# Patient Record
Sex: FEMALE | Race: WHITE | NOT HISPANIC OR LATINO | Employment: OTHER | URBAN - METROPOLITAN AREA
[De-identification: names, ages, dates, MRNs, and addresses within clinical notes are randomized per-mention and may not be internally consistent; named-entity substitution may affect disease eponyms.]

---

## 2017-01-10 ENCOUNTER — ALLSCRIPTS OFFICE VISIT (OUTPATIENT)
Dept: OTHER | Facility: OTHER | Age: 67
End: 2017-01-10

## 2017-01-10 DIAGNOSIS — Z20.5 CONTACT WITH AND (SUSPECTED) EXPOSURE TO VIRAL HEPATITIS: ICD-10-CM

## 2017-01-10 DIAGNOSIS — Z12.11 ENCOUNTER FOR SCREENING FOR MALIGNANT NEOPLASM OF COLON: ICD-10-CM

## 2017-01-10 DIAGNOSIS — I10 ESSENTIAL (PRIMARY) HYPERTENSION: ICD-10-CM

## 2017-01-10 DIAGNOSIS — M79.605 PAIN OF LEFT LEG: ICD-10-CM

## 2017-01-10 DIAGNOSIS — M79.604 PAIN OF RIGHT LEG: ICD-10-CM

## 2017-01-17 ENCOUNTER — ALLSCRIPTS OFFICE VISIT (OUTPATIENT)
Dept: OTHER | Facility: OTHER | Age: 67
End: 2017-01-17

## 2017-02-09 ENCOUNTER — ALLSCRIPTS OFFICE VISIT (OUTPATIENT)
Dept: OTHER | Facility: OTHER | Age: 67
End: 2017-02-09

## 2017-02-21 ENCOUNTER — APPOINTMENT (OUTPATIENT)
Dept: LAB | Facility: CLINIC | Age: 67
End: 2017-02-21
Payer: MEDICARE

## 2017-02-21 ENCOUNTER — TRANSCRIBE ORDERS (OUTPATIENT)
Dept: LAB | Facility: CLINIC | Age: 67
End: 2017-02-21

## 2017-02-21 DIAGNOSIS — M81.0 OSTEOPOROSIS, UNSPECIFIED: Primary | ICD-10-CM

## 2017-02-21 DIAGNOSIS — E55.9 UNSPECIFIED VITAMIN D DEFICIENCY: ICD-10-CM

## 2017-02-21 LAB
25(OH)D3 SERPL-MCNC: 30.1 NG/ML (ref 30–100)
ALBUMIN SERPL BCP-MCNC: 3.9 G/DL (ref 3.5–5)
ANION GAP SERPL CALCULATED.3IONS-SCNC: 5 MMOL/L (ref 4–13)
BUN SERPL-MCNC: 14 MG/DL (ref 5–25)
CALCIUM ALBUM COR SERPL-MCNC: 10.3 MG/DL (ref 8.3–10.1)
CALCIUM SERPL-MCNC: 10.2 MD/DL (ref 8.3–10.1)
CALCIUM SERPL-MCNC: 10.2 MG/DL (ref 8.3–10.1)
CHLORIDE SERPL-SCNC: 107 MMOL/L (ref 100–108)
CO2 SERPL-SCNC: 30 MMOL/L (ref 21–32)
CREAT SERPL-MCNC: 0.95 MG/DL (ref 0.6–1.3)
GFR SERPL CREATININE-BSD FRML MDRD: 58.7 ML/MIN/1.73SQ M
GLUCOSE SERPL-MCNC: 118 MG/DL (ref 65–140)
POTASSIUM SERPL-SCNC: 4.7 MMOL/L (ref 3.5–5.3)
SODIUM SERPL-SCNC: 142 MMOL/L (ref 136–145)

## 2017-02-21 PROCEDURE — 82040 ASSAY OF SERUM ALBUMIN: CPT

## 2017-02-21 PROCEDURE — 80048 BASIC METABOLIC PNL TOTAL CA: CPT

## 2017-02-21 PROCEDURE — 36415 COLL VENOUS BLD VENIPUNCTURE: CPT

## 2017-02-21 PROCEDURE — 82306 VITAMIN D 25 HYDROXY: CPT

## 2017-02-21 PROCEDURE — 82310 ASSAY OF CALCIUM: CPT

## 2017-02-24 ENCOUNTER — GENERIC CONVERSION - ENCOUNTER (OUTPATIENT)
Dept: OTHER | Facility: OTHER | Age: 67
End: 2017-02-24

## 2017-03-21 ENCOUNTER — TRANSCRIBE ORDERS (OUTPATIENT)
Dept: LAB | Facility: CLINIC | Age: 67
End: 2017-03-21

## 2017-03-21 ENCOUNTER — APPOINTMENT (OUTPATIENT)
Dept: LAB | Facility: CLINIC | Age: 67
End: 2017-03-21
Payer: MEDICARE

## 2017-03-21 DIAGNOSIS — E53.8 OTHER B-COMPLEX DEFICIENCIES: ICD-10-CM

## 2017-03-21 DIAGNOSIS — M62.81 MUSCLE WEAKNESS (GENERALIZED): ICD-10-CM

## 2017-03-21 DIAGNOSIS — G60.0 PERONEAL MUSCULAR ATROPHY: Primary | ICD-10-CM

## 2017-03-21 DIAGNOSIS — R20.2 PARESTHESIA: ICD-10-CM

## 2017-03-21 LAB — VIT B12 SERPL-MCNC: 438 PG/ML (ref 100–900)

## 2017-03-21 PROCEDURE — 82607 VITAMIN B-12: CPT

## 2017-03-21 PROCEDURE — 36415 COLL VENOUS BLD VENIPUNCTURE: CPT

## 2017-03-21 PROCEDURE — 86618 LYME DISEASE ANTIBODY: CPT

## 2017-03-21 PROCEDURE — 83918 ORGANIC ACIDS TOTAL QUANT: CPT

## 2017-03-21 PROCEDURE — 84165 PROTEIN E-PHORESIS SERUM: CPT

## 2017-03-21 PROCEDURE — 86038 ANTINUCLEAR ANTIBODIES: CPT

## 2017-03-21 PROCEDURE — 86235 NUCLEAR ANTIGEN ANTIBODY: CPT

## 2017-03-22 LAB
B BURGDOR IGG SER IA-ACNC: 0.43
B BURGDOR IGM SER IA-ACNC: 0.15
ENA SS-A AB SER-ACNC: <0.2 AI (ref 0–0.9)
ENA SS-B AB SER-ACNC: <0.2 AI (ref 0–0.9)

## 2017-03-23 LAB
ALBUMIN SERPL ELPH-MCNC: 4 G/DL (ref 3.5–5)
ALBUMIN SERPL ELPH-MCNC: 62.5 % (ref 52–65)
ALPHA1 GLOB SERPL ELPH-MCNC: 0.26 G/DL (ref 0.1–0.4)
ALPHA1 GLOB SERPL ELPH-MCNC: 4.1 % (ref 2.5–5)
ALPHA2 GLOB SERPL ELPH-MCNC: 0.72 G/DL (ref 0.4–1.2)
ALPHA2 GLOB SERPL ELPH-MCNC: 11.3 % (ref 7–13)
BETA GLOB ABNORMAL SERPL ELPH-MCNC: 0.52 G/DL (ref 0.4–0.8)
BETA1 GLOB SERPL ELPH-MCNC: 8.1 % (ref 5–13)
BETA2 GLOB SERPL ELPH-MCNC: 4.8 % (ref 2–8)
BETA2+GAMMA GLOB SERPL ELPH-MCNC: 0.31 G/DL (ref 0.2–0.5)
GAMMA GLOB ABNORMAL SERPL ELPH-MCNC: 0.59 G/DL (ref 0.5–1.6)
GAMMA GLOB SERPL ELPH-MCNC: 9.2 % (ref 12–22)
IGG/ALB SER: 1.67 {RATIO} (ref 1.1–1.8)
PROT PATTERN SERPL ELPH-IMP: ABNORMAL
PROT SERPL-MCNC: 6.4 G/DL (ref 6.4–8.2)
RYE IGE QN: NEGATIVE

## 2017-03-24 LAB — METHYLMALONATE SERPL-SCNC: 269 NMOL/L (ref 0–378)

## 2017-06-28 ENCOUNTER — ALLSCRIPTS OFFICE VISIT (OUTPATIENT)
Dept: OTHER | Facility: OTHER | Age: 67
End: 2017-06-28

## 2017-07-11 ENCOUNTER — OFFICE VISIT (OUTPATIENT)
Dept: URGENT CARE | Facility: CLINIC | Age: 67
End: 2017-07-11
Payer: MEDICARE

## 2017-07-11 ENCOUNTER — APPOINTMENT (OUTPATIENT)
Dept: RADIOLOGY | Facility: CLINIC | Age: 67
End: 2017-07-11
Payer: MEDICARE

## 2017-07-11 DIAGNOSIS — R05.9 COUGH: ICD-10-CM

## 2017-07-11 PROCEDURE — 71020 HB CHEST X-RAY 2VW FRONTAL&LATL: CPT

## 2017-07-11 PROCEDURE — 99214 OFFICE O/P EST MOD 30 MIN: CPT

## 2017-07-11 PROCEDURE — G0463 HOSPITAL OUTPT CLINIC VISIT: HCPCS

## 2017-07-17 ENCOUNTER — ALLSCRIPTS OFFICE VISIT (OUTPATIENT)
Dept: OTHER | Facility: OTHER | Age: 67
End: 2017-07-17

## 2017-08-23 ENCOUNTER — ALLSCRIPTS OFFICE VISIT (OUTPATIENT)
Dept: OTHER | Facility: OTHER | Age: 67
End: 2017-08-23

## 2017-08-28 ENCOUNTER — APPOINTMENT (OUTPATIENT)
Dept: LAB | Facility: CLINIC | Age: 67
End: 2017-08-28
Payer: MEDICARE

## 2017-08-28 ENCOUNTER — TRANSCRIBE ORDERS (OUTPATIENT)
Dept: LAB | Facility: CLINIC | Age: 67
End: 2017-08-28

## 2017-08-28 DIAGNOSIS — M81.0 SENILE OSTEOPOROSIS: Primary | ICD-10-CM

## 2017-08-28 DIAGNOSIS — E55.9 UNSPECIFIED VITAMIN D DEFICIENCY: ICD-10-CM

## 2017-08-28 LAB
25(OH)D3 SERPL-MCNC: 30.2 NG/ML (ref 30–100)
ALBUMIN SERPL BCP-MCNC: 3.7 G/DL (ref 3.5–5)
ALP SERPL-CCNC: 62 U/L (ref 46–116)
ALT SERPL W P-5'-P-CCNC: 25 U/L (ref 12–78)
ANION GAP SERPL CALCULATED.3IONS-SCNC: 7 MMOL/L (ref 4–13)
AST SERPL W P-5'-P-CCNC: 17 U/L (ref 5–45)
BILIRUB SERPL-MCNC: 0.51 MG/DL (ref 0.2–1)
BUN SERPL-MCNC: 23 MG/DL (ref 5–25)
CALCIUM SERPL-MCNC: 9.6 MG/DL (ref 8.3–10.1)
CHLORIDE SERPL-SCNC: 101 MMOL/L (ref 100–108)
CO2 SERPL-SCNC: 29 MMOL/L (ref 21–32)
CREAT SERPL-MCNC: 0.82 MG/DL (ref 0.6–1.3)
GFR SERPL CREATININE-BSD FRML MDRD: 74 ML/MIN/1.73SQ M
GLUCOSE SERPL-MCNC: 176 MG/DL (ref 65–140)
POTASSIUM SERPL-SCNC: 5.3 MMOL/L (ref 3.5–5.3)
PROT SERPL-MCNC: 7.1 G/DL (ref 6.4–8.2)
SODIUM SERPL-SCNC: 137 MMOL/L (ref 136–145)

## 2017-08-28 PROCEDURE — 82306 VITAMIN D 25 HYDROXY: CPT

## 2017-08-28 PROCEDURE — 36415 COLL VENOUS BLD VENIPUNCTURE: CPT

## 2017-08-28 PROCEDURE — 80053 COMPREHEN METABOLIC PANEL: CPT

## 2017-09-07 ENCOUNTER — GENERIC CONVERSION - ENCOUNTER (OUTPATIENT)
Dept: OTHER | Facility: OTHER | Age: 67
End: 2017-09-07

## 2017-09-11 ENCOUNTER — GENERIC CONVERSION - ENCOUNTER (OUTPATIENT)
Dept: OTHER | Facility: OTHER | Age: 67
End: 2017-09-11

## 2017-09-13 ENCOUNTER — GENERIC CONVERSION - ENCOUNTER (OUTPATIENT)
Dept: OTHER | Facility: OTHER | Age: 67
End: 2017-09-13

## 2017-10-04 ENCOUNTER — APPOINTMENT (OUTPATIENT)
Dept: RADIOLOGY | Facility: CLINIC | Age: 67
End: 2017-10-04
Payer: MEDICARE

## 2017-10-04 ENCOUNTER — OFFICE VISIT (OUTPATIENT)
Dept: URGENT CARE | Facility: CLINIC | Age: 67
End: 2017-10-04
Payer: MEDICARE

## 2017-10-04 DIAGNOSIS — R52 PAIN: ICD-10-CM

## 2017-10-04 DIAGNOSIS — S69.92XA UNSPECIFIED INJURY OF LEFT WRIST, HAND AND FINGER(S), INITIAL ENCOUNTER: ICD-10-CM

## 2017-10-04 PROCEDURE — 73130 X-RAY EXAM OF HAND: CPT

## 2017-10-04 PROCEDURE — 90715 TDAP VACCINE 7 YRS/> IM: CPT

## 2017-10-05 NOTE — PROGRESS NOTES
Assessment  1  Injury of left hand, initial encounter (959 4) (C51 28OD)   2  Left thumb sprain (842 10) (C45 672A)   3  Abrasion of knee (916 0) (S80 219A)    Plan  Injury of left hand, initial encounter    · * XR HAND 3+ VIEW LEFT; Status:Active; Requested for:04Oct2017;     Discussion/Summary  Discussion Summary:   Xray appears negative for fracture  Will follow up with radiologist report  Patient wearing stabilization brace  Ice, elevate  Can take ibuprofen for pain  If not improving over the next week to follow up with orthopedics  Medication Side Effects Reviewed: Possible side effects of new medications were reviewed with the patient/guardian today  Understands and agrees with treatment plan: The treatment plan was reviewed with the patient/guardian  The patient/guardian understands and agrees with the treatment plan   Follow Up Instructions: Follow Up with your Primary Care Provider in 7 days  If your symptoms worsen, go to the nearest [place] [address] Emergency Department  Chief Complaint  1  Wrist Pain  Chief Complaint Free Text Note Form: S/p fall on 10/01/17 , c/o pain , swelling and discoloration of left wrist extending to thumb; also with abrasions of both knees; denies loc      History of Present Illness  HPI: 80-year-old female here after falling 3 days ago  She has persistent pain and swelling and bruising of her left thenar area that extends up little bit into her wrist  Has decreased range of motion of her thumb  Also has abrasions on both of her knees  2 bilateral outstretched hands  Right hand and wrist with full range of motion but has some bruising along the lateral aspect of hand  Hospital Based Practices Required Assessment:   Pain Assessment   the patient states they have pain  The pain is located in the wrist    Dior-Coreas FACES Pain Rating Scale Children >3 Score: 8  Wrist Pain: BERTA MIGUEL presents with complaints of wrist pain     Associated symptoms include swelling,-redness,-wrist bruising,-decreased range of motion-and-pain in the hand, but-no warmth,-no crepitation,-no stiffness,-no numbness in the hand,-no weakness in the hand,-no fever,-no chills,-no generalized rash,-no localized rash-and-no pain in other joints  Review of Systems  Focused-Female:   Constitutional: No fever, no chills, feels well, no tiredness, no recent weight gain or loss  Musculoskeletal: as noted in HPI  Integumentary: as noted in HPI  Neurological: no complaints of headache, no confusion, no numbness or tingling, no dizziness or fainting  ROS Reviewed:   ROS reviewed  Active Problems  1  Acute bronchitis (466 0) (J20 9)   2  Anxiety (300 00) (F41 9)   3  Atopic dermatitis (691 8) (L20 9)   4  Chronic lower back pain (724 2,338 29) (M54 5,G89 29)   5  Chronic pain of both lower extremities (729 5,338 29) (M79 604,M79 605,G89 29)   6  Clinical depression (311) (F32 9)   7  Colon cancer screening (V76 51) (Z12 11)   8  Cough (786 2) (R05)   9  Degeneration of intervertebral disc (722 6)   10  Dermatitis (692 9) (L30 9)   11  Disc herniation (722 2)   12  Edema, peripheral (782 3) (R60 9)   13  Encounter for screening colonoscopy (V76 51) (Z12 11)   14  Episode of recurrent major depressive disorder, unspecified depression episode    severity (296 30) (F33 9)   15  Exposure to hepatitis C (V01 79) (Z20 5)   16  Flu vaccine need (V04 81) (Z23)   17  HTN (hypertension) (401 9) (I10)   18  Hypercholesteremia (272 0) (E78 00)   19  Hypotension (458 9) (I95 9)   20  Impetigo (684) (L01 00)   21  Left hip pain (719 45) (M25 552)   22  Lumbar radiculopathy (724 4) (M54 16)   23  Major depression (296 20) (F32 9)   24  Osteoporosis (733 00) (M81 0)   25  Restless legs syndrome (RLS) (333 94) (G25 81)   26  S/P lumbar fusion (V45 4) (Z98 1)   27  Screening for genitourinary condition (V81 6) (Z13 89)   28  Screening for neurological condition (V80 09) (Z13 89)   29   Sleep disturbances (780 50) (G47 9)   30  SOB (shortness of breath) on exertion (786 05) (R06 02)   31  Type 2 diabetes mellitus (250 00) (E11 9)   32  Ulnar neuropathy (354 2) (G56 20)   33  Uncontrolled diabetes mellitus type 2 without complications (229 64) (D49 95)   34  Visit for screening mammogram (V76 12) (Z12 31)   35  Vitamin D deficiency (268 9) (E55 9)    Past Medical History  1  History of Cellulitis (682 9) (L03 90)   2  History of Dry skin (701 1) (L85 3)   3  History of Dysuria (788 1) (R30 0)   4  History of Herniated disc (722 2)   5  History of esophageal reflux (V12 79) (Z87 19)   6  History of fatigue (V13 89) (Z87 898)   7  History of pneumonia (V12 61) (Z87 01)   8  History of urinary tract infection (V13 02) (Z87 440)   9  History of Left elbow pain (719 42) (M25 522)   10  History of Neck pain (723 1) (M54 2)   11  History of Pain, foot (729 5) (M79 673)   12  History of URTI (acute upper respiratory infection) (465 9) (J06 9)  Active Problems And Past Medical History Reviewed: The active problems and past medical history were reviewed and updated today  Family History  Mother    1  Family history of cerebrovascular accident (CVA) (V17 1) (Z82 3)   2  Family history of hypertension (V17 49) (Z82 49)   3  Family history of mental disorder (V17 0) (Z81 8)  Father    4  Family history of cardiac disorder (V17 49) (Z82 49)   5  Family history of malignant neoplasm (V16 9) (Z80 9)   6  Family history of type 2 diabetes mellitus (V18 0) (Z83 3)  Sibling    7  Family history of Calcium kidney stones   8  Family history of herpes zoster infection (V18 8) (Z83 1)   9  Family history of hypertension (V17 49) (Z82 49)   10  Family history of mental disorder (V17 0) (Z81 8)   11  Family history of migraine headaches (V17 2) (Z82 0)   12  Family history of type 2 diabetes mellitus (V18 0) (Z83 3)  Grandparent    13  Family history of Calcium kidney stones   14   Family history of chronic obstructive pulmonary disease (V17 6) (Z82 5)   15  Family history of tuberculosis (V18 8) (Z83 1)  Family History Reviewed: The family history was reviewed and updated today  Social History   · Alcohol use (V49 89) (Z78 9)   · Always uses seat belt   · Current every day smoker (305 1) (F17 200)   · Dental care, regularly   · English   · Good dental hygiene   · Occasional caffeine consumption   · Primary spoken language [place]  Social History Reviewed: The social history was reviewed and updated today  The social history was reviewed and is unchanged  Surgical History  1  History of Back Surgery   2  History of Cholecystectomy   3  History of Foot Surgery   4  History of Hip Surgery   5  History of Hysterectomy   6  History of Laparoscopic Sling Operation For Stress Incontinence   7  History of Ovarian Cystectomy   8  History of Shoulder Surgery  Surgical History Reviewed: The surgical history was reviewed and updated today  Current Meds   1  Albuterol Sulfate (2 5 MG/3ML) 0 083% Inhalation Nebulization Solution; USE 1 UNIT   DOSE IN NEBULIZER EVERY 4 HOURS AS NEEDED; Therapy: 93Uxl1271 to (Last Rx:45Eit4587)  Requested for: 92Snw3393 Ordered   2  Atorvastatin Calcium 20 MG Oral Tablet; take 1 tablet by mouth once daily; Therapy: 76DPD8949 to (Evaluate:08Rxs2878)  Requested for: 45TXI2168; Last   Rx:25Fqa2748 Ordered   3  Betamethasone Dipropionate 0 05 % External Cream; APPLY SPARINGLY TO AFFECTED   AREA(S) TWICE DAILY; Therapy: 09Vwq3422 to (Last Rx:41Wbo8840)  Requested for: 72Noc7087 Ordered   4  Calcium 500 MG TABS; TAKE 1 TABLET DAILY; Therapy: 77GYE8862 to Recorded   5  ClonazePAM 0 125 MG Oral Tablet Disintegrating; one at bedtime as needed; Therapy: 77Zwu3025 to Recorded   6  Elavil 25 MG Oral Tablet; TAKE 1 TABLET 3 TIMES DAILY AS NEEDED; Therapy: 96Fto4567 to Recorded   7  Escitalopram Oxalate 10 MG Oral Tablet; TAKE 1 TABLET DAILY;    Therapy: 51LWW7857 to (Evaluate:54Kdu8580)  Requested for: 72ONV9886; Last   EO:68ARW5430 Ordered   8  HydroCHLOROthiazide 50 MG Oral Tablet; 1 tablet daily prn for ankle swelling; Therapy: 95ERK6572 to (Last Jose Prima)  Requested for: 11LMG6667 Ordered   9  Lisinopril 2 5 MG Oral Tablet; TAKE ONE TABLET BY MOUTH ONCE DAILY AS   DIRECTED; Therapy: 40ISR7310 to (Evaluate:13Jan2018)  Requested for: 32OPC2412; Last   Rx:51Qif5304 Ordered   10  MetFORMIN HCl - 500 MG Oral Tablet; TAKE 1 TABLET TWICE DAILY WITH FOOD; Therapy: 66KCY4315 to (RFAPQPNV:09DMV1257)  Requested for: 57FYA1529; Last    Rx:29Jzo1404 Ordered   11  Mirapex ER 1 5 MG Oral Tablet Extended Release 24 Hour; TAKE 1 TABLET EVERY    MORNING; Therapy: 31DHU6122 to Recorded   12  Omeprazole 20 MG Oral Capsule Delayed Release; TAKE ONE CAPSULE BY MOUTH    ONCE DAILY; Therapy: 24Qzz4379 to (Evaluate:97Kol7467)  Requested for: 64BIT9327; Last    Rx:59Txb7348 Ordered   13  OneTouch Ultra Blue In Vitro Strip; TEST 3 TIMES DAILY; Therapy: 00MLM6878 to (Evaluate:42Thq9920) Recorded   14  ProAir  (90 Base) MCG/ACT Inhalation Aerosol Solution; INHALE 1 TO 2 PUFFS    EVERY 4 TO 6 HOURS AS NEEDED; Therapy: 01Apr2016 to (Last Rx:01Apr2016)  Requested for: 01Apr2016 Ordered   15  Prolia 60 MG/ML Subcutaneous Solution; INJECT SUBCUTANEOUSLY  60 MG / 1 ML    EVERY 6 MONTHS; Therapy: 62Uxt2755 to Recorded   16  Vitamin B Complex Oral Tablet; TAKE 1 TABLET DAILY; Therapy: 88ZJN5051 to Recorded   17  Vitamin D 1000 UNIT Oral Tablet; TAKE 1 TABLET DAILY; Therapy: 92SZR4254 to (LJTERRNR:58QSD0829) Recorded  Medication List Reviewed: The medication list was reviewed and updated today  Allergies  1  Versed   2  Gabapentin CAPS   3  Lyrica CAPS   4  Penicillin V Potassium TABS   5  propofol   6  Sulfa Drugs   7   Vicodin TABS    Vitals  Signs   Recorded: 69JET0422 01:12PM   Temperature: 97 1 F  Heart Rate: 88  Respiration: 18  Systolic: 712  Diastolic: 76  O2 Saturation: 95  Pain Scale: 8    Physical Exam    Constitutional   General appearance: No acute distress, well appearing and well nourished  Pulmonary   Respiratory effort: No increased work of breathing or signs of respiratory distress  Auscultation of lungs: Clear to auscultation  Cardiovascular   Auscultation of heart: Normal rate and rhythm, normal S1 and S2, without murmurs  Musculoskeletal   Inspection/palpation of joints, bones, and muscles: Abnormal  -Left thenar area with swelling and ecchymosis  Tenderness over MCP joint  Has full range of motion of wrist  Unable to fully oppose thumb and right fourth and fifth fingers  Unable to make a fist  Right wrist and hand with full range of motion  Mild bruising along lateral aspect of hand  Skin   Skin and subcutaneous tissue: Abnormal  -Healing abrasions on bilateral knees with no signs of infection        Future Appointments    Date/Time Provider Specialty Site   11/03/2017 11:15 AM Otto Mejía, Aleksandr Zarate [address] INT MED     Signatures   Electronically signed by : Aleksandr Neville; Oct  4 2017  1:44PM EST                       (Author)    Electronically signed by : MARY BETH Kothari ; Oct  4 2017  7:22PM EST                       (Co-author)

## 2017-10-11 ENCOUNTER — GENERIC CONVERSION - ENCOUNTER (OUTPATIENT)
Dept: OTHER | Facility: OTHER | Age: 67
End: 2017-10-11

## 2017-11-06 ENCOUNTER — ALLSCRIPTS OFFICE VISIT (OUTPATIENT)
Dept: OTHER | Facility: OTHER | Age: 67
End: 2017-11-06

## 2017-11-06 ENCOUNTER — TRANSCRIBE ORDERS (OUTPATIENT)
Dept: LAB | Facility: CLINIC | Age: 67
End: 2017-11-06

## 2017-11-06 ENCOUNTER — APPOINTMENT (OUTPATIENT)
Dept: LAB | Facility: CLINIC | Age: 67
End: 2017-11-06
Payer: MEDICARE

## 2017-11-06 DIAGNOSIS — I10 ESSENTIAL HYPERTENSION, MALIGNANT: ICD-10-CM

## 2017-11-06 DIAGNOSIS — E78.00 PURE HYPERCHOLESTEROLEMIA: ICD-10-CM

## 2017-11-06 DIAGNOSIS — E13.8 DIABETES MELLITUS OF OTHER TYPE WITH COMPLICATION, UNSPECIFIED LONG TERM INSULIN USE STATUS: Primary | ICD-10-CM

## 2017-11-06 DIAGNOSIS — E55.9 AVITAMINOSIS D: ICD-10-CM

## 2017-11-06 LAB
25(OH)D3 SERPL-MCNC: 37.6 NG/ML (ref 30–100)
BASOPHILS # BLD AUTO: 0.1 THOUSANDS/ΜL (ref 0–0.1)
BASOPHILS NFR BLD AUTO: 1 % (ref 0–1)
EOSINOPHIL # BLD AUTO: 0.24 THOUSAND/ΜL (ref 0–0.61)
EOSINOPHIL NFR BLD AUTO: 3 % (ref 0–6)
ERYTHROCYTE [DISTWIDTH] IN BLOOD BY AUTOMATED COUNT: 14.9 % (ref 11.6–15.1)
HCT VFR BLD AUTO: 39.1 % (ref 34.8–46.1)
HGB BLD-MCNC: 12.5 G/DL (ref 11.5–15.4)
LYMPHOCYTES # BLD AUTO: 2.69 THOUSANDS/ΜL (ref 0.6–4.47)
LYMPHOCYTES NFR BLD AUTO: 36 % (ref 14–44)
MCH RBC QN AUTO: 28 PG (ref 26.8–34.3)
MCHC RBC AUTO-ENTMCNC: 32 G/DL (ref 31.4–37.4)
MCV RBC AUTO: 88 FL (ref 82–98)
MONOCYTES # BLD AUTO: 0.6 THOUSAND/ΜL (ref 0.17–1.22)
MONOCYTES NFR BLD AUTO: 8 % (ref 4–12)
NEUTROPHILS # BLD AUTO: 3.79 THOUSANDS/ΜL (ref 1.85–7.62)
NEUTS SEG NFR BLD AUTO: 52 % (ref 43–75)
NRBC BLD AUTO-RTO: 0 /100 WBCS
PLATELET # BLD AUTO: 258 THOUSANDS/UL (ref 149–390)
PMV BLD AUTO: 10.2 FL (ref 8.9–12.7)
RBC # BLD AUTO: 4.46 MILLION/UL (ref 3.81–5.12)
WBC # BLD AUTO: 7.43 THOUSAND/UL (ref 4.31–10.16)

## 2017-11-06 PROCEDURE — 82306 VITAMIN D 25 HYDROXY: CPT

## 2017-11-06 PROCEDURE — 82043 UR ALBUMIN QUANTITATIVE: CPT

## 2017-11-06 PROCEDURE — 84443 ASSAY THYROID STIM HORMONE: CPT

## 2017-11-06 PROCEDURE — 83036 HEMOGLOBIN GLYCOSYLATED A1C: CPT

## 2017-11-06 PROCEDURE — 80053 COMPREHEN METABOLIC PANEL: CPT

## 2017-11-06 PROCEDURE — 85025 COMPLETE CBC W/AUTO DIFF WBC: CPT

## 2017-11-06 PROCEDURE — 80061 LIPID PANEL: CPT

## 2017-11-06 PROCEDURE — 82570 ASSAY OF URINE CREATININE: CPT

## 2017-11-06 PROCEDURE — 36415 COLL VENOUS BLD VENIPUNCTURE: CPT

## 2017-11-07 ENCOUNTER — GENERIC CONVERSION - ENCOUNTER (OUTPATIENT)
Dept: OTHER | Facility: OTHER | Age: 67
End: 2017-11-07

## 2017-11-07 LAB
ALBUMIN SERPL BCP-MCNC: 3.6 G/DL (ref 3.5–5)
ALP SERPL-CCNC: 54 U/L (ref 46–116)
ALT SERPL W P-5'-P-CCNC: 24 U/L (ref 12–78)
ANION GAP SERPL CALCULATED.3IONS-SCNC: 7 MMOL/L (ref 4–13)
AST SERPL W P-5'-P-CCNC: 17 U/L (ref 5–45)
BILIRUB SERPL-MCNC: 0.51 MG/DL (ref 0.2–1)
BUN SERPL-MCNC: 22 MG/DL (ref 5–25)
CALCIUM SERPL-MCNC: 9.1 MG/DL (ref 8.3–10.1)
CHLORIDE SERPL-SCNC: 106 MMOL/L (ref 100–108)
CHOLEST SERPL-MCNC: 115 MG/DL (ref 50–200)
CO2 SERPL-SCNC: 26 MMOL/L (ref 21–32)
CREAT SERPL-MCNC: 0.84 MG/DL (ref 0.6–1.3)
CREAT UR-MCNC: 74.8 MG/DL
EST. AVERAGE GLUCOSE BLD GHB EST-MCNC: 148 MG/DL
GFR SERPL CREATININE-BSD FRML MDRD: 72 ML/MIN/1.73SQ M
GLUCOSE P FAST SERPL-MCNC: 84 MG/DL (ref 65–99)
HBA1C MFR BLD: 6.8 % (ref 4.2–6.3)
HDLC SERPL-MCNC: 43 MG/DL (ref 40–60)
LDLC SERPL CALC-MCNC: 47 MG/DL (ref 0–100)
MICROALBUMIN UR-MCNC: 7.4 MG/L (ref 0–20)
MICROALBUMIN/CREAT 24H UR: 10 MG/G CREATININE (ref 0–30)
POTASSIUM SERPL-SCNC: 4.3 MMOL/L (ref 3.5–5.3)
PROT SERPL-MCNC: 7.1 G/DL (ref 6.4–8.2)
SODIUM SERPL-SCNC: 139 MMOL/L (ref 136–145)
TRIGL SERPL-MCNC: 125 MG/DL
TSH SERPL DL<=0.05 MIU/L-ACNC: 3.09 UIU/ML (ref 0.36–3.74)

## 2017-11-10 ENCOUNTER — ALLSCRIPTS OFFICE VISIT (OUTPATIENT)
Dept: OTHER | Facility: OTHER | Age: 67
End: 2017-11-10

## 2017-11-29 ENCOUNTER — GENERIC CONVERSION - ENCOUNTER (OUTPATIENT)
Dept: OTHER | Facility: OTHER | Age: 67
End: 2017-11-29

## 2018-01-03 ENCOUNTER — ALLSCRIPTS OFFICE VISIT (OUTPATIENT)
Dept: OTHER | Facility: OTHER | Age: 68
End: 2018-01-03

## 2018-01-03 DIAGNOSIS — Z91.81 HISTORY OF FALLING: ICD-10-CM

## 2018-01-09 NOTE — RESULT NOTES
Verified Results  * DXA BONE DENSITY SPINE HIP AND PELVIS 66AHW9689 12:50PM Isiah De Santiago Order Number: [id number]     Test Name Result Flag Reference   DXA BONE DENSITY SPINE HIP AND PELVIS (Report)     CENTRAL DXA SCAN     CLINICAL HISTORY:  77year old post-menopausal  female risk factors include postmenopausal, premature menopause, estrogen deficiency  TECHNIQUE: Bone densitometry was performed using a [employer] [place] C bone densitometer  Regions of interest appear properly placed  There are no obvious fractures or other confounding variables which could limit the study  None     COMPARISON: Baseline     RESULTS:    LUMBAR SPINE: L1-L4:   BMD 0 995 gm/cm2   T-score normal, -0 5   Z-score +1 4     LEFT TOTAL HIP:   BMD 0 771 gm/cm2   T-score below normal, -1 4   Z-score -0 1     LEFT FEMORAL NECK:   BMD 0 631 gm/cm2   T-score below normal, -2 0   Z-score -0 4     The Frax score in this patient identifies the risk of a major osteoporotic fracture in the next 10 years at 19% and a hip fracture risk of 1 9%  ASSESSMENT:   1  Based on the WHO classification, this study identifies moderate femoral neck and total hip osteopenia and the patient is considered at risk for fracture  2  A daily intake of calcium of at least 1200 mg and vitamin D, 800-1000 IU, as well as weight bearing and muscle strengthening exercise, fall prevention and avoidance of tobacco and excessive alcohol intake as basic preventive measures are recommended  3  Repeat DXA scan in 18-24 months as clinically indicated  WHO CLASSIFICATION:   Normal (a T-score of -1 0 or higher)   Low bone mineral density (a T-score of less than -1 0 but higher than -2 5)   Osteoporosis (a T-score of -2 5 or less)   Severe osteoporosis (a T-score of -2 5 or less with a fragility fracture)      Thank you for allowing us the opportunity to participate in your patient care       The expanded DEXA report will no longer be arriving in your mail   If you desire to view the full report please contact [address] [place] [address] or access the PACS system       Workstation performed: [id number]

## 2018-01-11 NOTE — RESULT NOTES
Verified Results  MAMMO SCREENING BILATERAL W 3D & CAD 12LJN8445 01:03PM Leann Mejía     Test Name Result Flag Reference   MAMMO SCREENING BILATERAL W 3D & CAD (Report)     Patient History:   Patient is postmenopausal    Family history of unknown cancer in paternal aunt, unknown cancer   in paternal cousin, and colorectal cancer in father at age 58  Took estrogen for 1 year  Patient is a former smoker  Patient's BMI is 29 5  Reason for exam: screening (asymptomatic)  Mammo Screening Bilateral W DBT and CAD: May 25, 2016 - Check In    #: [de-identified]   2D/3D Procedure   3D views: Bilateral MLO view(s) were taken  2D views: Bilateral CC view(s) were taken  Technologist: SIMI Eason (SIMI)(M)   Prior study comparison: November 6, 2014, mammo screening    bilateral, performed at [facility], [place]  November 4, 2013, mammo screening bilateral W CAD, performed at    [facility], [place]  November 17, 2010, mammo    screening bilateral, performed at [facility], [place]  November 5, 2009, mammo screening bilateral,    performed at [facility], [place]  March 31, 2009, mammo screening bilateral, performed at [place], [place]  There are scattered fibroglandular densities  A combination of    mediolateral oblique 3-D tomographic slices as well as standard    two-dimensional orthogonal images were obtained  No dominant soft tissue mass, architectural distortion or    suspicious calcifications are noted  The skin and nipple    contours are within normal limits  No evidence of malignancy  No significant changes   when compared with prior studies  ASSESSMENT: BiRad:1 - Negative     Recommendation:   Routine screening mammogram of both breasts in 1 year  A    reminder letter will be sent  8-10% of cancers will be missed on mammography   Management of a    palpable abnormality must be based on clinical grounds  Patients    will be notified of their results via letter from our facility  Accredited by [employer] of Radiology and FDA  Transcription Location: SIMI Kennedy 98: [id number]     Risk Value(s):   Tyrer-Cuzick 10 Year: 2 671%, Tyrer-Cuzick Lifetime: 5 366%,    Myriad Table: 1 5%, DO 5 Year: 1 9%, NCI Lifetime: 6 7%   Signed by:    Kimberley Child MD   5/26/16

## 2018-01-12 VITALS
WEIGHT: 164.5 LBS | RESPIRATION RATE: 18 BRPM | HEIGHT: 60 IN | BODY MASS INDEX: 32.3 KG/M2 | DIASTOLIC BLOOD PRESSURE: 70 MMHG | HEART RATE: 96 BPM | TEMPERATURE: 96.3 F | SYSTOLIC BLOOD PRESSURE: 144 MMHG

## 2018-01-12 NOTE — RESULT NOTES
Verified Results  (1) CBC/PLT/DIFF 82ILQ7656 10:30AM Leann Mejía     Test Name Result Flag Reference   WBC COUNT 7 43 Thousand/uL  4 31-10 16   RBC COUNT 4 46 Million/uL  3 81-5 12   HEMOGLOBIN 12 5 g/dL  11 5-15 4   HEMATOCRIT 39 1 %  34 8-46  1   MCV 88 fL  82-98   MCH 28 0 pg  26 8-34 3   MCHC 32 0 g/dL  31 4-37 4   RDW 14 9 %  11 6-15 1   MPV 10 2 fL  8 9-12 7   PLATELET COUNT 244 Thousands/uL  149-390   nRBC AUTOMATED 0 /100 WBCs     NEUTROPHILS RELATIVE PERCENT 52 %  43-75   LYMPHOCYTES RELATIVE PERCENT 36 %  14-44   MONOCYTES RELATIVE PERCENT 8 %  4-12   EOSINOPHILS RELATIVE PERCENT 3 %  0-6   BASOPHILS RELATIVE PERCENT 1 %  0-1   NEUTROPHILS ABSOLUTE COUNT 3 79 Thousands/? ??L  1 85-7 62   LYMPHOCYTES ABSOLUTE COUNT 2 69 Thousands/? ??L  0 60-4 47   MONOCYTES ABSOLUTE COUNT 0 60 Thousand/? ??L  0 17-1 22   EOSINOPHILS ABSOLUTE COUNT 0 24 Thousand/? ??L  0 00-0 61   BASOPHILS ABSOLUTE COUNT 0 10 Thousands/? ??L  0 00-0 10   This is a patient instruction: This test is non-fasting  Please drink two glasses of water morning of bloodwork  (1) VITAMIN D 25-HYDROXY 70GZR0089 10:30AM Leann Mejía     Test Name Result Flag Reference   VIT D 25-HYDROX 37 6 ng/mL  30 0-100 0   This assay is a certified procedure of the CDC Vitamin D Standardization Certification Program (VDSCP)     Deficiency <20ng/ml   Insufficiency 20-30ng/ml   Sufficient  ng/ml     *Patients undergoing fluorescein dye angiography may retain small amounts of fluorescein in the body for 48-72 hours post procedure  Samples containing fluorescein can produce falsely elevated Vitamin D values  If the patient had this procedure, a specimen should be resubmitted post fluorescein clearance       (1) MICROALBUMIN CREATININE RATIO, RANDOM URINE 40VPN1124 10:30AM Leann Mejía     Test Name Result Flag Reference   MICROALBUMIN/ CREAT R 10 mg/g creatinine  0-30   MICROALBUMIN,URINE 7 4 mg/L  0 0-20 0   CREATININE URINE 74 8 mg/dL       (1) TSH [id number] 10:30AM Leann Mejía     Test Name Result Flag Reference   TSH 3 090 uIU/mL  0 358-3 740   This is a patient instruction: This test is non-fasting  Please drink two glasses of water morning of bloodwork  Patients undergoing fluorescein dye angiography may retain small amounts of fluorescein in the body for 48-72 hours post procedure  Samples containing fluorescein can produce falsely depressed TSH values  If the patient had this procedure,a specimen should be resubmitted post fluorescein clearance  The recommended reference ranges for TSH during pregnancy are as follows:  First trimester 0 1 to 2 5 uIU/mL  Second trimester  0 2 to 3 0 uIU/mL  Third trimester 0 3 to 3 0 uIU/m     (1) COMPREHENSIVE METABOLIC PANEL 98YIA3136 44:80SE Leann Mejía     Test Name Result Flag Reference   SODIUM 139 mmol/L  136-145   POTASSIUM 4 3 mmol/L  3 5-5 3   CHLORIDE 106 mmol/L  100-108   CARBON DIOXIDE 26 mmol/L  21-32   ANION GAP (CALC) 7 mmol/L  4-13   BLOOD UREA NITROGEN 22 mg/dL  5-25   CREATININE 0 84 mg/dL  0 60-1 30   Standardized to IDMS reference method   CALCIUM 9 1 mg/dL  8 3-10 1   BILI, TOTAL 0 51 mg/dL  0 20-1 00   ALK PHOSPHATAS 54 U/L     ALT (SGPT) 24 U/L  12-78   Specimen collection should occur prior to Sulfasalazine and/or Sulfapyridine administration due to the potential for falsely depressed results  AST(SGOT) 17 U/L  5-45   Specimen collection should occur prior to Sulfasalazine administration due to the potential for falsely depressed results  ALBUMIN 3 6 g/dL  3 5-5 0   TOTAL PROTEIN 7 1 g/dL  6 4-8 2   eGFR 72 ml/min/1 73sq m     [facility] Disease Education Program recommendations are as follows:  GFR calculation is accurate only with a steady state creatinine  Chronic Kidney disease less than 60 ml/min/1 73 sq  meters  Kidney failure less than 15 ml/min/1 73 sq  meters     GLUCOSE FASTING 84 mg/dL  65-99   Specimen collection should occur prior to Sulfasalazine administration due to the potential for falsely depressed results  Specimen collection should occur prior to Sulfapyridine administration due to the potential for falsely elevated results  (1) LIPID PANEL FASTING W DIRECT LDL REFLEX [id number] 10:30AM Leann Mejía     Test Name Result Flag Reference   CHOLESTEROL 115 mg/dL     LDL CHOLESTEROL CALCULATED 47 mg/dL  0-100   This is a patient instruction: This is a fasting test  Water, black tea or black coffee only after 9:00pm the night before the test  Drink 2 glasses of water the morning of the test         Triglyceride:        Normal <150 mg/dl   Borderline High 150-199 mg/dl   High 200-499 mg/dl   Very High >499 mg/dl      Cholesterol:       Desirable <200 mg/dl    Borderline High 200-239 mg/dl    High >239 mg/dl      HDL Cholesterol:       High>59 mg/dL    Low <41 mg/dL      HDL Cholesterol:       High>59 mg/dL    Low <41 mg/dL      This screening LDL is a calculated result  It does not have the accuracy of the Direct Measured LDL in the monitoring of patients with hyperlipidemia and/or statin therapy  Direct Measure LDL (XRH626) must be ordered separately in these patients  TRIGLYCERIDES 125 mg/dL  <=150   Specimen collection should occur prior to N-Acetylcysteine or Metamizole administration due to the potential for falsely depressed results  HDL,DIRECT 43 mg/dL  40-60   Specimen collection should occur prior to Metamizole administration due to the potential for falsley depressed results  (1) HEMOGLOBIN A1C 03DMH2673 10:30AM Leann Mejía     Test Name Result Flag Reference   HEMOGLOBIN A1C 6 8 % H 4 2-6 3   EST  AVG   GLUCOSE 148 mg/dl

## 2018-01-13 VITALS
HEIGHT: 60 IN | RESPIRATION RATE: 20 BRPM | DIASTOLIC BLOOD PRESSURE: 70 MMHG | HEART RATE: 80 BPM | BODY MASS INDEX: 36 KG/M2 | TEMPERATURE: 99.1 F | SYSTOLIC BLOOD PRESSURE: 102 MMHG | WEIGHT: 183.38 LBS

## 2018-01-14 VITALS
RESPIRATION RATE: 20 BRPM | DIASTOLIC BLOOD PRESSURE: 72 MMHG | HEART RATE: 88 BPM | WEIGHT: 180.38 LBS | TEMPERATURE: 97.7 F | SYSTOLIC BLOOD PRESSURE: 130 MMHG | BODY MASS INDEX: 35.41 KG/M2 | HEIGHT: 60 IN

## 2018-01-14 VITALS
HEIGHT: 60 IN | BODY MASS INDEX: 32.62 KG/M2 | HEART RATE: 88 BPM | WEIGHT: 166.13 LBS | DIASTOLIC BLOOD PRESSURE: 70 MMHG | RESPIRATION RATE: 18 BRPM | SYSTOLIC BLOOD PRESSURE: 150 MMHG | TEMPERATURE: 96.5 F

## 2018-01-14 VITALS
WEIGHT: 169.13 LBS | DIASTOLIC BLOOD PRESSURE: 72 MMHG | RESPIRATION RATE: 18 BRPM | HEIGHT: 60 IN | HEART RATE: 90 BPM | TEMPERATURE: 97.5 F | SYSTOLIC BLOOD PRESSURE: 138 MMHG | OXYGEN SATURATION: 94 % | BODY MASS INDEX: 33.21 KG/M2

## 2018-01-14 VITALS
HEIGHT: 60 IN | TEMPERATURE: 98.3 F | WEIGHT: 176.38 LBS | DIASTOLIC BLOOD PRESSURE: 70 MMHG | RESPIRATION RATE: 18 BRPM | SYSTOLIC BLOOD PRESSURE: 122 MMHG | BODY MASS INDEX: 34.63 KG/M2

## 2018-01-15 VITALS
SYSTOLIC BLOOD PRESSURE: 98 MMHG | WEIGHT: 187 LBS | HEART RATE: 72 BPM | BODY MASS INDEX: 36.71 KG/M2 | DIASTOLIC BLOOD PRESSURE: 60 MMHG | HEIGHT: 60 IN | TEMPERATURE: 98.2 F | RESPIRATION RATE: 16 BRPM

## 2018-01-15 NOTE — RESULT NOTES
Verified Results  * XR CHEST PA & LATERAL [id number] 08:34AM [facility] Order Number: [id number]     Test Name Result Flag Reference   XR CHEST PA & LATERAL (Report)     CHEST      INDICATION: Cough     COMPARISON: None     VIEWS: Frontal and lateral projections; 2 images     FINDINGS:        Heart shadow appears unremarkable  Atherosclerotic vascular calcifications are noted  Left basilar subsegmental atelectasis present  No focal consolidations, pleural effusions, or pneumothorax  Right shoulder arthroplasty partially visualized  IMPRESSION:     Mild left basilar subsegmental atelectasis         Workstation performed: [id number]     Signed by:   Kathryn Cruz MD   4/11/16

## 2018-01-15 NOTE — PROGRESS NOTES
Assessment    1  Left elbow pain (719 42) (M25 522)   2  Neck pain (723 1) (M54 2)   3  Restless legs syndrome (RLS) (333 94) (G25 81)    Plan  Left elbow pain    · Cyclobenzaprine HCl - 5 MG Oral Tablet  Left elbow pain, Neck pain, Restless legs syndrome (RLS)    · Methocarbamol 750 MG Oral Tablet; take 1 tablet by mouth three times a day  Neck pain    · TraMADol HCl - 50 MG Oral Tablet; TAKE 1 TABLET 3 TIMES DAILY AS NEEDED  Type 2 diabetes mellitus    · *VB-Foot Exam; Status:Complete;   Done: [id number] 01:00PM    Discussion/Summary  Discussion Summary:   Will start pt with flexeril and robaxin for neck pain  Start tramadol for pain  Continue all other medicines as prescribed  Counseling Documentation With Imm: The patient was counseled regarding instructions for management, risk factor reductions, patient and family education, risks and benefits of treatment options, importance of compliance with treatment  Medication SE Review and Pt Understands Tx: Possible side effects of new medications were reviewed with the patient/guardian today  The treatment plan was reviewed with the patient/guardian  The patient/guardian understands and agrees with the treatment plan      Chief Complaint  Chief Complaint Free Text Note Form: Pt is here to f/u form blood work  History of Present Illness  HPI: Pt presents for follow up  Labs reviewed with pt  She is slightly vitamin d deficient and was informed to take OTC supplement  Pt complains of increased neck pain that is radiating into her left arm  She has been seeing Dr Susan Maloney regarding her elbow pain on that side  She desires an anti-inflammatory and muscle relaxer  She has not been sleeping secondary to the pain and spasm in her neck  She stopped all of her RLS medicines due to lack of benefit  Review of Systems  Complete-Female:   Constitutional: as noted in HPI     Eyes: No complaints of eye pain, no red eyes, no eyesight problems, no discharge, no dry eyes, no itching of eyes  ENT: no complaints of earache, no loss of hearing, no nose bleeds, no nasal discharge, no sore throat, no hoarseness  Cardiovascular: No complaints of slow heart rate, no fast heart rate, no chest pain, no palpitations, no leg claudication, no lower extremity edema  Respiratory: No complaints of shortness of breath, no wheezing, no cough, no SOB on exertion, no orthopnea, no PND  Gastrointestinal: No complaints of abdominal pain, no constipation, no nausea or vomiting, no diarrhea, no bloody stools  Genitourinary: No complaints of dysuria, no incontinence, no pelvic pain, no dysmenorrhea, no vaginal discharge or bleeding  Musculoskeletal: No complaints of arthralgias, no myalgias, no joint swelling or stiffness, no limb pain or swelling  Integumentary: No complaints of skin rash or lesions, no itching, no skin wounds, no breast pain or lump  Neurological: No complaints of headache, no confusion, no convulsions, no numbness, no dizziness or fainting, no tingling, no limb weakness, no difficulty walking  Psychiatric: as noted in HPI  Endocrine: No complaints of proptosis, no hot flashes, no muscle weakness, no deepening of the voice, no feelings of weakness  Hematologic/Lymphatic: No complaints of swollen glands, no swollen glands in the neck, does not bleed easily, does not bruise easily  ROS Reviewed:   ROS reviewed  Active Problems    1  Fatigue (780 79) (R53 83)   2  HTN (hypertension) (401 9) (I10)   3  Hypercholesteremia (272 0) (E78 0)   4  Left elbow pain (719 42) (M25 522)   5  Pain, foot (729 5) (M79 673)   6  Restless legs syndrome (RLS) (333 94) (G25 81)   7  Type 2 diabetes mellitus (250 00) (E11 9)   8  Ulnar neuropathy (354 2) (G56 20)    Past Medical History    1  History of Asymptomatic menopausal state (V49 81) (Z78 0)   2  History of Dry skin (701 1) (L85 3)   3  History of Herniated disc (722 2)   4  History of depression (V11 8) (Z86 59)   5  History of esophageal reflux (V12 79) (Z87 19)   6  History of URTI (acute upper respiratory infection) (465 9) (J06 9)  Active Problems And Past Medical History Reviewed: The active problems and past medical history were reviewed and updated today  Surgical History    1  History of Cholecystectomy   2  History of Foot Surgery   3  History of Hip Surgery   4  History of Hysterectomy   5  History of Laparoscopic Sling Operation For Stress Incontinence   6  History of Ovarian Cystectomy   7  History of Shoulder Surgery  Surgical History Reviewed: The surgical history was reviewed and updated today  Family History    1  Family history of cerebrovascular accident (CVA) (V17 1) (Z82 3)   2  Family history of hypertension (V17 49) (Z82 49)   3  Family history of mental disorder (V17 0) (Z81 8)    4  Family history of cardiac disorder (V17 49) (Z82 49)   5  Family history of malignant neoplasm (V16 9) (Z80 9)   6  Family history of type 2 diabetes mellitus (V18 0) (Z83 3)    7  Family history of Calcium kidney stones   8  Family history of herpes zoster infection (V18 8) (Z83 1)   9  Family history of hypertension (V17 49) (Z82 49)   10  Family history of mental disorder (V17 0) (Z81 8)   11  Family history of migraine headaches (V17 2) (Z82 0)   12  Family history of type 2 diabetes mellitus (V18 0) (Z83 3)    13  Family history of Calcium kidney stones   14  Family history of chronic obstructive pulmonary disease (V17 6) (Z82 5)   15  Family history of tuberculosis (V18 8) (Z83 1)  Family History Reviewed: The family history was reviewed and updated today  Social History    · Alcohol use (V49 89) (F10 99)   · Current every day smoker (305 1) (F17 200)   · English   · Primary spoken language English  Social History Reviewed: The social history was reviewed and updated today  The social history was reviewed and is unchanged  Current Meds   1   Calcium 500 MG Oral Tablet; TAKE 1 TABLET DAILY; Therapy: 77WDH2952 to Recorded   2  Cyclobenzaprine HCl - 5 MG Oral Tablet; TAKE 1 TABLET 3 TIMES DAILY AS NEEDED; Therapy: 09XLG0032 to (Evaluate:04Jan2016)  Requested for: 44Biv4235; Last Rx:65Gwu2334   Ordered   3  Invokamet 150-1000 MG Oral Tablet; Take one by mouth twice a day; Therapy: 93EOZ0380 to (Hossein Vu)  Requested for: 78WIJ3784; Last OQ:46QIS9979   Ordered   4  Lipitor 20 MG Oral Tablet; take 1 tablet by mouth once daily; Therapy: 50SJW4687 to Recorded   5  Lisinopril 2 5 MG Oral Tablet; TAKE 1 TABLET DAILY AS DIRECTED; Therapy: 89MLZ0881 to (Evaluate:13Jan2016)  Requested for: 58VVS2294; Last Rx:64Ctn4573   Ordered   6  Nystatin-Triamcinolone 626175-9 1 UNIT/GM-% External Ointment; APPLY SPARINGLY TO AFFECTED   AREA(S) 3 TIMES DAILY AS NEEDED; Therapy: 04ITK7809 to Recorded   7  OneTouch Ultra Blue In Vitro Strip; TEST 3 TIMES DAILY; Therapy: 74ZGR1303 to (Evaluate:96Tnv7148) Recorded  Medication List Reviewed: The medication list was reviewed and updated today  Allergies    1  Penicillin V Potassium TABS   2  propofol   3  Sulfa Drugs   4  Vicodin TABS    Vitals  Vital Signs [Data Includes: Current Encounter]    Recorded: 80DBO3855 12:58PM   Temperature 97 6 F   Heart Rate 110   Respiration 18   Systolic 050   Diastolic 74   Height 5 ft    Weight 168 lb 8 oz   BMI Calculated 32 91   BSA Calculated 1 74     Physical Exam  Right Foot Findings: dryness, but no swelling, no erythema, no tenderness, no warmth and no maceration  The toes were normal  Left Foot Findings: dryness, but no swelling, no erythema, no tenderness, no warmth and no maceration   The toes were normal    Monofilament Testing: Tactile sensation in the right foot (see image for location): number 1 was normal, number 4 was normal, number 5 was normal, number 6 was normal, number 2 was normal, number 3 was normal, number 7 was normal, number 8 was normal, number 9 was normal and number 10 was normal  Tactile sensation in the left foot (see image for location): number 1 was diminished, number 6 was diminished, number 2 was diminished, number 3 was diminished, number 9 was diminished and number 10 was diminished, but number 4 was normal, number 5 was normal, number 7 was normal and number 8 was normal    Vascular: Capillary refills findings on the right were normal in the toes  Capillary refills findings on the left were normal in the toes  Assign Risk Category: 1: No loss of protective sensation, deformity present  Impending risk  Constitutional   General appearance: No acute distress, well appearing and well nourished  Eyes   Conjunctiva and lids: No swelling, erythema or discharge  Pupils and irises: Equal, round and reactive to light  Ears, Nose, Mouth, and Throat   External inspection of ears and nose: Normal     Otoscopic examination: Tympanic membranes translucent with normal light reflex  Canals patent without erythema  Nasal mucosa, septum, and turbinates: Normal without edema or erythema  Oropharynx: Normal with no erythema, edema, exudate or lesions  Pulmonary   Respiratory effort: No increased work of breathing or signs of respiratory distress  Auscultation of lungs: Clear to auscultation  Cardiovascular   Auscultation of heart: Normal rate and rhythm, normal S1 and S2, without murmurs  Examination of extremities for edema and/or varicosities: Normal     Carotid pulses: Normal     Abdomen   Abdomen: Non-tender, no masses  Musculoskeletal   Gait and station: Normal     Digits and nails: Normal without clubbing or cyanosis  Inspection/palpation of joints, bones, and muscles: Abnormal   Palpation - left shoulder, C6 and C7 tenderness     Psychiatric   Orientation to person, place, and time: Normal     Mood and affect: Normal          Signatures   Electronically signed by : SANTIAGO Enamorado; Jan 18 2016  8:44AM EST                       (Author)    Electronically signed by EDILBERTO Cortez ; Jan 18 2016  8:44AM EST                       (Author)

## 2018-01-16 ENCOUNTER — TRANSCRIBE ORDERS (OUTPATIENT)
Dept: LAB | Facility: CLINIC | Age: 68
End: 2018-01-16

## 2018-01-16 NOTE — RESULT NOTES
Verified Results  (1) HEMOGLOBIN A1C 08Apr2016 09:58AM Claudene Grippe Order Number: [id number]      5 7-6 4% impaired fasting glucose  >=6 5% diagnosis of diabetes    Falsely low levels are seen in conditions linked to short RBC life span-  hemolytic anemia, and splenomegaly  Falsely elevated levels are seen in situations where there is an increased production of RBC- receipt of erythropoietin or blood transfusions  Adopted from ADA-Clinical Practice Recommendations     Test Name Result Flag Reference   HEMOGLOBIN A1C 6 3 % H 4 0-5 6   EST  AVG   GLUCOSE 134 mg/dl

## 2018-01-22 VITALS
TEMPERATURE: 99.6 F | HEART RATE: 80 BPM | HEIGHT: 60 IN | SYSTOLIC BLOOD PRESSURE: 100 MMHG | BODY MASS INDEX: 36.98 KG/M2 | WEIGHT: 188.38 LBS | RESPIRATION RATE: 20 BRPM | DIASTOLIC BLOOD PRESSURE: 62 MMHG

## 2018-01-22 VITALS
WEIGHT: 183 LBS | HEIGHT: 60 IN | BODY MASS INDEX: 35.93 KG/M2 | RESPIRATION RATE: 16 BRPM | HEART RATE: 84 BPM | SYSTOLIC BLOOD PRESSURE: 110 MMHG | DIASTOLIC BLOOD PRESSURE: 60 MMHG | TEMPERATURE: 98.6 F

## 2018-03-09 DIAGNOSIS — E11.8 TYPE 2 DIABETES MELLITUS WITH COMPLICATION, UNSPECIFIED LONG TERM INSULIN USE STATUS: Primary | ICD-10-CM

## 2018-03-09 RX ORDER — CLONAZEPAM 0.12 MG/1
TABLET, ORALLY DISINTEGRATING ORAL
COMMUNITY
Start: 2017-09-13 | End: 2018-04-06 | Stop reason: ALTCHOICE

## 2018-03-09 RX ORDER — BETAMETHASONE DIPROPIONATE 0.5 MG/G
CREAM TOPICAL 2 TIMES DAILY
COMMUNITY
Start: 2017-08-23 | End: 2018-10-03 | Stop reason: ALTCHOICE

## 2018-03-09 RX ORDER — AMITRIPTYLINE HYDROCHLORIDE 25 MG/1
1 TABLET, FILM COATED ORAL
COMMUNITY
Start: 2017-09-13 | End: 2018-11-26 | Stop reason: SDUPTHER

## 2018-03-09 RX ORDER — OXYCODONE AND ACETAMINOPHEN 10; 325 MG/1; MG/1
1 TABLET ORAL 4 TIMES DAILY PRN
COMMUNITY
Start: 2016-06-03 | End: 2018-05-07 | Stop reason: ALTCHOICE

## 2018-03-09 RX ORDER — ALBUTEROL SULFATE 2.5 MG/3ML
1 SOLUTION RESPIRATORY (INHALATION) EVERY 4 HOURS PRN
COMMUNITY
Start: 2016-07-21 | End: 2018-07-02 | Stop reason: SDUPTHER

## 2018-03-09 RX ORDER — MULTIVIT WITH MINERALS/LUTEIN
TABLET ORAL
Status: ON HOLD | COMMUNITY
Start: 2017-11-10 | End: 2019-01-28 | Stop reason: ALTCHOICE

## 2018-03-09 RX ORDER — ATORVASTATIN CALCIUM 20 MG/1
1 TABLET, FILM COATED ORAL DAILY
COMMUNITY
Start: 2015-12-14 | End: 2018-04-16 | Stop reason: SDUPTHER

## 2018-03-09 RX ORDER — ALBUTEROL SULFATE 90 UG/1
2 AEROSOL, METERED RESPIRATORY (INHALATION)
COMMUNITY
Start: 2016-04-01 | End: 2021-05-10 | Stop reason: SDUPTHER

## 2018-03-09 RX ORDER — TRIAMCINOLONE ACETONIDE 1 MG/G
CREAM TOPICAL 2 TIMES DAILY
Status: ON HOLD | COMMUNITY
Start: 2017-11-10 | End: 2019-01-28

## 2018-03-09 RX ORDER — CALCIUM CARBONATE 500(1250)
2 TABLET ORAL EVERY OTHER DAY
COMMUNITY
Start: 2015-12-14

## 2018-03-09 RX ORDER — LISINOPRIL 2.5 MG/1
1 TABLET ORAL DAILY
COMMUNITY
Start: 2015-12-14 | End: 2019-02-01 | Stop reason: SDUPTHER

## 2018-03-09 RX ORDER — OMEPRAZOLE 20 MG/1
1 CAPSULE, DELAYED RELEASE ORAL DAILY
COMMUNITY
Start: 2016-07-21 | End: 2018-07-02 | Stop reason: SDUPTHER

## 2018-03-09 RX ORDER — ESCITALOPRAM OXALATE 10 MG/1
1 TABLET ORAL DAILY
COMMUNITY
Start: 2017-06-29 | End: 2018-07-02 | Stop reason: SDUPTHER

## 2018-03-09 RX ORDER — PRAMIPEXOLE 1.5 MG/1
1 TABLET, EXTENDED RELEASE ORAL
COMMUNITY
Start: 2017-06-28 | End: 2018-04-06 | Stop reason: SDUPTHER

## 2018-03-09 RX ORDER — HYDROCHLOROTHIAZIDE 50 MG/1
TABLET ORAL
COMMUNITY
Start: 2017-06-28 | End: 2018-04-02 | Stop reason: SDUPTHER

## 2018-03-27 ENCOUNTER — APPOINTMENT (OUTPATIENT)
Dept: LAB | Facility: CLINIC | Age: 68
End: 2018-03-27
Payer: MEDICARE

## 2018-03-27 ENCOUNTER — TRANSCRIBE ORDERS (OUTPATIENT)
Dept: LAB | Facility: CLINIC | Age: 68
End: 2018-03-27

## 2018-03-27 DIAGNOSIS — E55.9 AVITAMINOSIS D: ICD-10-CM

## 2018-03-27 DIAGNOSIS — M81.0 SENILE OSTEOPOROSIS: Primary | ICD-10-CM

## 2018-03-27 DIAGNOSIS — M81.0 SENILE OSTEOPOROSIS: ICD-10-CM

## 2018-03-27 LAB
25(OH)D3 SERPL-MCNC: 33 NG/ML (ref 30–100)
ANION GAP SERPL CALCULATED.3IONS-SCNC: 7 MMOL/L (ref 4–13)
BUN SERPL-MCNC: 22 MG/DL (ref 5–25)
CALCIUM SERPL-MCNC: 9.5 MG/DL (ref 8.3–10.1)
CHLORIDE SERPL-SCNC: 101 MMOL/L (ref 100–108)
CO2 SERPL-SCNC: 31 MMOL/L (ref 21–32)
CREAT SERPL-MCNC: 0.95 MG/DL (ref 0.6–1.3)
GFR SERPL CREATININE-BSD FRML MDRD: 62 ML/MIN/1.73SQ M
GLUCOSE P FAST SERPL-MCNC: 96 MG/DL (ref 65–99)
POTASSIUM SERPL-SCNC: 4.3 MMOL/L (ref 3.5–5.3)
SODIUM SERPL-SCNC: 139 MMOL/L (ref 136–145)

## 2018-03-27 PROCEDURE — 82306 VITAMIN D 25 HYDROXY: CPT

## 2018-03-27 PROCEDURE — 36415 COLL VENOUS BLD VENIPUNCTURE: CPT

## 2018-03-27 PROCEDURE — 80048 BASIC METABOLIC PNL TOTAL CA: CPT

## 2018-04-02 DIAGNOSIS — R60.1 GENERALIZED EDEMA: Primary | ICD-10-CM

## 2018-04-03 RX ORDER — HYDROCHLOROTHIAZIDE 50 MG/1
TABLET ORAL
Qty: 30 TABLET | Refills: 3 | Status: SHIPPED | OUTPATIENT
Start: 2018-04-03 | End: 2018-04-18 | Stop reason: SDUPTHER

## 2018-04-06 ENCOUNTER — OFFICE VISIT (OUTPATIENT)
Dept: FAMILY MEDICINE CLINIC | Facility: CLINIC | Age: 68
End: 2018-04-06
Payer: MEDICARE

## 2018-04-06 VITALS
BODY MASS INDEX: 29.36 KG/M2 | HEART RATE: 123 BPM | DIASTOLIC BLOOD PRESSURE: 64 MMHG | TEMPERATURE: 97.6 F | SYSTOLIC BLOOD PRESSURE: 102 MMHG | WEIGHT: 176.2 LBS | RESPIRATION RATE: 20 BRPM | OXYGEN SATURATION: 90 % | HEIGHT: 65 IN

## 2018-04-06 DIAGNOSIS — J18.9 PNEUMONIA DUE TO INFECTIOUS ORGANISM, UNSPECIFIED LATERALITY, UNSPECIFIED PART OF LUNG: Primary | ICD-10-CM

## 2018-04-06 PROBLEM — E11.9 DIABETES MELLITUS (HCC): Status: ACTIVE | Noted: 2018-04-06

## 2018-04-06 PROCEDURE — 99214 OFFICE O/P EST MOD 30 MIN: CPT | Performed by: PHYSICIAN ASSISTANT

## 2018-04-06 RX ORDER — VALACYCLOVIR HYDROCHLORIDE 1 G/1
TABLET, FILM COATED ORAL
COMMUNITY
Start: 2018-02-27 | End: 2018-04-06 | Stop reason: ALTCHOICE

## 2018-04-06 RX ORDER — CLONAZEPAM 2 MG/1
2 TABLET ORAL
COMMUNITY
Start: 2018-03-06 | End: 2018-09-07 | Stop reason: SDUPTHER

## 2018-04-06 RX ORDER — PRAMIPEXOLE DIHYDROCHLORIDE 1.5 MG/1
1.5 TABLET ORAL 2 TIMES DAILY
Refills: 1 | COMMUNITY
Start: 2018-03-17 | End: 2018-09-15 | Stop reason: SDUPTHER

## 2018-04-06 RX ORDER — PROMETHAZINE HYDROCHLORIDE AND CODEINE PHOSPHATE 6.25; 1 MG/5ML; MG/5ML
5 SYRUP ORAL EVERY 4 HOURS PRN
Qty: 120 ML | Refills: 0 | Status: SHIPPED | OUTPATIENT
Start: 2018-04-06 | End: 2018-04-12 | Stop reason: SDUPTHER

## 2018-04-06 RX ORDER — LEVOFLOXACIN 500 MG/1
500 TABLET, FILM COATED ORAL EVERY 24 HOURS
Qty: 10 TABLET | Refills: 0 | Status: SHIPPED | OUTPATIENT
Start: 2018-04-06 | End: 2018-04-16

## 2018-04-06 NOTE — PROGRESS NOTES
Assessment/Plan:    Pneumonia due to infectious organism  Pt with sxs very suspicious for pneumonia  HR and Temp increased and O2 sat decreased  No fever at present but pt took tylenol  Will treat with levaquin and cough suppressant with codeine  RTO for routine when feeling better  Diagnoses and all orders for this visit:    Pneumonia due to infectious organism, unspecified laterality, unspecified part of lung  -     levofloxacin (LEVAQUIN) 500 mg tablet; Take 1 tablet (500 mg total) by mouth every 24 hours for 10 days  -     promethazine-codeine (PHENERGAN WITH CODEINE) 6 25-10 mg/5 mL syrup; Take 5 mL by mouth every 4 (four) hours as needed for cough    Other orders  -     carbidopa-levodopa (SINEMET)  mg per tablet; Take 1 tablet by mouth as needed    -     clonazePAM (KlonoPIN) 2 mg tablet; Take 2 mg by mouth daily at bedtime    -     pramipexole (MIRAPEX) 1 5 MG tablet; Take 1 5 mg by mouth 2 (two) times a day  -     Discontinue: valACYclovir (VALTREX) 1,000 mg tablet;           Subjective:      Patient ID: Orin Enamorado is a 76 y o  female  Cough   This is a new problem  The current episode started in the past 7 days  The problem has been unchanged  The problem occurs every few minutes  The cough is productive of purulent sputum  Associated symptoms include chills, a fever, nasal congestion, rhinorrhea and wheezing  Pertinent negatives include no chest pain, ear pain, headaches, myalgias, postnasal drip, rash, sore throat or shortness of breath  She has tried OTC cough suppressant for the symptoms  The treatment provided mild relief  The following portions of the patient's history were reviewed and updated as appropriate:   She  has a past medical history of Anxiety; Cellulitis; Depression; Diabetes mellitus ([address] ); Dysuria; Esophageal reflux; Fatigue; Hyperlipidemia; Hypertension; Pneumonia; RLS (restless legs syndrome); and Vitamin D deficiency    She   Patient Active Problem List Diagnosis Date Noted    Diabetes mellitus ([facility] [address] ) 04/06/2018    Pneumonia due to infectious organism 04/06/2018    Chronic lower back pain 09/29/2016    Anxiety 05/21/2016    Vitamin D deficiency 03/09/2016    HTN (hypertension) 12/14/2015    Hypercholesteremia 12/14/2015    Restless legs syndrome (RLS) 12/14/2015     She  has a past surgical history that includes Back surgery; Cholecystectomy (1975); Foot surgery (Right, 2014); Hip surgery (2013); Hysterectomy; Ovarian cyst surgery (1971); Shoulder surgery (Right, 12/14/2015); and Other surgical history (2011)  Her family history includes Calcium disorder in her other and other; Cancer in her father; Depression in her family; Diabetes type II in her father and other; Heart disease in her father; Hypertension in her mother and other; Mental illness in her mother and other; Migraines in her other; Other in her other; Pulmonary embolism in her other; Stroke in her mother; Substance Abuse in her brother and family; Tuberculosis in her other  She  reports that she quit smoking about 5 weeks ago  Her smoking use included Cigarettes  She has never used smokeless tobacco  She reports that she drinks alcohol  She reports that she does not use drugs    Current Outpatient Prescriptions   Medication Sig Dispense Refill    albuterol (2 5 mg/3 mL) 0 083 % nebulizer solution Inhale 1 each every 4 (four) hours as needed      albuterol (PROAIR HFA) 90 mcg/act inhaler Inhale 2 puffs      amitriptyline (ELAVIL) 25 mg tablet Take 1 tablet by mouth daily at bedtime as needed        Ascorbic Acid (VITAMIN C) 1000 MG tablet Take by mouth      atorvastatin (LIPITOR) 20 mg tablet Take 1 tablet by mouth daily      betamethasone dipropionate (DIPROSONE) 0 05 % cream Apply topically 2 (two) times a day      Calcium 500 MG tablet Take 1 tablet by mouth every other day        carbidopa-levodopa (SINEMET)  mg per tablet Take 1 tablet by mouth as needed    1    cholecalciferol (VITAMIN D3) 1,000 units tablet Take 3 tablets by mouth every other day        clonazePAM (KlonoPIN) 2 mg tablet Take 2 mg by mouth daily at bedtime        denosumab (PROLIA) 60 mg/mL Inject under the skin every 6 (six) months      escitalopram (LEXAPRO) 10 mg tablet Take 1 tablet by mouth daily      Glucose Blood (ONETOUCH ULTRA BLUE VI) by In Vitro route 3 (three) times a day      hydrochlorothiazide (HYDRODIURIL) 50 mg tablet TAKE ONE TABLET BY MOUTH ONCE DAILY AS NEEDED FOR  ANKLE  SWELLING 30 tablet 3    lisinopril (ZESTRIL) 2 5 mg tablet Take 1 tablet by mouth daily      metFORMIN (GLUCOPHAGE) 1000 MG tablet Take 1 tablet (1,000 mg total) by mouth 2 (two) times a day 180 tablet 1    omeprazole (PriLOSEC) 20 mg delayed release capsule Take 1 capsule by mouth daily      pramipexole (MIRAPEX) 1 5 MG tablet Take 1 5 mg by mouth 2 (two) times a day  1    triamcinolone (KENALOG) 0 1 % cream Apply topically 2 (two) times a day      VITAMIN B COMPLEX-C PO Take 1 tablet by mouth daily      levofloxacin (LEVAQUIN) 500 mg tablet Take 1 tablet (500 mg total) by mouth every 24 hours for 10 days 10 tablet 0    oxyCODONE-acetaminophen (PERCOCET)  mg per tablet Take 1 tablet by mouth 4 (four) times a day as needed      promethazine-codeine (PHENERGAN WITH CODEINE) 6 25-10 mg/5 mL syrup Take 5 mL by mouth every 4 (four) hours as needed for cough 120 mL 0     No current facility-administered medications for this visit        Current Outpatient Prescriptions on File Prior to Visit   Medication Sig    albuterol (2 5 mg/3 mL) 0 083 % nebulizer solution Inhale 1 each every 4 (four) hours as needed    albuterol (PROAIR HFA) 90 mcg/act inhaler Inhale 2 puffs    amitriptyline (ELAVIL) 25 mg tablet Take 1 tablet by mouth daily at bedtime as needed      Ascorbic Acid (VITAMIN C) 1000 MG tablet Take by mouth    atorvastatin (LIPITOR) 20 mg tablet Take 1 tablet by mouth daily    betamethasone dipropionate (DIPROSONE) 0 05 % cream Apply topically 2 (two) times a day    Calcium 500 MG tablet Take 1 tablet by mouth every other day      cholecalciferol (VITAMIN D3) 1,000 units tablet Take 3 tablets by mouth every other day      denosumab (PROLIA) 60 mg/mL Inject under the skin every 6 (six) months    escitalopram (LEXAPRO) 10 mg tablet Take 1 tablet by mouth daily    Glucose Blood (ONETOUCH ULTRA BLUE VI) by In Vitro route 3 (three) times a day    hydrochlorothiazide (HYDRODIURIL) 50 mg tablet TAKE ONE TABLET BY MOUTH ONCE DAILY AS NEEDED FOR  ANKLE  SWELLING    lisinopril (ZESTRIL) 2 5 mg tablet Take 1 tablet by mouth daily    metFORMIN (GLUCOPHAGE) 1000 MG tablet Take 1 tablet (1,000 mg total) by mouth 2 (two) times a day    omeprazole (PriLOSEC) 20 mg delayed release capsule Take 1 capsule by mouth daily    triamcinolone (KENALOG) 0 1 % cream Apply topically 2 (two) times a day    VITAMIN B COMPLEX-C PO Take 1 tablet by mouth daily    oxyCODONE-acetaminophen (PERCOCET)  mg per tablet Take 1 tablet by mouth 4 (four) times a day as needed    [DISCONTINUED] clonazePAM (KlonoPIN) 0 125 mg disintegrating tablet Take by mouth    [DISCONTINUED] Pramipexole Dihydrochloride ER (MIRAPEX ER) 1 5 MG TB24 Take 1 tablet by mouth     No current facility-administered medications on file prior to visit  She is allergic to gabapentin; hydrocodone-acetaminophen; midazolam; penicillin v; pregabalin; propofol; and sulfa antibiotics       Review of Systems   Constitutional: Positive for chills and fever  HENT: Positive for rhinorrhea  Negative for congestion, ear pain, hearing loss, postnasal drip, sinus pain, sinus pressure, sore throat and trouble swallowing  Eyes: Negative for pain and visual disturbance  Respiratory: Positive for cough and wheezing  Negative for chest tightness and shortness of breath  Cardiovascular: Negative  Negative for chest pain, palpitations and leg swelling  Gastrointestinal: Negative for abdominal pain, blood in stool, constipation, diarrhea, nausea and vomiting  Endocrine: Negative for cold intolerance, heat intolerance, polydipsia, polyphagia and polyuria  Genitourinary: Negative for difficulty urinating, dysuria, flank pain and urgency  Musculoskeletal: Negative for arthralgias, back pain, gait problem and myalgias  Skin: Negative for rash  Allergic/Immunologic: Negative  Neurological: Negative for dizziness, weakness, light-headedness and headaches  Hematological: Negative  Psychiatric/Behavioral: Negative for behavioral problems, dysphoric mood and sleep disturbance  The patient is not nervous/anxious  Objective:      /64 (BP Location: Left arm, Patient Position: Sitting, Cuff Size: Large)   Pulse (!) 123   Temp 97 6 °F (36 4 °C) (Tympanic)   Resp 20   Ht 5' 5" (1 651 m)   Wt 79 9 kg (176 lb 3 2 oz)   SpO2 90%   BMI 29 32 kg/m²          Physical Exam   Constitutional: She is oriented to person, place, and time  She appears well-developed and well-nourished  No distress  HENT:   Head: Normocephalic and atraumatic  Right Ear: External ear normal    Left Ear: External ear normal    Nose: Nose normal    Mouth/Throat: Oropharynx is clear and moist  No oropharyngeal exudate  Eyes: Conjunctivae and EOM are normal  Pupils are equal, round, and reactive to light  Right eye exhibits no discharge  Left eye exhibits no discharge  No scleral icterus  Neck: Normal range of motion  Neck supple  No thyromegaly present  Cardiovascular: Normal rate, regular rhythm and normal heart sounds  Exam reveals no gallop and no friction rub  No murmur heard  Pulmonary/Chest: Effort normal  No respiratory distress  She has no wheezes  She has rhonchi in the right lower field and the left lower field  She has no rales  Abdominal: Soft  Bowel sounds are normal  She exhibits no distension  There is no tenderness     Musculoskeletal: Normal range of motion  She exhibits no edema, tenderness or deformity  Neurological: She is alert and oriented to person, place, and time  No cranial nerve deficit  Skin: Skin is warm and dry  She is not diaphoretic  Psychiatric: She has a normal mood and affect   Her behavior is normal  Judgment and thought content normal

## 2018-04-06 NOTE — ASSESSMENT & PLAN NOTE
Pt with sxs very suspicious for pneumonia  HR and Temp increased and O2 sat decreased  No fever at present but pt took tylenol  Will treat with levaquin and cough suppressant with codeine  RTO for routine when feeling better

## 2018-04-12 DIAGNOSIS — J18.9 PNEUMONIA DUE TO INFECTIOUS ORGANISM, UNSPECIFIED LATERALITY, UNSPECIFIED PART OF LUNG: ICD-10-CM

## 2018-04-12 RX ORDER — PROMETHAZINE HYDROCHLORIDE AND CODEINE PHOSPHATE 6.25; 1 MG/5ML; MG/5ML
5 SYRUP ORAL EVERY 4 HOURS PRN
Qty: 120 ML | Refills: 0 | Status: SHIPPED | OUTPATIENT
Start: 2018-04-12 | End: 2018-05-07 | Stop reason: ALTCHOICE

## 2018-04-12 NOTE — TELEPHONE ENCOUNTER
Pt called and stated that she still has coughing spasms lasting up to 2 hrs  and is almost finished with her cough syrup    She would like if she can get a refill    Pharmacy [facility]    Please advise  [phone]

## 2018-04-16 DIAGNOSIS — E78.5 HYPERLIPIDEMIA, UNSPECIFIED HYPERLIPIDEMIA TYPE: Primary | ICD-10-CM

## 2018-04-16 RX ORDER — ATORVASTATIN CALCIUM 20 MG/1
20 TABLET, FILM COATED ORAL DAILY
Qty: 90 TABLET | Refills: 3 | Status: SHIPPED | OUTPATIENT
Start: 2018-04-16 | End: 2018-07-02 | Stop reason: SDUPTHER

## 2018-04-18 DIAGNOSIS — R60.1 GENERALIZED EDEMA: ICD-10-CM

## 2018-04-18 RX ORDER — HYDROCHLOROTHIAZIDE 50 MG/1
50 TABLET ORAL DAILY
Qty: 90 TABLET | Refills: 3 | Status: SHIPPED | OUTPATIENT
Start: 2018-04-18 | End: 2020-04-24 | Stop reason: SDUPTHER

## 2018-05-07 ENCOUNTER — OFFICE VISIT (OUTPATIENT)
Dept: URGENT CARE | Facility: CLINIC | Age: 68
End: 2018-05-07
Payer: MEDICARE

## 2018-05-07 ENCOUNTER — APPOINTMENT (OUTPATIENT)
Dept: RADIOLOGY | Facility: CLINIC | Age: 68
End: 2018-05-07
Payer: MEDICARE

## 2018-05-07 VITALS
RESPIRATION RATE: 20 BRPM | SYSTOLIC BLOOD PRESSURE: 120 MMHG | DIASTOLIC BLOOD PRESSURE: 68 MMHG | HEART RATE: 84 BPM | HEIGHT: 65 IN | WEIGHT: 176 LBS | OXYGEN SATURATION: 96 % | TEMPERATURE: 97 F | BODY MASS INDEX: 29.32 KG/M2

## 2018-05-07 DIAGNOSIS — M79.671 RIGHT FOOT PAIN: ICD-10-CM

## 2018-05-07 DIAGNOSIS — S99.191A OTHER PHYSEAL FRACTURE OF RIGHT METATARSAL, INITIAL ENCOUNTER FOR CLOSED FRACTURE: Primary | ICD-10-CM

## 2018-05-07 PROCEDURE — G0463 HOSPITAL OUTPT CLINIC VISIT: HCPCS | Performed by: PHYSICIAN ASSISTANT

## 2018-05-07 PROCEDURE — 73630 X-RAY EXAM OF FOOT: CPT

## 2018-05-07 PROCEDURE — 99213 OFFICE O/P EST LOW 20 MIN: CPT | Performed by: PHYSICIAN ASSISTANT

## 2018-05-07 RX ORDER — CLINDAMYCIN PHOSPHATE 11.9 MG/ML
SOLUTION TOPICAL
COMMUNITY
Start: 2018-04-17 | End: 2018-10-03 | Stop reason: ALTCHOICE

## 2018-05-07 NOTE — PATIENT INSTRUCTIONS
Foot Fracture in Adults   WHAT YOU NEED TO KNOW:   A foot fracture is a break in one or more of the bones in your foot  Foot fractures are commonly caused by trauma, falls, or repeated stress injuries  DISCHARGE INSTRUCTIONS:   Medicines:   · Antibiotics: This medicine is given to help treat or prevent an infection caused by bacteria  · NSAIDs:  These medicines decrease swelling and pain  NSAIDs are available without a doctor's order  Ask which medicine is right for you  Ask how much to take and when to take it  Take as directed  NSAIDs can cause stomach bleeding and kidney problems if not taken correctly  · Pain medicine: You may be given a prescription medicine to decrease pain  Do not wait until the pain is severe before you take this medicine  · Take your medicine as directed  Contact your healthcare provider if you think your medicine is not helping or if you have side effects  Tell him or her if you are allergic to any medicine  Keep a list of the medicines, vitamins, and herbs you take  Include the amounts, and when and why you take them  Bring the list or the pill bottles to follow-up visits  Carry your medicine list with you in case of an emergency  Follow up with your healthcare provider or bone specialist as directed: You may need to return to have your cast, splint, external fixation devices, or stitches removed  You may also need to return for tests to make sure your foot is healing  Write down your questions so you remember to ask them during your visits  Pin care: If you have pins in your foot, you will need to clean them daily  Cleaning the pins can help prevent an infection  Ask for more information about pin care  Wound care:  Carefully wash the wound with soap and water  Dry the area and put on new, clean bandages as directed  Change your bandages when they get wet or dirty  Self-care:   · Rest:  You may need to rest your foot and avoid activities that cause pain   For stress fractures, you will need to avoid the activity that caused the fracture until it heals  Ask when you can return to your normal activities such as work and sports  · Ice:  Ice helps decrease swelling and pain  Ice may also help prevent tissue damage  Use an ice pack or put crushed ice in a plastic bag  Cover it with a towel, and place it on your foot for 15 to 20 minutes every hour as directed  · Elevate your foot:  Raise your foot at or above the level of your heart as often as you can  This will help decrease swelling and pain  Prop your foot on pillows or blankets to keep it elevated comfortably  · Physical therapy: Once your foot has healed, a physical therapist can teach you exercises to help improve movement and strength, and to decrease pain  Cast or splint care:   · Check the skin around your cast and splint daily for any redness or open areas  · Do not use a sharp or pointed object to scratch your skin under the cast or splint  · Do not remove your splint unless your healthcare provider or orthopedic surgeon says it is okay  Bathing with a cast or splint:  Do not let your cast or splint get wet  Before bathing, cover the cast or splint with a plastic bag  Tape the bag to your skin above the cast or splint to seal out the water  Keep your foot out of the water in case the bag leaks  Ask when it is okay to take a bath or shower  Assistive devices: You may be given a hard-soled shoe to wear while your foot is healing  You also may need to use crutches to help you walk while your foot heals  It is important to use your crutches correctly  Ask for more information about how to use crutches  Contact your healthcare provider or bone specialist if:   · You have a fever  · You have new sores around your boot, cast, or splint  · You have new or worsening trouble moving your foot      · You notice a foul smell coming from under your cast     · Your boot, cast, or splint gets damaged  · You have questions or concerns about your condition or care  Seek care immediately or call 911 if:   · The pain in your injured foot gets worse even after you rest and take pain medicine  · The skin or toes of your foot become numb, swollen, cold, white, or blue  · You have more pain or swelling than you did before the cast was put on  · Your wound is draining fluid or pus  · Blood soaks through your bandage  · Your leg feels warm, tender, and painful  It may look swollen and red  · You suddenly feel lightheaded and short of breath  · You have chest pain when you take a deep breath or cough  You may cough up blood  © 2017 [employer] Information is for End User's use only and may not be sold, redistributed or otherwise used for commercial purposes  All illustrations and images included in CareNotes® are the copyrighted property of [employer] , [employer]  or Jean Kahn  The above information is an  only  It is not intended as medical advice for individual conditions or treatments  Talk to your doctor, nurse or pharmacist before following any medical regimen to see if it is safe and effective for you

## 2018-05-07 NOTE — PROGRESS NOTES
[facility]        NAME: Mary Quick is a 76 y o  female  : 1950    MRN: [id number]  DATE: May 7, 2018  TIME: 9:02 AM    Assessment and Plan   Other physeal fracture of right metatarsal, initial encounter for closed fracture [S99 191A]  1  Other physeal fracture of right metatarsal, initial encounter for closed fracture     2  Right foot pain  XR foot 3+ vw right         Patient Instructions       Follow up with your podiatrist  Call today to obtain an appt    Elevate foot, may continue to apply ice for swelling  Proceed to  ER if symptoms worsen  Chief Complaint     Chief Complaint   Patient presents with    Foot Pain     C/O pain in lateral aspect of  right foot while walking and feeling a crack x 2 days ago  Pt denies any injury but has past h/o stress fractures  Ecchymosis is noted at base of toes  History of Present Illness       Patient presents with right foot pain which started over this past weekend  She was at her grandson regulation unable base when she heard a crack in her right foot  There was no injury  She states that she has a history of stress fractures in her left foot and feels the same thing has happened with right foot  The foot is now of swollen had ecchymosis and very difficult to ambulate  Review of Systems   Review of Systems   Constitutional: Negative for chills and fever  HENT: Negative for rhinorrhea and sore throat  Eyes: Negative for redness  Respiratory: Negative for cough  Cardiovascular: Negative for chest pain  Gastrointestinal: Negative for abdominal pain  Skin: Negative for rash  Neurological: Negative for dizziness and headaches  Hematological: Negative for adenopathy           Current Medications       Current Outpatient Prescriptions:     albuterol (2 5 mg/3 mL) 0 083 % nebulizer solution, Inhale 1 each every 4 (four) hours as needed, Disp: , Rfl:     albuterol (PROAIR HFA) 90 mcg/act inhaler, Inhale 2 puffs, Disp: , Rfl:     amitriptyline (ELAVIL) 25 mg tablet, Take 1 tablet by mouth daily at bedtime as needed  , Disp: , Rfl:     Ascorbic Acid (VITAMIN C) 1000 MG tablet, Take by mouth, Disp: , Rfl:     atorvastatin (LIPITOR) 20 mg tablet, Take 1 tablet (20 mg total) by mouth daily, Disp: 90 tablet, Rfl: 3    betamethasone dipropionate (DIPROSONE) 0 05 % cream, Apply topically 2 (two) times a day, Disp: , Rfl:     Calcium 500 MG tablet, Take 1 tablet by mouth every other day  , Disp: , Rfl:     carbidopa-levodopa (SINEMET)  mg per tablet, Take 1 tablet by mouth as needed  , Disp: , Rfl: 1    cholecalciferol (VITAMIN D3) 1,000 units tablet, Take 3 tablets by mouth every other day  , Disp: , Rfl:     clindamycin (CLEOCIN T) 1 % external solution, , Disp: , Rfl:     clonazePAM (KlonoPIN) 2 mg tablet, Take 2 mg by mouth daily at bedtime  , Disp: , Rfl:     denosumab (PROLIA) 60 mg/mL, Inject under the skin every 6 (six) months, Disp: , Rfl:     escitalopram (LEXAPRO) 10 mg tablet, Take 1 tablet by mouth daily, Disp: , Rfl:     Glucose Blood (ONETOUCH ULTRA BLUE VI), by In Vitro route 3 (three) times a day, Disp: , Rfl:     hydrochlorothiazide (HYDRODIURIL) 50 mg tablet, Take 1 tablet (50 mg total) by mouth daily, Disp: 90 tablet, Rfl: 3    lisinopril (ZESTRIL) 2 5 mg tablet, Take 1 tablet by mouth daily, Disp: , Rfl:     metFORMIN (GLUCOPHAGE) 1000 MG tablet, Take 1 tablet (1,000 mg total) by mouth 2 (two) times a day, Disp: 180 tablet, Rfl: 1    omeprazole (PriLOSEC) 20 mg delayed release capsule, Take 1 capsule by mouth daily, Disp: , Rfl:     pramipexole (MIRAPEX) 1 5 MG tablet, Take 1 5 mg by mouth 2 (two) times a day, Disp: , Rfl: 1    triamcinolone (KENALOG) 0 1 % cream, Apply topically 2 (two) times a day, Disp: , Rfl:     VITAMIN B COMPLEX-C PO, Take 1 tablet by mouth daily, Disp: , Rfl:     Current Allergies     Allergies as of 05/07/2018 - Reviewed 05/07/2018   Allergen Reaction Noted    Gabapentin  11/04/2016    Hydrocodone-acetaminophen  12/14/2015    Midazolam Nausea Only and Vomiting 07/17/2017    Penicillin v  12/21/2015    Pregabalin  11/04/2016    Propofol  12/14/2015    Sulfa antibiotics  12/14/2015            The following portions of the patient's history were reviewed and updated as appropriate: allergies, current medications, past family history, past medical history, past social history, past surgical history and problem list      Past Medical History:   Diagnosis Date    Anxiety     Cellulitis     LAST ASSESED 2/12/16; RESOLVED 3/9/16    Depression     Diabetes mellitus ([facility] [address] )     Dysuria     LAST ASSESSED 3/7/16; RESOLVED 3/9/16    Esophageal reflux     RESOLVED 1/15/16    Fatigue     RESOLVED 3/9/16    Hyperlipidemia     Hypertension     Pneumonia     LAST ASSESSED 4/8/16; RESOLVED 5/10/16    RLS (restless legs syndrome)     Vitamin D deficiency        Past Surgical History:   Procedure Laterality Date    BACK SURGERY      CHOLECYSTECTOMY  1975    FOOT SURGERY Right 2014    HIP SURGERY  2013    HYSTERECTOMY      OTHER SURGICAL HISTORY  2011    LAPAROSCOPIC SLING OPERATION FOR STRESS INCONTINENCE     OVARIAN CYST SURGERY  1971    CYSTECTOMY    SHOULDER SURGERY Right 12/14/2015    REPAIR OF TORN MUSCLES       Family History   Problem Relation Age of Onset    Hypertension Mother     Mental illness Mother     Stroke Mother      CVA    Cancer Father     Diabetes type II Father     Heart disease Father      CARDIAC DISORDER    Hypertension Other     Mental illness Other     Migraines Other     Diabetes type II Other     Calcium disorder Other      CALCIUM KIDNEY STONES    Other Other      HERPES ZOSTER INFECTION    Tuberculosis Other     Pulmonary embolism Other      CHRONIC OBSTRUCTIVE PULMONARY DISEASE    Calcium disorder Other      CALISUM KIDNEY STONES    Depression Family     Substance Abuse Family     Substance Abuse Brother Medications have been verified  Objective   /68   Pulse 84   Temp (!) 97 °F (36 1 °C) (Tympanic)   Resp 20   Ht 5' 5" (1 651 m)   Wt 79 8 kg (176 lb)   SpO2 96%   BMI 29 29 kg/m²        Physical Exam     Physical Exam   Constitutional: She is oriented to person, place, and time  She appears well-developed and well-nourished  HENT:   Head: Normocephalic and atraumatic  Eyes: Conjunctivae are normal    Neck: No tracheal deviation present  Cardiovascular: Normal rate and regular rhythm  Pulmonary/Chest: Effort normal    Musculoskeletal:   Right foot with swelling and ecchymosis  Ecchymosis at the base of the 3rd to 5th toes  There is exquisite tenderness over the 3rd 4th and 5th metatarsals  Neurovascular status is intact  There is a healing blister on her 2nd toe which is being followed by her podiatrist    Neurological: She is alert and oriented to person, place, and time  Skin: Skin is warm and dry  Psychiatric: She has a normal mood and affect  Her behavior is normal  Thought content normal      Right foot x-ray viewed by me and independently reviewed by me contemporaneously as distal 4th metatarsal fracture      Patient placed in a post op shoe, has a walker at home and will follow up with her podiatrist

## 2018-06-05 DIAGNOSIS — Z12.11 SCREENING FOR COLON CANCER: Primary | ICD-10-CM

## 2018-06-06 ENCOUNTER — TRANSCRIBE ORDERS (OUTPATIENT)
Dept: LAB | Facility: CLINIC | Age: 68
End: 2018-06-06

## 2018-06-06 ENCOUNTER — APPOINTMENT (OUTPATIENT)
Dept: LAB | Facility: CLINIC | Age: 68
End: 2018-06-06
Payer: MEDICARE

## 2018-06-06 DIAGNOSIS — Z12.11 SCREENING FOR COLON CANCER: ICD-10-CM

## 2018-06-06 LAB — HEMOCCULT STL QL IA: POSITIVE

## 2018-06-06 PROCEDURE — G0328 FECAL BLOOD SCRN IMMUNOASSAY: HCPCS

## 2018-06-11 DIAGNOSIS — R19.5 POSITIVE FIT (FECAL IMMUNOCHEMICAL TEST): ICD-10-CM

## 2018-06-11 DIAGNOSIS — Z12.11 SCREENING FOR COLON CANCER: Primary | ICD-10-CM

## 2018-06-21 ENCOUNTER — TELEPHONE (OUTPATIENT)
Dept: FAMILY MEDICINE CLINIC | Facility: CLINIC | Age: 68
End: 2018-06-21

## 2018-06-22 ENCOUNTER — OFFICE VISIT (OUTPATIENT)
Dept: GASTROENTEROLOGY | Facility: CLINIC | Age: 68
End: 2018-06-22
Payer: MEDICARE

## 2018-06-22 VITALS
DIASTOLIC BLOOD PRESSURE: 68 MMHG | HEART RATE: 72 BPM | WEIGHT: 181 LBS | BODY MASS INDEX: 30.16 KG/M2 | SYSTOLIC BLOOD PRESSURE: 115 MMHG | HEIGHT: 65 IN

## 2018-06-22 DIAGNOSIS — Z12.11 SCREENING FOR COLON CANCER: ICD-10-CM

## 2018-06-22 DIAGNOSIS — R19.5 STOOL CONTENTS FINDING, ABNORMAL: ICD-10-CM

## 2018-06-22 DIAGNOSIS — R19.5 POSITIVE FIT (FECAL IMMUNOCHEMICAL TEST): Primary | ICD-10-CM

## 2018-06-22 DIAGNOSIS — R19.7 DIARRHEA, UNSPECIFIED TYPE: ICD-10-CM

## 2018-06-22 PROCEDURE — 99203 OFFICE O/P NEW LOW 30 MIN: CPT | Performed by: INTERNAL MEDICINE

## 2018-06-22 NOTE — PROGRESS NOTES
[facility]    Dear Carmela Garcia,    I had the pleasure of seeing your patient Senait Fried in the office today and I thank you for this kind referral        Chief Complaint: Diarrhea      HPI:  Senait Fried is a 76 y o  female who presents with a history of onset of diarrhea following institution of antibiotics  According to the patient she developed a cellulitis in her right foot  She was placed on Keflex  She developed significant diarrhea  She was told to stop the Keflex after some weeks and since then the diarrhea has gotten better  However stool testing showed a positive fecal immunochemical test   The patient has never had a colonoscopy  She denies any abdominal pain, change in stool caliber, weight loss, rectal pain, tenesmus, or any other associated symptomatology  No family history of colon cancer  Review of Systems:   Constitutional: No fever or chills, feels well, no tiredness, no recent weight gain or weight loss  HENT: No complaints of earache, no hearing loss, no nosebleeds, no nasal discharge, no sore throat, no hoarseness  Eyes: No complaints of eye pain, no red eyes, no discharge from eyes, no itchy eyes  Cardiovascular: No complaints of slow heart rate, no fast heart rate, no chest pain, no palpitations, no leg claudication, no lower extremity edema  Respiratory: No complaints of shortness of breath, no wheezing, no cough, no SOB on exertion, no orthopnea  Gastrointestinal: As noted in HPI  Genitourinary: No complaints of dysuria, no incontinence, no hesitancy, no nocturia  Musculoskeletal:  Per HPI Positive pain in the right foot as  Neurological: No complaints of headache, no confusion, no convulsions, no numbness or tingling, no dizziness or fainting, no limb weakness, no difficulty walking  Skin: No complaints of skin rash or skin lesions, no itching, no skin wound, no dry skin      Hematological/Lymphatic: No complaints of swollen glands, does not bleed easy  Allergic/Immunologic: No immunocompromised state  Endocrine:  No complaints of polyuria, no polydipsia  Psychiatric/Behavioral: is not suicidal, no sleep disturbances, no anxiety or depression, no change in personality, no emotional problems         Historical Information   Past Medical History:   Diagnosis Date    Anxiety     Cellulitis     LAST ASSESED 2/12/16; RESOLVED 3/9/16    Depression     Diabetes mellitus ([address] )     Dysuria     LAST ASSESSED 3/7/16; RESOLVED 3/9/16    Esophageal reflux     RESOLVED 1/15/16    Fatigue     RESOLVED 3/9/16    Hyperlipidemia     Hypertension     Pneumonia     LAST ASSESSED 4/8/16; RESOLVED 5/10/16    RLS (restless legs syndrome)     Vitamin D deficiency      Past Surgical History:   Procedure Laterality Date    BACK SURGERY      CHOLECYSTECTOMY  1975    FOOT SURGERY Right 2014    HIP SURGERY  2013    HYSTERECTOMY      OTHER SURGICAL HISTORY  2011    LAPAROSCOPIC SLING OPERATION FOR STRESS INCONTINENCE     OVARIAN CYST SURGERY  1971    CYSTECTOMY    SHOULDER SURGERY Right 12/14/2015    REPAIR OF TORN MUSCLES     Social History   History   Alcohol Use    Yes     Comment: rarely     History   Drug Use No     History   Smoking Status    Former Smoker    Types: Cigarettes    Quit date: 3/1/2018   Smokeless Tobacco    Never Used     Family History   Problem Relation Age of Onset    Hypertension Mother     Mental illness Mother     Stroke Mother         CVA    Cancer Father     Diabetes type II Father     Heart disease Father         CARDIAC DISORDER    Hypertension Other     Mental illness Other     Migraines Other     Diabetes type II Other     Calcium disorder Other         CALCIUM KIDNEY STONES    Other Other         HERPES ZOSTER INFECTION    Tuberculosis Other     Pulmonary embolism Other         CHRONIC OBSTRUCTIVE PULMONARY DISEASE    Calcium disorder Other         CALISUM KIDNEY STONES    Depression Family     Substance Abuse Family     Substance Abuse Brother          Current Medications: has a current medication list which includes the following prescription(s): albuterol, albuterol, amitriptyline, vitamin c, atorvastatin, betamethasone dipropionate, calcium, carbidopa-levodopa, cholecalciferol, clindamycin, clonazepam, denosumab, escitalopram, glucose blood, hydrochlorothiazide, lisinopril, metformin, omeprazole, pramipexole, triamcinolone, and b complex-c        Vital Signs: /68   Pulse 72   Ht 5' 5" (1 651 m)   Wt 82 1 kg (181 lb)   BMI 30 12 kg/m²     Physical Exam:   Constitutional  General Appearance: No acute distress, well appearing and well nourished  Head  Normocephalic  Eyes  Conjunctivae and lids: No swelling, erythema, or discharge  Pupils and irises: Equal, round and reactive to light  Ears, Nose, Mouth, and Throat  External inspection of ears and nose: Normal  Nasal mucosa, septum and turbinates: Normal without edema or erythema/   Oropharynx: Normal with no erythema, edema, exudate or lesions  Neck  Normal range of motion  Neck supple  Cardiovascular  Auscultation of the heart: Normal rate and rhythm, normal S1 and S2 without murmurs  Examination of the extremities for edema and/or varicosities: Normal  Pulmonary/Chest  Respiratory effort: No increased work of breathing or signs of respiratory distress  Auscultation of lungs: Clear to auscultation, equal breath sounds bilaterally, no wheezes, rales, no rhonchi  Abdomen  Abdomen: Non-tender, no masses  Liver and spleen: No hepatomegaly or splenomegaly  Musculoskeletal  Gait and station: normal  Right foot boot  Digits and Nails: normal without clubbing or cyanosis  Inspection/palpation of joints, bones, and muscles: Normal  Neurological  No nystagmus or asterixis  Skin  Skin and subcutaneous tissue: Normal without rashes or lesions  Lymphatic  Palpation of the lymph nodes in neck: No lymphadenopathy  Psychiatric  Orientation to person, place and time: Normal   Mood and affect: Normal          Labs:   Lab Results   Component Value Date    ALT 24 11/06/2017    AST 17 11/06/2017    BUN 22 03/27/2018    CALCIUM 9 5 03/27/2018     03/27/2018    CHOL 115 11/06/2017    CO2 31 03/27/2018    CREATININE 0 95 03/27/2018    HDL 43 11/06/2017    HCT 39 1 11/06/2017    HGB 12 5 11/06/2017    HGBA1C 6 8 (H) 11/06/2017    PHOS 3 8 07/08/2016     11/06/2017    K 4 3 03/27/2018     03/27/2018    TRIG 125 11/06/2017    WBC 7 43 11/06/2017         X-Rays & Procedures:   No orders to display         ______________________________________________________________________      Assessment & Plan:      Diagnoses and all orders for this visit:    Positive FIT (fecal immunochemical test)  -         Stool contents finding, abnormal    Diarrhea, unspecified type    Other orders  -     Diet NPO; Sips with meds; Standing  -     Void on call to OR; Standing  -     Insert peripheral IV; Standing      I have taken liberty of scheduling the patient for colonoscopy  I will be happy to inform you of her results and any further recommendations  I would like to thank you for allowing me to participate in her care                With warmest regards,    Yosvany Esparza MD, [facility]

## 2018-06-22 NOTE — LETTER
June 22, 2018     Lisa Samaniego PA-C  [address] [place] [address]    Patient: Trevor Ballesteros   YOB: 1950   Date of Visit: 6/22/2018       Dear Dr Conrad Gan: Thank you for referring Ivan Owens to me for evaluation  Below are my notes for this consultation  If you have questions, please do not hesitate to call me  I look forward to following your patient along with you  Sincerely,        Deven Lopez MD        CC: No Recipients  Deven Lopez MD  6/22/2018 10:21 AM  Sign at close encounter  [place] [id number] [facility]    Dear Joseph Nielsen,    I had the pleasure of seeing your patient Trevor Ballesteros in the office today and I thank you for this kind referral        Chief Complaint: Diarrhea      HPI:  Trevor Ballesteros is a 76 y o  female who presents with a history of onset of diarrhea following institution of antibiotics  According to the patient she developed a cellulitis in her right foot  She was placed on Keflex  She developed significant diarrhea  She was told to stop the Keflex after some weeks and since then the diarrhea has gotten better  However stool testing showed a positive fecal immunochemical test   The patient has never had a colonoscopy  She denies any abdominal pain, change in stool caliber, weight loss, rectal pain, tenesmus, or any other associated symptomatology  No family history of colon cancer  Review of Systems:   Constitutional: No fever or chills, feels well, no tiredness, no recent weight gain or weight loss  HENT: No complaints of earache, no hearing loss, no nosebleeds, no nasal discharge, no sore throat, no hoarseness  Eyes: No complaints of eye pain, no red eyes, no discharge from eyes, no itchy eyes  Cardiovascular: No complaints of slow heart rate, no fast heart rate, no chest pain, no palpitations, no leg claudication, no lower extremity edema     Respiratory: No complaints of shortness of breath, no wheezing, no cough, no SOB on exertion, no orthopnea  Gastrointestinal: As noted in HPI  Genitourinary: No complaints of dysuria, no incontinence, no hesitancy, no nocturia  Musculoskeletal:  Per HPI Positive pain in the right foot as  Neurological: No complaints of headache, no confusion, no convulsions, no numbness or tingling, no dizziness or fainting, no limb weakness, no difficulty walking  Skin: No complaints of skin rash or skin lesions, no itching, no skin wound, no dry skin  Hematological/Lymphatic: No complaints of swollen glands, does not bleed easy  Allergic/Immunologic: No immunocompromised state  Endocrine:  No complaints of polyuria, no polydipsia  Psychiatric/Behavioral: is not suicidal, no sleep disturbances, no anxiety or depression, no change in personality, no emotional problems         Historical Information   Past Medical History:   Diagnosis Date    Anxiety     Cellulitis     LAST ASSESED 2/12/16; RESOLVED 3/9/16    Depression     Diabetes mellitus ([facility] [address] )     Dysuria     LAST ASSESSED 3/7/16; RESOLVED 3/9/16    Esophageal reflux     RESOLVED 1/15/16    Fatigue     RESOLVED 3/9/16    Hyperlipidemia     Hypertension     Pneumonia     LAST ASSESSED 4/8/16; RESOLVED 5/10/16    RLS (restless legs syndrome)     Vitamin D deficiency      Past Surgical History:   Procedure Laterality Date    BACK SURGERY      CHOLECYSTECTOMY  1975    FOOT SURGERY Right 2014    HIP SURGERY  2013    HYSTERECTOMY      OTHER SURGICAL HISTORY  2011    LAPAROSCOPIC SLING OPERATION FOR STRESS INCONTINENCE     OVARIAN CYST SURGERY  1971    CYSTECTOMY    SHOULDER SURGERY Right 12/14/2015    REPAIR OF TORN MUSCLES     Social History   History   Alcohol Use    Yes     Comment: rarely     History   Drug Use No     History   Smoking Status    Former Smoker    Types: Cigarettes    Quit date: 3/1/2018   Smokeless Tobacco    Never Used     Family History   Problem Relation Age of Onset    Hypertension Mother     Mental illness Mother     Stroke Mother         CVA    Cancer Father     Diabetes type II Father     Heart disease Father         CARDIAC DISORDER    Hypertension Other     Mental illness Other     Migraines Other     Diabetes type II Other     Calcium disorder Other         CALCIUM KIDNEY STONES    Other Other         HERPES ZOSTER INFECTION    Tuberculosis Other     Pulmonary embolism Other         CHRONIC OBSTRUCTIVE PULMONARY DISEASE    Calcium disorder Other         CALISUM KIDNEY STONES    Depression Family     Substance Abuse Family     Substance Abuse Brother          Current Medications: has a current medication list which includes the following prescription(s): albuterol, albuterol, amitriptyline, vitamin c, atorvastatin, betamethasone dipropionate, calcium, carbidopa-levodopa, cholecalciferol, clindamycin, clonazepam, denosumab, escitalopram, glucose blood, hydrochlorothiazide, lisinopril, metformin, omeprazole, pramipexole, triamcinolone, and b complex-c        Vital Signs: /68   Pulse 72   Ht 5' 5" (1 651 m)   Wt 82 1 kg (181 lb)   BMI 30 12 kg/m²      Physical Exam:   Constitutional  General Appearance: No acute distress, well appearing and well nourished  Head  Normocephalic  Eyes  Conjunctivae and lids: No swelling, erythema, or discharge  Pupils and irises: Equal, round and reactive to light  Ears, Nose, Mouth, and Throat  External inspection of ears and nose: Normal  Nasal mucosa, septum and turbinates: Normal without edema or erythema/   Oropharynx: Normal with no erythema, edema, exudate or lesions  Neck  Normal range of motion  Neck supple  Cardiovascular  Auscultation of the heart: Normal rate and rhythm, normal S1 and S2 without murmurs  Examination of the extremities for edema and/or varicosities: Normal  Pulmonary/Chest  Respiratory effort: No increased work of breathing or signs of respiratory distress     Auscultation of lungs: Clear to auscultation, equal breath sounds bilaterally, no wheezes, rales, no rhonchi  Abdomen  Abdomen: Non-tender, no masses  Liver and spleen: No hepatomegaly or splenomegaly  Musculoskeletal  Gait and station: normal  Right foot boot  Digits and Nails: normal without clubbing or cyanosis  Inspection/palpation of joints, bones, and muscles: Normal  Neurological  No nystagmus or asterixis  Skin  Skin and subcutaneous tissue: Normal without rashes or lesions  Lymphatic  Palpation of the lymph nodes in neck: No lymphadenopathy  Psychiatric  Orientation to person, place and time: Normal   Mood and affect: Normal          Labs:   Lab Results   Component Value Date    ALT 24 11/06/2017    AST 17 11/06/2017    BUN 22 03/27/2018    CALCIUM 9 5 03/27/2018     03/27/2018    CHOL 115 11/06/2017    CO2 31 03/27/2018    CREATININE 0 95 03/27/2018    HDL 43 11/06/2017    HCT 39 1 11/06/2017    HGB 12 5 11/06/2017    HGBA1C 6 8 (H) 11/06/2017    PHOS 3 8 07/08/2016     11/06/2017    K 4 3 03/27/2018     03/27/2018    TRIG 125 11/06/2017    WBC 7 43 11/06/2017         X-Rays & Procedures:   No orders to display         ______________________________________________________________________      Assessment & Plan:      Diagnoses and all orders for this visit:    Positive FIT (fecal immunochemical test)  -         Stool contents finding, abnormal    Diarrhea, unspecified type    Other orders  -     Diet NPO; Sips with meds; Standing  -     Void on call to OR; Standing  -     Insert peripheral IV; Standing      I have taken liberty of scheduling the patient for colonoscopy  I will be happy to inform you of her results and any further recommendations  I would like to thank you for allowing me to participate in her care                With warmest regards,    Joseph Peterson MD, [facility]

## 2018-06-25 PROBLEM — R19.7 DIARRHEA: Status: ACTIVE | Noted: 2018-06-25

## 2018-06-25 PROBLEM — R19.5 POSITIVE FIT (FECAL IMMUNOCHEMICAL TEST): Status: ACTIVE | Noted: 2018-06-25

## 2018-07-02 ENCOUNTER — OFFICE VISIT (OUTPATIENT)
Dept: FAMILY MEDICINE CLINIC | Facility: CLINIC | Age: 68
End: 2018-07-02
Payer: MEDICARE

## 2018-07-02 VITALS
DIASTOLIC BLOOD PRESSURE: 74 MMHG | RESPIRATION RATE: 16 BRPM | HEIGHT: 65 IN | HEART RATE: 84 BPM | SYSTOLIC BLOOD PRESSURE: 118 MMHG | WEIGHT: 180 LBS | BODY MASS INDEX: 29.99 KG/M2 | TEMPERATURE: 96.7 F

## 2018-07-02 DIAGNOSIS — R06.02 SHORTNESS OF BREATH: ICD-10-CM

## 2018-07-02 DIAGNOSIS — G25.81 RESTLESS LEGS SYNDROME (RLS): ICD-10-CM

## 2018-07-02 DIAGNOSIS — E11.9 TYPE 2 DIABETES MELLITUS WITHOUT COMPLICATION, WITHOUT LONG-TERM CURRENT USE OF INSULIN (HCC): Primary | ICD-10-CM

## 2018-07-02 DIAGNOSIS — K21.9 GASTROESOPHAGEAL REFLUX DISEASE WITHOUT ESOPHAGITIS: ICD-10-CM

## 2018-07-02 DIAGNOSIS — E78.5 HYPERLIPIDEMIA, UNSPECIFIED HYPERLIPIDEMIA TYPE: ICD-10-CM

## 2018-07-02 DIAGNOSIS — F41.9 ANXIETY: ICD-10-CM

## 2018-07-02 PROBLEM — J18.9 PNEUMONIA DUE TO INFECTIOUS ORGANISM: Status: RESOLVED | Noted: 2018-04-06 | Resolved: 2018-07-02

## 2018-07-02 PROCEDURE — 99214 OFFICE O/P EST MOD 30 MIN: CPT | Performed by: PHYSICIAN ASSISTANT

## 2018-07-02 PROCEDURE — 83036 HEMOGLOBIN GLYCOSYLATED A1C: CPT | Performed by: PHYSICIAN ASSISTANT

## 2018-07-02 PROCEDURE — 36416 COLLJ CAPILLARY BLOOD SPEC: CPT | Performed by: PHYSICIAN ASSISTANT

## 2018-07-02 RX ORDER — ALBUTEROL SULFATE 2.5 MG/3ML
2.5 SOLUTION RESPIRATORY (INHALATION) EVERY 4 HOURS PRN
Qty: 3 VIAL | Refills: 0 | Status: SHIPPED | OUTPATIENT
Start: 2018-07-02 | End: 2018-07-09 | Stop reason: SDUPTHER

## 2018-07-02 RX ORDER — OMEPRAZOLE 20 MG/1
20 CAPSULE, DELAYED RELEASE ORAL DAILY
Qty: 90 CAPSULE | Refills: 1 | Status: SHIPPED | OUTPATIENT
Start: 2018-07-02 | End: 2018-10-01 | Stop reason: SDUPTHER

## 2018-07-02 RX ORDER — ATORVASTATIN CALCIUM 20 MG/1
20 TABLET, FILM COATED ORAL DAILY
Qty: 90 TABLET | Refills: 0 | Status: SHIPPED | OUTPATIENT
Start: 2018-07-02 | End: 2019-03-15 | Stop reason: SDUPTHER

## 2018-07-02 RX ORDER — ESCITALOPRAM OXALATE 10 MG/1
15 TABLET ORAL DAILY
Qty: 45 TABLET | Refills: 3 | Status: SHIPPED | OUTPATIENT
Start: 2018-07-02 | End: 2018-11-06 | Stop reason: SDUPTHER

## 2018-07-02 RX ORDER — ALBUTEROL SULFATE 2.5 MG/3ML
2.5 SOLUTION RESPIRATORY (INHALATION) EVERY 4 HOURS PRN
Qty: 3 VIAL | Refills: 5 | Status: SHIPPED | OUTPATIENT
Start: 2018-07-02 | End: 2018-07-02 | Stop reason: SDUPTHER

## 2018-07-02 NOTE — ASSESSMENT & PLAN NOTE
[facility] [address]    [address] [address] [place] [id number]    Phone:  [phone]    Fax:  [phone]       Thank You for choosing us for your health care visit. We are glad to serve you and happy to provide you with this summary of your visit. Please help us to ensure we have accurate records. If you find anything that needs to be changed, please let our staff know as soon as possible.          Your Demographic Information     Patient Name Sex Kana Mcdonough Male 2009       Ethnic Group Patient Race    Not of  or  Origin White      Your Visit Details     Date & Time Provider Department    3/22/2017 7:15 AM Daniela Flowers,  [facility] [address]      Your To Do List     Follow-Up    Return in about 1 year (around 3/22/2018).      Your Vitals Were     BP Pulse Temp Resp    104/70 (62 %/ 80 %)* (BP Location: [facility] [place], Patient Position: Sitting, Cuff Size: Pediatric) 88 98.9 °F (37.2 °C) (Temporal Artery) 20    Height Weight BMI Smoking Status    4' 4\" (1.321 m) (75 %, Z= 0.68)† 63 lb (28.6 kg) (73 %, Z= 0.63)† 16.38 kg/m2 (63 %, Z= 0.34)† Never Smoker    *BP percentiles are based on NHBPEP's 4th Report    †Growth percentiles are based on CDC 2-20 Years data.      Medications Prescribed or Re-Ordered Today     None      Allergies     No Known Allergies      Immunizations History as of 3/22/2017     Name Date    DTaP 2011, 3/12/2010, 2009, 2009    DTaP/IPV 2014  2:10 PM    HEPATITIS A CHILD 2012, 2011    HIB 3/12/2010, 2009, 2009    Hepatitis B Child 2009, 2009, 2009    Influenza 2011    Influenza A novel H1N1 2009    Influenza Quadrivalent Live 2013    MMR 2011    MMRV 2014  2:08 PM    Pneumococcal Conjugate 7 Valent 3/12/2010, 2009, 2009    Polio, INACTIVATED 3/12/2010, 2009, 2009    Rotavirus - Pentavalent 2009, 2009    Varicella 2011      Problem List  Lab Results   Component Value Date    HGBA1C 6 8 (H) 11/06/2017       No results for input(s): POCGLU in the last 72 hours  Blood Sugar Average: Last 72 hrs: Will try invokamet for pts elevated glucose   Recheck 3 months as of 3/22/2017     Simple tics    No active medical problems              Patient Instructions    In the next few weeks you may receive a Press Ganey survey regarding your most recent clinic visit with us.  Please take a few moments out of your day to accurately evaluate your visit.  We strive to provide you with the best medical care.  Again, thank you for your time and we look forward to your next visit.      Your Child's Health  8-9 Year-Old Visit      Kana Lopez  March 22, 2017    Visit Vitals   • /70 (BP Location: [facility] [place], Patient Position: Sitting, Cuff Size: Pediatric)   • Pulse 88   • Temp 98.9 °F (37.2 °C) (Temporal Artery)   • Resp 20   • Ht 4' 4\" (1.321 m)   • Wt 28.6 kg   • BMI 16.38 kg/m2     Weight: 63 lbs      DEVELOPMENT:  At this age a typical child:  1.  Can read for enjoyment.  2.  Can tell a joke coherently.  3.  Is very concerned about rules and fairness.  4.  Is outspoken when he or she perceives unfairness in parents.  5.  Is able to take care of his or her room and assume responsibility with fewer reminders than at six years of age.  6.  Is learning to tell time (this sometimes takes until age ten).    SAFETY/ACCIDENT PREVENTION:  Accidents are the most common causes of death in  this age range.  Automobile fatalities are the most numerous, followed by deaths due to fire, drowning, falls, and gun accidents.  Continued reminders about seat belts, swimming rules, playing with fire, playing with guns, and other safety measures are necessary.  The seat belt should be across the hips and not across the stomach.  Kana should ride in the back seat.  The center seat is the safest if it has a shoulder harness.      INDIVIDUAL DIFFERENCES:  In this age range, differing abilities in physical coordination, memory, reading, and personality become apparent.  It is particularly important for parents to learn the individual strengths of each child and to praise and encourage them.  Encouragement can take  the form of praise, gifts related to the child's interests, trips, memory games at the dinner table, and individual attention from you (yes, they still crave it at this age).    Academic achievement is easily recognized by both parent and school.  Be sure to support and praise the development of non-academic strengths such as artistic, mechanical, social, athletic, or verbal skills.  \"Ruba is my finder; she always remembers where things are,\" is more likely to have a good effect than, \"She doesn't get good grades like her brother, but we like her anyway.\" Your hardest job may be telling the difference between low effort and genuine difficulty when Kana encounters poor grades.    SUN EXPOSURE:  There is now evidence that sun exposure, especially sunburn before the age of ten, increases the risk of skin cancer.  Fair-skinned people always have a higher risk.  Kana should avoid the midday sun, use sun block, and wear protective clothing and sunglasses.  Begin to educate him about tanning booths.  There is no such thing as \"safe\" tanning rays.  Set a good example; avoid burning and crash-tanning.  Follow the guidelines in our Sun Exposure handout.    DIET AND EXERCISE:  Some children may need breakfast to function well at school.  However, you should keep in mind that much of the research about the benefits of having breakfast was done by a breakfast cereal company.  In any case, the meal does not need to be big.    A multivitamin with 600 International Units vitamin D should be given to all children who drink less than 32 oz of milk per day.    Continue reminders about regular tooth brushing.    Television ads and convenience foods encourage a diet high in salt, fat, and calories as well as low in fiber.  Having a variety of alternative snack foods can promote better nutrition.  Pre-cut vegetables, dried fruits, fresh fruits, cheese, and unsalted nuts are examples of good snack foods.  Having these available will not  be effective, however, if Kana knows that the cupboards are full of chips and cupcakes.    TELEVISION/VIDEO:  1.  Limit viewing to a maximum of two hours per day.    2.  Excessive TV is associated with obesity, aggressive behavior, and negative moods.  3.  Do not allow TV shows or videos that promote bad attitudes.  Many videos consistently portray women or minorities as victims.   4.  Teach Kana not to eat while watching TV by not doing it yourself.  5.  Encourage other forms of learning and entertainment, such as books, story-telling, and trips to museums, farms, zoos, etc.    CHORES/ALLOWANCES:  Children at this age will handle chores with encouragement, but they can be very sensitive to fairness.  Chores should be evenly divided among siblings or rotated.  Excusing a child because of special activities, even if they are highly valued by the family (for example, music lessons in a musical family), will breed resentment in the siblings who end up picking up the chores.    An allowance can provide a useful tool for learning about Kana's values and personality as well as providing a lever for discipline.  Make certain that the amount is not so large that Kana does not have to make choices about what to spend it on.    MEDICATION FOR FEVER OR PAIN:   Acetaminophen liquid (e.g., Tylenol or Tempra) may be given every four hours as needed for pain or fever.  Acetaminophen liquid is less concentrated than the infant dropper bottle type. Be sure to check which product CONCENTRATION you are using.      CHILDREN’S Tylenol/Acetaminophen  (160 MG/5 mL)    Child’s Weight:  Dose:  36 - 47 pounds:    240 mg (7.5 mL (1 1/2 Teaspoons))  48 - 59 pounds:    320 mg (10.0 mL (2 Teaspoons))  60 - 71 pounds:    400 mg (12.5 mL (2 1/2 Teaspoons))  72 - 95 pounds:    480 mg (15.0 mL (3 Teaspoons))    CHILDREN’S Tylenol/Acetaminophen MELTAWAYS ( 80 MG tablets)    Child’s Weight:  Dose:  36 - 47 pounds:    240 mg (3 meltaway tablets)  48 -  59 pounds:    320 mg (4 meltaway tablets)  60 - 71 pounds:    400 mg (5 meltaway tablets)  72 - 95 pounds:    480 mg (6 meltaway tablets)     () Tylenol/Acetaminophen MELTAWAYS (160 MG tablets)    Child’s Weight:  Dose:  36 - 47 pounds:    240 mg (1 1/2 meltaway tablets)  48 - 59 pounds:    320 mg (2 meltaway tablets)  60 - 71 pounds:    400 mg (2 1/2 meltaway tablets)  72 - 95 pounds:    480 mg (3 meltaway tablets)    CHILDREN'S Ibuprofen (e.g., Advil or Motrin) may be given every six hours as needed for pain or fever.    Child’s Weight:  Dose:  48 - 59 pounds:    200 mg (2 Teaspoons of liquid)  60 - 71 pounds:    250 mg (2 1/2Teaspoons of liquid)  72 - 95 pounds:    300 mg (3 Teaspoons of liquid)    NEXT VISIT:  IN 1 - 2 YEARS       Thank you for entrusting your care to [facility].

## 2018-07-02 NOTE — PROGRESS NOTES
Assessment/Plan:    No problem-specific Assessment & Plan notes found for this encounter  Diagnoses and all orders for this visit:    Gastroesophageal reflux disease without esophagitis  -     omeprazole (PriLOSEC) 20 mg delayed release capsule; Take 1 capsule (20 mg total) by mouth daily    Hyperlipidemia, unspecified hyperlipidemia type  -     atorvastatin (LIPITOR) 20 mg tablet; Take 1 tablet (20 mg total) by mouth daily    Shortness of breath  -     Discontinue: albuterol (2 5 mg/3 mL) 0 083 % nebulizer solution; Take 1 vial (2 5 mg total) by nebulization every 4 (four) hours as needed for shortness of breath  -     albuterol (2 5 mg/3 mL) 0 083 % nebulizer solution; Take 1 vial (2 5 mg total) by nebulization every 4 (four) hours as needed for shortness of breath    Type 2 diabetes mellitus without complication, without long-term current use of insulin (HCC)  -     Canagliflozin-Metformin HCl (INVOKAMET) 150-1000 MG TABS; Take 1 tablet by mouth 2 (two) times a day    Restless legs syndrome (RLS)    Anxiety  -     escitalopram (LEXAPRO) 10 mg tablet; Take 1 5 tablets (15 mg total) by mouth daily          Subjective:      Patient ID: Chery Costello is a 76 y o  female  Pt presents for routine visit  Since her last visit she notes that she fell and broke her right foot  She is in a cam walker boot for another 2 weeks and has been following with ortho regarding this  She states her sugars have been elevated and she desires an option to help with this  She is currently on metformin 1000mg bid  She also notes ongoing RLS  She is currently on elavil, klonopin, mirapex and sinemet  She only takes the sinemet for long car rides  This works well and she tolerates it  She failed gabapentin, lyrica, requip  The following portions of the patient's history were reviewed and updated as appropriate:   She  has a past medical history of Anxiety; Cellulitis; Depression; Diabetes mellitus ([address] );  Dysuria; Esophageal reflux; Fatigue; Fracture; Hyperlipidemia; Hypertension; Pneumonia; RLS (restless legs syndrome); and Vitamin D deficiency  She   Patient Active Problem List    Diagnosis Date Noted    Positive FIT (fecal immunochemical test) 06/25/2018    Diarrhea 06/25/2018    Diabetes mellitus ([facility] [address] ) 04/06/2018    Pneumonia due to infectious organism 04/06/2018    Chronic lower back pain 09/29/2016    Anxiety 05/21/2016    Vitamin D deficiency 03/09/2016    HTN (hypertension) 12/14/2015    Hypercholesteremia 12/14/2015    Restless legs syndrome (RLS) 12/14/2015     She  has a past surgical history that includes Back surgery; Cholecystectomy (1975); Foot surgery (Right, 2014); Hip surgery (2013); Hysterectomy; Ovarian cyst surgery (1971); Shoulder surgery (Right, 12/14/2015); and Other surgical history (2011)  Her family history includes Calcium disorder in her other and other; Cancer in her father; Depression in her family; Diabetes type II in her father and other; Heart disease in her father; Hypertension in her mother and other; Mental illness in her mother and other; Migraines in her other; Other in her other; Pulmonary embolism in her other; Stroke in her mother; Substance Abuse in her brother and family; Tuberculosis in her other  She  reports that she quit smoking about 4 months ago  Her smoking use included Cigarettes  She has never used smokeless tobacco  She reports that she drinks alcohol  She reports that she does not use drugs    Current Outpatient Prescriptions   Medication Sig Dispense Refill    albuterol (2 5 mg/3 mL) 0 083 % nebulizer solution Take 1 vial (2 5 mg total) by nebulization every 4 (four) hours as needed for shortness of breath 3 vial 0    albuterol (PROAIR HFA) 90 mcg/act inhaler Inhale 2 puffs      amitriptyline (ELAVIL) 25 mg tablet Take 1 tablet by mouth daily at bedtime as needed        Ascorbic Acid (VITAMIN C) 1000 MG tablet Take by mouth      atorvastatin (LIPITOR) 20 mg tablet Take 1 tablet (20 mg total) by mouth daily 90 tablet 0    betamethasone dipropionate (DIPROSONE) 0 05 % cream Apply topically 2 (two) times a day      Calcium 500 MG tablet Take 2 tablets by mouth every other day        carbidopa-levodopa (SINEMET)  mg per tablet Take 1 tablet by mouth as needed    1    cholecalciferol (VITAMIN D3) 1,000 units tablet Take 3 tablets by mouth every other day        clindamycin (CLEOCIN T) 1 % external solution       clonazePAM (KlonoPIN) 2 mg tablet Take 2 mg by mouth daily at bedtime        denosumab (PROLIA) 60 mg/mL Inject under the skin every 6 (six) months      escitalopram (LEXAPRO) 10 mg tablet Take 1 5 tablets (15 mg total) by mouth daily 45 tablet 3    Glucose Blood (ONETOUCH ULTRA BLUE VI) by In Vitro route 3 (three) times a day      hydrochlorothiazide (HYDRODIURIL) 50 mg tablet Take 1 tablet (50 mg total) by mouth daily 90 tablet 3    lisinopril (ZESTRIL) 2 5 mg tablet Take 1 tablet by mouth daily      omeprazole (PriLOSEC) 20 mg delayed release capsule Take 1 capsule (20 mg total) by mouth daily 90 capsule 1    pramipexole (MIRAPEX) 1 5 MG tablet Take 1 5 mg by mouth 2 (two) times a day  1    triamcinolone (KENALOG) 0 1 % cream Apply topically 2 (two) times a day      VITAMIN B COMPLEX-C PO Take 1 tablet by mouth daily      Canagliflozin-Metformin HCl (INVOKAMET) 150-1000 MG TABS Take 1 tablet by mouth 2 (two) times a day 60 tablet 3     No current facility-administered medications for this visit        Current Outpatient Prescriptions on File Prior to Visit   Medication Sig    albuterol (PROAIR HFA) 90 mcg/act inhaler Inhale 2 puffs    amitriptyline (ELAVIL) 25 mg tablet Take 1 tablet by mouth daily at bedtime as needed      Ascorbic Acid (VITAMIN C) 1000 MG tablet Take by mouth    betamethasone dipropionate (DIPROSONE) 0 05 % cream Apply topically 2 (two) times a day    Calcium 500 MG tablet Take 2 tablets by mouth every other day  carbidopa-levodopa (SINEMET)  mg per tablet Take 1 tablet by mouth as needed      cholecalciferol (VITAMIN D3) 1,000 units tablet Take 3 tablets by mouth every other day      clindamycin (CLEOCIN T) 1 % external solution     clonazePAM (KlonoPIN) 2 mg tablet Take 2 mg by mouth daily at bedtime      denosumab (PROLIA) 60 mg/mL Inject under the skin every 6 (six) months    Glucose Blood (ONETOUCH ULTRA BLUE VI) by In Vitro route 3 (three) times a day    hydrochlorothiazide (HYDRODIURIL) 50 mg tablet Take 1 tablet (50 mg total) by mouth daily    lisinopril (ZESTRIL) 2 5 mg tablet Take 1 tablet by mouth daily    pramipexole (MIRAPEX) 1 5 MG tablet Take 1 5 mg by mouth 2 (two) times a day    triamcinolone (KENALOG) 0 1 % cream Apply topically 2 (two) times a day    VITAMIN B COMPLEX-C PO Take 1 tablet by mouth daily    [DISCONTINUED] albuterol (2 5 mg/3 mL) 0 083 % nebulizer solution Inhale 1 each every 4 (four) hours as needed    [DISCONTINUED] atorvastatin (LIPITOR) 20 mg tablet Take 1 tablet (20 mg total) by mouth daily    [DISCONTINUED] escitalopram (LEXAPRO) 10 mg tablet Take 1 tablet by mouth daily    [DISCONTINUED] metFORMIN (GLUCOPHAGE) 1000 MG tablet Take 1 tablet (1,000 mg total) by mouth 2 (two) times a day    [DISCONTINUED] omeprazole (PriLOSEC) 20 mg delayed release capsule Take 1 capsule by mouth daily     No current facility-administered medications on file prior to visit  She is allergic to gabapentin; hydrocodone-acetaminophen; midazolam; penicillin v; pregabalin; propofol; and sulfa antibiotics       Review of Systems   Constitutional: Negative for chills and fever  HENT: Negative for congestion, ear pain, hearing loss, postnasal drip, rhinorrhea, sinus pain, sinus pressure, sore throat and trouble swallowing  Eyes: Negative for pain and visual disturbance  Respiratory: Negative for cough, chest tightness, shortness of breath and wheezing  Cardiovascular: Negative  Negative for chest pain, palpitations and leg swelling  Gastrointestinal: Negative for abdominal pain, blood in stool, constipation, diarrhea, nausea and vomiting  Endocrine: Negative for cold intolerance, heat intolerance, polydipsia, polyphagia and polyuria  Genitourinary: Negative for difficulty urinating, dysuria, flank pain and urgency  Musculoskeletal: Positive for arthralgias  Negative for back pain, gait problem and myalgias  Skin: Negative for rash  Allergic/Immunologic: Negative  Neurological: Positive for tremors  Negative for dizziness, weakness, light-headedness and headaches  Hematological: Negative  Psychiatric/Behavioral: Negative for behavioral problems, dysphoric mood and sleep disturbance  The patient is not nervous/anxious  Objective:      /74 (BP Location: Left arm, Patient Position: Sitting, Cuff Size: Large)   Pulse 84   Temp (!) 96 7 °F (35 9 °C) (Tympanic)   Resp 16   Ht 5' 5" (1 651 m)   Wt 81 6 kg (180 lb)   BMI 29 95 kg/m²          Physical Exam   Constitutional: She is oriented to person, place, and time  She appears well-developed and well-nourished  No distress  HENT:   Head: Normocephalic and atraumatic  Right Ear: External ear normal    Left Ear: External ear normal    Nose: Nose normal    Mouth/Throat: Oropharynx is clear and moist  No oropharyngeal exudate  Eyes: Conjunctivae and EOM are normal  Pupils are equal, round, and reactive to light  Right eye exhibits no discharge  Left eye exhibits no discharge  No scleral icterus  Neck: Normal range of motion  Neck supple  No thyromegaly present  Cardiovascular: Normal rate, regular rhythm and normal heart sounds  Exam reveals no gallop and no friction rub  No murmur heard  Pulmonary/Chest: Effort normal and breath sounds normal  No respiratory distress  She has no wheezes  She has no rales  Abdominal: Soft  Bowel sounds are normal  She exhibits no distension   There is no tenderness  Musculoskeletal: Normal range of motion  She exhibits no edema, tenderness or deformity  Neurological: She is alert and oriented to person, place, and time  No cranial nerve deficit  Skin: Skin is warm and dry  She is not diaphoretic  Psychiatric: She has a normal mood and affect   Her behavior is normal  Judgment and thought content normal

## 2018-07-09 ENCOUNTER — ANESTHESIA EVENT (OUTPATIENT)
Dept: PERIOP | Facility: HOSPITAL | Age: 68
End: 2018-07-09
Payer: MEDICARE

## 2018-07-09 DIAGNOSIS — E11.8 TYPE 2 DIABETES MELLITUS WITH COMPLICATION, WITHOUT LONG-TERM CURRENT USE OF INSULIN (HCC): Primary | ICD-10-CM

## 2018-07-09 DIAGNOSIS — R06.02 SHORTNESS OF BREATH: ICD-10-CM

## 2018-07-09 RX ORDER — ALBUTEROL SULFATE 2.5 MG/3ML
2.5 SOLUTION RESPIRATORY (INHALATION) EVERY 4 HOURS PRN
Qty: 3 ML | Refills: 5 | Status: SHIPPED | OUTPATIENT
Start: 2018-07-09 | End: 2018-07-13 | Stop reason: SDUPTHER

## 2018-07-10 ENCOUNTER — HOSPITAL ENCOUNTER (OUTPATIENT)
Facility: HOSPITAL | Age: 68
Setting detail: OUTPATIENT SURGERY
Discharge: HOME | End: 2018-07-10
Attending: INTERNAL MEDICINE | Admitting: INTERNAL MEDICINE
Payer: MEDICARE

## 2018-07-10 ENCOUNTER — ANESTHESIA (OUTPATIENT)
Dept: PERIOP | Facility: HOSPITAL | Age: 68
End: 2018-07-10
Payer: MEDICARE

## 2018-07-10 VITALS
RESPIRATION RATE: 20 BRPM | OXYGEN SATURATION: 94 % | BODY MASS INDEX: 29.02 KG/M2 | SYSTOLIC BLOOD PRESSURE: 99 MMHG | HEART RATE: 70 BPM | WEIGHT: 174.16 LBS | TEMPERATURE: 97.8 F | DIASTOLIC BLOOD PRESSURE: 51 MMHG | HEIGHT: 65 IN

## 2018-07-10 DIAGNOSIS — R19.7 DIARRHEA, UNSPECIFIED TYPE: ICD-10-CM

## 2018-07-10 DIAGNOSIS — R19.5 POSITIVE FIT (FECAL IMMUNOCHEMICAL TEST): ICD-10-CM

## 2018-07-10 LAB — GLUCOSE SERPL-MCNC: 118 MG/DL (ref 65–140)

## 2018-07-10 PROCEDURE — 88305 TISSUE EXAM BY PATHOLOGIST: CPT | Performed by: PATHOLOGY

## 2018-07-10 PROCEDURE — 82948 REAGENT STRIP/BLOOD GLUCOSE: CPT

## 2018-07-10 PROCEDURE — 45380 COLONOSCOPY AND BIOPSY: CPT | Performed by: INTERNAL MEDICINE

## 2018-07-10 RX ORDER — LIDOCAINE HYDROCHLORIDE 10 MG/ML
INJECTION, SOLUTION INFILTRATION; PERINEURAL AS NEEDED
Status: DISCONTINUED | OUTPATIENT
Start: 2018-07-10 | End: 2018-07-10 | Stop reason: SURG

## 2018-07-10 RX ORDER — ASPIRIN 81 MG/1
81 TABLET ORAL DAILY
COMMUNITY

## 2018-07-10 RX ORDER — POTASSIUM CHLORIDE 750 MG/1
10 CAPSULE, EXTENDED RELEASE ORAL 2 TIMES DAILY
COMMUNITY

## 2018-07-10 RX ORDER — OXYCODONE HYDROCHLORIDE AND ACETAMINOPHEN 5; 325 MG/1; MG/1
1 TABLET ORAL ONCE
Status: COMPLETED | OUTPATIENT
Start: 2018-07-10 | End: 2018-07-10

## 2018-07-10 RX ORDER — SODIUM CHLORIDE, SODIUM LACTATE, POTASSIUM CHLORIDE, CALCIUM CHLORIDE 600; 310; 30; 20 MG/100ML; MG/100ML; MG/100ML; MG/100ML
125 INJECTION, SOLUTION INTRAVENOUS CONTINUOUS
Status: DISCONTINUED | OUTPATIENT
Start: 2018-07-10 | End: 2018-07-10 | Stop reason: HOSPADM

## 2018-07-10 RX ORDER — PROPOFOL 10 MG/ML
INJECTION, EMULSION INTRAVENOUS AS NEEDED
Status: DISCONTINUED | OUTPATIENT
Start: 2018-07-10 | End: 2018-07-10 | Stop reason: SURG

## 2018-07-10 RX ADMIN — OXYCODONE HYDROCHLORIDE AND ACETAMINOPHEN 1 TABLET: 5; 325 TABLET ORAL at 11:49

## 2018-07-10 RX ADMIN — PROPOFOL 50 MG: 10 INJECTION, EMULSION INTRAVENOUS at 11:12

## 2018-07-10 RX ADMIN — PROPOFOL 50 MG: 10 INJECTION, EMULSION INTRAVENOUS at 11:23

## 2018-07-10 RX ADMIN — PROPOFOL 50 MG: 10 INJECTION, EMULSION INTRAVENOUS at 11:15

## 2018-07-10 RX ADMIN — SODIUM CHLORIDE, SODIUM LACTATE, POTASSIUM CHLORIDE, AND CALCIUM CHLORIDE: .6; .31; .03; .02 INJECTION, SOLUTION INTRAVENOUS at 10:57

## 2018-07-10 RX ADMIN — PROPOFOL 50 MG: 10 INJECTION, EMULSION INTRAVENOUS at 11:19

## 2018-07-10 RX ADMIN — LIDOCAINE HYDROCHLORIDE 50 MG: 10 INJECTION, SOLUTION INFILTRATION; PERINEURAL at 11:10

## 2018-07-10 RX ADMIN — PROPOFOL 100 MG: 10 INJECTION, EMULSION INTRAVENOUS at 11:10

## 2018-07-10 NOTE — OP NOTE
**** GI/ENDOSCOPY REPORT ****     PATIENT NAME: Kit Bales ------ VISIT ID:  Patient ID:   [id number] YOB: 1950     INTRODUCTION: Colonoscopy - A 76 female patient presents for an outpatient   Colonoscopy at [address] [place] [address]  PREVIOUS COLONOSCOPY: This colonoscopy is being performed for diagnostic   indication     INDICATIONS: Change in bowel habits  Diarrhea  CONSENT:  The benefits, risks, and alternatives to the procedure were   discussed and informed consent was obtained from the patient  PREPARATION: EKG, pulse, pulse oximetry and blood pressure were monitored   throughout the procedure  The patient was identified by myself both   verbally and by visual inspection of ID band  Airway Assessment   Classification: Airway class 2 - Visualization of the soft palate, fauces   and uvula  ASA Classification: Class 2 - Patient has mild to moderate   systemic disturbance that may or may not be related to the disorder   requiring surgery  MEDICATIONS: Anesthesia-check records     PROCEDURE:  The endoscope was passed without difficulty through the anus   under direct visualization and advanced to the cecum, confirmed by   appendiceal orifice and ileocecal valve  The scope was withdrawn and the   mucosa was carefully examined  The quality of the preparation was adequate   prep  Cecal Intubation Time: 5 minutes(s) Scope Withdrawal Time: 10   minutes(s)     RECTAL EXAM: Normal rectal exam      FINDINGS:  The colonoscopy examination was completely normal  A biopsy was   taken from the ascending colon and descending colon  Normal retroversion     COMPLICATIONS: There were no complications  IMPRESSIONS: Normal colonoscopy  RECOMMENDATIONS: Follow-up on the results of the biopsy specimens  Colonoscopy recommended in 10 years Yearly FIT test with PCP Every 3 year   Cologuard test with PCP     ESTIMATED BLOOD LOSS: Insignificant       PATHOLOGY SPECIMENS: Random biopsy taken from the ascending colon and   descending colon  PROCEDURE CODES: Colonoscopy with biopsy     ICD-9 Codes: 787 99 Other symptoms involving digestive system 787 91   Diarrhea     ICD-10 Codes: R19 4 Change in bowel habit R19 7 Diarrhea, unspecified     PERFORMED BY: EDILBERTO Barcenas [facility]  on 07/10/2018  Version 1, electronically signed by EDILBERTO Rodriguez , D O  on   07/10/2018 at 11:29

## 2018-07-10 NOTE — ANESTHESIA PREPROCEDURE EVALUATION
Review of Systems/Medical History  Patient summary reviewed        Cardiovascular  Negative cardio ROS Hyperlipidemia, Hypertension ,    Pulmonary  Pneumonia,        GI/Hepatic    GERD ,        Negative  ROS        Endo/Other  Diabetes ,      GYN  Negative gynecology ROS          Hematology  Negative hematology ROS      Musculoskeletal  Negative musculoskeletal ROS        Neurology      Comment: RLS Psychology   Anxiety, Depression ,              Physical Exam    Airway    Mallampati score: II  TM Distance: >3 FB  Neck ROM: full     Dental       Cardiovascular  Comment: Negative ROS, Cardiovascular exam normal    Pulmonary  Pulmonary exam normal     Other Findings        Anesthesia Plan  ASA Score- 2     Anesthesia Type- IV sedation with anesthesia with ASA Monitors  Additional Monitors:   Airway Plan:         Plan Factors-    Induction- intravenous  Postoperative Plan-     Informed Consent- Anesthetic plan and risks discussed with patient  I personally reviewed this patient with the CRNA  Discussed and agreed on the Anesthesia Plan with the CRNA  Natan Hoang

## 2018-07-12 ENCOUNTER — TELEPHONE (OUTPATIENT)
Dept: FAMILY MEDICINE CLINIC | Facility: CLINIC | Age: 68
End: 2018-07-12

## 2018-07-12 NOTE — TELEPHONE ENCOUNTER
Pharmacy filled with 3 cc per vile and gave her only 25 viles of her Albuteraol  The script was written for a  Quantity 75 viles    When  She called they said the 3cc equaled 75 viles  This is not correct  Can you look at this        This would mean she would have to pick prescription every 5 to 6 days    Pharmacy [facility] in [facility] [address]    Please advise    [phone]

## 2018-07-13 DIAGNOSIS — R06.02 SHORTNESS OF BREATH: ICD-10-CM

## 2018-07-13 DIAGNOSIS — E11.8 TYPE 2 DIABETES MELLITUS WITH COMPLICATION, WITHOUT LONG-TERM CURRENT USE OF INSULIN (HCC): Primary | ICD-10-CM

## 2018-07-13 RX ORDER — ALBUTEROL SULFATE 2.5 MG/3ML
2.5 SOLUTION RESPIRATORY (INHALATION) EVERY 4 HOURS PRN
Qty: 25 VIAL | Refills: 5 | Status: SHIPPED | OUTPATIENT
Start: 2018-07-13 | End: 2020-04-08 | Stop reason: SDUPTHER

## 2018-07-16 ENCOUNTER — TELEPHONE (OUTPATIENT)
Dept: FAMILY MEDICINE CLINIC | Facility: CLINIC | Age: 68
End: 2018-07-16

## 2018-07-17 ENCOUNTER — TELEPHONE (OUTPATIENT)
Dept: GASTROENTEROLOGY | Facility: CLINIC | Age: 68
End: 2018-07-17

## 2018-08-03 ENCOUNTER — TELEPHONE (OUTPATIENT)
Dept: FAMILY MEDICINE CLINIC | Facility: CLINIC | Age: 68
End: 2018-08-03

## 2018-08-03 NOTE — TELEPHONE ENCOUNTER
Electronic Ting Dumont was denied- will do paper auth Pts invokamet not covered and very expensive Submitted auth via cover my meds Submitted auth via cover my meds

## 2018-08-03 NOTE — TELEPHONE ENCOUNTER
Mckayla Salcido stated that her 2nd insurance will cover the invocana but it will cost er $200 00 a month      She will drop off her formulary from her insurance on Monday to see what ins will cover and will be cheaper/    FYI

## 2018-08-16 ENCOUNTER — TELEPHONE (OUTPATIENT)
Dept: FAMILY MEDICINE CLINIC | Facility: CLINIC | Age: 68
End: 2018-08-16

## 2018-08-16 DIAGNOSIS — Z12.39 SCREENING FOR BREAST CANCER: Primary | ICD-10-CM

## 2018-08-16 NOTE — TELEPHONE ENCOUNTER
Tita sent her a letter that next month she should get her Mammo done  Can you please put in script for her just a screening

## 2018-09-07 DIAGNOSIS — G25.81 RLS (RESTLESS LEGS SYNDROME): Primary | ICD-10-CM

## 2018-09-07 DIAGNOSIS — F41.9 ANXIETY: ICD-10-CM

## 2018-09-07 RX ORDER — CLONAZEPAM 2 MG/1
2 TABLET ORAL
Qty: 30 TABLET | Refills: 0 | Status: SHIPPED | OUTPATIENT
Start: 2018-09-07 | End: 2018-10-08 | Stop reason: SDUPTHER

## 2018-09-12 ENCOUNTER — TRANSCRIBE ORDERS (OUTPATIENT)
Dept: LAB | Facility: CLINIC | Age: 68
End: 2018-09-12

## 2018-09-12 ENCOUNTER — APPOINTMENT (OUTPATIENT)
Dept: RADIOLOGY | Facility: CLINIC | Age: 68
End: 2018-09-12
Payer: MEDICARE

## 2018-09-12 ENCOUNTER — OFFICE VISIT (OUTPATIENT)
Dept: FAMILY MEDICINE CLINIC | Facility: CLINIC | Age: 68
End: 2018-09-12
Payer: MEDICARE

## 2018-09-12 ENCOUNTER — APPOINTMENT (OUTPATIENT)
Dept: LAB | Facility: CLINIC | Age: 68
End: 2018-09-12
Payer: MEDICARE

## 2018-09-12 VITALS
HEIGHT: 65 IN | BODY MASS INDEX: 30.16 KG/M2 | SYSTOLIC BLOOD PRESSURE: 140 MMHG | TEMPERATURE: 98.3 F | HEART RATE: 106 BPM | RESPIRATION RATE: 24 BRPM | OXYGEN SATURATION: 86 % | WEIGHT: 181 LBS | DIASTOLIC BLOOD PRESSURE: 76 MMHG

## 2018-09-12 DIAGNOSIS — J44.9 CHRONIC OBSTRUCTIVE PULMONARY DISEASE, UNSPECIFIED COPD TYPE (HCC): ICD-10-CM

## 2018-09-12 DIAGNOSIS — R05.9 COUGH: ICD-10-CM

## 2018-09-12 DIAGNOSIS — J18.9 PNEUMONIA DUE TO INFECTIOUS ORGANISM, UNSPECIFIED LATERALITY, UNSPECIFIED PART OF LUNG: ICD-10-CM

## 2018-09-12 DIAGNOSIS — R06.02 SHORTNESS OF BREATH: ICD-10-CM

## 2018-09-12 DIAGNOSIS — J18.9 PNEUMONIA DUE TO INFECTIOUS ORGANISM, UNSPECIFIED LATERALITY, UNSPECIFIED PART OF LUNG: Primary | ICD-10-CM

## 2018-09-12 PROBLEM — R01.1 HEART MURMUR: Status: ACTIVE | Noted: 2018-09-12

## 2018-09-12 LAB
BASOPHILS # BLD AUTO: 0.09 THOUSANDS/ΜL (ref 0–0.1)
BASOPHILS NFR BLD AUTO: 1 % (ref 0–1)
EOSINOPHIL # BLD AUTO: 0.25 THOUSAND/ΜL (ref 0–0.61)
EOSINOPHIL NFR BLD AUTO: 3 % (ref 0–6)
ERYTHROCYTE [DISTWIDTH] IN BLOOD BY AUTOMATED COUNT: 16 % (ref 11.6–15.1)
HCT VFR BLD AUTO: 39.2 % (ref 34.8–46.1)
HGB BLD-MCNC: 12.2 G/DL (ref 11.5–15.4)
IMM GRANULOCYTES # BLD AUTO: 0.03 THOUSAND/UL (ref 0–0.2)
IMM GRANULOCYTES NFR BLD AUTO: 0 % (ref 0–2)
LYMPHOCYTES # BLD AUTO: 1.94 THOUSANDS/ΜL (ref 0.6–4.47)
LYMPHOCYTES NFR BLD AUTO: 24 % (ref 14–44)
MCH RBC QN AUTO: 28.2 PG (ref 26.8–34.3)
MCHC RBC AUTO-ENTMCNC: 31.1 G/DL (ref 31.4–37.4)
MCV RBC AUTO: 91 FL (ref 82–98)
MONOCYTES # BLD AUTO: 0.73 THOUSAND/ΜL (ref 0.17–1.22)
MONOCYTES NFR BLD AUTO: 9 % (ref 4–12)
NEUTROPHILS # BLD AUTO: 5.17 THOUSANDS/ΜL (ref 1.85–7.62)
NEUTS SEG NFR BLD AUTO: 63 % (ref 43–75)
NRBC BLD AUTO-RTO: 0 /100 WBCS
PLATELET # BLD AUTO: 255 THOUSANDS/UL (ref 149–390)
PMV BLD AUTO: 10 FL (ref 8.9–12.7)
RBC # BLD AUTO: 4.33 MILLION/UL (ref 3.81–5.12)
WBC # BLD AUTO: 8.21 THOUSAND/UL (ref 4.31–10.16)

## 2018-09-12 PROCEDURE — 99214 OFFICE O/P EST MOD 30 MIN: CPT | Performed by: NURSE PRACTITIONER

## 2018-09-12 PROCEDURE — 36415 COLL VENOUS BLD VENIPUNCTURE: CPT

## 2018-09-12 PROCEDURE — 96372 THER/PROPH/DIAG INJ SC/IM: CPT | Performed by: NURSE PRACTITIONER

## 2018-09-12 PROCEDURE — 85025 COMPLETE CBC W/AUTO DIFF WBC: CPT

## 2018-09-12 PROCEDURE — 71046 X-RAY EXAM CHEST 2 VIEWS: CPT

## 2018-09-12 RX ORDER — METHYLPREDNISOLONE ACETATE 40 MG/ML
40 INJECTION, SUSPENSION INTRA-ARTICULAR; INTRALESIONAL; INTRAMUSCULAR; SOFT TISSUE ONCE
Status: COMPLETED | OUTPATIENT
Start: 2018-09-12 | End: 2018-09-12

## 2018-09-12 RX ORDER — PREDNISONE 20 MG/1
TABLET ORAL
Qty: 16 TABLET | Refills: 0 | Status: SHIPPED | OUTPATIENT
Start: 2018-09-12 | End: 2018-09-26 | Stop reason: ALTCHOICE

## 2018-09-12 RX ORDER — AZITHROMYCIN 250 MG/1
250 TABLET, FILM COATED ORAL EVERY 24 HOURS
Qty: 6 TABLET | Refills: 0 | Status: SHIPPED | OUTPATIENT
Start: 2018-09-12 | End: 2018-09-18

## 2018-09-12 RX ADMIN — METHYLPREDNISOLONE ACETATE 40 MG: 40 INJECTION, SUSPENSION INTRA-ARTICULAR; INTRALESIONAL; INTRAMUSCULAR; SOFT TISSUE at 09:22

## 2018-09-12 NOTE — PROGRESS NOTES
Assessment/Plan:    No problem-specific Assessment & Plan notes found for this encounter  Diagnoses and all orders for this visit:    Pneumonia due to infectious organism, unspecified laterality, unspecified part of lung  Comments:  Chest xray ordered, Depomedrol 40mg POCT , Prednisone taper Rx , Trelegy inhaler ordered and Z pack ordered  Orders:  -     Cancel: XR chest pa & lateral; Future  -     CBC and differential; Future  -     methylPREDNISolone acetate (DEPO-MEDROL) injection 40 mg; Inject 1 mL (40 mg total) into a muscle once   -     predniSONE 20 mg tablet; Please take 3 -20 mg tabs X3 days , Please take 2 - 20mg tabs X 3 days, Please take 1-20 mg tab X1 days  -     azithromycin (ZITHROMAX Z-NICOLÁS) 250 mg tablet; Take 1 tablet (250 mg total) by mouth every 24 hours for 6 days  -     fluticasone-umeclidinium-vilanterol (TRELEGY ELLIPTA) 100-62 5-25 MCG/INH inhaler; Inhale 1 puff daily Rinse mouth after use  -     XR chest pa & lateral; Future    Cough  Comments:  Chest xray ordered, Depomedrol 40mg POCT , Prednisone taper Rx , Trelegy inhaler ordered and Z pack ordered  Orders:  -     Cancel: XR chest pa & lateral; Future  -     CBC and differential; Future  -     methylPREDNISolone acetate (DEPO-MEDROL) injection 40 mg; Inject 1 mL (40 mg total) into a muscle once   -     predniSONE 20 mg tablet; Please take 3 -20 mg tabs X3 days , Please take 2 - 20mg tabs X 3 days, Please take 1-20 mg tab X1 days  -     azithromycin (ZITHROMAX Z-NICOLÁS) 250 mg tablet; Take 1 tablet (250 mg total) by mouth every 24 hours for 6 days  -     fluticasone-umeclidinium-vilanterol (TRELEGY ELLIPTA) 100-62 5-25 MCG/INH inhaler; Inhale 1 puff daily Rinse mouth after use    -     XR chest pa & lateral; Future    Shortness of breath  Comments:  Chest xray ordered, Depomedrol 40mg POCT , Prednisone taper Rx , Trelegy inhaler ordered and Z pack ordered  Orders:  -     methylPREDNISolone acetate (DEPO-MEDROL) injection 40 mg; Inject 1 mL (40 mg total) into a muscle once   -     predniSONE 20 mg tablet; Please take 3 -20 mg tabs X3 days , Please take 2 - 20mg tabs X 3 days, Please take 1-20 mg tab X1 days  -     azithromycin (ZITHROMAX Z-NICOLÁS) 250 mg tablet; Take 1 tablet (250 mg total) by mouth every 24 hours for 6 days  -     fluticasone-umeclidinium-vilanterol (TRELEGY ELLIPTA) 100-62 5-25 MCG/INH inhaler; Inhale 1 puff daily Rinse mouth after use  -     XR chest pa & lateral; Future    Chronic obstructive pulmonary disease, unspecified COPD type ([facility] )  Comments:  Pt quit smoking 6 mos ago, Chest xray ordered, Depomedrol 40mg POCT , Prednisone taper Rx , Trelegy inhaler ordered and Z pack ordered  Orders:  -     methylPREDNISolone acetate (DEPO-MEDROL) injection 40 mg; Inject 1 mL (40 mg total) into a muscle once   -     predniSONE 20 mg tablet; Please take 3 -20 mg tabs X3 days , Please take 2 - 20mg tabs X 3 days, Please take 1-20 mg tab X1 days  -     azithromycin (ZITHROMAX Z-NICOLÁS) 250 mg tablet; Take 1 tablet (250 mg total) by mouth every 24 hours for 6 days  -     fluticasone-umeclidinium-vilanterol (TRELEGY ELLIPTA) 100-62 5-25 MCG/INH inhaler; Inhale 1 puff daily Rinse mouth after use  -     XR chest pa & lateral; Future          Subjective:      Patient ID: Chery Costello is a 76 y o  female here for ongoing symptoms of cough, shortness of breath X3days reports no fever but reports feeling really cold  Pt is using her   Albuterol inhaler and her nebulizer daily and throughout the day  Pneumonia   She complains of cough, shortness of breath and wheezing  This is a recurrent problem  The current episode started in the past 7 days  The problem occurs daily  The problem has been gradually worsening  The cough is productive of purulent sputum, productive, hoarse and harsh  Associated symptoms include orthopnea and a sore throat  Pertinent negatives include no postnasal drip   Her symptoms are aggravated by change in weather, climbing stairs, any activity, lying down, minimal activity, strenuous activity and URI  Her symptoms are alleviated by nothing  She reports minimal improvement on treatment  Her symptoms are not alleviated by beta-agonist, change in position, OTC cough suppressant and rest  Risk factors for lung disease include smoking/tobacco exposure  Her past medical history is significant for COPD and pneumonia  The following portions of the patient's history were reviewed and updated as appropriate:   She  has a past medical history of Anxiety; Cellulitis; Depression; Diabetes mellitus ([facility] ); Dysuria; Esophageal reflux; Fatigue; Fracture; Hyperlipidemia; Hypertension; Pneumonia; RLS (restless legs syndrome); and Vitamin D deficiency  She   Patient Active Problem List    Diagnosis Date Noted    Cough 09/12/2018    Shortness of breath 09/12/2018    Heart murmur 09/12/2018    Positive FIT (fecal immunochemical test) 06/25/2018    Diarrhea 06/25/2018    Diabetes mellitus ([facility] ) 04/06/2018    Pneumonia due to infectious organism 04/06/2018    Chronic lower back pain 09/29/2016    Anxiety 05/21/2016    Vitamin D deficiency 03/09/2016    HTN (hypertension) 12/14/2015    Hypercholesteremia 12/14/2015    Restless legs syndrome (RLS) 12/14/2015     She  has a past surgical history that includes Back surgery; Cholecystectomy (1975); Foot surgery (Right, 2014); Hip surgery (2013); Hysterectomy; Ovarian cyst surgery (1971); Shoulder surgery (Right, 12/14/2015); Other surgical history (2011); Tonsillectomy; and pr colonoscopy flx dx w/collj spec when pfrmd (N/A, 7/10/2018)  Her family history includes Calcium disorder in her other and other; Cancer in her father; Depression in her family; Diabetes type II in her father and other; Heart disease in her father; Hypertension in her mother and other; Mental illness in her mother and other; Migraines in her other;  Other in her other; Pulmonary embolism in her other; Stroke in her mother; Substance Abuse in her brother and family; Tuberculosis in her other  She  reports that she quit smoking about 6 months ago  Her smoking use included Cigarettes  She has never used smokeless tobacco  She reports that she drinks alcohol  She reports that she does not use drugs    Current Outpatient Prescriptions   Medication Sig Dispense Refill    albuterol (2 5 mg/3 mL) 0 083 % nebulizer solution Take 1 vial (2 5 mg total) by nebulization every 4 (four) hours as needed for shortness of breath Disp 1 box 25 vial 5    albuterol (PROAIR HFA) 90 mcg/act inhaler Inhale 2 puffs      amitriptyline (ELAVIL) 25 mg tablet Take 1 tablet by mouth daily at bedtime as needed        Ascorbic Acid (VITAMIN C) 1000 MG tablet Take by mouth      aspirin (ECOTRIN LOW STRENGTH) 81 mg EC tablet Take 81 mg by mouth daily      atorvastatin (LIPITOR) 20 mg tablet Take 1 tablet (20 mg total) by mouth daily 90 tablet 0    JOELLEN MICROLET LANCETS lancets Use as instructed BID E11 65 100 each 5    betamethasone dipropionate (DIPROSONE) 0 05 % cream Apply topically 2 (two) times a day      Calcium 500 MG tablet Take 2 tablets by mouth every other day        Canagliflozin-Metformin HCl (INVOKAMET) 150-1000 MG TABS Take 1 tablet by mouth 2 (two) times a day 60 tablet 3    carbidopa-levodopa (SINEMET)  mg per tablet Take 1 tablet by mouth as needed    1    cholecalciferol (VITAMIN D3) 1,000 units tablet Take 3 tablets by mouth every other day        clindamycin (CLEOCIN T) 1 % external solution       clonazePAM (KlonoPIN) 2 mg tablet Take 1 tablet (2 mg total) by mouth daily at bedtime 30 tablet 0    denosumab (PROLIA) 60 mg/mL Inject under the skin every 6 (six) months      escitalopram (LEXAPRO) 10 mg tablet Take 1 5 tablets (15 mg total) by mouth daily 45 tablet 3    glucose blood (JOELLEN CONTOUR TEST) test strip Use as instructed BID  E11 65 100 each 5    Glucose Blood (ONETOUCH ULTRA BLUE VI) by In Vitro route 3 (three) times a day      hydrochlorothiazide (HYDRODIURIL) 50 mg tablet Take 1 tablet (50 mg total) by mouth daily 90 tablet 3    lisinopril (ZESTRIL) 2 5 mg tablet Take 1 tablet by mouth daily      metFORMIN (GLUCOPHAGE) 1000 MG tablet Take 1,000 mg by mouth 2 (two) times a day with meals      omeprazole (PriLOSEC) 20 mg delayed release capsule Take 1 capsule (20 mg total) by mouth daily 90 capsule 1    potassium chloride (MICRO-K) 10 MEQ CR capsule Take 10 mEq by mouth 2 (two) times a day      pramipexole (MIRAPEX) 1 5 MG tablet Take 1 5 mg by mouth 2 (two) times a day  1    triamcinolone (KENALOG) 0 1 % cream Apply topically 2 (two) times a day      VITAMIN B COMPLEX-C PO Take 1 tablet by mouth daily      azithromycin (ZITHROMAX Z-NICOLÁS) 250 mg tablet Take 1 tablet (250 mg total) by mouth every 24 hours for 6 days 6 tablet 0    fluticasone-umeclidinium-vilanterol (TRELEGY ELLIPTA) 100-62 5-25 MCG/INH inhaler Inhale 1 puff daily Rinse mouth after use  1 Inhaler 5    predniSONE 20 mg tablet Please take 3 -20 mg tabs X3 days , Please take 2 - 20mg tabs X 3 days, Please take 1-20 mg tab X1 days 16 tablet 0     No current facility-administered medications for this visit        Current Outpatient Prescriptions on File Prior to Visit   Medication Sig    albuterol (2 5 mg/3 mL) 0 083 % nebulizer solution Take 1 vial (2 5 mg total) by nebulization every 4 (four) hours as needed for shortness of breath Disp 1 box    albuterol (PROAIR HFA) 90 mcg/act inhaler Inhale 2 puffs    amitriptyline (ELAVIL) 25 mg tablet Take 1 tablet by mouth daily at bedtime as needed      Ascorbic Acid (VITAMIN C) 1000 MG tablet Take by mouth    aspirin (ECOTRIN LOW STRENGTH) 81 mg EC tablet Take 81 mg by mouth daily    atorvastatin (LIPITOR) 20 mg tablet Take 1 tablet (20 mg total) by mouth daily    JOELLEN MICROLET LANCETS lancets Use as instructed BID E11 65    betamethasone dipropionate (DIPROSONE) 0 05 % cream Apply topically 2 (two) times a day    Calcium 500 MG tablet Take 2 tablets by mouth every other day      Canagliflozin-Metformin HCl (INVOKAMET) 150-1000 MG TABS Take 1 tablet by mouth 2 (two) times a day    carbidopa-levodopa (SINEMET)  mg per tablet Take 1 tablet by mouth as needed      cholecalciferol (VITAMIN D3) 1,000 units tablet Take 3 tablets by mouth every other day      clindamycin (CLEOCIN T) 1 % external solution     clonazePAM (KlonoPIN) 2 mg tablet Take 1 tablet (2 mg total) by mouth daily at bedtime    denosumab (PROLIA) 60 mg/mL Inject under the skin every 6 (six) months    escitalopram (LEXAPRO) 10 mg tablet Take 1 5 tablets (15 mg total) by mouth daily    glucose blood (JOELLEN CONTOUR TEST) test strip Use as instructed BID  E11 65    Glucose Blood (ONETOUCH ULTRA BLUE VI) by In Vitro route 3 (three) times a day    hydrochlorothiazide (HYDRODIURIL) 50 mg tablet Take 1 tablet (50 mg total) by mouth daily    lisinopril (ZESTRIL) 2 5 mg tablet Take 1 tablet by mouth daily    metFORMIN (GLUCOPHAGE) 1000 MG tablet Take 1,000 mg by mouth 2 (two) times a day with meals    omeprazole (PriLOSEC) 20 mg delayed release capsule Take 1 capsule (20 mg total) by mouth daily    potassium chloride (MICRO-K) 10 MEQ CR capsule Take 10 mEq by mouth 2 (two) times a day    pramipexole (MIRAPEX) 1 5 MG tablet Take 1 5 mg by mouth 2 (two) times a day    triamcinolone (KENALOG) 0 1 % cream Apply topically 2 (two) times a day    VITAMIN B COMPLEX-C PO Take 1 tablet by mouth daily     No current facility-administered medications on file prior to visit  She is allergic to gabapentin; hydrocodone-acetaminophen; midazolam; penicillin v; pregabalin; propofol; and sulfa antibiotics       Review of Systems   Constitutional: Negative  HENT: Positive for sore throat  Negative for congestion, postnasal drip, sinus pain and sinus pressure  Eyes: Negative      Respiratory: Positive for cough, shortness of breath and wheezing  Cardiovascular: Negative  Heart murmur   Gastrointestinal: Negative  Endocrine: Negative  Genitourinary: Negative  Musculoskeletal: Negative  Skin: Negative  Allergic/Immunologic: Negative  Neurological: Negative  Hematological: Negative  Psychiatric/Behavioral: Negative  Objective:      /76 (BP Location: Left arm, Patient Position: Sitting, Cuff Size: Large)   Pulse (!) 106   Temp 98 3 °F (36 8 °C) (Tympanic)   Resp (!) 24   Ht 5' 5" (1 651 m)   Wt 82 1 kg (181 lb)   SpO2 (!) 86%   BMI 30 12 kg/m²          Physical Exam   Constitutional: She is oriented to person, place, and time  She appears well-developed and well-nourished  HENT:   Head: Normocephalic  Eyes: Pupils are equal, round, and reactive to light  Neck: Normal range of motion  Cardiovascular: Normal rate and regular rhythm  Pulmonary/Chest: Effort normal  She has decreased breath sounds in the right upper field, the right middle field, the right lower field, the left upper field, the left middle field and the left lower field  She has wheezes in the right upper field, the right middle field, the right lower field, the left upper field, the left middle field and the left lower field  She has rhonchi in the right upper field, the right middle field, the right lower field, the left upper field, the left middle field and the left lower field  Abdominal: Soft  Bowel sounds are normal    Musculoskeletal: Normal range of motion  Neurological: She is alert and oriented to person, place, and time  Skin: Skin is warm and dry  Psychiatric: She has a normal mood and affect  Her behavior is normal  Judgment and thought content normal    Nursing note and vitals reviewed

## 2018-09-13 ENCOUNTER — TELEPHONE (OUTPATIENT)
Dept: FAMILY MEDICINE CLINIC | Facility: CLINIC | Age: 68
End: 2018-09-13

## 2018-09-13 NOTE — TELEPHONE ENCOUNTER
She would like if you would call her  Something to do about medications    She didn't want to go into this long drawn out issue    Please call her at [phone]

## 2018-09-15 DIAGNOSIS — G25.81 RLS (RESTLESS LEGS SYNDROME): Primary | ICD-10-CM

## 2018-09-17 RX ORDER — PRAMIPEXOLE DIHYDROCHLORIDE 1.5 MG/1
TABLET ORAL
Qty: 180 TABLET | Refills: 1 | Status: SHIPPED | OUTPATIENT
Start: 2018-09-17 | End: 2018-09-20 | Stop reason: SDUPTHER

## 2018-09-20 DIAGNOSIS — G25.81 RLS (RESTLESS LEGS SYNDROME): ICD-10-CM

## 2018-09-20 RX ORDER — PRAMIPEXOLE DIHYDROCHLORIDE 1.5 MG/1
1.5 TABLET ORAL 2 TIMES DAILY
Qty: 180 TABLET | Refills: 1 | Status: SHIPPED | OUTPATIENT
Start: 2018-09-20 | End: 2018-11-26 | Stop reason: SDUPTHER

## 2018-09-24 ENCOUNTER — HOSPITAL ENCOUNTER (OUTPATIENT)
Dept: MAMMOGRAPHY | Facility: HOSPITAL | Age: 68
Discharge: HOME | End: 2018-09-24
Payer: MEDICARE

## 2018-09-24 DIAGNOSIS — E11.8 TYPE 2 DIABETES MELLITUS WITH COMPLICATION, WITHOUT LONG-TERM CURRENT USE OF INSULIN (HCC): Primary | ICD-10-CM

## 2018-09-24 DIAGNOSIS — Z12.39 SCREENING FOR BREAST CANCER: ICD-10-CM

## 2018-09-24 PROCEDURE — 77063 BREAST TOMOSYNTHESIS BI: CPT

## 2018-09-24 PROCEDURE — 77067 SCR MAMMO BI INCL CAD: CPT

## 2018-09-26 ENCOUNTER — OFFICE VISIT (OUTPATIENT)
Dept: FAMILY MEDICINE CLINIC | Facility: CLINIC | Age: 68
End: 2018-09-26
Payer: MEDICARE

## 2018-09-26 VITALS
HEIGHT: 65 IN | TEMPERATURE: 97.1 F | SYSTOLIC BLOOD PRESSURE: 110 MMHG | BODY MASS INDEX: 30.59 KG/M2 | WEIGHT: 183.6 LBS | RESPIRATION RATE: 20 BRPM | HEART RATE: 84 BPM | DIASTOLIC BLOOD PRESSURE: 62 MMHG

## 2018-09-26 DIAGNOSIS — E11.9 TYPE 2 DIABETES MELLITUS WITHOUT COMPLICATION, WITHOUT LONG-TERM CURRENT USE OF INSULIN (HCC): Primary | ICD-10-CM

## 2018-09-26 DIAGNOSIS — R06.02 SHORTNESS OF BREATH: ICD-10-CM

## 2018-09-26 DIAGNOSIS — Z23 NEED FOR INFLUENZA VACCINATION: ICD-10-CM

## 2018-09-26 DIAGNOSIS — R05.9 COUGH: ICD-10-CM

## 2018-09-26 PROCEDURE — 90471 IMMUNIZATION ADMIN: CPT | Performed by: NURSE PRACTITIONER

## 2018-09-26 PROCEDURE — 90682 RIV4 VACC RECOMBINANT DNA IM: CPT | Performed by: NURSE PRACTITIONER

## 2018-09-26 PROCEDURE — 99214 OFFICE O/P EST MOD 30 MIN: CPT | Performed by: NURSE PRACTITIONER

## 2018-09-26 NOTE — PROGRESS NOTES
Assessment/Plan:    No problem-specific Assessment & Plan notes found for this encounter  Diagnoses and all orders for this visit:    Type 2 diabetes mellitus without complication, without long-term current use of insulin ([facility])  Comments:  Pt currently managing on Metformin    Cough  Comments:  Pt is currently using Diabetic tussin, spirometry, nebulizer and inhalers as directed    Shortness of breath  Comments:  resolved          Subjective:      Patient ID: Latesha Cabrales is a 76 y o  female here for f/u symptom management with respiratory illness- pt reports she is much improved except tenacious cough for which she is using nebulizer, spirometer and inhalers  Pt has not received her Trilegy as yet its on order from her pharmacy    HPI    The following portions of the patient's history were reviewed and updated as appropriate:   She  has a past medical history of Anxiety; Cellulitis; Depression; Diabetes mellitus ([facility] ); Dysuria; Esophageal reflux; Fatigue; Fracture; and Vitamin D deficiency  She   Patient Active Problem List    Diagnosis Date Noted    Cough 09/12/2018    Shortness of breath 09/12/2018    Heart murmur 09/12/2018    Positive FIT (fecal immunochemical test) 06/25/2018    Diarrhea 06/25/2018    Diabetes mellitus ([facility] ) 04/06/2018    Pneumonia due to infectious organism 04/06/2018    Chronic lower back pain 09/29/2016    Anxiety 05/21/2016    Vitamin D deficiency 03/09/2016    HTN (hypertension) 12/14/2015    Hypercholesteremia 12/14/2015    Restless legs syndrome (RLS) 12/14/2015     She  has a past surgical history that includes Back surgery; Cholecystectomy (1975); Foot surgery (Right, 2014); Hip surgery (2013); Hysterectomy; Ovarian cyst surgery (1971); Shoulder surgery (Right, 12/14/2015); Other surgical history (2011); Tonsillectomy; and pr colonoscopy flx dx w/collj spec when pfrmd (N/A, 7/10/2018)    Her family history includes Calcium disorder in her other and other; Cancer in her father; Depression in her family; Diabetes type II in her father and other; Heart disease in her father; Hypertension in her mother and other; Mental illness in her mother and other; Migraines in her other; Other in her other; Pulmonary embolism in her other; Stroke in her mother; Substance Abuse in her brother and family; Tuberculosis in her other  She  reports that she quit smoking about 6 months ago  Her smoking use included Cigarettes  She has never used smokeless tobacco  She reports that she drinks alcohol  She reports that she does not use drugs    Current Outpatient Prescriptions   Medication Sig Dispense Refill    albuterol (2 5 mg/3 mL) 0 083 % nebulizer solution Take 1 vial (2 5 mg total) by nebulization every 4 (four) hours as needed for shortness of breath Disp 1 box 25 vial 5    albuterol (PROAIR HFA) 90 mcg/act inhaler Inhale 2 puffs      amitriptyline (ELAVIL) 25 mg tablet Take 1 tablet by mouth daily at bedtime as needed        Ascorbic Acid (VITAMIN C) 1000 MG tablet Take by mouth      aspirin (ECOTRIN LOW STRENGTH) 81 mg EC tablet Take 81 mg by mouth daily      atorvastatin (LIPITOR) 20 mg tablet Take 1 tablet (20 mg total) by mouth daily 90 tablet 0    "[employer] MICROLET LANCETS lancets Use as instructed BID E11 65 100 each 5    betamethasone dipropionate (DIPROSONE) 0 05 % cream Apply topically 2 (two) times a day      Calcium 500 MG tablet Take 2 tablets by mouth every other day        carbidopa-levodopa (SINEMET)  mg per tablet TAKE 1 TABLET BY MOUTH 3 TIMES A  tablet 0    cholecalciferol (VITAMIN D3) 1,000 units tablet Take 3 tablets by mouth every other day        clindamycin (CLEOCIN T) 1 % external solution       clonazePAM (KlonoPIN) 2 mg tablet Take 1 tablet (2 mg total) by mouth daily at bedtime 30 tablet 0    denosumab (PROLIA) 60 mg/mL Inject under the skin every 6 (six) months      escitalopram (LEXAPRO) 10 mg tablet Take 1 5 tablets (15 mg total) by mouth daily 45 tablet 3    glucose blood (JOELLEN CONTOUR TEST) test strip Use as instructed BID  E11 65 100 each 5    Glucose Blood (ONETOUCH ULTRA BLUE VI) by In Vitro route 3 (three) times a day      hydrochlorothiazide (HYDRODIURIL) 50 mg tablet Take 1 tablet (50 mg total) by mouth daily 90 tablet 3    lisinopril (ZESTRIL) 2 5 mg tablet Take 1 tablet by mouth daily      metFORMIN (GLUCOPHAGE) 1000 MG tablet Take 1 tablet (1,000 mg total) by mouth 2 (two) times a day with meals 180 tablet 1    omeprazole (PriLOSEC) 20 mg delayed release capsule Take 1 capsule (20 mg total) by mouth daily 90 capsule 1    potassium chloride (MICRO-K) 10 MEQ CR capsule Take 10 mEq by mouth 2 (two) times a day      pramipexole (MIRAPEX) 1 5 MG tablet Take 1 tablet (1 5 mg total) by mouth 2 (two) times a day 180 tablet 1    triamcinolone (KENALOG) 0 1 % cream Apply topically 2 (two) times a day      VITAMIN B COMPLEX-C PO Take 1 tablet by mouth daily      fluticasone-umeclidinium-vilanterol (TRELEGY ELLIPTA) 100-62 5-25 MCG/INH inhaler Inhale 1 puff daily Rinse mouth after use  (Patient not taking: Reported on 9/26/2018 ) 1 Inhaler 5     No current facility-administered medications for this visit        Current Outpatient Prescriptions on File Prior to Visit   Medication Sig    albuterol (2 5 mg/3 mL) 0 083 % nebulizer solution Take 1 vial (2 5 mg total) by nebulization every 4 (four) hours as needed for shortness of breath Disp 1 box    albuterol (PROAIR HFA) 90 mcg/act inhaler Inhale 2 puffs    amitriptyline (ELAVIL) 25 mg tablet Take 1 tablet by mouth daily at bedtime as needed      Ascorbic Acid (VITAMIN C) 1000 MG tablet Take by mouth    aspirin (ECOTRIN LOW STRENGTH) 81 mg EC tablet Take 81 mg by mouth daily    atorvastatin (LIPITOR) 20 mg tablet Take 1 tablet (20 mg total) by mouth daily    JOELLEN MICROLET LANCETS lancets Use as instructed BID E11 65    betamethasone dipropionate (DIPROSONE) 0 05 % cream Apply topically 2 (two) times a day    Calcium 500 MG tablet Take 2 tablets by mouth every other day      carbidopa-levodopa (SINEMET)  mg per tablet TAKE 1 TABLET BY MOUTH 3 TIMES A DAY    cholecalciferol (VITAMIN D3) 1,000 units tablet Take 3 tablets by mouth every other day      clindamycin (CLEOCIN T) 1 % external solution     clonazePAM (KlonoPIN) 2 mg tablet Take 1 tablet (2 mg total) by mouth daily at bedtime    denosumab (PROLIA) 60 mg/mL Inject under the skin every 6 (six) months    escitalopram (LEXAPRO) 10 mg tablet Take 1 5 tablets (15 mg total) by mouth daily    glucose blood (JOELLEN CONTOUR TEST) test strip Use as instructed BID  E11 65    Glucose Blood (ONETOUCH ULTRA BLUE VI) by In Vitro route 3 (three) times a day    hydrochlorothiazide (HYDRODIURIL) 50 mg tablet Take 1 tablet (50 mg total) by mouth daily    lisinopril (ZESTRIL) 2 5 mg tablet Take 1 tablet by mouth daily    metFORMIN (GLUCOPHAGE) 1000 MG tablet Take 1 tablet (1,000 mg total) by mouth 2 (two) times a day with meals    omeprazole (PriLOSEC) 20 mg delayed release capsule Take 1 capsule (20 mg total) by mouth daily    potassium chloride (MICRO-K) 10 MEQ CR capsule Take 10 mEq by mouth 2 (two) times a day    pramipexole (MIRAPEX) 1 5 MG tablet Take 1 tablet (1 5 mg total) by mouth 2 (two) times a day    triamcinolone (KENALOG) 0 1 % cream Apply topically 2 (two) times a day    VITAMIN B COMPLEX-C PO Take 1 tablet by mouth daily    [DISCONTINUED] Canagliflozin-Metformin HCl (INVOKAMET) 150-1000 MG TABS Take 1 tablet by mouth 2 (two) times a day    fluticasone-umeclidinium-vilanterol (TRELEGY ELLIPTA) 100-62 5-25 MCG/INH inhaler Inhale 1 puff daily Rinse mouth after use   (Patient not taking: Reported on 9/26/2018 )    [DISCONTINUED] predniSONE 20 mg tablet Please take 3 -20 mg tabs X3 days , Please take 2 - 20mg tabs X 3 days, Please take 1-20 mg tab X1 days     No current facility-administered medications on file prior to visit  She is allergic to gabapentin; hydrocodone-acetaminophen; midazolam; penicillin v; pregabalin; propofol; and sulfa antibiotics       Review of Systems   Constitutional: Negative  HENT: Positive for sore throat  Negative for congestion, postnasal drip, sinus pain and sinus pressure  Eyes: Negative  Respiratory: Positive for cough  Negative for shortness of breath and wheezing  Pt reports tenacious cough   Cardiovascular: Negative  Heart murmur   Gastrointestinal: Negative  Endocrine: Negative  Genitourinary: Negative  Musculoskeletal: Negative  Skin: Negative  Allergic/Immunologic: Negative  Neurological: Negative  Hematological: Negative  Psychiatric/Behavioral: Negative  Objective:      /62 (BP Location: Left arm, Patient Position: Sitting, Cuff Size: Large)   Pulse 84   Temp (!) 97 1 °F (36 2 °C) (Tympanic)   Resp 20   Ht 5' 5" (1 651 m)   Wt 83 3 kg (183 lb 9 6 oz)   BMI 30 55 kg/m²          Physical Exam   Constitutional: She is oriented to person, place, and time  She appears well-developed and well-nourished  HENT:   Head: Normocephalic  Eyes: Pupils are equal, round, and reactive to light  Neck: Normal range of motion  Cardiovascular: Normal rate and regular rhythm  Pulmonary/Chest: Effort normal  She has decreased breath sounds in the right upper field, the right middle field, the right lower field, the left upper field, the left middle field and the left lower field  She has no wheezes  She has rhonchi in the right upper field, the right middle field and the right lower field  Abdominal: Soft  Bowel sounds are normal    Musculoskeletal: Normal range of motion  Neurological: She is alert and oriented to person, place, and time  Skin: Skin is warm and dry  Psychiatric: She has a normal mood and affect  Her behavior is normal  Judgment and thought content normal    Nursing note and vitals reviewed

## 2018-10-01 DIAGNOSIS — K21.9 GASTROESOPHAGEAL REFLUX DISEASE WITHOUT ESOPHAGITIS: ICD-10-CM

## 2018-10-01 RX ORDER — OMEPRAZOLE 20 MG/1
20 CAPSULE, DELAYED RELEASE ORAL DAILY
Qty: 90 CAPSULE | Refills: 1 | Status: SHIPPED | OUTPATIENT
Start: 2018-10-01 | End: 2018-12-05 | Stop reason: ALTCHOICE

## 2018-10-03 ENCOUNTER — TRANSCRIBE ORDERS (OUTPATIENT)
Dept: LAB | Facility: CLINIC | Age: 68
End: 2018-10-03

## 2018-10-03 ENCOUNTER — APPOINTMENT (OUTPATIENT)
Dept: LAB | Facility: CLINIC | Age: 68
End: 2018-10-03
Payer: MEDICARE

## 2018-10-03 ENCOUNTER — OFFICE VISIT (OUTPATIENT)
Dept: INTERNAL MEDICINE CLINIC | Facility: CLINIC | Age: 68
End: 2018-10-03
Payer: MEDICARE

## 2018-10-03 VITALS
SYSTOLIC BLOOD PRESSURE: 122 MMHG | BODY MASS INDEX: 30.29 KG/M2 | HEIGHT: 65 IN | HEART RATE: 99 BPM | OXYGEN SATURATION: 96 % | WEIGHT: 181.8 LBS | DIASTOLIC BLOOD PRESSURE: 64 MMHG | TEMPERATURE: 97 F | RESPIRATION RATE: 18 BRPM

## 2018-10-03 DIAGNOSIS — M81.0 SENILE OSTEOPOROSIS: ICD-10-CM

## 2018-10-03 DIAGNOSIS — G25.81 RESTLESS LEGS SYNDROME (RLS): ICD-10-CM

## 2018-10-03 DIAGNOSIS — B37.2 CANDIDAL INTERTRIGO: Primary | ICD-10-CM

## 2018-10-03 DIAGNOSIS — M81.0 SENILE OSTEOPOROSIS: Primary | ICD-10-CM

## 2018-10-03 PROBLEM — R19.7 DIARRHEA: Status: RESOLVED | Noted: 2018-06-25 | Resolved: 2018-10-03

## 2018-10-03 PROBLEM — R06.02 SHORTNESS OF BREATH: Status: RESOLVED | Noted: 2018-09-12 | Resolved: 2018-10-03

## 2018-10-03 PROBLEM — R05.9 COUGH: Status: RESOLVED | Noted: 2018-09-12 | Resolved: 2018-10-03

## 2018-10-03 PROBLEM — J18.9 PNEUMONIA DUE TO INFECTIOUS ORGANISM: Status: RESOLVED | Noted: 2018-04-06 | Resolved: 2018-10-03

## 2018-10-03 LAB
ANION GAP SERPL CALCULATED.3IONS-SCNC: 9 MMOL/L (ref 4–13)
BUN SERPL-MCNC: 17 MG/DL (ref 5–25)
CALCIUM SERPL-MCNC: 9.5 MG/DL (ref 8.3–10.1)
CHLORIDE SERPL-SCNC: 101 MMOL/L (ref 100–108)
CO2 SERPL-SCNC: 26 MMOL/L (ref 21–32)
CREAT SERPL-MCNC: 0.94 MG/DL (ref 0.6–1.3)
GFR SERPL CREATININE-BSD FRML MDRD: 63 ML/MIN/1.73SQ M
GLUCOSE P FAST SERPL-MCNC: 96 MG/DL (ref 65–99)
POTASSIUM SERPL-SCNC: 3.9 MMOL/L (ref 3.5–5.3)
SODIUM SERPL-SCNC: 136 MMOL/L (ref 136–145)

## 2018-10-03 PROCEDURE — 80048 BASIC METABOLIC PNL TOTAL CA: CPT

## 2018-10-03 PROCEDURE — 36415 COLL VENOUS BLD VENIPUNCTURE: CPT

## 2018-10-03 PROCEDURE — 99213 OFFICE O/P EST LOW 20 MIN: CPT | Performed by: PHYSICIAN ASSISTANT

## 2018-10-03 RX ORDER — NYSTATIN 100000 U/G
CREAM TOPICAL 2 TIMES DAILY
Qty: 30 G | Refills: 0 | Status: SHIPPED | OUTPATIENT
Start: 2018-10-03 | End: 2018-11-27 | Stop reason: SDUPTHER

## 2018-10-03 RX ORDER — METAXALONE 800 MG/1
800 TABLET ORAL 3 TIMES DAILY
Qty: 90 TABLET | Refills: 2 | Status: ON HOLD | OUTPATIENT
Start: 2018-10-03 | End: 2019-01-28 | Stop reason: CLARIF

## 2018-10-03 NOTE — PROGRESS NOTES
Assessment/Plan:    Restless legs syndrome (RLS)  Start skelaxin in place of robaxin as it is on backorder    Candidal intertrigo  Will provide nystatin cream for fungal rash       Diagnoses and all orders for this visit:    Candidal intertrigo  -     nystatin (MYCOSTATIN) cream; Apply topically 2 (two) times a day    Restless legs syndrome (RLS)  -     metaxalone (SKELAXIN) 800 mg tablet; Take 1 tablet (800 mg total) by mouth 3 (three) times a day          Subjective:      Patient ID: Eliot Rincon is a 76 y o  female  Pt presents for evaluation of rash along her left groin  It is red, itchy and has an odor  She tried triamcinolone cream without benefit  She also desires new muscle relaxer as her robaxin is on back order  She failed flexeril and zanaflex  The following portions of the patient's history were reviewed and updated as appropriate:   She  has a past medical history of Anxiety; Cellulitis; Depression; Diabetes mellitus ([facility] [address] ); Dysuria; Esophageal reflux; Fatigue; Fracture; Heart murmur; Hypertension; Pneumonia; and Vitamin D deficiency  She   Patient Active Problem List    Diagnosis Date Noted    Candidal intertrigo 10/03/2018    Heart murmur 09/12/2018    Positive FIT (fecal immunochemical test) 06/25/2018    Diabetes mellitus ([facility] [address] ) 04/06/2018    Chronic lower back pain 09/29/2016    Anxiety 05/21/2016    Vitamin D deficiency 03/09/2016    HTN (hypertension) 12/14/2015    Hypercholesteremia 12/14/2015    Restless legs syndrome (RLS) 12/14/2015     She  has a past surgical history that includes Back surgery; Cholecystectomy (1975); Foot surgery (Right, 2014); Hip surgery (2013); Hysterectomy; Ovarian cyst surgery (1971); Shoulder surgery (Right, 12/14/2015); Other surgical history (2011); Tonsillectomy; and pr colonoscopy flx dx w/collj spec when pfrmd (N/A, 7/10/2018)    Her family history includes Calcium disorder in her other and other; Cancer in her father; Depression in her family; Diabetes type II in her father and other; Heart disease in her father; Hypertension in her mother and other; Mental illness in her mother and other; Migraines in her other; Other in her other; Pulmonary embolism in her other; Stroke in her mother; Substance Abuse in her brother and family; Tuberculosis in her other  She  reports that she quit smoking about 7 months ago  Her smoking use included Cigarettes  She has never used smokeless tobacco  She reports that she drinks alcohol  She reports that she does not use drugs  Current Outpatient Prescriptions   Medication Sig Dispense Refill    albuterol (2 5 mg/3 mL) 0 083 % nebulizer solution Take 1 vial (2 5 mg total) by nebulization every 4 (four) hours as needed for shortness of breath Disp 1 box 25 vial 5    amitriptyline (ELAVIL) 25 mg tablet Take 1 tablet by mouth daily at bedtime as needed        Ascorbic Acid (VITAMIN C) 1000 MG tablet Take by mouth      aspirin (ECOTRIN LOW STRENGTH) 81 mg EC tablet Take 81 mg by mouth daily      atorvastatin (LIPITOR) 20 mg tablet Take 1 tablet (20 mg total) by mouth daily 90 tablet 0    JOELLEN MICROLET LANCETS lancets Use as instructed BID E11 65 100 each 5    Calcium 500 MG tablet Take 2 tablets by mouth every other day        carbidopa-levodopa (SINEMET)  mg per tablet TAKE 1 TABLET BY MOUTH 3 TIMES A  tablet 0    cholecalciferol (VITAMIN D3) 1,000 units tablet Take 3 tablets by mouth every other day        clonazePAM (KlonoPIN) 2 mg tablet Take 1 tablet (2 mg total) by mouth daily at bedtime 30 tablet 0    denosumab (PROLIA) 60 mg/mL Inject under the skin every 6 (six) months      escitalopram (LEXAPRO) 10 mg tablet Take 1 5 tablets (15 mg total) by mouth daily 45 tablet 3    fluticasone-umeclidinium-vilanterol (TRELEGY ELLIPTA) 100-62 5-25 MCG/INH inhaler Inhale 1 puff daily Rinse mouth after use   1 Inhaler 5    glucose blood (JOELLEN CONTOUR TEST) test strip Use as instructed BID  E11 65 100 each 5    Glucose Blood (ONETOUCH ULTRA BLUE VI) by In Vitro route 3 (three) times a day      hydrochlorothiazide (HYDRODIURIL) 50 mg tablet Take 1 tablet (50 mg total) by mouth daily 90 tablet 3    lisinopril (ZESTRIL) 2 5 mg tablet Take 1 tablet by mouth daily      metFORMIN (GLUCOPHAGE) 1000 MG tablet Take 1 tablet (1,000 mg total) by mouth 2 (two) times a day with meals 180 tablet 1    omeprazole (PriLOSEC) 20 mg delayed release capsule Take 1 capsule (20 mg total) by mouth daily 90 capsule 1    potassium chloride (MICRO-K) 10 MEQ CR capsule Take 10 mEq by mouth 2 (two) times a day      pramipexole (MIRAPEX) 1 5 MG tablet Take 1 tablet (1 5 mg total) by mouth 2 (two) times a day 180 tablet 1    triamcinolone (KENALOG) 0 1 % cream Apply topically 2 (two) times a day      VITAMIN B COMPLEX-C PO Take 1 tablet by mouth daily      albuterol (PROAIR HFA) 90 mcg/act inhaler Inhale 2 puffs      metaxalone (SKELAXIN) 800 mg tablet Take 1 tablet (800 mg total) by mouth 3 (three) times a day 90 tablet 2    nystatin (MYCOSTATIN) cream Apply topically 2 (two) times a day 30 g 0     No current facility-administered medications for this visit        Current Outpatient Prescriptions on File Prior to Visit   Medication Sig    albuterol (2 5 mg/3 mL) 0 083 % nebulizer solution Take 1 vial (2 5 mg total) by nebulization every 4 (four) hours as needed for shortness of breath Disp 1 box    amitriptyline (ELAVIL) 25 mg tablet Take 1 tablet by mouth daily at bedtime as needed      Ascorbic Acid (VITAMIN C) 1000 MG tablet Take by mouth    aspirin (ECOTRIN LOW STRENGTH) 81 mg EC tablet Take 81 mg by mouth daily    atorvastatin (LIPITOR) 20 mg tablet Take 1 tablet (20 mg total) by mouth daily    JOELLEN MICROLET LANCETS lancets Use as instructed BID E11 65    Calcium 500 MG tablet Take 2 tablets by mouth every other day      carbidopa-levodopa (SINEMET)  mg per tablet TAKE 1 TABLET BY MOUTH 3 TIMES A DAY    cholecalciferol (VITAMIN D3) 1,000 units tablet Take 3 tablets by mouth every other day      clonazePAM (KlonoPIN) 2 mg tablet Take 1 tablet (2 mg total) by mouth daily at bedtime    denosumab (PROLIA) 60 mg/mL Inject under the skin every 6 (six) months    escitalopram (LEXAPRO) 10 mg tablet Take 1 5 tablets (15 mg total) by mouth daily    fluticasone-umeclidinium-vilanterol (TRELEGY ELLIPTA) 100-62 5-25 MCG/INH inhaler Inhale 1 puff daily Rinse mouth after use   glucose blood (JOELLEN CONTOUR TEST) test strip Use as instructed BID  E11 65    Glucose Blood (ONETOUCH ULTRA BLUE VI) by In Vitro route 3 (three) times a day    hydrochlorothiazide (HYDRODIURIL) 50 mg tablet Take 1 tablet (50 mg total) by mouth daily    lisinopril (ZESTRIL) 2 5 mg tablet Take 1 tablet by mouth daily    metFORMIN (GLUCOPHAGE) 1000 MG tablet Take 1 tablet (1,000 mg total) by mouth 2 (two) times a day with meals    omeprazole (PriLOSEC) 20 mg delayed release capsule Take 1 capsule (20 mg total) by mouth daily    potassium chloride (MICRO-K) 10 MEQ CR capsule Take 10 mEq by mouth 2 (two) times a day    pramipexole (MIRAPEX) 1 5 MG tablet Take 1 tablet (1 5 mg total) by mouth 2 (two) times a day    triamcinolone (KENALOG) 0 1 % cream Apply topically 2 (two) times a day    VITAMIN B COMPLEX-C PO Take 1 tablet by mouth daily    albuterol (PROAIR HFA) 90 mcg/act inhaler Inhale 2 puffs    [DISCONTINUED] betamethasone dipropionate (DIPROSONE) 0 05 % cream Apply topically 2 (two) times a day    [DISCONTINUED] clindamycin (CLEOCIN T) 1 % external solution      No current facility-administered medications on file prior to visit  She is allergic to gabapentin; hydrocodone-acetaminophen; midazolam; penicillin v; pregabalin; propofol; and sulfa antibiotics       Review of Systems   Constitutional: Negative for chills and fever     HENT: Negative for congestion, ear pain, hearing loss, postnasal drip, rhinorrhea, sinus pain, sinus pressure, sore throat and trouble swallowing  Eyes: Negative for pain and visual disturbance  Respiratory: Negative for cough, chest tightness, shortness of breath and wheezing  Cardiovascular: Negative  Negative for chest pain, palpitations and leg swelling  Gastrointestinal: Negative for abdominal pain, blood in stool, constipation, diarrhea, nausea and vomiting  Endocrine: Negative for cold intolerance, heat intolerance, polydipsia, polyphagia and polyuria  Genitourinary: Negative for difficulty urinating, dysuria, flank pain and urgency  Musculoskeletal: Positive for myalgias  Negative for arthralgias, back pain and gait problem  Skin: Positive for rash  Allergic/Immunologic: Negative  Neurological: Negative for dizziness, weakness, light-headedness and headaches  Hematological: Negative  Psychiatric/Behavioral: Negative for behavioral problems, dysphoric mood and sleep disturbance  The patient is not nervous/anxious  Objective:      /64 (BP Location: Left arm, Patient Position: Sitting, Cuff Size: Adult)   Pulse 99   Temp (!) 97 °F (36 1 °C) (Tympanic)   Resp 18   Ht 5' 5" (1 651 m)   Wt 82 5 kg (181 lb 12 8 oz)   SpO2 96%   BMI 30 25 kg/m²          Physical Exam   Constitutional: She is oriented to person, place, and time  She appears well-developed and well-nourished  No distress  HENT:   Head: Normocephalic and atraumatic  Right Ear: External ear normal    Left Ear: External ear normal    Nose: Nose normal    Mouth/Throat: Oropharynx is clear and moist  No oropharyngeal exudate  Eyes: Pupils are equal, round, and reactive to light  Conjunctivae and EOM are normal  Right eye exhibits no discharge  Left eye exhibits no discharge  No scleral icterus  Neck: Normal range of motion  Neck supple  No thyromegaly present  Cardiovascular: Normal rate, regular rhythm and normal heart sounds  Exam reveals no gallop and no friction rub      No murmur heard   Pulmonary/Chest: Effort normal and breath sounds normal  No respiratory distress  She has no wheezes  She has no rales  Abdominal: Soft  Bowel sounds are normal  She exhibits no distension  There is no tenderness  Musculoskeletal: Normal range of motion  She exhibits no edema, tenderness or deformity  Lumbar back: She exhibits spasm  Neurological: She is alert and oriented to person, place, and time  No cranial nerve deficit  Skin: Skin is warm and dry  Rash (kissing lesion right groin that is erythematous and has satellite lesions) noted  She is not diaphoretic  Psychiatric: She has a normal mood and affect   Her behavior is normal  Judgment and thought content normal

## 2018-10-04 ENCOUNTER — TELEPHONE (OUTPATIENT)
Dept: INTERNAL MEDICINE CLINIC | Facility: CLINIC | Age: 68
End: 2018-10-04

## 2018-10-04 NOTE — TELEPHONE ENCOUNTER
Pt stated they are out of the metaxalone, would have to order it, would take a couple weeks for it to come in

## 2018-10-04 NOTE — TELEPHONE ENCOUNTER
Pt was given a rx for Skelaxin, not covered by her plan, very expensive, pt states baclofen, soma and metaxolone, is covered, could you pls send another rx ?  Pt uses [facility]

## 2018-10-04 NOTE — TELEPHONE ENCOUNTER
Soma and baclofen are not good in combination with high dose klonopin   We can revisit flexeril or tizanidine if she would like or we can temporarily do robaxin that she was given before

## 2018-10-05 DIAGNOSIS — M62.838 MUSCLE SPASM: Primary | ICD-10-CM

## 2018-10-05 RX ORDER — CYCLOBENZAPRINE HCL 10 MG
10 TABLET ORAL 3 TIMES DAILY PRN
Qty: 30 TABLET | Refills: 0 | Status: ON HOLD | OUTPATIENT
Start: 2018-10-05 | End: 2019-01-28 | Stop reason: ALTCHOICE

## 2018-10-08 ENCOUNTER — TRANSCRIBE ORDERS (OUTPATIENT)
Dept: ADMINISTRATIVE | Facility: HOSPITAL | Age: 68
End: 2018-10-08

## 2018-10-08 DIAGNOSIS — F41.9 ANXIETY: ICD-10-CM

## 2018-10-08 DIAGNOSIS — G25.81 RLS (RESTLESS LEGS SYNDROME): ICD-10-CM

## 2018-10-08 DIAGNOSIS — M81.0 SENILE OSTEOPOROSIS: Primary | ICD-10-CM

## 2018-10-08 RX ORDER — CLONAZEPAM 2 MG/1
TABLET ORAL
Qty: 30 TABLET | Refills: 0 | Status: SHIPPED | OUTPATIENT
Start: 2018-10-08 | End: 2018-11-21 | Stop reason: SDUPTHER

## 2018-10-11 ENCOUNTER — TELEPHONE (OUTPATIENT)
Dept: FAMILY MEDICINE CLINIC | Facility: CLINIC | Age: 68
End: 2018-10-11

## 2018-10-11 DIAGNOSIS — M54.50 CHRONIC BILATERAL LOW BACK PAIN WITHOUT SCIATICA: Primary | ICD-10-CM

## 2018-10-11 DIAGNOSIS — G89.29 CHRONIC BILATERAL LOW BACK PAIN WITHOUT SCIATICA: Primary | ICD-10-CM

## 2018-10-11 RX ORDER — METHOCARBAMOL 750 MG/1
750 TABLET, FILM COATED ORAL EVERY 8 HOURS SCHEDULED
Qty: 30 TABLET | Refills: 0 | Status: SHIPPED | OUTPATIENT
Start: 2018-10-11 | End: 2018-10-22 | Stop reason: SDUPTHER

## 2018-10-11 NOTE — TELEPHONE ENCOUNTER
Patient called about her back pain - she has been using the flexeril and is doesn't help her pain at all, she has also used the generic Zanaflex in past with no relief =- looking for recommendations on what she would take for pain relief

## 2018-10-11 NOTE — TELEPHONE ENCOUNTER
She found the robaxin at  [address] but they only have the 750mg and I think she said she would be willing to pay for it

## 2018-10-11 NOTE — TELEPHONE ENCOUNTER
She states she couldn't afford robaxin and skelaxin was on back order   I cant use soma due to her benzodiazepine so we unfortunately don't have many options unless she wants to pay for the robaxin

## 2018-10-22 DIAGNOSIS — M54.50 CHRONIC BILATERAL LOW BACK PAIN WITHOUT SCIATICA: ICD-10-CM

## 2018-10-22 DIAGNOSIS — G89.29 CHRONIC BILATERAL LOW BACK PAIN WITHOUT SCIATICA: ICD-10-CM

## 2018-10-23 RX ORDER — METHOCARBAMOL 750 MG/1
750 TABLET, FILM COATED ORAL EVERY 8 HOURS SCHEDULED
Qty: 90 TABLET | Refills: 2 | Status: SHIPPED | OUTPATIENT
Start: 2018-10-23 | End: 2018-12-10 | Stop reason: SDUPTHER

## 2018-11-06 DIAGNOSIS — F41.9 ANXIETY: ICD-10-CM

## 2018-11-11 RX ORDER — ESCITALOPRAM OXALATE 10 MG/1
TABLET ORAL
Qty: 45 TABLET | Refills: 3 | Status: SHIPPED | OUTPATIENT
Start: 2018-11-11 | End: 2019-04-01 | Stop reason: SDUPTHER

## 2018-11-12 LAB
LEFT EYE DIABETIC RETINOPATHY: NORMAL
RIGHT EYE DIABETIC RETINOPATHY: NORMAL
SEVERITY (EYE EXAM): NORMAL

## 2018-11-21 DIAGNOSIS — F41.9 ANXIETY: ICD-10-CM

## 2018-11-21 DIAGNOSIS — G25.81 RLS (RESTLESS LEGS SYNDROME): ICD-10-CM

## 2018-11-21 RX ORDER — TRAMADOL HYDROCHLORIDE 50 MG/1
TABLET ORAL
Status: ON HOLD | COMMUNITY
End: 2019-01-28 | Stop reason: ALTCHOICE

## 2018-11-21 RX ORDER — CLONAZEPAM 2 MG/1
2 TABLET ORAL
Qty: 30 TABLET | Refills: 0 | Status: SHIPPED | OUTPATIENT
Start: 2018-11-21 | End: 2018-12-24 | Stop reason: SDUPTHER

## 2018-11-26 ENCOUNTER — OFFICE VISIT (OUTPATIENT)
Dept: FAMILY MEDICINE CLINIC | Facility: CLINIC | Age: 68
End: 2018-11-26
Payer: MEDICARE

## 2018-11-26 VITALS
TEMPERATURE: 97.1 F | DIASTOLIC BLOOD PRESSURE: 72 MMHG | RESPIRATION RATE: 16 BRPM | HEART RATE: 92 BPM | HEIGHT: 65 IN | WEIGHT: 182.2 LBS | BODY MASS INDEX: 30.35 KG/M2 | SYSTOLIC BLOOD PRESSURE: 136 MMHG

## 2018-11-26 DIAGNOSIS — G25.81 RLS (RESTLESS LEGS SYNDROME): Primary | ICD-10-CM

## 2018-11-26 DIAGNOSIS — E55.9 VITAMIN D DEFICIENCY: ICD-10-CM

## 2018-11-26 DIAGNOSIS — Z13.0 SCREENING FOR DEFICIENCY ANEMIA: ICD-10-CM

## 2018-11-26 DIAGNOSIS — E11.8 TYPE 2 DIABETES MELLITUS WITH COMPLICATION, WITHOUT LONG-TERM CURRENT USE OF INSULIN (HCC): ICD-10-CM

## 2018-11-26 DIAGNOSIS — E78.5 HYPERLIPIDEMIA ASSOCIATED WITH TYPE 2 DIABETES MELLITUS (HCC): ICD-10-CM

## 2018-11-26 DIAGNOSIS — E11.69 HYPERLIPIDEMIA ASSOCIATED WITH TYPE 2 DIABETES MELLITUS (HCC): ICD-10-CM

## 2018-11-26 DIAGNOSIS — L30.9 DERMATITIS: ICD-10-CM

## 2018-11-26 DIAGNOSIS — R13.19 ESOPHAGEAL DYSPHAGIA: ICD-10-CM

## 2018-11-26 DIAGNOSIS — Z13.29 SCREENING FOR THYROID DISORDER: ICD-10-CM

## 2018-11-26 DIAGNOSIS — G25.81 RESTLESS LEGS SYNDROME (RLS): ICD-10-CM

## 2018-11-26 PROCEDURE — 99214 OFFICE O/P EST MOD 30 MIN: CPT | Performed by: PHYSICIAN ASSISTANT

## 2018-11-26 RX ORDER — AMITRIPTYLINE HYDROCHLORIDE 25 MG/1
25 TABLET, FILM COATED ORAL
Qty: 90 TABLET | Refills: 0 | Status: SHIPPED | OUTPATIENT
Start: 2018-11-26 | End: 2019-03-04 | Stop reason: SDUPTHER

## 2018-11-26 RX ORDER — PRAMIPEXOLE DIHYDROCHLORIDE 1.5 MG/1
1.5 TABLET ORAL 3 TIMES DAILY
Qty: 270 TABLET | Refills: 0 | Status: SHIPPED | COMMUNITY
Start: 2018-11-26 | End: 2019-02-13 | Stop reason: SDUPTHER

## 2018-11-26 RX ORDER — PANTOPRAZOLE SODIUM 40 MG/1
40 TABLET, DELAYED RELEASE ORAL DAILY
Qty: 30 TABLET | Refills: 3 | Status: SHIPPED | OUTPATIENT
Start: 2018-11-26 | End: 2018-12-27 | Stop reason: SDUPTHER

## 2018-11-26 NOTE — PROGRESS NOTES
Assessment/Plan:    Restless legs syndrome (RLS)  Will increase mirapex and also refill amytriptylline to combat symptoms  Esophageal dysphagia  Will start PPI and refer to GI for EGD  We discussed differential including hiatal hernia or achalasia or rings/webs  Diagnoses and all orders for this visit:    RLS (restless legs syndrome)  -     amitriptyline (ELAVIL) 25 mg tablet; Take 1 tablet (25 mg total) by mouth daily at bedtime as needed for sleep  -     pramipexole (MIRAPEX) 1 5 MG tablet; Take 1 tablet (1 5 mg total) by mouth 3 (three) times a day    Screening for deficiency anemia  -     CBC and differential; Future    Type 2 diabetes mellitus with complication, without long-term current use of insulin ([facility])  -     Comprehensive metabolic panel; Future  -     HEMOGLOBIN A1C W/ EAG ESTIMATION; Future  -     Microalbumin / creatinine urine ratio    Hyperlipidemia associated with type 2 diabetes mellitus ([facility] )  -     Lipid panel; Future    Screening for thyroid disorder  -     TSH, 3rd generation with Free T4 reflex; Future    Vitamin D deficiency  -     Vitamin D 25 hydroxy; Future    Restless legs syndrome (RLS)    Esophageal dysphagia  -     Ambulatory referral to Gastroenterology; Future  -     pantoprazole (PROTONIX) 40 mg tablet; Take 1 tablet (40 mg total) by mouth daily    Dermatitis  -     Ambulatory referral to Dermatology; Future    Other orders  -     traMADol (ULTRAM) 50 mg tablet; tramadol 50 mg tablet          Subjective:      Patient ID: Esvin Germain is a 76 y o  female  Pt presents for routine visit  She is due for labs  She is noting difficulty swallowing and vomiting frequently after meals  She states this happens with almost every meal especially if she eats solids,protein or anything dry  She can tolerate liquids and soft foods  She denies significant acid reflux  She also notes a worsening of her RLS  Mirapex had been working well but overtime the effectiveness has tapered  She is not sleeping as a result  She was on amytriptylline with benefit but is now out of this  She also desires a referral to a new dermatologist for a second opinion regarding the lesions on her cheeks  The following portions of the patient's history were reviewed and updated as appropriate:   She  has a past medical history of Anxiety; Cellulitis; Depression; Diabetes mellitus ([facility] ); Dysuria; Esophageal reflux; Fatigue; Fracture; Heart murmur; Hypertension; Pneumonia; and Vitamin D deficiency  She   Patient Active Problem List    Diagnosis Date Noted    Esophageal dysphagia 11/26/2018    Dermatitis 11/26/2018    Candidal intertrigo 10/03/2018    Heart murmur 09/12/2018    Positive FIT (fecal immunochemical test) 06/25/2018    Diabetes mellitus ([facility] ) 04/06/2018    Chronic lower back pain 09/29/2016    Anxiety 05/21/2016    Vitamin D deficiency 03/09/2016    HTN (hypertension) 12/14/2015    Hypercholesteremia 12/14/2015    Restless legs syndrome (RLS) 12/14/2015     She  has a past surgical history that includes Back surgery; Cholecystectomy (1975); Foot surgery (Right, 2014); Hip surgery (2013); Hysterectomy; Ovarian cyst surgery (1971); Shoulder surgery (Right, 12/14/2015); Other surgical history (2011); Tonsillectomy; and pr colonoscopy flx dx w/collj spec when pfrmd (N/A, 7/10/2018)  Her family history includes Calcium disorder in her other and other; Cancer in her father; Depression in her family; Diabetes type II in her father and other; Heart disease in her father; Hypertension in her mother and other; Mental illness in her mother and other; Migraines in her other; Other in her other; Pulmonary embolism in her other; Stroke in her mother; Substance Abuse in her brother and family; Tuberculosis in her other  She  reports that she has been smoking Cigarettes  She has never used smokeless tobacco  She reports that she drinks alcohol  She reports that she does not use drugs    Current Outpatient Prescriptions   Medication Sig Dispense Refill    albuterol (2 5 mg/3 mL) 0 083 % nebulizer solution Take 1 vial (2 5 mg total) by nebulization every 4 (four) hours as needed for shortness of breath Disp 1 box 25 vial 5    albuterol (PROAIR HFA) 90 mcg/act inhaler Inhale 2 puffs      amitriptyline (ELAVIL) 25 mg tablet Take 1 tablet (25 mg total) by mouth daily at bedtime as needed for sleep 90 tablet 0    Ascorbic Acid (VITAMIN C) 1000 MG tablet Take by mouth      aspirin (ECOTRIN LOW STRENGTH) 81 mg EC tablet Take 81 mg by mouth daily      atorvastatin (LIPITOR) 20 mg tablet Take 1 tablet (20 mg total) by mouth daily 90 tablet 0    Ofidium MICROLET LANCETS lancets Use as instructed BID E11 65 100 each 5    Calcium 500 MG tablet Take 2 tablets by mouth every other day        carbidopa-levodopa (SINEMET)  mg per tablet TAKE 1 TABLET BY MOUTH 3 TIMES A  tablet 0    cholecalciferol (VITAMIN D3) 1,000 units tablet Take 3 tablets by mouth every other day        clonazePAM (KlonoPIN) 2 mg tablet Take 1 tablet (2 mg total) by mouth daily at bedtime 30 tablet 0    cyclobenzaprine (FLEXERIL) 10 mg tablet Take 1 tablet (10 mg total) by mouth 3 (three) times a day as needed for muscle spasms 30 tablet 0    denosumab (PROLIA) 60 mg/mL Inject under the skin every 6 (six) months      escitalopram (LEXAPRO) 10 mg tablet TAKE 1 & 1/2 (ONE & ONE-HALF) TABLETS BY MOUTH ONCE DAILY 45 tablet 3    glucose blood (Ofidium CONTOUR TEST) test strip Use as instructed BID  E11 65 100 each 5    Glucose Blood (ONETOUCH ULTRA BLUE VI) by In Vitro route 3 (three) times a day      hydrochlorothiazide (HYDRODIURIL) 50 mg tablet Take 1 tablet (50 mg total) by mouth daily 90 tablet 3    lisinopril (ZESTRIL) 2 5 mg tablet Take 1 tablet by mouth daily      metaxalone (SKELAXIN) 800 mg tablet Take 1 tablet (800 mg total) by mouth 3 (three) times a day 90 tablet 2    metFORMIN (GLUCOPHAGE) 1000 MG tablet Take 1 tablet (1,000 mg total) by mouth 2 (two) times a day with meals 180 tablet 1    methocarbamol (ROBAXIN) 750 mg tablet Take 1 tablet (750 mg total) by mouth every 8 (eight) hours 90 tablet 2    nystatin (MYCOSTATIN) cream Apply topically 2 (two) times a day 30 g 0    omeprazole (PriLOSEC) 20 mg delayed release capsule Take 1 capsule (20 mg total) by mouth daily 90 capsule 1    potassium chloride (MICRO-K) 10 MEQ CR capsule Take 10 mEq by mouth 2 (two) times a day      pramipexole (MIRAPEX) 1 5 MG tablet Take 1 tablet (1 5 mg total) by mouth 3 (three) times a day 270 tablet 0    traMADol (ULTRAM) 50 mg tablet tramadol 50 mg tablet      triamcinolone (KENALOG) 0 1 % cream Apply topically 2 (two) times a day      VITAMIN B COMPLEX-C PO Take 1 tablet by mouth daily      pantoprazole (PROTONIX) 40 mg tablet Take 1 tablet (40 mg total) by mouth daily 30 tablet 3     No current facility-administered medications for this visit        Current Outpatient Prescriptions on File Prior to Visit   Medication Sig    albuterol (2 5 mg/3 mL) 0 083 % nebulizer solution Take 1 vial (2 5 mg total) by nebulization every 4 (four) hours as needed for shortness of breath Disp 1 box    albuterol (PROAIR HFA) 90 mcg/act inhaler Inhale 2 puffs    Ascorbic Acid (VITAMIN C) 1000 MG tablet Take by mouth    aspirin (ECOTRIN LOW STRENGTH) 81 mg EC tablet Take 81 mg by mouth daily    atorvastatin (LIPITOR) 20 mg tablet Take 1 tablet (20 mg total) by mouth daily    SpeechTrans MICROLET LANCETS lancets Use as instructed BID E11 65    Calcium 500 MG tablet Take 2 tablets by mouth every other day      carbidopa-levodopa (SINEMET)  mg per tablet TAKE 1 TABLET BY MOUTH 3 TIMES A DAY    cholecalciferol (VITAMIN D3) 1,000 units tablet Take 3 tablets by mouth every other day      clonazePAM (KlonoPIN) 2 mg tablet Take 1 tablet (2 mg total) by mouth daily at bedtime    cyclobenzaprine (FLEXERIL) 10 mg tablet Take 1 tablet (10 mg total) by mouth 3 (three) times a day as needed for muscle spasms    denosumab (PROLIA) 60 mg/mL Inject under the skin every 6 (six) months    escitalopram (LEXAPRO) 10 mg tablet TAKE 1 & 1/2 (ONE & ONE-HALF) TABLETS BY MOUTH ONCE DAILY    glucose blood (JOELLEN CONTOUR TEST) test strip Use as instructed BID  E11 65    Glucose Blood (ONETOUCH ULTRA BLUE VI) by In Vitro route 3 (three) times a day    hydrochlorothiazide (HYDRODIURIL) 50 mg tablet Take 1 tablet (50 mg total) by mouth daily    lisinopril (ZESTRIL) 2 5 mg tablet Take 1 tablet by mouth daily    metaxalone (SKELAXIN) 800 mg tablet Take 1 tablet (800 mg total) by mouth 3 (three) times a day    metFORMIN (GLUCOPHAGE) 1000 MG tablet Take 1 tablet (1,000 mg total) by mouth 2 (two) times a day with meals    methocarbamol (ROBAXIN) 750 mg tablet Take 1 tablet (750 mg total) by mouth every 8 (eight) hours    nystatin (MYCOSTATIN) cream Apply topically 2 (two) times a day    omeprazole (PriLOSEC) 20 mg delayed release capsule Take 1 capsule (20 mg total) by mouth daily    potassium chloride (MICRO-K) 10 MEQ CR capsule Take 10 mEq by mouth 2 (two) times a day    triamcinolone (KENALOG) 0 1 % cream Apply topically 2 (two) times a day    VITAMIN B COMPLEX-C PO Take 1 tablet by mouth daily    [DISCONTINUED] amitriptyline (ELAVIL) 25 mg tablet Take 1 tablet by mouth daily at bedtime as needed      [DISCONTINUED] fluticasone-umeclidinium-vilanterol (TRELEGY ELLIPTA) 100-62 5-25 MCG/INH inhaler Inhale 1 puff daily Rinse mouth after use   [DISCONTINUED] pramipexole (MIRAPEX) 1 5 MG tablet Take 1 tablet (1 5 mg total) by mouth 2 (two) times a day     No current facility-administered medications on file prior to visit  She is allergic to gabapentin; hydrocodone-acetaminophen; midazolam; penicillin v; pregabalin; propofol; and sulfa antibiotics       Review of Systems   Constitutional: Negative for chills and fever     HENT: Negative for congestion, ear pain, hearing loss, postnasal drip, rhinorrhea, sinus pain, sinus pressure, sore throat and trouble swallowing  Eyes: Negative for pain and visual disturbance  Respiratory: Negative for cough, chest tightness, shortness of breath and wheezing  Cardiovascular: Negative  Negative for chest pain, palpitations and leg swelling  Gastrointestinal: Positive for vomiting  Negative for abdominal pain, blood in stool, constipation, diarrhea and nausea  Endocrine: Negative for cold intolerance, heat intolerance, polydipsia, polyphagia and polyuria  Genitourinary: Negative for difficulty urinating, dysuria, flank pain and urgency  Musculoskeletal: Negative for arthralgias, back pain, gait problem and myalgias  Skin: Negative for rash  Allergic/Immunologic: Negative  Neurological: Positive for numbness  Negative for dizziness, weakness, light-headedness and headaches  Hematological: Negative  Psychiatric/Behavioral: Positive for sleep disturbance  Negative for behavioral problems and dysphoric mood  The patient is not nervous/anxious  Objective:      /72 (BP Location: Left arm, Patient Position: Sitting, Cuff Size: Large)   Pulse 92   Temp (!) 97 1 °F (36 2 °C) (Tympanic)   Resp 16   Ht 5' 5" (1 651 m)   Wt 82 6 kg (182 lb 3 2 oz)   BMI 30 32 kg/m²          Physical Exam   Constitutional: She is oriented to person, place, and time  She appears well-developed and well-nourished  No distress  HENT:   Head: Normocephalic and atraumatic  Right Ear: External ear normal    Left Ear: External ear normal    Nose: Nose normal    Mouth/Throat: Oropharynx is clear and moist  No oropharyngeal exudate  Eyes: Pupils are equal, round, and reactive to light  Conjunctivae and EOM are normal  Right eye exhibits no discharge  Left eye exhibits no discharge  No scleral icterus  Neck: Normal range of motion  Neck supple  No thyromegaly present     Cardiovascular: Normal rate, regular rhythm and normal heart sounds  Exam reveals no gallop and no friction rub  No murmur heard  Pulmonary/Chest: Effort normal and breath sounds normal  No respiratory distress  She has no wheezes  She has no rales  Abdominal: Soft  Bowel sounds are normal  She exhibits no distension  There is no tenderness  Musculoskeletal: Normal range of motion  She exhibits no edema, tenderness or deformity  Neurological: She is alert and oriented to person, place, and time  No cranial nerve deficit  Skin: Skin is warm and dry  She is not diaphoretic  Psychiatric: She has a normal mood and affect   Her behavior is normal  Judgment and thought content normal

## 2018-11-26 NOTE — ASSESSMENT & PLAN NOTE
Will start PPI and refer to GI for EGD  We discussed differential including hiatal hernia or achalasia or rings/webs

## 2018-11-27 DIAGNOSIS — B37.2 CANDIDAL INTERTRIGO: ICD-10-CM

## 2018-11-27 RX ORDER — NYSTATIN 100000 U/G
CREAM TOPICAL 2 TIMES DAILY
Qty: 30 G | Refills: 2 | Status: SHIPPED | OUTPATIENT
Start: 2018-11-27 | End: 2019-02-13 | Stop reason: SDUPTHER

## 2018-12-03 ENCOUNTER — TELEPHONE (OUTPATIENT)
Dept: FAMILY MEDICINE CLINIC | Facility: CLINIC | Age: 68
End: 2018-12-03

## 2018-12-03 NOTE — TELEPHONE ENCOUNTER
Pt called pharmacy and they never received the order for visteral for her rls at last weeks appt - how much should I send?

## 2018-12-04 DIAGNOSIS — G25.81 RLS (RESTLESS LEGS SYNDROME): Primary | ICD-10-CM

## 2018-12-04 RX ORDER — HYDROXYZINE PAMOATE 50 MG/1
50 CAPSULE ORAL 3 TIMES DAILY PRN
Qty: 90 CAPSULE | Refills: 2 | Status: SHIPPED | OUTPATIENT
Start: 2018-12-04 | End: 2018-12-10 | Stop reason: SDUPTHER

## 2018-12-05 ENCOUNTER — OFFICE VISIT (OUTPATIENT)
Dept: FAMILY MEDICINE CLINIC | Facility: CLINIC | Age: 68
End: 2018-12-05
Payer: MEDICARE

## 2018-12-05 VITALS
BODY MASS INDEX: 30.66 KG/M2 | TEMPERATURE: 97.2 F | SYSTOLIC BLOOD PRESSURE: 110 MMHG | RESPIRATION RATE: 20 BRPM | WEIGHT: 184 LBS | DIASTOLIC BLOOD PRESSURE: 64 MMHG | HEART RATE: 88 BPM | HEIGHT: 65 IN

## 2018-12-05 DIAGNOSIS — J06.9 UPPER RESPIRATORY TRACT INFECTION, UNSPECIFIED TYPE: Primary | ICD-10-CM

## 2018-12-05 DIAGNOSIS — R05.9 COUGH: ICD-10-CM

## 2018-12-05 PROCEDURE — 99213 OFFICE O/P EST LOW 20 MIN: CPT | Performed by: NURSE PRACTITIONER

## 2018-12-05 RX ORDER — AZITHROMYCIN 250 MG/1
TABLET, FILM COATED ORAL
Qty: 6 TABLET | Refills: 0 | Status: SHIPPED | OUTPATIENT
Start: 2018-12-05 | End: 2018-12-09

## 2018-12-05 NOTE — PROGRESS NOTES
Assessment/Plan:    No problem-specific Assessment & Plan notes found for this encounter  Diagnoses and all orders for this visit:    Upper respiratory tract infection, unspecified type  Comments:  Rx for Z pack provided , pt has diabetic tussin for cough at home use as directed  Orders:  -     azithromycin (ZITHROMAX) 250 mg tablet; Take 2 tablets today then 1 tablet daily x 4 days  -     azithromycin (ZITHROMAX) 250 mg tablet; Take 2 tablets today then 1 tablet daily x 4 days    Cough  Comments:  Pt does have DM Tussin at home use as directed  Orders:  -     azithromycin (ZITHROMAX) 250 mg tablet; Take 2 tablets today then 1 tablet daily x 4 days  -     azithromycin (ZITHROMAX) 250 mg tablet; Take 2 tablets today then 1 tablet daily x 4 days          Subjective:      Patient ID: Franklyn Bruce is a 76 y o  female here for productive cough    76 yr old female here with onset chest congestion, productive yellow cough and sore throat approx 5 days  Pt has been using Tussin DM for diabetics with no relief  Pt is concerned she will develop pneumonia due to past experience  Pt is using her inhalers    I will order an antibiotic and advise pt to drink plenty of fluids, tylenol for fever as directed  The following portions of the patient's history were reviewed and updated as appropriate:   She  has a past medical history of Anxiety; Cellulitis; Depression; Diabetes mellitus ([address] ); Dysuria; Esophageal reflux; Fatigue; Fracture; Heart murmur; Hypertension; Pneumonia; and Vitamin D deficiency    She   Patient Active Problem List    Diagnosis Date Noted    Esophageal dysphagia 11/26/2018    Dermatitis 11/26/2018    Candidal intertrigo 10/03/2018    Heart murmur 09/12/2018    Positive FIT (fecal immunochemical test) 06/25/2018    Diabetes mellitus ([address] ) 04/06/2018    Chronic lower back pain 09/29/2016    Anxiety 05/21/2016    Vitamin D deficiency 03/09/2016    HTN (hypertension) 12/14/2015    Hypercholesteremia 12/14/2015    Restless legs syndrome (RLS) 12/14/2015     She  has a past surgical history that includes Back surgery; Cholecystectomy (1975); Foot surgery (Right, 2014); Hip surgery (2013); Hysterectomy; Ovarian cyst surgery (1971); Shoulder surgery (Right, 12/14/2015); Other surgical history (2011); Tonsillectomy; and pr colonoscopy flx dx w/collj spec when pfrmd (N/A, 7/10/2018)  Her family history includes Calcium disorder in her other and other; Cancer in her father; Depression in her family; Diabetes type II in her father and other; Heart disease in her father; Hypertension in her mother and other; Mental illness in her mother and other; Migraines in her other; Other in her other; Pulmonary embolism in her other; Stroke in her mother; Substance Abuse in her brother and family; Tuberculosis in her other  She  reports that she has been smoking Cigarettes  She has never used smokeless tobacco  She reports that she drinks alcohol  She reports that she does not use drugs    Current Outpatient Prescriptions   Medication Sig Dispense Refill    albuterol (2 5 mg/3 mL) 0 083 % nebulizer solution Take 1 vial (2 5 mg total) by nebulization every 4 (four) hours as needed for shortness of breath Disp 1 box 25 vial 5    albuterol (PROAIR HFA) 90 mcg/act inhaler Inhale 2 puffs      amitriptyline (ELAVIL) 25 mg tablet Take 1 tablet (25 mg total) by mouth daily at bedtime as needed for sleep 90 tablet 0    Ascorbic Acid (VITAMIN C) 1000 MG tablet Take by mouth      aspirin (ECOTRIN LOW STRENGTH) 81 mg EC tablet Take 81 mg by mouth daily      atorvastatin (LIPITOR) 20 mg tablet Take 1 tablet (20 mg total) by mouth daily 90 tablet 0    JOELLEN MICROLET LANCETS lancets Use as instructed BID E11 65 100 each 5    Calcium 500 MG tablet Take 2 tablets by mouth every other day        carbidopa-levodopa (SINEMET)  mg per tablet TAKE 1 TABLET BY MOUTH 3 TIMES A  tablet 0    cholecalciferol (VITAMIN D3) 1,000 units tablet Take 3 tablets by mouth every other day        clonazePAM (KlonoPIN) 2 mg tablet Take 1 tablet (2 mg total) by mouth daily at bedtime 30 tablet 0    cyclobenzaprine (FLEXERIL) 10 mg tablet Take 1 tablet (10 mg total) by mouth 3 (three) times a day as needed for muscle spasms 30 tablet 0    denosumab (PROLIA) 60 mg/mL Inject under the skin every 6 (six) months      escitalopram (LEXAPRO) 10 mg tablet TAKE 1 & 1/2 (ONE & ONE-HALF) TABLETS BY MOUTH ONCE DAILY 45 tablet 3    glucose blood (JOELLEN CONTOUR TEST) test strip Use as instructed BID  E11 65 100 each 5    Glucose Blood (ONETOUCH ULTRA BLUE VI) by In Vitro route 3 (three) times a day      hydrochlorothiazide (HYDRODIURIL) 50 mg tablet Take 1 tablet (50 mg total) by mouth daily 90 tablet 3    hydrOXYzine pamoate (VISTARIL) 50 mg capsule Take 1 capsule (50 mg total) by mouth 3 (three) times a day as needed (rls) 90 capsule 2    lisinopril (ZESTRIL) 2 5 mg tablet Take 1 tablet by mouth daily      metaxalone (SKELAXIN) 800 mg tablet Take 1 tablet (800 mg total) by mouth 3 (three) times a day 90 tablet 2    metFORMIN (GLUCOPHAGE) 1000 MG tablet Take 1 tablet (1,000 mg total) by mouth 2 (two) times a day with meals 180 tablet 1    methocarbamol (ROBAXIN) 750 mg tablet Take 1 tablet (750 mg total) by mouth every 8 (eight) hours 90 tablet 2    nystatin (MYCOSTATIN) cream Apply topically 2 (two) times a day 30 g 2    pantoprazole (PROTONIX) 40 mg tablet Take 1 tablet (40 mg total) by mouth daily 30 tablet 3    potassium chloride (MICRO-K) 10 MEQ CR capsule Take 10 mEq by mouth 2 (two) times a day      pramipexole (MIRAPEX) 1 5 MG tablet Take 1 tablet (1 5 mg total) by mouth 3 (three) times a day 270 tablet 0    traMADol (ULTRAM) 50 mg tablet tramadol 50 mg tablet      triamcinolone (KENALOG) 0 1 % cream Apply topically 2 (two) times a day      VITAMIN B COMPLEX-C PO Take 1 tablet by mouth daily      azithromycin (ZITHROMAX) 250 mg tablet Take 2 tablets today then 1 tablet daily x 4 days 6 tablet 0    azithromycin (ZITHROMAX) 250 mg tablet Take 2 tablets today then 1 tablet daily x 4 days 6 tablet 0     No current facility-administered medications for this visit        Current Outpatient Prescriptions on File Prior to Visit   Medication Sig    albuterol (2 5 mg/3 mL) 0 083 % nebulizer solution Take 1 vial (2 5 mg total) by nebulization every 4 (four) hours as needed for shortness of breath Disp 1 box    albuterol (PROAIR HFA) 90 mcg/act inhaler Inhale 2 puffs    amitriptyline (ELAVIL) 25 mg tablet Take 1 tablet (25 mg total) by mouth daily at bedtime as needed for sleep    Ascorbic Acid (VITAMIN C) 1000 MG tablet Take by mouth    aspirin (ECOTRIN LOW STRENGTH) 81 mg EC tablet Take 81 mg by mouth daily    atorvastatin (LIPITOR) 20 mg tablet Take 1 tablet (20 mg total) by mouth daily    [employer] MICROLET LANCETS lancets Use as instructed BID E11 65    Calcium 500 MG tablet Take 2 tablets by mouth every other day      carbidopa-levodopa (SINEMET)  mg per tablet TAKE 1 TABLET BY MOUTH 3 TIMES A DAY    cholecalciferol (VITAMIN D3) 1,000 units tablet Take 3 tablets by mouth every other day      clonazePAM (KlonoPIN) 2 mg tablet Take 1 tablet (2 mg total) by mouth daily at bedtime    cyclobenzaprine (FLEXERIL) 10 mg tablet Take 1 tablet (10 mg total) by mouth 3 (three) times a day as needed for muscle spasms    denosumab (PROLIA) 60 mg/mL Inject under the skin every 6 (six) months    escitalopram (LEXAPRO) 10 mg tablet TAKE 1 & 1/2 (ONE & ONE-HALF) TABLETS BY MOUTH ONCE DAILY    glucose blood (Y[employer] Software CONTOUR TEST) test strip Use as instructed BID  E11 65    Glucose Blood (ONETOUCH ULTRA BLUE VI) by In Vitro route 3 (three) times a day    hydrochlorothiazide (HYDRODIURIL) 50 mg tablet Take 1 tablet (50 mg total) by mouth daily    hydrOXYzine pamoate (VISTARIL) 50 mg capsule Take 1 capsule (50 mg total) by mouth 3 (three) times a day as needed (rls)    lisinopril (ZESTRIL) 2 5 mg tablet Take 1 tablet by mouth daily    metaxalone (SKELAXIN) 800 mg tablet Take 1 tablet (800 mg total) by mouth 3 (three) times a day    metFORMIN (GLUCOPHAGE) 1000 MG tablet Take 1 tablet (1,000 mg total) by mouth 2 (two) times a day with meals    methocarbamol (ROBAXIN) 750 mg tablet Take 1 tablet (750 mg total) by mouth every 8 (eight) hours    nystatin (MYCOSTATIN) cream Apply topically 2 (two) times a day    pantoprazole (PROTONIX) 40 mg tablet Take 1 tablet (40 mg total) by mouth daily    potassium chloride (MICRO-K) 10 MEQ CR capsule Take 10 mEq by mouth 2 (two) times a day    pramipexole (MIRAPEX) 1 5 MG tablet Take 1 tablet (1 5 mg total) by mouth 3 (three) times a day    traMADol (ULTRAM) 50 mg tablet tramadol 50 mg tablet    triamcinolone (KENALOG) 0 1 % cream Apply topically 2 (two) times a day    VITAMIN B COMPLEX-C PO Take 1 tablet by mouth daily    [DISCONTINUED] omeprazole (PriLOSEC) 20 mg delayed release capsule Take 1 capsule (20 mg total) by mouth daily     No current facility-administered medications on file prior to visit  She is allergic to gabapentin; hydrocodone-acetaminophen; midazolam; penicillin v; pregabalin; propofol; and sulfa antibiotics       Review of Systems   Constitutional: Negative  HENT: Positive for congestion, postnasal drip and sore throat  Negative for sinus pain and sinus pressure  Eyes: Negative  Respiratory: Positive for cough and shortness of breath  Chest congestion   Cardiovascular: Negative  Gastrointestinal: Negative  Endocrine: Negative  Genitourinary: Negative  Musculoskeletal: Negative  Skin: Negative  Allergic/Immunologic: Negative  Neurological: Negative  Hematological: Negative  Psychiatric/Behavioral: Negative            Objective:      /64 (BP Location: Left arm, Patient Position: Sitting, Cuff Size: Large)   Pulse 88   Temp [place] ) 97 2 °F (36 2 °C) (Tympanic)   Resp 20   Ht 5' 5" (1 651 m)   Wt 83 5 kg (184 lb)   BMI 30 62 kg/m²          Physical Exam   Constitutional: She is oriented to person, place, and time  She appears well-developed and well-nourished  HENT:   Head: Normocephalic  Mouth/Throat: Posterior oropharyngeal erythema present  Eyes: Pupils are equal, round, and reactive to light  Neck: Normal range of motion  Cardiovascular: Normal rate and regular rhythm  Pulmonary/Chest: Effort normal  She has decreased breath sounds in the left upper field, the left middle field and the left lower field  She has rhonchi in the right middle field and the right lower field  Abdominal: Soft  Bowel sounds are normal    Musculoskeletal: Normal range of motion  Neurological: She is alert and oriented to person, place, and time  Skin: Skin is warm and dry  Psychiatric: She has a normal mood and affect  Her behavior is normal  Judgment and thought content normal    Nursing note and vitals reviewed

## 2018-12-10 ENCOUNTER — APPOINTMENT (OUTPATIENT)
Dept: LAB | Facility: CLINIC | Age: 68
End: 2018-12-10
Payer: MEDICARE

## 2018-12-10 ENCOUNTER — TRANSCRIBE ORDERS (OUTPATIENT)
Dept: LAB | Facility: CLINIC | Age: 68
End: 2018-12-10

## 2018-12-10 DIAGNOSIS — Z13.29 SCREENING FOR THYROID DISORDER: ICD-10-CM

## 2018-12-10 DIAGNOSIS — E55.9 VITAMIN D DEFICIENCY: ICD-10-CM

## 2018-12-10 DIAGNOSIS — E78.5 HYPERLIPIDEMIA ASSOCIATED WITH TYPE 2 DIABETES MELLITUS (HCC): ICD-10-CM

## 2018-12-10 DIAGNOSIS — Z13.0 SCREENING FOR DEFICIENCY ANEMIA: ICD-10-CM

## 2018-12-10 DIAGNOSIS — G25.81 RLS (RESTLESS LEGS SYNDROME): ICD-10-CM

## 2018-12-10 DIAGNOSIS — G89.29 CHRONIC BILATERAL LOW BACK PAIN WITHOUT SCIATICA: ICD-10-CM

## 2018-12-10 DIAGNOSIS — E11.8 TYPE 2 DIABETES MELLITUS WITH COMPLICATION, WITHOUT LONG-TERM CURRENT USE OF INSULIN (HCC): ICD-10-CM

## 2018-12-10 DIAGNOSIS — E11.69 HYPERLIPIDEMIA ASSOCIATED WITH TYPE 2 DIABETES MELLITUS (HCC): ICD-10-CM

## 2018-12-10 DIAGNOSIS — M54.50 CHRONIC BILATERAL LOW BACK PAIN WITHOUT SCIATICA: ICD-10-CM

## 2018-12-10 LAB
25(OH)D3 SERPL-MCNC: 30.7 NG/ML (ref 30–100)
ALBUMIN SERPL BCP-MCNC: 3.7 G/DL (ref 3.5–5)
ALP SERPL-CCNC: 54 U/L (ref 46–116)
ALT SERPL W P-5'-P-CCNC: 12 U/L (ref 12–78)
ANION GAP SERPL CALCULATED.3IONS-SCNC: 6 MMOL/L (ref 4–13)
AST SERPL W P-5'-P-CCNC: 17 U/L (ref 5–45)
BASOPHILS # BLD AUTO: 0.08 THOUSANDS/ΜL (ref 0–0.1)
BASOPHILS NFR BLD AUTO: 1 % (ref 0–1)
BILIRUB SERPL-MCNC: 0.73 MG/DL (ref 0.2–1)
BUN SERPL-MCNC: 14 MG/DL (ref 5–25)
CALCIUM SERPL-MCNC: 8.7 MG/DL (ref 8.3–10.1)
CHLORIDE SERPL-SCNC: 106 MMOL/L (ref 100–108)
CHOLEST SERPL-MCNC: 108 MG/DL (ref 50–200)
CO2 SERPL-SCNC: 26 MMOL/L (ref 21–32)
CREAT SERPL-MCNC: 0.92 MG/DL (ref 0.6–1.3)
CREAT UR-MCNC: 47 MG/DL
EOSINOPHIL # BLD AUTO: 0.22 THOUSAND/ΜL (ref 0–0.61)
EOSINOPHIL NFR BLD AUTO: 3 % (ref 0–6)
ERYTHROCYTE [DISTWIDTH] IN BLOOD BY AUTOMATED COUNT: 15.4 % (ref 11.6–15.1)
EST. AVERAGE GLUCOSE BLD GHB EST-MCNC: 146 MG/DL
GFR SERPL CREATININE-BSD FRML MDRD: 64 ML/MIN/1.73SQ M
GLUCOSE P FAST SERPL-MCNC: 90 MG/DL (ref 65–99)
HBA1C MFR BLD: 6.7 % (ref 4.2–6.3)
HCT VFR BLD AUTO: 38.2 % (ref 34.8–46.1)
HDLC SERPL-MCNC: 51 MG/DL (ref 40–60)
HGB BLD-MCNC: 12.1 G/DL (ref 11.5–15.4)
IMM GRANULOCYTES # BLD AUTO: 0.02 THOUSAND/UL (ref 0–0.2)
IMM GRANULOCYTES NFR BLD AUTO: 0 % (ref 0–2)
LDLC SERPL CALC-MCNC: 40 MG/DL (ref 0–100)
LYMPHOCYTES # BLD AUTO: 2.57 THOUSANDS/ΜL (ref 0.6–4.47)
LYMPHOCYTES NFR BLD AUTO: 33 % (ref 14–44)
MCH RBC QN AUTO: 28.4 PG (ref 26.8–34.3)
MCHC RBC AUTO-ENTMCNC: 31.7 G/DL (ref 31.4–37.4)
MCV RBC AUTO: 90 FL (ref 82–98)
MICROALBUMIN UR-MCNC: <5 MG/L (ref 0–20)
MICROALBUMIN/CREAT 24H UR: <11 MG/G CREATININE (ref 0–30)
MONOCYTES # BLD AUTO: 0.5 THOUSAND/ΜL (ref 0.17–1.22)
MONOCYTES NFR BLD AUTO: 7 % (ref 4–12)
NEUTROPHILS # BLD AUTO: 4.31 THOUSANDS/ΜL (ref 1.85–7.62)
NEUTS SEG NFR BLD AUTO: 56 % (ref 43–75)
NONHDLC SERPL-MCNC: 57 MG/DL
NRBC BLD AUTO-RTO: 0 /100 WBCS
PLATELET # BLD AUTO: 228 THOUSANDS/UL (ref 149–390)
PMV BLD AUTO: 10.4 FL (ref 8.9–12.7)
POTASSIUM SERPL-SCNC: 4.1 MMOL/L (ref 3.5–5.3)
PROT SERPL-MCNC: 7 G/DL (ref 6.4–8.2)
RBC # BLD AUTO: 4.26 MILLION/UL (ref 3.81–5.12)
SODIUM SERPL-SCNC: 138 MMOL/L (ref 136–145)
TRIGL SERPL-MCNC: 87 MG/DL
TSH SERPL DL<=0.05 MIU/L-ACNC: 2.19 UIU/ML (ref 0.36–3.74)
WBC # BLD AUTO: 7.7 THOUSAND/UL (ref 4.31–10.16)

## 2018-12-10 PROCEDURE — 36415 COLL VENOUS BLD VENIPUNCTURE: CPT

## 2018-12-10 PROCEDURE — 85025 COMPLETE CBC W/AUTO DIFF WBC: CPT

## 2018-12-10 PROCEDURE — 82570 ASSAY OF URINE CREATININE: CPT | Performed by: PHYSICIAN ASSISTANT

## 2018-12-10 PROCEDURE — 82306 VITAMIN D 25 HYDROXY: CPT

## 2018-12-10 PROCEDURE — 80053 COMPREHEN METABOLIC PANEL: CPT

## 2018-12-10 PROCEDURE — 83036 HEMOGLOBIN GLYCOSYLATED A1C: CPT

## 2018-12-10 PROCEDURE — 80061 LIPID PANEL: CPT

## 2018-12-10 PROCEDURE — 84443 ASSAY THYROID STIM HORMONE: CPT

## 2018-12-10 PROCEDURE — 82043 UR ALBUMIN QUANTITATIVE: CPT | Performed by: PHYSICIAN ASSISTANT

## 2018-12-10 RX ORDER — METHOCARBAMOL 750 MG/1
750 TABLET, FILM COATED ORAL EVERY 8 HOURS SCHEDULED
Qty: 90 TABLET | Refills: 1 | Status: SHIPPED | OUTPATIENT
Start: 2018-12-10 | End: 2019-03-04 | Stop reason: SDUPTHER

## 2018-12-11 RX ORDER — HYDROXYZINE PAMOATE 50 MG/1
50 CAPSULE ORAL 3 TIMES DAILY PRN
Qty: 90 CAPSULE | Refills: 1 | Status: SHIPPED | OUTPATIENT
Start: 2018-12-11 | End: 2018-12-27 | Stop reason: SDUPTHER

## 2018-12-17 DIAGNOSIS — E11.8 TYPE 2 DIABETES MELLITUS WITH COMPLICATION, WITHOUT LONG-TERM CURRENT USE OF INSULIN (HCC): ICD-10-CM

## 2018-12-17 NOTE — TELEPHONE ENCOUNTER
Needs one week of Metformin 1000mg  The pharmacy shorted her this med  She only wants a week worth      Send to [facility]

## 2018-12-18 ENCOUNTER — TELEPHONE (OUTPATIENT)
Dept: FAMILY MEDICINE CLINIC | Facility: CLINIC | Age: 68
End: 2018-12-18

## 2018-12-18 NOTE — TELEPHONE ENCOUNTER
Needs one week of Metformin 1000mg  The pharmacy shorted her this med  She only wants a week worth      Send to [employer]    She took her last pill today

## 2018-12-24 DIAGNOSIS — G25.81 RLS (RESTLESS LEGS SYNDROME): ICD-10-CM

## 2018-12-24 DIAGNOSIS — F41.9 ANXIETY: ICD-10-CM

## 2018-12-26 RX ORDER — CLONAZEPAM 2 MG/1
TABLET ORAL
Qty: 30 TABLET | Refills: 0 | Status: SHIPPED | OUTPATIENT
Start: 2018-12-26 | End: 2018-12-27 | Stop reason: SDUPTHER

## 2018-12-27 DIAGNOSIS — F41.9 ANXIETY: ICD-10-CM

## 2018-12-27 DIAGNOSIS — G25.81 RLS (RESTLESS LEGS SYNDROME): ICD-10-CM

## 2018-12-27 DIAGNOSIS — E11.8 TYPE 2 DIABETES MELLITUS WITH COMPLICATION, WITHOUT LONG-TERM CURRENT USE OF INSULIN (HCC): ICD-10-CM

## 2018-12-27 DIAGNOSIS — R13.19 ESOPHAGEAL DYSPHAGIA: ICD-10-CM

## 2018-12-27 RX ORDER — CLONAZEPAM 2 MG/1
2 TABLET ORAL
Qty: 30 TABLET | Refills: 0 | Status: SHIPPED | OUTPATIENT
Start: 2018-12-27 | End: 2019-03-08 | Stop reason: SDUPTHER

## 2018-12-27 RX ORDER — PANTOPRAZOLE SODIUM 40 MG/1
40 TABLET, DELAYED RELEASE ORAL DAILY
Qty: 90 TABLET | Refills: 1 | Status: SHIPPED | OUTPATIENT
Start: 2018-12-27 | End: 2019-07-03 | Stop reason: SDUPTHER

## 2018-12-27 RX ORDER — HYDROXYZINE PAMOATE 50 MG/1
50 CAPSULE ORAL 3 TIMES DAILY PRN
Qty: 90 CAPSULE | Refills: 2 | Status: SHIPPED | OUTPATIENT
Start: 2018-12-27 | End: 2019-04-03 | Stop reason: SDUPTHER

## 2019-01-07 ENCOUNTER — OFFICE VISIT (OUTPATIENT)
Dept: GASTROENTEROLOGY | Facility: CLINIC | Age: 69
End: 2019-01-07
Payer: MEDICARE

## 2019-01-07 VITALS
HEART RATE: 90 BPM | SYSTOLIC BLOOD PRESSURE: 122 MMHG | WEIGHT: 183.13 LBS | BODY MASS INDEX: 30.47 KG/M2 | DIASTOLIC BLOOD PRESSURE: 74 MMHG

## 2019-01-07 DIAGNOSIS — R13.19 ESOPHAGEAL DYSPHAGIA: ICD-10-CM

## 2019-01-07 DIAGNOSIS — Z09 FOLLOW UP: Primary | ICD-10-CM

## 2019-01-07 PROCEDURE — 99214 OFFICE O/P EST MOD 30 MIN: CPT | Performed by: INTERNAL MEDICINE

## 2019-01-07 NOTE — LETTER
January 7, 2019     Phi Finch PA-C  [address]  [facility] [address]    Patient: Jimenez Bhakta   YOB: 1950   Date of Visit: 1/7/2019       Dear Dr Andrew Magana: Thank you for referring Khadra Donnelly to me for evaluation  Below are my notes for this consultation  If you have questions, please do not hesitate to call me  I look forward to following your patient along with you  Sincerely,        Radha Bearden MD        CC: No Recipients  Radha Bearden MD  1/7/2019  4:00 PM  Sign at close encounter  Consultation - [address] [facility]  Jimenez Bhakta 1950 76 y o  female     ASSESSMENT @ PLAN:   She is a 43-year-old female with ongoing dysphagia for approximately 15 years which is been getting much worse over the last few months  I suspect this will be from gastroesophageal reflux disease versus eosinophilic esophagitis  1 will do EGD to investigate    2 I told her to continue on the Protonix that was just started 3 weeks ago by her primary care physician    3 she had a negative colonoscopy in September of 2018    Chief Complaint:   Dysphagia    HPI:   She is a 43-year-old female with ongoing dysphagia  She reports that she has been having this for over 15 years  She reports that she feels like she has a stricture in the esophagus the food gets stuck at the lower esophagus it specifically happens with pork and state  It can happen with any solid food though  She had happens even when she cuts up the food very finally  He does not have an to liquid  She has rare heartburn or regurgitation she has no melena or nausea or vomiting  She has no NSAID use  She is a retired nurse  She has never had endoscopy  Her primary care physician just put her on 3 weeks of pantoprazole and it was just started  She has had no discernible improvement  She had negative colonoscopy in September of 2018  Her weight is stable      REVIEW OF SYSTEMS: CONSTITUTIONAL: Denies any fever, chills, or rigors  Good appetite, and no recent weight loss  HEENT: No earache or tinnitus  Denies hearing loss or visual disturbances  CARDIOVASCULAR: No chest pain or palpitations  RESPIRATORY: Denies any cough, hemoptysis, shortness of breath or dyspnea on exertion  GASTROINTESTINAL: As noted in the History of Present Illness  GENITOURINARY: No problems with urination  Denies any hematuria or dysuria  NEUROLOGIC: No dizziness or vertigo, denies headaches  MUSCULOSKELETAL: Denies any muscle or joint pain  SKIN: Denies skin rashes or itching  ENDOCRINE: Denies excessive thirst  Denies intolerance to heat or cold  PSYCHOSOCIAL: Denies depression or anxiety  Denies any recent memory loss  Past Medical History:   Diagnosis Date    Anxiety     Cellulitis     LAST ASSESED 2/12/16; RESOLVED 3/9/16    Depression     [facility]     Dysuria     LAST ASSESSED 3/7/16; RESOLVED 3/9/16    Esophageal reflux     RESOLVED 1/15/16    Fatigue     RESOLVED 3/9/16    Fracture     rt 4 th metatarsal     Heart murmur     Hypertension     Pneumonia     Vitamin D deficiency       Past Surgical History:   Procedure Laterality Date    BACK SURGERY      CHOLECYSTECTOMY  1975    FOOT SURGERY Right 2014    HIP SURGERY  2013    HYSTERECTOMY      OTHER SURGICAL HISTORY  2011    LAPAROSCOPIC SLING OPERATION FOR STRESS INCONTINENCE     OVARIAN CYST SURGERY  1971    CYSTECTOMY    CT COLONOSCOPY FLX DX W/COLLJ SPEC WHEN PFRMD N/A 7/10/2018    Procedure: COLONOSCOPY;  Surgeon: Irineo Shaver MD;  Location: [facility]; Service: Gastroenterology    SHOULDER SURGERY Right 12/14/2015    REPAIR OF TORN MUSCLES    TONSILLECTOMY       Social History     Social History    Marital status: /Civil Union     Spouse name: N/A    Number of children: N/A    Years of education: N/A     Occupational History    Not on file       Social History Main Topics    Smoking status: Former Smoker     Types: Cigarettes     Quit date: 3/1/2018    Smokeless tobacco: Never Used    Alcohol use Yes      Comment: rarely    Drug use: No    Sexual activity: Not on file     Other Topics Concern    Not on file     Social History Narrative    ALWAYS USES SEAT BELT     DENTAL CARE, [facility]        Retired    Lives with Spouse     Family History   Problem Relation Age of Onset    Hypertension Mother     Mental illness Mother     Stroke Mother         CVA    Cancer Father     Diabetes type II Father     Heart disease Father         CARDIAC DISORDER    Hypertension Other     Mental illness Other     Migraines Other     Diabetes type II Other     Calcium disorder Other         CALCIUM KIDNEY STONES    Other Other         HERPES ZOSTER INFECTION    Tuberculosis Other     Pulmonary embolism Other         CHRONIC OBSTRUCTIVE PULMONARY DISEASE    Calcium disorder Other         CALISUM KIDNEY STONES    Depression Family     Substance Abuse Family     Substance Abuse Brother      Midazolam; Gabapentin; Hydrocodone-acetaminophen; Penicillin v; Pregabalin; Propofol; and Sulfa antibiotics  Current Outpatient Prescriptions   Medication Sig Dispense Refill    albuterol (2 5 mg/3 mL) 0 083 % nebulizer solution Take 1 vial (2 5 mg total) by nebulization every 4 (four) hours as needed for shortness of breath Disp 1 box 25 vial 5    albuterol (PROAIR HFA) 90 mcg/act inhaler Inhale 2 puffs      amitriptyline (ELAVIL) 25 mg tablet Take 1 tablet (25 mg total) by mouth daily at bedtime as needed for sleep 90 tablet 0    aspirin (ECOTRIN LOW STRENGTH) 81 mg EC tablet Take 81 mg by mouth daily      atorvastatin (LIPITOR) 20 mg tablet Take 1 tablet (20 mg total) by mouth daily 90 tablet 0    JOELLEN MICROLET LANCETS lancets Use as instructed BID E11 65 100 each 5    Calcium 500 MG tablet Take 2 tablets by mouth every other day  carbidopa-levodopa (SINEMET)  mg per tablet TAKE 1 TABLET BY MOUTH 3 TIMES A  tablet 0    cholecalciferol (VITAMIN D3) 1,000 units tablet Take 3 tablets by mouth every other day        clonazePAM (KlonoPIN) 2 mg tablet Take 1 tablet (2 mg total) by mouth daily at bedtime 30 tablet 0    denosumab (PROLIA) 60 mg/mL Inject under the skin every 6 (six) months      escitalopram (LEXAPRO) 10 mg tablet TAKE 1 & 1/2 (ONE & ONE-HALF) TABLETS BY MOUTH ONCE DAILY 45 tablet 3    glucose blood (Renovar CONTOUR TEST) test strip Use as instructed BID  E11 65 100 each 5    Glucose Blood (ONETOUCH ULTRA BLUE VI) by In Vitro route 3 (three) times a day      hydrochlorothiazide (HYDRODIURIL) 50 mg tablet Take 1 tablet (50 mg total) by mouth daily 90 tablet 3    hydrOXYzine pamoate (VISTARIL) 50 mg capsule Take 1 capsule (50 mg total) by mouth 3 (three) times a day as needed (rls) 90 capsule 2    lisinopril (ZESTRIL) 2 5 mg tablet Take 1 tablet by mouth daily      metaxalone (SKELAXIN) 800 mg tablet Take 1 tablet (800 mg total) by mouth 3 (three) times a day 90 tablet 2    metFORMIN (GLUCOPHAGE) 1000 MG tablet Take 1 tablet (1,000 mg total) by mouth 2 (two) times a day with meals 180 tablet 1    methocarbamol (ROBAXIN) 750 mg tablet Take 1 tablet (750 mg total) by mouth every 8 (eight) hours 90 tablet 1    nystatin (MYCOSTATIN) cream Apply topically 2 (two) times a day 30 g 2    pantoprazole (PROTONIX) 40 mg tablet Take 1 tablet (40 mg total) by mouth daily 90 tablet 1    potassium chloride (MICRO-K) 10 MEQ CR capsule Take 10 mEq by mouth 2 (two) times a day        pramipexole (MIRAPEX) 1 5 MG tablet Take 1 tablet (1 5 mg total) by mouth 3 (three) times a day 270 tablet 0    triamcinolone (KENALOG) 0 1 % cream Apply topically 2 (two) times a day      VITAMIN B COMPLEX-C PO Take 1 tablet by mouth daily      Ascorbic Acid (VITAMIN C) 1000 MG tablet Take by mouth      cyclobenzaprine (FLEXERIL) 10 mg tablet Take 1 tablet (10 mg total) by mouth 3 (three) times a day as needed for muscle spasms (Patient not taking: Reported on 1/7/2019 ) 30 tablet 0    traMADol (ULTRAM) 50 mg tablet tramadol 50 mg tablet       No current facility-administered medications for this visit  Blood pressure 122/74, pulse 90, weight 83 1 kg (183 lb 2 oz)  PHYSICAL EXAM:     General Appearance:   Alert, cooperative, no distress, appears stated age    HEENT:   Normocephalic, atraumatic, anicteric      Neck:  Supple, symmetrical, trachea midline, no adenopathy;    thyroid: no enlargement/tenderness/nodules; no carotid  bruit or JVD    Lungs:   Clear to auscultation bilaterally; no rales, rhonchi or wheezing; respirations unlabored    Heart[de-identified]   S1 and S2 normal; regular rate and rhythm; no murmur, rub, or gallop     Abdomen:   Soft, non-tender, non-distended; normal bowel sounds; no masses, no organomegaly    Genitalia:   Deferred    Rectal:   Deferred    Extremities:  No cyanosis, clubbing or edema    Pulses:  2+ and symmetric all extremities    Skin:  Skin color, texture, turgor normal, no rashes or lesions    Lymph nodes:  No palpable cervical, axillary or inguinal lymphadenopathy        Lab Results   Component Value Date    WBC 7 70 12/10/2018    HGB 12 1 12/10/2018    HCT 38 2 12/10/2018    MCV 90 12/10/2018     12/10/2018     Lab Results   Component Value Date    GLUCOSE 121 12/16/2015    CALCIUM 8 7 12/10/2018     12/16/2015    K 4 1 12/10/2018    CO2 26 12/10/2018     12/10/2018    BUN 14 12/10/2018    CREATININE 0 92 12/10/2018     Lab Results   Component Value Date    ALT 12 12/10/2018    AST 17 12/10/2018    ALKPHOS 54 12/10/2018    BILITOT 0 65 12/16/2015     No results found for: INR, PROTIME

## 2019-01-07 NOTE — PROGRESS NOTES
Consultation - [address] [facility]  Marlane Bloch 1950 76 y o  female     ASSESSMENT @ PLAN:   She is a 60-year-old female with ongoing dysphagia for approximately 15 years which is been getting much worse over the last few months  I suspect this will be from gastroesophageal reflux disease versus eosinophilic esophagitis  1 will do EGD to investigate    2 I told her to continue on the Protonix that was just started 3 weeks ago by her primary care physician    3 she had a negative colonoscopy in September of 2018    Chief Complaint:   Dysphagia    HPI:   She is a 60-year-old female with ongoing dysphagia  She reports that she has been having this for over 15 years  She reports that she feels like she has a stricture in the esophagus the food gets stuck at the lower esophagus it specifically happens with pork and state  It can happen with any solid food though  She had happens even when she cuts up the food very finally  He does not have an to liquid  She has rare heartburn or regurgitation she has no melena or nausea or vomiting  She has no NSAID use  She is a retired nurse  She has never had endoscopy  Her primary care physician just put her on 3 weeks of pantoprazole and it was just started  She has had no discernible improvement  She had negative colonoscopy in September of 2018  Her weight is stable  REVIEW OF SYSTEMS:     CONSTITUTIONAL: Denies any fever, chills, or rigors  Good appetite, and no recent weight loss  HEENT: No earache or tinnitus  Denies hearing loss or visual disturbances  CARDIOVASCULAR: No chest pain or palpitations  RESPIRATORY: Denies any cough, hemoptysis, shortness of breath or dyspnea on exertion  GASTROINTESTINAL: As noted in the History of Present Illness  GENITOURINARY: No problems with urination  Denies any hematuria or dysuria  NEUROLOGIC: No dizziness or vertigo, denies headaches  MUSCULOSKELETAL: Denies any muscle or joint pain  SKIN: Denies skin rashes or itching  ENDOCRINE: Denies excessive thirst  Denies intolerance to heat or cold  PSYCHOSOCIAL: Denies depression or anxiety  Denies any recent memory loss  Past Medical History:   Diagnosis Date    Anxiety     Cellulitis     LAST ASSESED 2/12/16; RESOLVED 3/9/16    Depression     Diabetes mellitus [facility]     Dysuria     LAST ASSESSED 3/7/16; RESOLVED 3/9/16    Esophageal reflux     RESOLVED 1/15/16    Fatigue     RESOLVED 3/9/16    Fracture     rt 4 th metatarsal     Heart murmur     Hypertension     Pneumonia     Vitamin D deficiency       Past Surgical History:   Procedure Laterality Date    BACK SURGERY      CHOLECYSTECTOMY  1975    FOOT SURGERY Right 2014    HIP SURGERY  2013    HYSTERECTOMY      OTHER SURGICAL HISTORY  2011    LAPAROSCOPIC SLING OPERATION FOR STRESS INCONTINENCE     OVARIAN CYST SURGERY  1971    CYSTECTOMY    ND COLONOSCOPY FLX DX W/COLLJ SPEC WHEN PFRMD N/A 7/10/2018    Procedure: COLONOSCOPY;  Surgeon: Bakari Carrillo MD;  Location: [facility]; Service: Gastroenterology    SHOULDER SURGERY Right 12/14/2015    REPAIR OF TORN MUSCLES    TONSILLECTOMY       Social History     Social History    Marital status: /Civil Union     Spouse name: N/A    Number of children: N/A    Years of education: N/A     Occupational History    Not on file       Social History Main Topics    Smoking status: Former Smoker     Types: Cigarettes     Quit date: 3/1/2018    Smokeless tobacco: Never Used    Alcohol use Yes      Comment: rarely    Drug use: No    Sexual activity: Not on file     Other Topics Concern    Not on file     Social History Narrative    ALWAYS USES SEAT BELT     DENTAL CARE, [address] [place] [address]        Retired    Lives with Spouse     Family History   Problem Relation Age of Onset    Hypertension Mother     Mental illness Mother     Stroke Mother         CVA    Cancer Father     Diabetes type II Father     Heart disease Father         CARDIAC DISORDER    Hypertension Other     Mental illness Other     Migraines Other     Diabetes type II Other     Calcium disorder Other         CALCIUM KIDNEY STONES    Other Other         HERPES ZOSTER INFECTION    Tuberculosis Other     Pulmonary embolism Other         CHRONIC OBSTRUCTIVE PULMONARY DISEASE    Calcium disorder Other         CALISUM KIDNEY STONES    Depression Family     Substance Abuse Family     Substance Abuse Brother      Midazolam; Gabapentin; Hydrocodone-acetaminophen; Penicillin v; Pregabalin; Propofol; and Sulfa antibiotics  Current Outpatient Prescriptions   Medication Sig Dispense Refill    albuterol (2 5 mg/3 mL) 0 083 % nebulizer solution Take 1 vial (2 5 mg total) by nebulization every 4 (four) hours as needed for shortness of breath Disp 1 box 25 vial 5    albuterol (PROAIR HFA) 90 mcg/act inhaler Inhale 2 puffs      amitriptyline (ELAVIL) 25 mg tablet Take 1 tablet (25 mg total) by mouth daily at bedtime as needed for sleep 90 tablet 0    aspirin (ECOTRIN LOW STRENGTH) 81 mg EC tablet Take 81 mg by mouth daily      atorvastatin (LIPITOR) 20 mg tablet Take 1 tablet (20 mg total) by mouth daily 90 tablet 0    [employer]adian[employer] MICROLET LANCETS lancets Use as instructed BID E11 65 100 each 5    Calcium 500 MG tablet Take 2 tablets by mouth every other day        carbidopa-levodopa (SINEMET)  mg per tablet TAKE 1 TABLET BY MOUTH 3 TIMES A  tablet 0    cholecalciferol (VITAMIN D3) 1,000 units tablet Take 3 tablets by mouth every other day        clonazePAM (KlonoPIN) 2 mg tablet Take 1 tablet (2 mg total) by mouth daily at bedtime 30 tablet 0    denosumab (PROLIA) 60 mg/mL Inject under the skin every 6 (six) months      escitalopram (LEXAPRO) 10 mg tablet TAKE 1 & 1/2 (ONE & ONE-HALF) TABLETS BY MOUTH ONCE DAILY 45 tablet 3    glucose blood (JOELLEN CONTOUR TEST) test strip Use as instructed BID E11 65 100 each 5    Glucose Blood (ONETOUCH ULTRA BLUE VI) by In Vitro route 3 (three) times a day      hydrochlorothiazide (HYDRODIURIL) 50 mg tablet Take 1 tablet (50 mg total) by mouth daily 90 tablet 3    hydrOXYzine pamoate (VISTARIL) 50 mg capsule Take 1 capsule (50 mg total) by mouth 3 (three) times a day as needed (rls) 90 capsule 2    lisinopril (ZESTRIL) 2 5 mg tablet Take 1 tablet by mouth daily      metaxalone (SKELAXIN) 800 mg tablet Take 1 tablet (800 mg total) by mouth 3 (three) times a day 90 tablet 2    metFORMIN (GLUCOPHAGE) 1000 MG tablet Take 1 tablet (1,000 mg total) by mouth 2 (two) times a day with meals 180 tablet 1    methocarbamol (ROBAXIN) 750 mg tablet Take 1 tablet (750 mg total) by mouth every 8 (eight) hours 90 tablet 1    nystatin (MYCOSTATIN) cream Apply topically 2 (two) times a day 30 g 2    pantoprazole (PROTONIX) 40 mg tablet Take 1 tablet (40 mg total) by mouth daily 90 tablet 1    potassium chloride (MICRO-K) 10 MEQ CR capsule Take 10 mEq by mouth 2 (two) times a day        pramipexole (MIRAPEX) 1 5 MG tablet Take 1 tablet (1 5 mg total) by mouth 3 (three) times a day 270 tablet 0    triamcinolone (KENALOG) 0 1 % cream Apply topically 2 (two) times a day      VITAMIN B COMPLEX-C PO Take 1 tablet by mouth daily      Ascorbic Acid (VITAMIN C) 1000 MG tablet Take by mouth      cyclobenzaprine (FLEXERIL) 10 mg tablet Take 1 tablet (10 mg total) by mouth 3 (three) times a day as needed for muscle spasms (Patient not taking: Reported on 1/7/2019 ) 30 tablet 0    traMADol (ULTRAM) 50 mg tablet tramadol 50 mg tablet       No current facility-administered medications for this visit  Blood pressure 122/74, pulse 90, weight 83 1 kg (183 lb 2 oz)      PHYSICAL EXAM:     General Appearance:   Alert, cooperative, no distress, appears stated age    HEENT:   Normocephalic, atraumatic, anicteric      Neck:  Supple, symmetrical, trachea midline, no adenopathy;    thyroid: no enlargement/tenderness/nodules; no carotid  bruit or JVD    Lungs:   Clear to auscultation bilaterally; no rales, rhonchi or wheezing; respirations unlabored    Heart[de-identified]   S1 and S2 normal; regular rate and rhythm; no murmur, rub, or gallop     Abdomen:   Soft, non-tender, non-distended; normal bowel sounds; no masses, no organomegaly    Genitalia:   Deferred    Rectal:   Deferred    Extremities:  No cyanosis, clubbing or edema    Pulses:  2+ and symmetric all extremities    Skin:  Skin color, texture, turgor normal, no rashes or lesions    Lymph nodes:  No palpable cervical, axillary or inguinal lymphadenopathy        Lab Results   Component Value Date    WBC 7 70 12/10/2018    HGB 12 1 12/10/2018    HCT 38 2 12/10/2018    MCV 90 12/10/2018     12/10/2018     Lab Results   Component Value Date    GLUCOSE 121 12/16/2015    CALCIUM 8 7 12/10/2018     12/16/2015    K 4 1 12/10/2018    CO2 26 12/10/2018     12/10/2018    BUN 14 12/10/2018    CREATININE 0 92 12/10/2018     Lab Results   Component Value Date    ALT 12 12/10/2018    AST 17 12/10/2018    ALKPHOS 54 12/10/2018    BILITOT 0 65 12/16/2015     No results found for: INR, PROTIME

## 2019-01-27 ENCOUNTER — ANESTHESIA EVENT (OUTPATIENT)
Dept: PERIOP | Facility: HOSPITAL | Age: 69
End: 2019-01-27
Payer: MEDICARE

## 2019-01-28 ENCOUNTER — ANESTHESIA (OUTPATIENT)
Dept: PERIOP | Facility: HOSPITAL | Age: 69
End: 2019-01-28
Payer: MEDICARE

## 2019-01-28 ENCOUNTER — HOSPITAL ENCOUNTER (OUTPATIENT)
Facility: HOSPITAL | Age: 69
Setting detail: OUTPATIENT SURGERY
Discharge: HOME | End: 2019-01-28
Attending: INTERNAL MEDICINE | Admitting: INTERNAL MEDICINE
Payer: MEDICARE

## 2019-01-28 VITALS
OXYGEN SATURATION: 94 % | RESPIRATION RATE: 20 BRPM | HEIGHT: 65 IN | DIASTOLIC BLOOD PRESSURE: 64 MMHG | BODY MASS INDEX: 30.05 KG/M2 | HEART RATE: 80 BPM | TEMPERATURE: 97.9 F | SYSTOLIC BLOOD PRESSURE: 120 MMHG | WEIGHT: 180.34 LBS

## 2019-01-28 DIAGNOSIS — R13.19 ESOPHAGEAL DYSPHAGIA: ICD-10-CM

## 2019-01-28 LAB — GLUCOSE SERPL-MCNC: 107 MG/DL (ref 65–140)

## 2019-01-28 PROCEDURE — 82948 REAGENT STRIP/BLOOD GLUCOSE: CPT

## 2019-01-28 PROCEDURE — 88305 TISSUE EXAM BY PATHOLOGIST: CPT | Performed by: PATHOLOGY

## 2019-01-28 PROCEDURE — 43239 EGD BIOPSY SINGLE/MULTIPLE: CPT | Performed by: INTERNAL MEDICINE

## 2019-01-28 RX ORDER — FENTANYL CITRATE 50 UG/ML
INJECTION, SOLUTION INTRAMUSCULAR; INTRAVENOUS AS NEEDED
Status: DISCONTINUED | OUTPATIENT
Start: 2019-01-28 | End: 2019-01-28 | Stop reason: SURG

## 2019-01-28 RX ORDER — FLUCONAZOLE 200 MG/1
200 TABLET ORAL DAILY
Qty: 7 TABLET | Refills: 0 | Status: SHIPPED | OUTPATIENT
Start: 2019-01-28 | End: 2019-02-04

## 2019-01-28 RX ORDER — SODIUM CHLORIDE, SODIUM LACTATE, POTASSIUM CHLORIDE, CALCIUM CHLORIDE 600; 310; 30; 20 MG/100ML; MG/100ML; MG/100ML; MG/100ML
75 INJECTION, SOLUTION INTRAVENOUS CONTINUOUS
Status: DISCONTINUED | OUTPATIENT
Start: 2019-01-28 | End: 2019-01-28 | Stop reason: HOSPADM

## 2019-01-28 RX ORDER — PROPOFOL 10 MG/ML
INJECTION, EMULSION INTRAVENOUS AS NEEDED
Status: DISCONTINUED | OUTPATIENT
Start: 2019-01-28 | End: 2019-01-28 | Stop reason: SURG

## 2019-01-28 RX ADMIN — PROPOFOL 10 MG: 10 INJECTION, EMULSION INTRAVENOUS at 07:33

## 2019-01-28 RX ADMIN — LIDOCAINE HYDROCHLORIDE 60 MG: 20 INJECTION, SOLUTION INTRAVENOUS at 07:30

## 2019-01-28 RX ADMIN — FENTANYL CITRATE 25 MCG: 50 INJECTION, SOLUTION INTRAMUSCULAR; INTRAVENOUS at 07:31

## 2019-01-28 RX ADMIN — PROPOFOL 120 MG: 10 INJECTION, EMULSION INTRAVENOUS at 07:31

## 2019-01-28 RX ADMIN — SODIUM CHLORIDE, SODIUM LACTATE, POTASSIUM CHLORIDE, AND CALCIUM CHLORIDE 75 ML/HR: .6; .31; .03; .02 INJECTION, SOLUTION INTRAVENOUS at 07:09

## 2019-01-28 NOTE — ANESTHESIA PREPROCEDURE EVALUATION
Review of Systems/Medical History  Patient summary reviewed  Chart reviewed  History of anesthetic complications (propofol exacerbates RLS; relieved by percocet)     Cardiovascular  Hyperlipidemia, Hypertension ,   Comment: Hx heart murmur that PCP says is r/t HTN,  Pulmonary  Negative pulmonary ROS Smoker ex-smoker  ,        GI/Hepatic  Dysphagia,               Endo/Other  Diabetes well controlled type 2 Oral agent,      GYN       Hematology  Negative hematology ROS      Musculoskeletal    Arthritis     Neurology      Comment: RLS Psychology   Anxiety, Depression ,          discussed using fentanyl to try to prevent RLS exacerbation caused by propofol, will order percocet x1 post procedure if needed    Physical Exam    Airway    Mallampati score: II  TM Distance: >3 FB  Neck ROM: full     Dental   Comment: Bottom glued temp, upper dentures,     Cardiovascular  Rhythm: regular, Rate: normal,     Pulmonary  Breath sounds clear to auscultation,     Other Findings        Anesthesia Plan  ASA Score- 2     Anesthesia Type- IV sedation with anesthesia with ASA Monitors  Additional Monitors:   Airway Plan:         Plan Factors-    Induction- intravenous  Postoperative Plan-     Informed Consent- Anesthetic plan and risks discussed with patient  I personally reviewed this patient with the CRNA  Discussed and agreed on the Anesthesia Plan with the CRNA  Leigh [place]

## 2019-01-28 NOTE — DISCHARGE INSTRUCTIONS
Upper Endoscopy   WHAT YOU NEED TO KNOW:   An upper endoscopy is also called an upper gastrointestinal (GI) endoscopy, or an esophagogastroduodenoscopy (EGD)  You may feel bloated, gassy, or have some abdominal discomfort after your procedure  Your throat may be sore for 24 to 36 hours  You may burp or pass gas from air that is still inside your body  DISCHARGE INSTRUCTIONS:   Call 911 for any of the following:   · You have sudden chest pain or trouble breathing  Seek care immediately if:   · You feel dizzy or faint  · You have trouble swallowing  · Your bowel movements are very dark or black  · Your abdomen is hard and firm and you have severe pain  · You vomit blood  Contact your healthcare provider if:   · You feel full or bloated and cannot burp or pass gas  · You have not had a bowel movement for 3 days after your procedure  · You have neck pain  · You have a fever or chills  · You have nausea or are vomiting  · You have a rash or hives  · You have questions or concerns about your endoscopy  Relieve a sore throat:  Suck on throat lozenges or crushed ice  Gargle with a small amount of warm salt water  Mix 1 teaspoon of salt and 1 cup of warm water to make salt water  Relieve gas and discomfort from bloating:  Lie on your right side with a heating pad on your abdomen  Take short walks to help pass gas  Eat small meals until bloating is relieved  Rest after your procedure: You have been given medicine to relax you  Do not  drive or make important decisions until the day after your procedure  Return to your normal activity as directed  You can usually return to work the day after your procedure  Follow up with your healthcare provider as directed:  Write down your questions so you remember to ask them during your visits     © 2017 [address] is for End User's use only and may not be sold, redistributed or otherwise used for commercial purposes  All illustrations and images included in CareNotes® are the copyrighted property of A D A M , Inc  or Jean Kahn  The above information is an  only  It is not intended as medical advice for individual conditions or treatments  Talk to your doctor, nurse or pharmacist before following any medical regimen to see if it is safe and effective for you

## 2019-01-28 NOTE — DISCHARGE INSTR - AVS FIRST PAGE
ESOPHAGOGASTRODUODENOSCOPY    PROCEDURE: EGD/ Biopsy    INDICATIONS: Dysphagia    POST-OP DIAGNOSIS: See the impression below    SEDATION: Monitored anesthesia care, check anesthesia records    PHYSICAL EXAM:  Vitals:    01/28/19 0701   BP: 126/62   Pulse: 79   Resp: 20   Temp: 98 8 °F (37 1 °C)   SpO2: 97%     Body mass index is 30 01 kg/m²  General: NAD  Heart: S1 & S2 normal, RRR  Lungs: CTA, No rales or rhonchi  Abdomen: Soft, nontender, nondistended, good bowel sounds    CONSENT:  Informed consent was obtained for the procedure, including sedation after explaining the risks and benefits of the procedure  Risks including but not limited to bleeding, perforation, infection, aspiration were discussed in detail  Also explained about less than 100% sensitivity with the exam and other alternatives  PREPARATION:   EKG tracing, pulse oximetry, blood pressure were monitored throughout the procedure  Patient was identified by myself both verbally and by visual inspection of ID band  DESCRIPTION:   Patient was placed in the left lateral decubitus position and was sedated with the above medication  The gastroscope was introduced in to the oropharynx and the esophagus was intubated under direct visualization  Scope was passed down the esophagus up to 2nd part of the duodenum  A careful inspection was made as the gastroscope was withdrawn, including a retroflexed view of the stomach; findings and interventions are described below  The blood loss was minimal     FINDINGS:    #1  Esophagus and GEJ- there was moderate to severe candidal esophagitis in the entire esophagus  No discernible stricture was noted  The GE junction appeared normal     #2  Stomach- the cardia fundus body and antrum appeared normal   Multiple random biopsies were obtained      #3  Duodenum- the bulb and 2nd portion of the duodenum were normal   Multiple random biopsies were obtained         IMPRESSIONS:    Candidal esophagitis    RECOMMENDATIONS:   Continue Protonix for a total of 8 weeks  Diflucan course 200 mg q day for 1 week  Resume diet  Await pathology    COMPLICATIONS:  None; patient tolerated the procedure well    DISPOSITION: PACU  CONDITION: Stable    Rosa Dixon MD  6/93/1192,9:98 AM

## 2019-01-28 NOTE — OP NOTE
ESOPHAGOGASTRODUODENOSCOPY    PROCEDURE: EGD/ Biopsy    INDICATIONS: Dysphagia    POST-OP DIAGNOSIS: See the impression below    SEDATION: Monitored anesthesia care, check anesthesia records    PHYSICAL EXAM:  Vitals:    01/28/19 0701   BP: 126/62   Pulse: 79   Resp: 20   Temp: 98 8 °F (37 1 °C)   SpO2: 97%     Body mass index is 30 01 kg/m²  General: NAD  Heart: S1 & S2 normal, RRR  Lungs: CTA, No rales or rhonchi  Abdomen: Soft, nontender, nondistended, good bowel sounds    CONSENT:  Informed consent was obtained for the procedure, including sedation after explaining the risks and benefits of the procedure  Risks including but not limited to bleeding, perforation, infection, aspiration were discussed in detail  Also explained about less than 100% sensitivity with the exam and other alternatives  PREPARATION:   EKG tracing, pulse oximetry, blood pressure were monitored throughout the procedure  Patient was identified by myself both verbally and by visual inspection of ID band  DESCRIPTION:   Patient was placed in the left lateral decubitus position and was sedated with the above medication  The gastroscope was introduced in to the oropharynx and the esophagus was intubated under direct visualization  Scope was passed down the esophagus up to 2nd part of the duodenum  A careful inspection was made as the gastroscope was withdrawn, including a retroflexed view of the stomach; findings and interventions are described below  The blood loss was minimal     FINDINGS:    #1  Esophagus and GEJ- there was moderate to severe candidal esophagitis in the entire esophagus  No discernible stricture was noted  The GE junction appeared normal     #2  Stomach- the cardia fundus body and antrum appeared normal   Multiple random biopsies were obtained      #3  Duodenum- the bulb and 2nd portion of the duodenum were normal   Multiple random biopsies were obtained         IMPRESSIONS:    Candidal esophagitis    RECOMMENDATIONS:   Continue Protonix for a total of 8 weeks  Diflucan course 200 mg q day for 1 week  Resume diet  Await pathology    COMPLICATIONS:  None; patient tolerated the procedure well    DISPOSITION: PACU  CONDITION: Stable    Dixon Rodriguez MD  3/24/3892,8:03 AM

## 2019-01-28 NOTE — ANESTHESIA POSTPROCEDURE EVALUATION
Post-Op Assessment Note      CV Status:  Stable    Post-procedure mental status: sleeping      Hydration Status:  Stable    PONV Controlled:  None    Airway Patency:  Patent    Post Op Vitals Reviewed: Yes          Staff: CRNA           /65 (01/28/19 0739)    Temp 97 7 °F (36 5 °C) (01/28/19 0739)    Pulse 67 (01/28/19 0739)   Resp 18 (01/28/19 0739)    SpO2 98 % (01/28/19 0739)    Post procedure VS noted above, SV non obstructed on [facility]

## 2019-02-01 DIAGNOSIS — I10 ESSENTIAL HYPERTENSION: Primary | ICD-10-CM

## 2019-02-01 RX ORDER — LISINOPRIL 2.5 MG/1
TABLET ORAL
Qty: 90 TABLET | Refills: 1 | Status: SHIPPED | OUTPATIENT
Start: 2019-02-01 | End: 2019-07-08 | Stop reason: SDUPTHER

## 2019-02-13 DIAGNOSIS — B37.2 CANDIDAL INTERTRIGO: ICD-10-CM

## 2019-02-13 DIAGNOSIS — G25.81 RLS (RESTLESS LEGS SYNDROME): ICD-10-CM

## 2019-02-13 RX ORDER — NYSTATIN 100000 U/G
CREAM TOPICAL 2 TIMES DAILY
Qty: 30 G | Refills: 2 | Status: SHIPPED | OUTPATIENT
Start: 2019-02-13 | End: 2019-05-10 | Stop reason: SDUPTHER

## 2019-02-13 RX ORDER — PRAMIPEXOLE DIHYDROCHLORIDE 1.5 MG/1
1.5 TABLET ORAL 3 TIMES DAILY
Qty: 270 TABLET | Refills: 1 | Status: SHIPPED | COMMUNITY
Start: 2019-02-13 | End: 2019-02-20 | Stop reason: SDUPTHER

## 2019-02-19 ENCOUNTER — TELEPHONE (OUTPATIENT)
Dept: FAMILY MEDICINE CLINIC | Facility: CLINIC | Age: 69
End: 2019-02-19

## 2019-02-19 NOTE — TELEPHONE ENCOUNTER
LETHA:  Pt is going for BW  Today 2/19/19  Dr Abby Ndiaye has her on very high dose of prednisone, so she said her sugars will be up

## 2019-02-20 DIAGNOSIS — G25.81 RLS (RESTLESS LEGS SYNDROME): ICD-10-CM

## 2019-02-21 RX ORDER — PRAMIPEXOLE DIHYDROCHLORIDE 1.5 MG/1
1.5 TABLET ORAL 3 TIMES DAILY
Qty: 270 TABLET | Refills: 1 | Status: SHIPPED | OUTPATIENT
Start: 2019-02-21 | End: 2019-09-16 | Stop reason: SDUPTHER

## 2019-02-27 DIAGNOSIS — G25.81 RLS (RESTLESS LEGS SYNDROME): ICD-10-CM

## 2019-03-04 DIAGNOSIS — G25.81 RLS (RESTLESS LEGS SYNDROME): ICD-10-CM

## 2019-03-04 DIAGNOSIS — G89.29 CHRONIC BILATERAL LOW BACK PAIN WITHOUT SCIATICA: ICD-10-CM

## 2019-03-04 DIAGNOSIS — M54.50 CHRONIC BILATERAL LOW BACK PAIN WITHOUT SCIATICA: ICD-10-CM

## 2019-03-04 RX ORDER — AMITRIPTYLINE HYDROCHLORIDE 25 MG/1
25 TABLET, FILM COATED ORAL
Qty: 90 TABLET | Refills: 1 | Status: SHIPPED | OUTPATIENT
Start: 2019-03-04 | End: 2019-10-28 | Stop reason: SDUPTHER

## 2019-03-04 RX ORDER — METHOCARBAMOL 750 MG/1
750 TABLET, FILM COATED ORAL EVERY 8 HOURS SCHEDULED
Qty: 90 TABLET | Refills: 1 | Status: SHIPPED | OUTPATIENT
Start: 2019-03-04 | End: 2019-05-28 | Stop reason: SDUPTHER

## 2019-03-08 DIAGNOSIS — F41.9 ANXIETY: ICD-10-CM

## 2019-03-08 DIAGNOSIS — G25.81 RLS (RESTLESS LEGS SYNDROME): ICD-10-CM

## 2019-03-08 RX ORDER — CLONAZEPAM 2 MG/1
2 TABLET ORAL
Qty: 30 TABLET | Refills: 0 | Status: SHIPPED | OUTPATIENT
Start: 2019-03-08 | End: 2019-04-18 | Stop reason: SDUPTHER

## 2019-03-11 ENCOUNTER — OFFICE VISIT (OUTPATIENT)
Dept: FAMILY MEDICINE CLINIC | Facility: CLINIC | Age: 69
End: 2019-03-11
Payer: MEDICARE

## 2019-03-11 VITALS
HEART RATE: 88 BPM | SYSTOLIC BLOOD PRESSURE: 126 MMHG | RESPIRATION RATE: 20 BRPM | TEMPERATURE: 97.4 F | WEIGHT: 180.2 LBS | DIASTOLIC BLOOD PRESSURE: 68 MMHG | HEIGHT: 65 IN | BODY MASS INDEX: 30.02 KG/M2

## 2019-03-11 DIAGNOSIS — E11.8 TYPE 2 DIABETES MELLITUS WITH COMPLICATION, WITHOUT LONG-TERM CURRENT USE OF INSULIN (HCC): ICD-10-CM

## 2019-03-11 DIAGNOSIS — J44.9 CHRONIC OBSTRUCTIVE PULMONARY DISEASE, UNSPECIFIED COPD TYPE (HCC): ICD-10-CM

## 2019-03-11 DIAGNOSIS — M25.561 ACUTE PAIN OF RIGHT KNEE: Primary | ICD-10-CM

## 2019-03-11 DIAGNOSIS — F33.41 RECURRENT MAJOR DEPRESSIVE DISORDER, IN PARTIAL REMISSION (HCC): ICD-10-CM

## 2019-03-11 PROBLEM — L30.9 DERMATITIS: Status: RESOLVED | Noted: 2018-11-26 | Resolved: 2019-03-11

## 2019-03-11 PROCEDURE — 99213 OFFICE O/P EST LOW 20 MIN: CPT | Performed by: PHYSICIAN ASSISTANT

## 2019-03-11 RX ORDER — CEPHALEXIN 500 MG/1
500 CAPSULE ORAL 2 TIMES DAILY
COMMUNITY
End: 2019-03-20 | Stop reason: ALTCHOICE

## 2019-03-11 NOTE — ASSESSMENT & PLAN NOTE
Will get xray to better evaluate  No redness, warmth  Negative homans sign  Some lower extremity swelling present

## 2019-03-11 NOTE — PROGRESS NOTES
Assessment/Plan:    Acute pain of right knee  Will get xray to better evaluate  No redness, warmth  Negative homans sign  Some lower extremity swelling present  Diagnoses and all orders for this visit:    Acute pain of right knee  -     XR knee 4+ vw right injury; Future    Recurrent major depressive disorder, in partial remission (HCC)    Chronic obstructive pulmonary disease, unspecified COPD type ([facility] )    Type 2 diabetes mellitus with complication, without long-term current use of insulin ([facility] )    Other orders  -     cephalexin (KEFLEX) 500 mg capsule; Take 500 mg by mouth 2 (two) times a day          Subjective:      Patient ID: Baljit Mo is a 71 y o  female  Knee Pain    The incident occurred more than 1 week ago  The incident occurred at home  The injury mechanism was a fall  The pain is present in the right knee  The quality of the pain is described as aching  The pain is at a severity of 6/10  The pain is moderate  The pain has been constant since onset  Associated symptoms include an inability to bear weight and a loss of motion  She reports no foreign bodies present  The symptoms are aggravated by weight bearing and palpation  She has tried NSAIDs, acetaminophen and rest for the symptoms  The treatment provided mild relief  The following portions of the patient's history were reviewed and updated as appropriate:   She  has a past medical history of Anxiety, Cellulitis, Chronic obstructive pulmonary disease ([facility] ) (3/11/2019), Depression, Diabetes mellitus ([facility] ), Dysuria, Esophageal reflux, Fatigue, Fracture, Heart murmur, Hypertension, Pneumonia, Restless legs syndrome (RLS), and Vitamin D deficiency    She   Patient Active Problem List    Diagnosis Date Noted    [facility] major [facility], [facility] 03/11/2019    Chronic obstructive pulmonary disease ([facility] ) 03/11/2019    Acute pain of right knee 03/11/2019    Esophageal dysphagia 11/26/2018    Candidal intertrigo 10/03/2018    Heart murmur 09/12/2018    Positive FIT (fecal immunochemical test) 06/25/2018    Type 2 diabetes mellitus with complication, without long-term current use of insulin [facility] 04/06/2018    Chronic lower back pain 09/29/2016    Anxiety 05/21/2016    Vitamin D deficiency 03/09/2016    HTN (hypertension) 12/14/2015    Hypercholesteremia 12/14/2015    Restless legs syndrome (RLS) 12/14/2015     She  has a past surgical history that includes Back surgery; Cholecystectomy (1975); Foot surgery (Right, 2014); Hip surgery (2013); Hysterectomy; Ovarian cyst surgery (1971); Shoulder surgery (Right, 12/14/2015); Other surgical history (2011); Tonsillectomy; pr colonoscopy flx dx w/collj spec when pfrmd (N/A, 7/10/2018); Appendectomy; pr esophagogastroduodenoscopy transoral diagnostic (N/A, 1/28/2019); and Skin biopsy  Her family history includes Calcium disorder in her other and other; Cancer in her father; Depression in her family; Diabetes type II in her father and other; Heart disease in her father; Hypertension in her mother and other; Mental illness in her mother and other; Migraines in her other; Other in her other; Pulmonary embolism in her other; Stroke in her mother; Substance Abuse in her brother and family; Tuberculosis in her other  She  reports that she quit smoking about a year ago  Her smoking use included cigarettes  She has never used smokeless tobacco  She reports that she drinks alcohol  She reports that she does not use drugs    Current Outpatient Medications   Medication Sig Dispense Refill    albuterol (2 5 mg/3 mL) 0 083 % nebulizer solution Take 1 vial (2 5 mg total) by nebulization every 4 (four) hours as needed for shortness of breath Disp 1 box 25 vial 5    albuterol (PROAIR HFA) 90 mcg/act inhaler Inhale 2 puffs      amitriptyline (ELAVIL) 25 mg tablet Take 1 tablet (25 mg total) by mouth daily at bedtime as needed for sleep 90 tablet 1    aspirin (ECOTRIN LOW STRENGTH) 81 mg EC tablet Take 81 mg by mouth daily      atorvastatin (LIPITOR) 20 mg tablet Take 1 tablet (20 mg total) by mouth daily 90 tablet 0    JOELLEN MICROLET LANCETS lancets Use as instructed BID E11 65 100 each 5    Calcium 500 MG tablet Take 2 tablets by mouth every other day        carbidopa-levodopa (SINEMET)  mg per tablet Take 1 tablet by mouth 3 (three) times a day 270 tablet 1    cephalexin (KEFLEX) 500 mg capsule Take 500 mg by mouth 2 (two) times a day      cholecalciferol (VITAMIN D3) 1,000 units tablet Take 3 tablets by mouth every other day        clonazePAM (KlonoPIN) 2 mg tablet Take 1 tablet (2 mg total) by mouth daily at bedtime 30 tablet 0    denosumab (PROLIA) 60 mg/mL Inject under the skin every 6 (six) months      escitalopram (LEXAPRO) 10 mg tablet TAKE 1 & 1/2 (ONE & ONE-HALF) TABLETS BY MOUTH ONCE DAILY 45 tablet 3    glucose blood (Instamojo CONTOUR TEST) test strip Use as instructed BID  E11 65 100 each 5    Glucose Blood (ONETOUCH ULTRA BLUE VI) by In Vitro route 3 (three) times a day      hydrochlorothiazide (HYDRODIURIL) 50 mg tablet Take 1 tablet (50 mg total) by mouth daily 90 tablet 3    hydrOXYzine pamoate (VISTARIL) 50 mg capsule Take 1 capsule (50 mg total) by mouth 3 (three) times a day as needed (rls) 90 capsule 2    lisinopril (ZESTRIL) 2 5 mg tablet TAKE ONE TABLET BY MOUTH ONCE DAILY 90 tablet 1    metFORMIN (GLUCOPHAGE) 1000 MG tablet Take 1 tablet (1,000 mg total) by mouth 2 (two) times a day with meals 180 tablet 1    methocarbamol (ROBAXIN) 750 mg tablet Take 1 tablet (750 mg total) by mouth every 8 (eight) hours 90 tablet 1    nystatin (MYCOSTATIN) cream Apply topically 2 (two) times a day 30 g 2    pantoprazole (PROTONIX) 40 mg tablet Take 1 tablet (40 mg total) by mouth daily 90 tablet 1    potassium chloride (MICRO-K) 10 MEQ CR capsule Take 10 mEq by mouth 2 (two) times a day        pramipexole (MIRAPEX) 1 5 MG tablet Take 1 tablet (1 5 mg total) by mouth 3 (three) times a day 270 tablet 1    VITAMIN B COMPLEX-C PO Take 1 tablet by mouth daily       No current facility-administered medications for this visit        Current Outpatient Medications on File Prior to Visit   Medication Sig    albuterol (2 5 mg/3 mL) 0 083 % nebulizer solution Take 1 vial (2 5 mg total) by nebulization every 4 (four) hours as needed for shortness of breath Disp 1 box    albuterol (PROAIR HFA) 90 mcg/act inhaler Inhale 2 puffs    amitriptyline (ELAVIL) 25 mg tablet Take 1 tablet (25 mg total) by mouth daily at bedtime as needed for sleep    aspirin (ECOTRIN LOW STRENGTH) 81 mg EC tablet Take 81 mg by mouth daily    atorvastatin (LIPITOR) 20 mg tablet Take 1 tablet (20 mg total) by mouth daily    Squee MICROLET LANCETS lancets Use as instructed BID E11 65    Calcium 500 MG tablet Take 2 tablets by mouth every other day      carbidopa-levodopa (SINEMET)  mg per tablet Take 1 tablet by mouth 3 (three) times a day    cephalexin (KEFLEX) 500 mg capsule Take 500 mg by mouth 2 (two) times a day    cholecalciferol (VITAMIN D3) 1,000 units tablet Take 3 tablets by mouth every other day      clonazePAM (KlonoPIN) 2 mg tablet Take 1 tablet (2 mg total) by mouth daily at bedtime    denosumab (PROLIA) 60 mg/mL Inject under the skin every 6 (six) months    escitalopram (LEXAPRO) 10 mg tablet TAKE 1 & 1/2 (ONE & ONE-HALF) TABLETS BY MOUTH ONCE DAILY    glucose blood (Squee CONTOUR TEST) test strip Use as instructed BID  E11 65    Glucose Blood (ONETOUCH ULTRA BLUE VI) by In Vitro route 3 (three) times a day    hydrochlorothiazide (HYDRODIURIL) 50 mg tablet Take 1 tablet (50 mg total) by mouth daily    hydrOXYzine pamoate (VISTARIL) 50 mg capsule Take 1 capsule (50 mg total) by mouth 3 (three) times a day as needed (rls)    lisinopril (ZESTRIL) 2 5 mg tablet TAKE ONE TABLET BY MOUTH ONCE DAILY    metFORMIN (GLUCOPHAGE) 1000 MG tablet Take 1 tablet (1,000 mg total) by mouth 2 (two) times a day with meals    methocarbamol (ROBAXIN) 750 mg tablet Take 1 tablet (750 mg total) by mouth every 8 (eight) hours    nystatin (MYCOSTATIN) cream Apply topically 2 (two) times a day    pantoprazole (PROTONIX) 40 mg tablet Take 1 tablet (40 mg total) by mouth daily    potassium chloride (MICRO-K) 10 MEQ CR capsule Take 10 mEq by mouth 2 (two) times a day      pramipexole (MIRAPEX) 1 5 MG tablet Take 1 tablet (1 5 mg total) by mouth 3 (three) times a day    VITAMIN B COMPLEX-C PO Take 1 tablet by mouth daily     No current facility-administered medications on file prior to visit  She is allergic to midazolam; gabapentin; hydrocodone-acetaminophen; penicillin v; pregabalin; propofol; and sulfa antibiotics       Review of Systems   Constitutional: Negative for chills and fever  HENT: Negative for congestion, ear pain, hearing loss, postnasal drip, rhinorrhea, sinus pressure, sinus pain, sore throat and trouble swallowing  Eyes: Negative for pain and visual disturbance  Respiratory: Negative for cough, chest tightness, shortness of breath and wheezing  Cardiovascular: Negative  Negative for chest pain, palpitations and leg swelling  Gastrointestinal: Negative for abdominal pain, blood in stool, constipation, diarrhea, nausea and vomiting  Endocrine: Negative for cold intolerance, heat intolerance, polydipsia, polyphagia and polyuria  Genitourinary: Negative for difficulty urinating, dysuria, flank pain and urgency  Musculoskeletal: Positive for arthralgias  Negative for back pain, gait problem and myalgias  Skin: Negative for rash  Allergic/Immunologic: Negative  Neurological: Negative for dizziness, weakness, light-headedness and headaches  Hematological: Negative  Psychiatric/Behavioral: Negative for behavioral problems, dysphoric mood and sleep disturbance  The patient is not nervous/anxious            Objective:      /68 (BP Location: Left arm, Patient Position: Sitting, Cuff Size: Large)   Pulse 88   Temp (!) 97 4 °F (36 3 °C) (Tympanic)   Resp 20   Ht 5' 5" (1 651 m)   Wt 81 7 kg (180 lb 3 2 oz)   BMI 29 99 kg/m²          Physical Exam   Constitutional: She is oriented to person, place, and time  She appears well-developed and well-nourished  No distress  HENT:   Head: Normocephalic and atraumatic  Right Ear: External ear normal    Left Ear: External ear normal    Nose: Nose normal    Mouth/Throat: Oropharynx is clear and moist  No oropharyngeal exudate  Eyes: Pupils are equal, round, and reactive to light  Conjunctivae and EOM are normal  Right eye exhibits no discharge  Left eye exhibits no discharge  No scleral icterus  Neck: Normal range of motion  Neck supple  No thyromegaly present  Cardiovascular: Normal rate, regular rhythm and normal heart sounds  Exam reveals no gallop and no friction rub  No murmur heard  Pulmonary/Chest: Effort normal and breath sounds normal  No respiratory distress  She has no wheezes  She has no rales  Abdominal: Soft  Bowel sounds are normal  She exhibits no distension  There is no tenderness  Musculoskeletal: She exhibits no edema or deformity  Right knee: She exhibits decreased range of motion  Tenderness found  Patellar tendon tenderness noted  Neurological: She is alert and oriented to person, place, and time  No cranial nerve deficit  Skin: Skin is warm and dry  She is not diaphoretic  Psychiatric: She has a normal mood and affect   Her behavior is normal  Judgment and thought content normal

## 2019-03-13 DIAGNOSIS — G25.81 RLS (RESTLESS LEGS SYNDROME): ICD-10-CM

## 2019-03-13 RX ORDER — PRAMIPEXOLE DIHYDROCHLORIDE 1.5 MG/1
TABLET ORAL
Qty: 180 TABLET | Refills: 1 | Status: SHIPPED | OUTPATIENT
Start: 2019-03-13 | End: 2019-04-01 | Stop reason: SDUPTHER

## 2019-03-15 DIAGNOSIS — E78.5 HYPERLIPIDEMIA, UNSPECIFIED HYPERLIPIDEMIA TYPE: ICD-10-CM

## 2019-03-15 RX ORDER — ATORVASTATIN CALCIUM 20 MG/1
20 TABLET, FILM COATED ORAL DAILY
Qty: 90 TABLET | Refills: 1 | Status: SHIPPED | OUTPATIENT
Start: 2019-03-15 | End: 2019-06-27 | Stop reason: SDUPTHER

## 2019-03-20 ENCOUNTER — OFFICE VISIT (OUTPATIENT)
Dept: FAMILY MEDICINE CLINIC | Facility: CLINIC | Age: 69
End: 2019-03-20
Payer: MEDICARE

## 2019-03-20 VITALS — HEIGHT: 65 IN | BODY MASS INDEX: 30.59 KG/M2 | WEIGHT: 183.6 LBS

## 2019-03-20 DIAGNOSIS — E11.9 ENCOUNTER FOR DIABETIC FOOT EXAM (HCC): ICD-10-CM

## 2019-03-20 DIAGNOSIS — J02.8 ACUTE PHARYNGITIS DUE TO OTHER SPECIFIED ORGANISMS: Primary | ICD-10-CM

## 2019-03-20 DIAGNOSIS — R21 FACIAL RASH: ICD-10-CM

## 2019-03-20 DIAGNOSIS — Z11.59 NEED FOR HEPATITIS C SCREENING TEST: ICD-10-CM

## 2019-03-20 DIAGNOSIS — E11.8 TYPE 2 DIABETES MELLITUS WITH COMPLICATION, WITHOUT LONG-TERM CURRENT USE OF INSULIN (HCC): ICD-10-CM

## 2019-03-20 LAB — SL AMB POCT HEMOGLOBIN AIC: 6.7 (ref ?–6.5)

## 2019-03-20 PROCEDURE — 99214 OFFICE O/P EST MOD 30 MIN: CPT | Performed by: NURSE PRACTITIONER

## 2019-03-20 PROCEDURE — 83036 HEMOGLOBIN GLYCOSYLATED A1C: CPT | Performed by: NURSE PRACTITIONER

## 2019-03-20 RX ORDER — CLINDAMYCIN HYDROCHLORIDE 300 MG/1
300 CAPSULE ORAL 3 TIMES DAILY
Qty: 30 CAPSULE | Refills: 0 | Status: SHIPPED | OUTPATIENT
Start: 2019-03-20 | End: 2019-03-30

## 2019-03-20 NOTE — PROGRESS NOTES
Assessment/Plan:    No problem-specific Assessment & Plan notes found for this encounter  Diagnoses and all orders for this visit:    Acute pharyngitis due to other specified organisms  Comments:  Rx for Clindamycin provided  Orders:  -     clindamycin (CLEOCIN) 300 MG capsule; Take 1 capsule (300 mg total) by mouth 3 (three) times a day for 10 days    Type 2 diabetes mellitus with complication, without long-term current use of insulin ([facility])  Comments:  labwork ordered , POCT hgbA1C 6 7 pt on Meformin  Orders:  -     POCT hemoglobin A1c  -     Hemoglobin A1C; Future  -     Glucose, fasting; Future    Encounter for diabetic foot exam ([place] [address] )  Comments:  Completed today  Orders:  -     Diabetic foot exam; Future    Need for hepatitis C screening test  Comments:  Completed today  Orders:  -     Hepatitis C antibody; Future    Facial rash  -     clindamycin (CLEOCIN) 300 MG capsule; Take 1 capsule (300 mg total) by mouth 3 (three) times a day for 10 days          Subjective:      Patient ID: Lele Rueda is a 71 y o  female  Sore Throat  Patient complains of sore throat  Associated symptoms include chills, nasal blockage, post nasal drip, sinus and nasal congestion and sore throat  Onset of symptoms was 3 days ago, and have been gradually worsening since that time  She is drinking plenty of fluids  She has not had recent close exposure to someone with proven streptococcal pharyngitis  Pt also c/o facial rash diagnosed as Sprigo and she did have round of Clindamycin and Keflex   Today pt presents with sore throat and concern for infected pustule on her face   Pt has several bandaids over these pustules, also noted on both ears, neck cheeks      The following portions of the patient's history were reviewed and updated as appropriate: She  has a past medical history of Anxiety, Cellulitis, Chronic obstructive pulmonary disease ([facility] ) (3/11/2019), Depression, Diabetes mellitus ([facility] ), Dysuria, Esophageal reflux, Fatigue, Fracture, Heart murmur, Hypertension, Pneumonia, Restless legs syndrome (RLS), and Vitamin D deficiency  She   Patient Active Problem List    Diagnosis Date Noted    Acute pharyngitis due to other specified organisms 03/20/2019    Recurrent major depressive disorder, in partial remission ([facility] ) 03/11/2019    Chronic obstructive pulmonary disease ([place] [address] ) 03/11/2019    Acute pain of right knee 03/11/2019    Esophageal dysphagia 11/26/2018    Candidal intertrigo 10/03/2018    Heart murmur 09/12/2018    Positive FIT (fecal immunochemical test) 06/25/2018    Type 2 diabetes mellitus with complication, without long-term current use of insulin ([place] [address] ) 04/06/2018    Chronic lower back pain 09/29/2016    Anxiety 05/21/2016    Vitamin D deficiency 03/09/2016    HTN (hypertension) 12/14/2015    Hypercholesteremia 12/14/2015    Restless legs syndrome (RLS) 12/14/2015     She  has a past surgical history that includes Back surgery; Cholecystectomy (1975); Foot surgery (Right, 2014); Hip surgery (2013); Hysterectomy; Ovarian cyst surgery (1971); Shoulder surgery (Right, 12/14/2015); Other surgical history (2011); Tonsillectomy; pr colonoscopy flx dx w/collj spec when pfrmd (N/A, 7/10/2018); Appendectomy; pr esophagogastroduodenoscopy transoral diagnostic (N/A, 1/28/2019); and Skin biopsy  Her family history includes Calcium disorder in her other and other; Cancer in her father; Depression in her family; Diabetes type II in her father and other; Heart disease in her father; Hypertension in her mother and other; Mental illness in her mother and other; Migraines in her other; Other in her other; Pulmonary embolism in her other; Stroke in her mother; Substance Abuse in her brother and family; Tuberculosis in her other  She  reports that she quit smoking about 12 months ago  Her smoking use included cigarettes  She has never used smokeless tobacco  She reports that she drinks alcohol   She reports that she does not use drugs    Current Outpatient Medications   Medication Sig Dispense Refill    albuterol (2 5 mg/3 mL) 0 083 % nebulizer solution Take 1 vial (2 5 mg total) by nebulization every 4 (four) hours as needed for shortness of breath Disp 1 box 25 vial 5    albuterol (PROAIR HFA) 90 mcg/act inhaler Inhale 2 puffs      amitriptyline (ELAVIL) 25 mg tablet Take 1 tablet (25 mg total) by mouth daily at bedtime as needed for sleep 90 tablet 1    aspirin (ECOTRIN LOW STRENGTH) 81 mg EC tablet Take 81 mg by mouth daily      atorvastatin (LIPITOR) 20 mg tablet Take 1 tablet (20 mg total) by mouth daily 90 tablet 1    [employer] MICROLET LANCETS lancets Use as instructed BID E11 65 100 each 5    Calcium 500 MG tablet Take 2 tablets by mouth every other day        carbidopa-levodopa (SINEMET)  mg per tablet Take 1 tablet by mouth 3 (three) times a day 270 tablet 1    cholecalciferol (VITAMIN D3) 1,000 units tablet Take 3 tablets by mouth every other day        clonazePAM (KlonoPIN) 2 mg tablet Take 1 tablet (2 mg total) by mouth daily at bedtime 30 tablet 0    denosumab (PROLIA) 60 mg/mL Inject under the skin every 6 (six) months      escitalopram (LEXAPRO) 10 mg tablet TAKE 1 & 1/2 (ONE & ONE-HALF) TABLETS BY MOUTH ONCE DAILY 45 tablet 3    glucose blood ([employer] CONTOUR TEST) test strip Use as instructed BID  E11 65 100 each 5    Glucose Blood (ONETOUCH ULTRA BLUE VI) by In Vitro route 3 (three) times a day      hydrochlorothiazide (HYDRODIURIL) 50 mg tablet Take 1 tablet (50 mg total) by mouth daily 90 tablet 3    hydrOXYzine pamoate (VISTARIL) 50 mg capsule Take 1 capsule (50 mg total) by mouth 3 (three) times a day as needed (rls) 90 capsule 2    lisinopril (ZESTRIL) 2 5 mg tablet TAKE ONE TABLET BY MOUTH ONCE DAILY 90 tablet 1    metFORMIN (GLUCOPHAGE) 1000 MG tablet Take 1 tablet (1,000 mg total) by mouth 2 (two) times a day with meals 180 tablet 1    methocarbamol (ROBAXIN) 750 mg tablet Take 1 tablet (750 mg total) by mouth every 8 (eight) hours 90 tablet 1    nystatin (MYCOSTATIN) cream Apply topically 2 (two) times a day 30 g 2    pantoprazole (PROTONIX) 40 mg tablet Take 1 tablet (40 mg total) by mouth daily 90 tablet 1    potassium chloride (MICRO-K) 10 MEQ CR capsule Take 10 mEq by mouth 2 (two) times a day        pramipexole (MIRAPEX) 1 5 MG tablet Take 1 tablet (1 5 mg total) by mouth 3 (three) times a day 270 tablet 1    VITAMIN B COMPLEX-C PO Take 1 tablet by mouth daily      clindamycin (CLEOCIN) 300 MG capsule Take 1 capsule (300 mg total) by mouth 3 (three) times a day for 10 days 30 capsule 0    pramipexole (MIRAPEX) 1 5 MG tablet TAKE 1 TABLET BY MOUTH TWICE A DAY (Patient not taking: Reported on 3/20/2019) 180 tablet 1     No current facility-administered medications for this visit        Current Outpatient Medications on File Prior to Visit   Medication Sig    albuterol (2 5 mg/3 mL) 0 083 % nebulizer solution Take 1 vial (2 5 mg total) by nebulization every 4 (four) hours as needed for shortness of breath Disp 1 box    albuterol (PROAIR HFA) 90 mcg/act inhaler Inhale 2 puffs    amitriptyline (ELAVIL) 25 mg tablet Take 1 tablet (25 mg total) by mouth daily at bedtime as needed for sleep    aspirin (ECOTRIN LOW STRENGTH) 81 mg EC tablet Take 81 mg by mouth daily    atorvastatin (LIPITOR) 20 mg tablet Take 1 tablet (20 mg total) by mouth daily    JOELLEN MICROLET LANCETS lancets Use as instructed BID E11 65    Calcium 500 MG tablet Take 2 tablets by mouth every other day      carbidopa-levodopa (SINEMET)  mg per tablet Take 1 tablet by mouth 3 (three) times a day    cholecalciferol (VITAMIN D3) 1,000 units tablet Take 3 tablets by mouth every other day      clonazePAM (KlonoPIN) 2 mg tablet Take 1 tablet (2 mg total) by mouth daily at bedtime    denosumab (PROLIA) 60 mg/mL Inject under the skin every 6 (six) months    escitalopram (LEXAPRO) 10 mg tablet TAKE 1 & 1/2 (ONE & ONE-HALF) TABLETS BY MOUTH ONCE DAILY    glucose blood ([employer] CONTOUR TEST) test strip Use as instructed BID  E11 65    Glucose Blood (ONETOUCH ULTRA BLUE VI) by In Vitro route 3 (three) times a day    hydrochlorothiazide (HYDRODIURIL) 50 mg tablet Take 1 tablet (50 mg total) by mouth daily    hydrOXYzine pamoate (VISTARIL) 50 mg capsule Take 1 capsule (50 mg total) by mouth 3 (three) times a day as needed (rls)    lisinopril (ZESTRIL) 2 5 mg tablet TAKE ONE TABLET BY MOUTH ONCE DAILY    metFORMIN (GLUCOPHAGE) 1000 MG tablet Take 1 tablet (1,000 mg total) by mouth 2 (two) times a day with meals    methocarbamol (ROBAXIN) 750 mg tablet Take 1 tablet (750 mg total) by mouth every 8 (eight) hours    nystatin (MYCOSTATIN) cream Apply topically 2 (two) times a day    pantoprazole (PROTONIX) 40 mg tablet Take 1 tablet (40 mg total) by mouth daily    potassium chloride (MICRO-K) 10 MEQ CR capsule Take 10 mEq by mouth 2 (two) times a day      pramipexole (MIRAPEX) 1 5 MG tablet Take 1 tablet (1 5 mg total) by mouth 3 (three) times a day    VITAMIN B COMPLEX-C PO Take 1 tablet by mouth daily    [DISCONTINUED] cephalexin (KEFLEX) 500 mg capsule Take 500 mg by mouth 2 (two) times a day    pramipexole (MIRAPEX) 1 5 MG tablet TAKE 1 TABLET BY MOUTH TWICE A DAY (Patient not taking: Reported on 3/20/2019)     No current facility-administered medications on file prior to visit  She is allergic to midazolam; gabapentin; hydrocodone-acetaminophen; penicillin v; pregabalin; propofol; and sulfa antibiotics       Review of Systems   Constitutional: Positive for chills  Negative for fever  Itching   HENT: Positive for sore throat  Eyes: Negative  Respiratory: Negative  Cardiovascular: Negative  Gastrointestinal: Negative  Endocrine: Negative  Genitourinary: Negative  Musculoskeletal: Negative  Skin: Positive for color change and rash  Neurological: Negative  Hematological: Negative  Psychiatric/Behavioral: Negative  Objective:      Ht 5' 5" (1 651 m)   Wt 83 3 kg (183 lb 9 6 oz)   BMI 30 55 kg/m²          Physical Exam   Constitutional: She is oriented to person, place, and time  She appears well-developed and well-nourished  She appears ill  HENT:   Head: Normocephalic  Right Ear: Tympanic membrane is not erythematous  No middle ear effusion  Left Ear: Tympanic membrane is not erythematous  No middle ear effusion  Nose: Mucosal edema and rhinorrhea present  Right sinus exhibits maxillary sinus tenderness and frontal sinus tenderness  Left sinus exhibits maxillary sinus tenderness and frontal sinus tenderness  Mouth/Throat: Posterior oropharyngeal erythema present  Eyes: Pupils are equal, round, and reactive to light  Neck: Normal range of motion  Cardiovascular: Normal rate and regular rhythm  Pulses are no weak pulses  Pulses:       Dorsalis pedis pulses are 2+ on the right side, and 2+ on the left side  Posterior tibial pulses are 2+ on the right side  Pulmonary/Chest: Effort normal and breath sounds normal    Abdominal: Soft  Bowel sounds are normal    Musculoskeletal: Normal range of motion  Feet:   Right Foot:   Skin Integrity: Positive for dry skin  Negative for ulcer, skin breakdown, erythema, warmth or callus  Left Foot:   Skin Integrity: Positive for dry skin  Negative for ulcer, skin breakdown, erythema, warmth or callus  Lymphadenopathy:        Head (right side): No submandibular adenopathy present  Head (left side): No submandibular adenopathy present  Neurological: She is alert and oriented to person, place, and time  Skin: Skin is warm and dry  Rash noted  Rash is pustular  Rash is not maculopapular and not urticarial  There is erythema  Psychiatric: She has a normal mood and affect  Her behavior is normal  Judgment and thought content normal    Nursing note and vitals reviewed          Patient's shoes and socks removed  Right Foot/Ankle   Right Foot Inspection  Skin Exam: skin normal, skin intact and dry skin no warmth, no callus, no erythema, no maceration, no abnormal color, no pre-ulcer, no ulcer and no callus                          Toe Exam: ROM and strength within normal limits  Sensory   Vibration: intact  Proprioception: intact   Monofilament testing: diminished  Vascular  Capillary refills: < 3 seconds  The right DP pulse is 2+  The right PT pulse is 2+  Left Foot/Ankle  Left Foot Inspection  Skin Exam: skin normal, skin intact and dry skinno warmth, no erythema, no maceration, normal color, no pre-ulcer, no ulcer and no callus                         Toe Exam: ROM and strength within normal limits                   Sensory   Vibration: intact  Proprioception: intact  Monofilament: diminished  Vascular  Capillary refills: < 3 seconds  The left DP pulse is 2+  Assign Risk Category:  Deformity present; Loss of protective sensation;  No weak pulses       Risk: 1

## 2019-03-21 ENCOUNTER — APPOINTMENT (OUTPATIENT)
Dept: LAB | Facility: CLINIC | Age: 69
End: 2019-03-21
Payer: MEDICARE

## 2019-03-21 ENCOUNTER — APPOINTMENT (OUTPATIENT)
Dept: RADIOLOGY | Facility: CLINIC | Age: 69
End: 2019-03-21
Payer: MEDICARE

## 2019-03-21 ENCOUNTER — TRANSCRIBE ORDERS (OUTPATIENT)
Dept: LAB | Facility: CLINIC | Age: 69
End: 2019-03-21

## 2019-03-21 DIAGNOSIS — M25.561 ACUTE PAIN OF RIGHT KNEE: ICD-10-CM

## 2019-03-21 DIAGNOSIS — Z11.59 NEED FOR HEPATITIS C SCREENING TEST: ICD-10-CM

## 2019-03-21 DIAGNOSIS — E11.8 TYPE 2 DIABETES MELLITUS WITH COMPLICATION, WITHOUT LONG-TERM CURRENT USE OF INSULIN (HCC): ICD-10-CM

## 2019-03-21 LAB
EST. AVERAGE GLUCOSE BLD GHB EST-MCNC: 151 MG/DL
GLUCOSE P FAST SERPL-MCNC: 126 MG/DL (ref 65–99)
HBA1C MFR BLD: 6.9 % (ref 4.2–6.3)

## 2019-03-21 PROCEDURE — 86803 HEPATITIS C AB TEST: CPT

## 2019-03-21 PROCEDURE — 36415 COLL VENOUS BLD VENIPUNCTURE: CPT

## 2019-03-21 PROCEDURE — 82947 ASSAY GLUCOSE BLOOD QUANT: CPT

## 2019-03-21 PROCEDURE — 73564 X-RAY EXAM KNEE 4 OR MORE: CPT

## 2019-03-21 PROCEDURE — 83036 HEMOGLOBIN GLYCOSYLATED A1C: CPT

## 2019-03-22 DIAGNOSIS — E11.8 TYPE 2 DIABETES MELLITUS WITH COMPLICATION, WITHOUT LONG-TERM CURRENT USE OF INSULIN (HCC): ICD-10-CM

## 2019-03-22 LAB — HCV AB SER QL: NORMAL

## 2019-03-29 ENCOUNTER — TRANSCRIBE ORDERS (OUTPATIENT)
Dept: LAB | Facility: CLINIC | Age: 69
End: 2019-03-29

## 2019-04-01 ENCOUNTER — OFFICE VISIT (OUTPATIENT)
Dept: FAMILY MEDICINE CLINIC | Facility: CLINIC | Age: 69
End: 2019-04-01
Payer: MEDICARE

## 2019-04-01 VITALS
BODY MASS INDEX: 30.39 KG/M2 | SYSTOLIC BLOOD PRESSURE: 140 MMHG | RESPIRATION RATE: 16 BRPM | TEMPERATURE: 97.5 F | HEIGHT: 65 IN | WEIGHT: 182.4 LBS | HEART RATE: 88 BPM | DIASTOLIC BLOOD PRESSURE: 72 MMHG

## 2019-04-01 DIAGNOSIS — L08.9 SKIN INFECTION: Primary | ICD-10-CM

## 2019-04-01 DIAGNOSIS — F41.9 ANXIETY: ICD-10-CM

## 2019-04-01 PROCEDURE — 99213 OFFICE O/P EST LOW 20 MIN: CPT | Performed by: PHYSICIAN ASSISTANT

## 2019-04-01 RX ORDER — ESCITALOPRAM OXALATE 10 MG/1
15 TABLET ORAL DAILY
Qty: 45 TABLET | Refills: 3 | Status: SHIPPED | OUTPATIENT
Start: 2019-04-01 | End: 2019-08-12 | Stop reason: SDUPTHER

## 2019-04-01 RX ORDER — MUPIROCIN CALCIUM 20 MG/G
CREAM TOPICAL 3 TIMES DAILY
Qty: 15 G | Refills: 0 | Status: SHIPPED | OUTPATIENT
Start: 2019-04-01 | End: 2019-06-03 | Stop reason: ALTCHOICE

## 2019-04-03 DIAGNOSIS — G25.81 RLS (RESTLESS LEGS SYNDROME): ICD-10-CM

## 2019-04-04 RX ORDER — HYDROXYZINE PAMOATE 50 MG/1
50 CAPSULE ORAL 3 TIMES DAILY PRN
Qty: 90 CAPSULE | Refills: 2 | Status: SHIPPED | OUTPATIENT
Start: 2019-04-04 | End: 2019-07-05 | Stop reason: SDUPTHER

## 2019-04-05 ENCOUNTER — APPOINTMENT (OUTPATIENT)
Dept: LAB | Facility: CLINIC | Age: 69
End: 2019-04-05
Payer: MEDICARE

## 2019-04-05 ENCOUNTER — TRANSCRIBE ORDERS (OUTPATIENT)
Dept: LAB | Facility: CLINIC | Age: 69
End: 2019-04-05

## 2019-04-05 DIAGNOSIS — E55.9 AVITAMINOSIS D: ICD-10-CM

## 2019-04-05 DIAGNOSIS — M81.0 SENILE OSTEOPOROSIS: ICD-10-CM

## 2019-04-05 DIAGNOSIS — M81.0 SENILE OSTEOPOROSIS: Primary | ICD-10-CM

## 2019-04-05 LAB
25(OH)D3 SERPL-MCNC: 31.7 NG/ML (ref 30–100)
ANION GAP SERPL CALCULATED.3IONS-SCNC: 8 MMOL/L (ref 4–13)
BUN SERPL-MCNC: 25 MG/DL (ref 5–25)
CALCIUM SERPL-MCNC: 9.5 MG/DL (ref 8.3–10.1)
CHLORIDE SERPL-SCNC: 98 MMOL/L (ref 100–108)
CO2 SERPL-SCNC: 28 MMOL/L (ref 21–32)
CREAT SERPL-MCNC: 1.02 MG/DL (ref 0.6–1.3)
GFR SERPL CREATININE-BSD FRML MDRD: 56 ML/MIN/1.73SQ M
GLUCOSE P FAST SERPL-MCNC: 99 MG/DL (ref 65–99)
POTASSIUM SERPL-SCNC: 4.1 MMOL/L (ref 3.5–5.3)
SODIUM SERPL-SCNC: 134 MMOL/L (ref 136–145)

## 2019-04-05 PROCEDURE — 80048 BASIC METABOLIC PNL TOTAL CA: CPT

## 2019-04-05 PROCEDURE — 82306 VITAMIN D 25 HYDROXY: CPT

## 2019-04-05 PROCEDURE — 36415 COLL VENOUS BLD VENIPUNCTURE: CPT

## 2019-04-18 DIAGNOSIS — G25.81 RLS (RESTLESS LEGS SYNDROME): ICD-10-CM

## 2019-04-18 DIAGNOSIS — F41.9 ANXIETY: ICD-10-CM

## 2019-04-18 RX ORDER — CLONAZEPAM 2 MG/1
2 TABLET ORAL
Qty: 30 TABLET | Refills: 0 | Status: SHIPPED | OUTPATIENT
Start: 2019-04-18 | End: 2019-05-20 | Stop reason: SDUPTHER

## 2019-04-24 ENCOUNTER — OFFICE VISIT (OUTPATIENT)
Dept: FAMILY MEDICINE CLINIC | Facility: CLINIC | Age: 69
End: 2019-04-24
Payer: MEDICARE

## 2019-04-24 VITALS
HEART RATE: 94 BPM | HEIGHT: 65 IN | OXYGEN SATURATION: 97 % | DIASTOLIC BLOOD PRESSURE: 76 MMHG | SYSTOLIC BLOOD PRESSURE: 132 MMHG | TEMPERATURE: 98 F | WEIGHT: 183.4 LBS | BODY MASS INDEX: 30.56 KG/M2

## 2019-04-24 DIAGNOSIS — M54.6 ACUTE BILATERAL THORACIC BACK PAIN: ICD-10-CM

## 2019-04-24 DIAGNOSIS — M62.830 BACK SPASM: Primary | ICD-10-CM

## 2019-04-24 DIAGNOSIS — M62.830 BACK SPASM: ICD-10-CM

## 2019-04-24 PROCEDURE — 99213 OFFICE O/P EST LOW 20 MIN: CPT | Performed by: NURSE PRACTITIONER

## 2019-04-24 RX ORDER — OXYCODONE HYDROCHLORIDE AND ACETAMINOPHEN 5; 325 MG/1; MG/1
1 TABLET ORAL EVERY 4 HOURS PRN
Qty: 84 TABLET | Refills: 0 | Status: SHIPPED | OUTPATIENT
Start: 2019-04-24 | End: 2019-06-03 | Stop reason: ALTCHOICE

## 2019-04-24 RX ORDER — OXYCODONE HYDROCHLORIDE AND ACETAMINOPHEN 5; 325 MG/1; MG/1
1 TABLET ORAL EVERY 4 HOURS PRN
Qty: 84 TABLET | Refills: 0 | Status: SHIPPED | OUTPATIENT
Start: 2019-04-24 | End: 2019-04-24 | Stop reason: SDUPTHER

## 2019-04-24 RX ORDER — PREDNISONE 20 MG/1
TABLET ORAL
Qty: 16 TABLET | Refills: 0 | Status: SHIPPED | OUTPATIENT
Start: 2019-04-24 | End: 2019-05-15 | Stop reason: SDUPTHER

## 2019-04-24 RX ORDER — PREDNISONE 20 MG/1
TABLET ORAL
Qty: 16 TABLET | Refills: 0 | Status: SHIPPED | OUTPATIENT
Start: 2019-04-24 | End: 2019-04-24 | Stop reason: SDUPTHER

## 2019-04-26 ENCOUNTER — TELEPHONE (OUTPATIENT)
Dept: INTERNAL MEDICINE CLINIC | Facility: CLINIC | Age: 69
End: 2019-04-26

## 2019-05-08 ENCOUNTER — OFFICE VISIT (OUTPATIENT)
Dept: FAMILY MEDICINE CLINIC | Facility: CLINIC | Age: 69
End: 2019-05-08
Payer: MEDICARE

## 2019-05-08 VITALS
HEIGHT: 65 IN | HEART RATE: 105 BPM | DIASTOLIC BLOOD PRESSURE: 66 MMHG | OXYGEN SATURATION: 93 % | TEMPERATURE: 98.3 F | BODY MASS INDEX: 29.49 KG/M2 | SYSTOLIC BLOOD PRESSURE: 118 MMHG | WEIGHT: 177 LBS

## 2019-05-08 DIAGNOSIS — J40 BRONCHITIS: Primary | ICD-10-CM

## 2019-05-08 DIAGNOSIS — R05.8 PRODUCTIVE COUGH: ICD-10-CM

## 2019-05-08 DIAGNOSIS — J44.1 CHRONIC OBSTRUCTIVE PULMONARY DISEASE WITH ACUTE EXACERBATION (HCC): ICD-10-CM

## 2019-05-08 PROCEDURE — 96372 THER/PROPH/DIAG INJ SC/IM: CPT

## 2019-05-08 PROCEDURE — 99213 OFFICE O/P EST LOW 20 MIN: CPT | Performed by: NURSE PRACTITIONER

## 2019-05-08 RX ORDER — DOXYCYCLINE HYCLATE 100 MG/1
100 CAPSULE ORAL EVERY 12 HOURS SCHEDULED
Qty: 14 CAPSULE | Refills: 0 | Status: SHIPPED | OUTPATIENT
Start: 2019-05-08 | End: 2019-05-15

## 2019-05-08 RX ORDER — TRIAMCINOLONE ACETONIDE 40 MG/ML
40 INJECTION, SUSPENSION INTRA-ARTICULAR; INTRAMUSCULAR ONCE
Status: COMPLETED | OUTPATIENT
Start: 2019-05-08 | End: 2019-05-08

## 2019-05-08 RX ORDER — PROMETHAZINE HYDROCHLORIDE AND CODEINE PHOSPHATE 6.25; 1 MG/5ML; MG/5ML
5 SYRUP ORAL EVERY 4 HOURS PRN
Qty: 120 ML | Refills: 0 | Status: SHIPPED | OUTPATIENT
Start: 2019-05-08 | End: 2019-06-03 | Stop reason: ALTCHOICE

## 2019-05-08 RX ADMIN — TRIAMCINOLONE ACETONIDE 40 MG: 40 INJECTION, SUSPENSION INTRA-ARTICULAR; INTRAMUSCULAR at 16:14

## 2019-05-10 DIAGNOSIS — B37.2 CANDIDAL INTERTRIGO: ICD-10-CM

## 2019-05-10 RX ORDER — NYSTATIN 100000 U/G
CREAM TOPICAL 2 TIMES DAILY
Qty: 30 G | Refills: 5 | Status: SHIPPED | OUTPATIENT
Start: 2019-05-10 | End: 2019-06-03 | Stop reason: ALTCHOICE

## 2019-05-14 ENCOUNTER — TELEPHONE (OUTPATIENT)
Dept: FAMILY MEDICINE CLINIC | Facility: CLINIC | Age: 69
End: 2019-05-14

## 2019-05-15 ENCOUNTER — APPOINTMENT (OUTPATIENT)
Dept: RADIOLOGY | Facility: CLINIC | Age: 69
End: 2019-05-15
Payer: MEDICARE

## 2019-05-15 DIAGNOSIS — R05.8 PRODUCTIVE COUGH: ICD-10-CM

## 2019-05-15 DIAGNOSIS — J40 BRONCHITIS: ICD-10-CM

## 2019-05-15 DIAGNOSIS — M54.6 ACUTE BILATERAL THORACIC BACK PAIN: ICD-10-CM

## 2019-05-15 DIAGNOSIS — R05.8 PRODUCTIVE COUGH: Primary | ICD-10-CM

## 2019-05-15 DIAGNOSIS — M62.830 BACK SPASM: ICD-10-CM

## 2019-05-15 PROCEDURE — 71046 X-RAY EXAM CHEST 2 VIEWS: CPT

## 2019-05-15 RX ORDER — PREDNISONE 20 MG/1
TABLET ORAL
Qty: 16 TABLET | Refills: 0 | Status: SHIPPED | OUTPATIENT
Start: 2019-05-15 | End: 2019-06-03 | Stop reason: ALTCHOICE

## 2019-05-20 DIAGNOSIS — G25.81 RLS (RESTLESS LEGS SYNDROME): ICD-10-CM

## 2019-05-20 DIAGNOSIS — F41.9 ANXIETY: ICD-10-CM

## 2019-05-20 RX ORDER — CLONAZEPAM 2 MG/1
2 TABLET ORAL
Qty: 30 TABLET | Refills: 0 | Status: SHIPPED | OUTPATIENT
Start: 2019-05-20 | End: 2019-06-18 | Stop reason: SDUPTHER

## 2019-05-28 ENCOUNTER — HOSPITAL ENCOUNTER (OUTPATIENT)
Dept: CT IMAGING | Facility: HOSPITAL | Age: 69
Discharge: HOME | End: 2019-05-28
Payer: MEDICARE

## 2019-05-28 DIAGNOSIS — J40 BRONCHITIS: ICD-10-CM

## 2019-05-28 DIAGNOSIS — M54.50 CHRONIC BILATERAL LOW BACK PAIN WITHOUT SCIATICA: ICD-10-CM

## 2019-05-28 DIAGNOSIS — J44.1 CHRONIC OBSTRUCTIVE PULMONARY DISEASE WITH ACUTE EXACERBATION (HCC): ICD-10-CM

## 2019-05-28 DIAGNOSIS — R05.8 PRODUCTIVE COUGH: ICD-10-CM

## 2019-05-28 DIAGNOSIS — G89.29 CHRONIC BILATERAL LOW BACK PAIN WITHOUT SCIATICA: ICD-10-CM

## 2019-05-28 PROCEDURE — 71250 CT THORAX DX C-: CPT

## 2019-05-28 RX ORDER — METHOCARBAMOL 750 MG/1
750 TABLET, FILM COATED ORAL EVERY 8 HOURS SCHEDULED
Qty: 90 TABLET | Refills: 2 | Status: SHIPPED | OUTPATIENT
Start: 2019-05-28 | End: 2019-09-16 | Stop reason: SDUPTHER

## 2019-06-03 ENCOUNTER — TELEPHONE (OUTPATIENT)
Dept: FAMILY MEDICINE CLINIC | Facility: CLINIC | Age: 69
End: 2019-06-03

## 2019-06-03 ENCOUNTER — OFFICE VISIT (OUTPATIENT)
Dept: FAMILY MEDICINE CLINIC | Facility: CLINIC | Age: 69
End: 2019-06-03
Payer: MEDICARE

## 2019-06-03 VITALS
DIASTOLIC BLOOD PRESSURE: 70 MMHG | HEART RATE: 88 BPM | WEIGHT: 179 LBS | HEIGHT: 65 IN | SYSTOLIC BLOOD PRESSURE: 108 MMHG | RESPIRATION RATE: 20 BRPM | BODY MASS INDEX: 29.82 KG/M2 | TEMPERATURE: 98 F

## 2019-06-03 DIAGNOSIS — J18.9 PNEUMONIA DUE TO INFECTIOUS ORGANISM, UNSPECIFIED LATERALITY, UNSPECIFIED PART OF LUNG: ICD-10-CM

## 2019-06-03 DIAGNOSIS — Z00.00 MEDICARE ANNUAL WELLNESS VISIT, INITIAL: Primary | ICD-10-CM

## 2019-06-03 PROBLEM — M25.561 ACUTE PAIN OF RIGHT KNEE: Status: RESOLVED | Noted: 2019-03-11 | Resolved: 2019-06-03

## 2019-06-03 PROCEDURE — G0438 PPPS, INITIAL VISIT: HCPCS | Performed by: PHYSICIAN ASSISTANT

## 2019-06-03 RX ORDER — LEVOFLOXACIN 500 MG/1
500 TABLET, FILM COATED ORAL EVERY 24 HOURS
Qty: 10 TABLET | Refills: 0 | Status: SHIPPED | OUTPATIENT
Start: 2019-06-03 | End: 2019-06-13

## 2019-06-06 DIAGNOSIS — L30.9 DERMATITIS: ICD-10-CM

## 2019-06-06 DIAGNOSIS — L08.9 SKIN INFECTION: Primary | ICD-10-CM

## 2019-06-06 RX ORDER — TRIAMCINOLONE ACETONIDE 1 MG/G
CREAM TOPICAL 2 TIMES DAILY
Qty: 80 G | Refills: 5 | Status: SHIPPED | OUTPATIENT
Start: 2019-06-06 | End: 2019-08-16

## 2019-06-18 DIAGNOSIS — G25.81 RLS (RESTLESS LEGS SYNDROME): ICD-10-CM

## 2019-06-18 DIAGNOSIS — F41.9 ANXIETY: ICD-10-CM

## 2019-06-18 RX ORDER — CLONAZEPAM 2 MG/1
2 TABLET ORAL
Qty: 30 TABLET | Refills: 0 | Status: SHIPPED | OUTPATIENT
Start: 2019-06-18 | End: 2019-07-05 | Stop reason: SDUPTHER

## 2019-06-24 ENCOUNTER — LAB (OUTPATIENT)
Dept: LAB | Facility: CLINIC | Age: 69
End: 2019-06-24
Payer: MEDICARE

## 2019-06-24 ENCOUNTER — OFFICE VISIT (OUTPATIENT)
Dept: FAMILY MEDICINE CLINIC | Facility: CLINIC | Age: 69
End: 2019-06-24
Payer: MEDICARE

## 2019-06-24 ENCOUNTER — TRANSCRIBE ORDERS (OUTPATIENT)
Dept: LAB | Facility: CLINIC | Age: 69
End: 2019-06-24

## 2019-06-24 VITALS
BODY MASS INDEX: 29.69 KG/M2 | TEMPERATURE: 98.6 F | HEART RATE: 94 BPM | HEIGHT: 65 IN | DIASTOLIC BLOOD PRESSURE: 62 MMHG | WEIGHT: 178.2 LBS | SYSTOLIC BLOOD PRESSURE: 100 MMHG

## 2019-06-24 DIAGNOSIS — Z11.59 SCREENING EXAMINATION FOR POLIOMYELITIS: Primary | ICD-10-CM

## 2019-06-24 DIAGNOSIS — E11.8 TYPE 2 DIABETES MELLITUS WITH COMPLICATION, WITHOUT LONG-TERM CURRENT USE OF INSULIN (HCC): Primary | ICD-10-CM

## 2019-06-24 DIAGNOSIS — Z11.59 SCREENING EXAMINATION FOR POLIOMYELITIS: ICD-10-CM

## 2019-06-24 DIAGNOSIS — E11.9 TYPE 2 DIABETES MELLITUS WITHOUT COMPLICATION, UNSPECIFIED WHETHER LONG TERM INSULIN USE (HCC): ICD-10-CM

## 2019-06-24 DIAGNOSIS — Z13.29 SCREENING FOR THYROID DISORDER: ICD-10-CM

## 2019-06-24 DIAGNOSIS — Z13.0 SCREENING FOR DEFICIENCY ANEMIA: ICD-10-CM

## 2019-06-24 DIAGNOSIS — Z13.220 SCREENING FOR HYPERLIPIDEMIA: ICD-10-CM

## 2019-06-24 DIAGNOSIS — Z91.81 AT RISK FOR FALLS: ICD-10-CM

## 2019-06-24 LAB
ALBUMIN SERPL BCP-MCNC: 3.9 G/DL (ref 3.5–5)
ALP SERPL-CCNC: 53 U/L (ref 46–116)
ALT SERPL W P-5'-P-CCNC: 12 U/L (ref 12–78)
ANION GAP SERPL CALCULATED.3IONS-SCNC: 6 MMOL/L (ref 4–13)
AST SERPL W P-5'-P-CCNC: 18 U/L (ref 5–45)
BILIRUB SERPL-MCNC: 0.62 MG/DL (ref 0.2–1)
BUN SERPL-MCNC: 20 MG/DL (ref 5–25)
CALCIUM SERPL-MCNC: 9.5 MG/DL (ref 8.3–10.1)
CHLORIDE SERPL-SCNC: 102 MMOL/L (ref 100–108)
CHOLEST SERPL-MCNC: 133 MG/DL (ref 50–200)
CO2 SERPL-SCNC: 30 MMOL/L (ref 21–32)
CREAT SERPL-MCNC: 0.9 MG/DL (ref 0.6–1.3)
EST. AVERAGE GLUCOSE BLD GHB EST-MCNC: 140 MG/DL
GFR SERPL CREATININE-BSD FRML MDRD: 65 ML/MIN/1.73SQ M
GLUCOSE P FAST SERPL-MCNC: 96 MG/DL (ref 65–99)
GLUCOSE SERPL-MCNC: 90 MG/DL (ref 65–140)
HBA1C MFR BLD: 6.5 % (ref 4.2–6.3)
HCV AB SER QL: NORMAL
HDLC SERPL-MCNC: 60 MG/DL (ref 40–60)
LDLC SERPL CALC-MCNC: 58 MG/DL (ref 0–100)
NONHDLC SERPL-MCNC: 73 MG/DL
POTASSIUM SERPL-SCNC: 4.4 MMOL/L (ref 3.5–5.3)
PROT SERPL-MCNC: 6.9 G/DL (ref 6.4–8.2)
SL AMB POCT HEMOGLOBIN AIC: 6.2 (ref ?–6.5)
SODIUM SERPL-SCNC: 138 MMOL/L (ref 136–145)
TRIGL SERPL-MCNC: 75 MG/DL
TSH SERPL DL<=0.05 MIU/L-ACNC: 3.45 UIU/ML (ref 0.36–3.74)

## 2019-06-24 PROCEDURE — 82951 GLUCOSE TOLERANCE TEST (GTT): CPT

## 2019-06-24 PROCEDURE — 83036 HEMOGLOBIN GLYCOSYLATED A1C: CPT | Performed by: NURSE PRACTITIONER

## 2019-06-24 PROCEDURE — 36415 COLL VENOUS BLD VENIPUNCTURE: CPT

## 2019-06-24 PROCEDURE — 86803 HEPATITIS C AB TEST: CPT

## 2019-06-24 PROCEDURE — 80061 LIPID PANEL: CPT | Performed by: NURSE PRACTITIONER

## 2019-06-24 PROCEDURE — 84443 ASSAY THYROID STIM HORMONE: CPT | Performed by: NURSE PRACTITIONER

## 2019-06-24 PROCEDURE — 80053 COMPREHEN METABOLIC PANEL: CPT | Performed by: NURSE PRACTITIONER

## 2019-06-24 PROCEDURE — 83036 HEMOGLOBIN GLYCOSYLATED A1C: CPT

## 2019-06-24 PROCEDURE — 99213 OFFICE O/P EST LOW 20 MIN: CPT | Performed by: NURSE PRACTITIONER

## 2019-06-27 DIAGNOSIS — E78.5 HYPERLIPIDEMIA, UNSPECIFIED HYPERLIPIDEMIA TYPE: ICD-10-CM

## 2019-06-28 RX ORDER — ATORVASTATIN CALCIUM 20 MG/1
20 TABLET, FILM COATED ORAL DAILY
Qty: 90 TABLET | Refills: 1 | Status: SHIPPED | OUTPATIENT
Start: 2019-06-28 | End: 2020-04-08 | Stop reason: SDUPTHER

## 2019-07-03 DIAGNOSIS — R13.19 ESOPHAGEAL DYSPHAGIA: ICD-10-CM

## 2019-07-03 RX ORDER — PANTOPRAZOLE SODIUM 40 MG/1
40 TABLET, DELAYED RELEASE ORAL DAILY
Qty: 90 TABLET | Refills: 1 | Status: SHIPPED | OUTPATIENT
Start: 2019-07-03 | End: 2020-04-08 | Stop reason: SDUPTHER

## 2019-07-05 DIAGNOSIS — G25.81 RLS (RESTLESS LEGS SYNDROME): ICD-10-CM

## 2019-07-05 DIAGNOSIS — F41.9 ANXIETY: ICD-10-CM

## 2019-07-08 ENCOUNTER — TELEPHONE (OUTPATIENT)
Dept: INTERNAL MEDICINE CLINIC | Facility: CLINIC | Age: 69
End: 2019-07-08

## 2019-07-08 DIAGNOSIS — I10 ESSENTIAL HYPERTENSION: ICD-10-CM

## 2019-07-08 RX ORDER — HYDROXYZINE PAMOATE 50 MG/1
50 CAPSULE ORAL 3 TIMES DAILY PRN
Qty: 90 CAPSULE | Refills: 2 | Status: SHIPPED | OUTPATIENT
Start: 2019-07-08 | End: 2019-10-24 | Stop reason: SDUPTHER

## 2019-07-08 RX ORDER — CLONAZEPAM 2 MG/1
2 TABLET ORAL
Qty: 30 TABLET | Refills: 0 | Status: SHIPPED | OUTPATIENT
Start: 2019-07-08 | End: 2019-07-25 | Stop reason: SDUPTHER

## 2019-07-08 RX ORDER — LISINOPRIL 2.5 MG/1
2.5 TABLET ORAL DAILY
Qty: 90 TABLET | Refills: 1 | Status: SHIPPED | OUTPATIENT
Start: 2019-07-08 | End: 2019-07-31 | Stop reason: SDUPTHER

## 2019-07-08 NOTE — TELEPHONE ENCOUNTER
Pt called  Her medication Hydroxyzine is on back order  Pt going on vacation and won't have enough  Is there anything else she can do?   [phone]

## 2019-07-11 DIAGNOSIS — F41.9 ANXIETY: Primary | ICD-10-CM

## 2019-07-11 RX ORDER — BUSPIRONE HYDROCHLORIDE 5 MG/1
10 TABLET ORAL 3 TIMES DAILY
Qty: 90 TABLET | Refills: 5 | Status: SHIPPED | OUTPATIENT
Start: 2019-07-11 | End: 2019-07-31 | Stop reason: ALTCHOICE

## 2019-07-16 NOTE — TELEPHONE ENCOUNTER
She may just have to go without then, there are not many alternatives to the hydroxyzine that she is not already taking

## 2019-07-25 DIAGNOSIS — G25.81 RLS (RESTLESS LEGS SYNDROME): ICD-10-CM

## 2019-07-25 DIAGNOSIS — F41.9 ANXIETY: ICD-10-CM

## 2019-07-25 RX ORDER — CLONAZEPAM 2 MG/1
2 TABLET ORAL
Qty: 30 TABLET | Refills: 0 | Status: SHIPPED | OUTPATIENT
Start: 2019-07-25 | End: 2019-08-21 | Stop reason: SDUPTHER

## 2019-07-31 ENCOUNTER — OFFICE VISIT (OUTPATIENT)
Dept: INTERNAL MEDICINE CLINIC | Facility: CLINIC | Age: 69
End: 2019-07-31
Payer: MEDICARE

## 2019-07-31 VITALS
WEIGHT: 178 LBS | HEART RATE: 93 BPM | DIASTOLIC BLOOD PRESSURE: 62 MMHG | RESPIRATION RATE: 18 BRPM | HEIGHT: 65 IN | SYSTOLIC BLOOD PRESSURE: 118 MMHG | TEMPERATURE: 98 F | OXYGEN SATURATION: 97 % | BODY MASS INDEX: 29.66 KG/M2

## 2019-07-31 DIAGNOSIS — I10 ESSENTIAL HYPERTENSION: ICD-10-CM

## 2019-07-31 DIAGNOSIS — Z12.39 SCREENING FOR BREAST CANCER: ICD-10-CM

## 2019-07-31 DIAGNOSIS — L28.2 PRURIGO: Primary | ICD-10-CM

## 2019-07-31 PROCEDURE — 99213 OFFICE O/P EST LOW 20 MIN: CPT | Performed by: PHYSICIAN ASSISTANT

## 2019-07-31 RX ORDER — LISINOPRIL 2.5 MG/1
2.5 TABLET ORAL DAILY
Qty: 90 TABLET | Refills: 1 | Status: SHIPPED | OUTPATIENT
Start: 2019-07-31 | End: 2020-02-13

## 2019-07-31 RX ORDER — MINOCYCLINE HYDROCHLORIDE 50 MG/1
50 CAPSULE ORAL 2 TIMES DAILY
Qty: 60 CAPSULE | Refills: 0 | Status: SHIPPED | OUTPATIENT
Start: 2019-07-31 | End: 2019-08-16

## 2019-07-31 RX ORDER — NYSTATIN 100000 U/G
1 CREAM TOPICAL 2 TIMES DAILY
Refills: 5 | COMMUNITY
Start: 2019-06-25 | End: 2020-05-13 | Stop reason: SDUPTHER

## 2019-07-31 NOTE — ASSESSMENT & PLAN NOTE
Pt dx with prurigo by dermatology that has been difficult to treat  Symptoms recently worsened  Will give course of minocin  May use neosporin + pain on her open wounds   Pt has derm second opinion scheduled for early august

## 2019-07-31 NOTE — PROGRESS NOTES
Assessment/Plan:  Problem List Items Addressed This Visit        Cardiovascular and Mediastinum    HTN (hypertension)    Relevant Medications    lisinopril (ZESTRIL) 2 5 mg tablet       Musculoskeletal and Integument    Prurigo - Primary     Pt dx with prurigo by dermatology that has been difficult to treat  Symptoms recently worsened  Will give course of minocin  May use neosporin + pain on her open wounds  Pt has derm second opinion scheduled for early august           Relevant Medications    nystatin (MYCOSTATIN) cream    minocycline (MINOCIN) 50 mg capsule      Other Visit Diagnoses     Screening for breast cancer        Relevant Orders    Mammo screening bilateral w 3d & cad           Diagnoses and all orders for this visit:    Prurigo  -     minocycline (MINOCIN) 50 mg capsule; Take 1 capsule (50 mg total) by mouth 2 (two) times a day for 30 days    Screening for breast cancer  -     Mammo screening bilateral w 3d & cad; Future    Essential hypertension  -     lisinopril (ZESTRIL) 2 5 mg tablet; Take 1 tablet (2 5 mg total) by mouth daily    Other orders  -     nystatin (MYCOSTATIN) cream; Apply 1 application topically 2 (two) times a day To affected area        Prurigo  Pt dx with prurigo by dermatology that has been difficult to treat  Symptoms recently worsened  Will give course of minocin  May use neosporin + pain on her open wounds  Pt has derm second opinion scheduled for early august        Subjective:      Patient ID: Augustine Hodge is a 71 y o  female  Rash   This is a recurrent problem  The current episode started in the past 7 days  The problem is unchanged  The affected locations include the face, left ear and right ear  The rash is characterized by blistering, redness and pain  She was exposed to nothing  Pertinent negatives include no congestion, cough, diarrhea, eye pain, facial edema, fever, joint pain, rhinorrhea, shortness of breath, sore throat or vomiting   Past treatments include antibiotic cream  The treatment provided no relief  (Prurigo dx made by previous derm)       The following portions of the patient's history were reviewed and updated as appropriate:   She has a past medical history of Anxiety, Cellulitis, Chronic obstructive pulmonary disease ([facility] ) (3/11/2019), Depression, Diabetes mellitus ([facility] ), Dysuria, Esophageal reflux, Fatigue, Fracture, Heart murmur, Hypertension, Pneumonia, Restless legs syndrome (RLS), and Vitamin D deficiency  ,  does not have any pertinent problems on file  ,   has a past surgical history that includes Back surgery; Cholecystectomy (1975); Foot surgery (Right, 2014); Hip surgery (2013); Hysterectomy; Ovarian cyst surgery (1971); Shoulder surgery (Right, 12/14/2015); Other surgical history (2011); Tonsillectomy; pr colonoscopy flx dx w/collj spec when pfrmd (N/A, 7/10/2018); Appendectomy; pr esophagogastroduodenoscopy transoral diagnostic (N/A, 1/28/2019); and Skin biopsy  ,  family history includes Calcium disorder in her other and other; Cancer in her father; Depression in her family; Diabetes type II in her father and other; Heart disease in her father; Hypertension in her mother and other; Mental illness in her mother and other; Migraines in her other; Other in her other; Pulmonary embolism in her other; Stroke in her mother; Substance Abuse in her brother and family; Tuberculosis in her other  ,   reports that she has been smoking cigarettes  She has never used smokeless tobacco  She reports that she drinks alcohol  She reports that she does not use drugs  ,  is allergic to midazolam; gabapentin; hydrocodone-acetaminophen; penicillin v; pregabalin; propofol; and sulfa antibiotics     Current Outpatient Medications   Medication Sig Dispense Refill    albuterol (2 5 mg/3 mL) 0 083 % nebulizer solution Take 1 vial (2 5 mg total) by nebulization every 4 (four) hours as needed for shortness of breath Disp 1 box 25 vial 5    albuterol (PROAIR HFA) 90 mcg/act inhaler Inhale 2 puffs      amitriptyline (ELAVIL) 25 mg tablet Take 1 tablet (25 mg total) by mouth daily at bedtime as needed for sleep 90 tablet 1    aspirin (ECOTRIN LOW STRENGTH) 81 mg EC tablet Take 81 mg by mouth daily      atorvastatin (LIPITOR) 20 mg tablet Take 1 tablet (20 mg total) by mouth daily 90 tablet 1    JOELLEN MICROLET LANCETS lancets Use as instructed BID E11 65 100 each 5    Calcium 500 MG tablet Take 2 tablets by mouth every other day        carbidopa-levodopa (SINEMET)  mg per tablet Take 1 tablet by mouth 3 (three) times a day 270 tablet 1    cholecalciferol (VITAMIN D3) 1,000 units tablet Take 4 tablets by mouth every other day       clonazePAM (KlonoPIN) 2 mg tablet Take 1 tablet (2 mg total) by mouth daily at bedtime 30 tablet 0    denosumab (PROLIA) 60 mg/mL Inject under the skin every 6 (six) months      escitalopram (LEXAPRO) 10 mg tablet Take 1 5 tablets (15 mg total) by mouth daily 45 tablet 3    glucose blood ([employer] CONTOUR TEST) test strip Use as instructed BID  E11 65 100 each 5    Glucose Blood (ONETOUCH ULTRA BLUE VI) by In Vitro route 3 (three) times a day      hydrochlorothiazide (HYDRODIURIL) 50 mg tablet Take 1 tablet (50 mg total) by mouth daily (Patient taking differently: Take 50 mg by mouth as needed ) 90 tablet 3    hydrOXYzine pamoate (VISTARIL) 50 mg capsule Take 1 capsule (50 mg total) by mouth 3 (three) times a day as needed (rls) 90 capsule 2    lisinopril (ZESTRIL) 2 5 mg tablet Take 1 tablet (2 5 mg total) by mouth daily 90 tablet 1    metFORMIN (GLUCOPHAGE) 1000 MG tablet Take 1 tablet (1,000 mg total) by mouth 2 (two) times a day with meals 180 tablet 1    methocarbamol (ROBAXIN) 750 mg tablet Take 1 tablet (750 mg total) by mouth every 8 (eight) hours 90 tablet 2    nystatin (MYCOSTATIN) cream Apply 1 application topically 2 (two) times a day To affected area  5    pantoprazole (PROTONIX) 40 mg tablet Take 1 tablet (40 mg total) by mouth daily 90 tablet 1    potassium chloride (MICRO-K) 10 MEQ CR capsule Take 10 mEq by mouth 2 (two) times a day        pramipexole (MIRAPEX) 1 5 MG tablet Take 1 tablet (1 5 mg total) by mouth 3 (three) times a day 270 tablet 1    triamcinolone (KENALOG) 0 1 % cream Apply topically 2 (two) times a day 80 g 5    VITAMIN B COMPLEX-C PO Take 1 tablet by mouth daily      minocycline (MINOCIN) 50 mg capsule Take 1 capsule (50 mg total) by mouth 2 (two) times a day for 30 days 60 capsule 0     No current facility-administered medications for this visit  Review of Systems   Constitutional: Negative for chills and fever  HENT: Negative for congestion, ear pain, hearing loss, postnasal drip, rhinorrhea, sinus pressure, sinus pain, sore throat and trouble swallowing  Eyes: Negative for pain and visual disturbance  Respiratory: Negative for cough, chest tightness, shortness of breath and wheezing  Cardiovascular: Negative  Negative for chest pain, palpitations and leg swelling  Gastrointestinal: Negative for abdominal pain, blood in stool, constipation, diarrhea, nausea and vomiting  Endocrine: Negative for cold intolerance, heat intolerance, polydipsia, polyphagia and polyuria  Genitourinary: Negative for difficulty urinating, dysuria, flank pain and urgency  Musculoskeletal: Negative for arthralgias, back pain, gait problem, joint pain and myalgias  Skin: Positive for rash  Allergic/Immunologic: Negative  Neurological: Negative for dizziness, weakness, light-headedness and headaches  Hematological: Negative  Psychiatric/Behavioral: Negative for behavioral problems, dysphoric mood and sleep disturbance  The patient is not nervous/anxious            Objective:  Vitals:    07/31/19 1006   BP: 118/62   BP Location: Left arm   Patient Position: Sitting   Cuff Size: Adult   Pulse: 93   Resp: 18   Temp: 98 °F (36 7 °C)   TempSrc: Tympanic   SpO2: 97%   Weight: 80 7 kg (178 lb)   Height: 5' 5" (1 651 m)     Body mass index is 29 62 kg/m²  Physical Exam   Constitutional: She is oriented to person, place, and time  She appears well-developed and well-nourished  No distress  HENT:   Head: Normocephalic and atraumatic  Right Ear: External ear normal    Left Ear: External ear normal    Nose: Nose normal    Mouth/Throat: Oropharynx is clear and moist  No oropharyngeal exudate  Eyes: Pupils are equal, round, and reactive to light  Conjunctivae and EOM are normal  Right eye exhibits no discharge  Left eye exhibits no discharge  No scleral icterus  Neck: Normal range of motion  Neck supple  No thyromegaly present  Cardiovascular: Normal rate, regular rhythm and normal heart sounds  Exam reveals no gallop and no friction rub  No murmur heard  Pulmonary/Chest: Effort normal and breath sounds normal  No respiratory distress  She has no wheezes  She has no rales  Abdominal: Soft  Bowel sounds are normal  She exhibits no distension  There is no tenderness  Musculoskeletal: Normal range of motion  She exhibits no edema, tenderness or deformity  Neurological: She is alert and oriented to person, place, and time  No cranial nerve deficit  Skin: Skin is warm and dry  Rash noted  She is not diaphoretic  Psychiatric: She has a normal mood and affect   Her behavior is normal  Judgment and thought content normal

## 2019-08-03 ENCOUNTER — OFFICE VISIT (OUTPATIENT)
Dept: URGENT CARE | Facility: CLINIC | Age: 69
End: 2019-08-03
Payer: MEDICARE

## 2019-08-03 ENCOUNTER — TELEPHONE (OUTPATIENT)
Dept: OTHER | Facility: OTHER | Age: 69
End: 2019-08-03

## 2019-08-03 VITALS
TEMPERATURE: 97.8 F | BODY MASS INDEX: 29.66 KG/M2 | SYSTOLIC BLOOD PRESSURE: 135 MMHG | HEART RATE: 105 BPM | RESPIRATION RATE: 16 BRPM | WEIGHT: 178 LBS | HEIGHT: 65 IN | DIASTOLIC BLOOD PRESSURE: 69 MMHG | OXYGEN SATURATION: 96 %

## 2019-08-03 DIAGNOSIS — L03.115 CELLULITIS OF RIGHT LOWER EXTREMITY: Primary | ICD-10-CM
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
## 2019-08-03 DIAGNOSIS — S80.861A INSECT BITE OF RIGHT LOWER LEG, INITIAL ENCOUNTER: ICD-10-CM

## 2019-08-03 PROCEDURE — 99214 OFFICE O/P EST MOD 30 MIN: CPT | Performed by: NURSE PRACTITIONER

## 2019-08-03 PROCEDURE — G0463 HOSPITAL OUTPT CLINIC VISIT: HCPCS | Performed by: NURSE PRACTITIONER

## 2019-08-03 RX ORDER — CEPHALEXIN 500 MG/1
500 CAPSULE ORAL 4 TIMES DAILY
Qty: 40 CAPSULE | Refills: 0 | Status: SHIPPED | OUTPATIENT
Start: 2019-08-03 | End: 2019-08-13

## 2019-08-03 NOTE — PROGRESS NOTES
[facility]        NAME: Galo Frazier is a 71 y o  female  : 1950    MRN: [id number]  DATE: August 3, 2019  TIME: 7:27 PM    Assessment and Plan   Cellulitis of right lower extremity [L03 115]  1  Cellulitis of right lower extremity  cephalexin (KEFLEX) 500 mg capsule   2  Insect bite of right lower leg, initial encounter  cephalexin (KEFLEX) 500 mg capsule         Patient Instructions     Patient Instructions     Stop the minocycline for now until the keflex is completed since the minocycline can decrease the absorption of the keflex  Monitor the redness  Once you are on the keflex for 24 hours, it should not continue to spread  Cellulitis   AMBULATORY CARE:   Cellulitis  is a skin infection caused by bacteria  Cellulitis usually appears on the legs and feet, arms and hands, or face  Common signs and symptoms include the following:   · A red, warm, swollen area on your skin    · Pain when the area is touched    · Bumps or blisters (abscess) that may drain pus    · Bumpy, raised skin that feels like an orange peel  Call 911 if:   · You have sudden trouble breathing or chest pain  Seek care immediately if:   · Your wound gets larger and more painful  · You feel a crackling under your skin when you touch it  · You have purple dots or bumps on your skin, or you see bleeding under your skin  · You have new swelling and pain in your legs  · The red, warm, swollen area gets larger  · You see red streaks coming from the infected area  Contact your healthcare provider if:   · You have a fever  · Your fever or pain does not go away or gets worse  · The area does not get smaller after 2 days of antibiotics  · Your skin is flaking or peeling off  · You have questions or concerns about your condition or care  Treatment for cellulitis  may decrease symptoms, stop the infection from spreading, and cure the infection  Treatment depends on how severe your cellulitis is  Cellulitis may go away on its own  You may  instead need antibiotics to help treat the bacterial infection and medicines for pain  Your healthcare provider may draw a [place] around the edges of your cellulitis  If your cellulitis spreads, your healthcare provider will see it outside of the [place]  Manage your symptoms:   · Elevate the area above the level of your heart  as often as you can  This will help decrease swelling and pain  Prop the area on pillows or blankets to keep it elevated comfortably  · Clean the area daily until the wound scabs over  Gently wash the area with soap and water  Pat dry  Use dressings as directed  · Place cool or warm, wet cloths on the area as directed  Use clean cloths and clean water  Leave it on the area until the cloth is room temperature  Pat the area dry with a clean, dry cloth  The cloths may help decrease pain  Prevent cellulitis:   · Do not scratch bug bites or areas of injury  You increase your risk for cellulitis by scratching these areas  · Do not share personal items, such as towels, clothing, and razors  · Clean exercise equipment  with germ-killing  before and after you use it  · Wash your hands often  Use soap and water  Wash your hands after you use the bathroom, change a child's diapers, or sneeze  Wash your hands before you prepare or eat food  Use lotion to prevent dry, cracked skin  · Wear pressure stockings as directed  You may be told to wear the stockings if you have peripheral edema  The stockings improve blood flow and decrease swelling  · Treat athlete's foot  This can help prevent the spread of a bacterial skin infection  Follow up with your healthcare provider within 3 days, or as directed: Your healthcare provider will check if your cellulitis is getting better  You may need different medicine  Write down your questions so you remember to ask them during your visits    © 2017 [facility] Information is for End User's use only and may not be sold, redistributed or otherwise used for commercial purposes  All illustrations and images included in CareNotes® are the copyrighted property of A D A M , Inc  or Jean Kahn  The above information is an  only  It is not intended as medical advice for individual conditions or treatments  Talk to your doctor, nurse or pharmacist before following any medical regimen to see if it is safe and effective for you  Insect Bite or Sting   AMBULATORY CARE:   Most insect bites and stings  are not dangerous and go away without treatment  Common examples of insects that bite or sting are bees, ticks, mosquitoes, spiders, and ants  Insect bites or stings can lead to diseases such as malaria, West Nile virus, Lyme disease, or [place] Spotted Fever  Common signs and symptoms:   · Mild symptoms  include a red bump, pain, swelling, and itching  · Anaphylaxis symptoms  include throat tightness, trouble breathing, tingling, dizziness, and wheezing  Anaphylaxis is a sudden, life-threatening reaction that needs immediate treatment  Call 911 for signs or symptoms of anaphylaxis,  such as trouble breathing, swelling in your mouth or throat, or wheezing  You may also have itching, a rash, hives, or feel like you are going to faint  Seek care immediately if:   · You are stung on your tongue or in your throat  · A white area forms around the bite  · You are sweating badly or have body pain  · You think you were bitten or stung by a poisonous insect  Contact your healthcare provider if:   · You have a fever  · The area becomes red, warm, tender, and swollen beyond the area of the bite or sting  · You have questions or concerns about your condition or care  Steps to take for signs or symptoms of anaphylaxis:   · Immediately  give 1 shot of epinephrine only into the outer thigh muscle       · Leave the shot in place  as directed  Your healthcare provider may recommend you leave it in place for up to 10 seconds before you remove it  This helps make sure all of the epinephrine is delivered  · Call 911 and go to the emergency department,  even if the shot improved symptoms  Do not drive yourself  Bring the used epinephrine shot with you  Treatment  depends on how severe your symptoms are and if you had anaphylaxis before  You may need any of the following:  · Antihistamines  decrease mild symptoms such as itching and rash  · Epinephrine  is medicine used to treat severe allergic reactions such as anaphylaxis  If an insect bites or stings you:   · Remove the stinger  Scrape the stinger out with your fingernail, edge of a credit card, or a knife blade  Do not squeeze the wound  Gently wash the area with soap and water  · Remove the tick  Ticks must be removed as soon as possible so you do not get diseases passed through tick bites  Ask your healthcare provider for more information on tick bites and how to remove ticks  Care for your bite or sting wound:   · Elevate the affected area  Prop the wound above the level of your heart, if possible  Elevate the area for 10 to 20 minutes each hour or as directed by your healthcare provider  · Apply compresses  Soak a clean washcloth in cold water, wring it out, and put it on the bite or sting  Use the compress for 10 to 20 minutes each hour or as directed by your healthcare provider  After 24 to 48 hours, change to warm compresses  · Apply a baking soda paste  Add water to baking soda to make a thick paste  Put the paste on the area for 5 minutes  Rinse gently to remove the paste  Safety precautions to take if you are at risk for anaphylaxis:   · Keep 2 shots of epinephrine with you at all times  You may need a second shot, because epinephrine only works for about 20 minutes and symptoms may return   Your healthcare provider can show you and family members how to give the shot  Check the expiration date every month and replace it before it expires  · Create an action plan  Your healthcare provider can help you create a written plan that explains the allergy and an emergency plan to treat a reaction  The plan explains when to give a second epinephrine shot if symptoms return or do not improve after the first  Give copies of the action plan and emergency instructions to family members, work and school staff, and  providers  Show them how to give a shot of epinephrine  · Carry medical alert identification  Wear medical alert jewelry or carry a card that says you have an insect allergy  Ask your healthcare provider where to get these items  Prevent an insect bite or sting:   · Do not wear bright-colored or flower-print clothing when you plan to spend time outdoors  Do not use hairspray, perfumes, or aftershave  · Do not leave food out  · Empty any standing water  Wash containers with soap and water every 2 days  · Put screens on all open windows and doors  · Put insect repellent that contains DEET on skin that is showing when you go outside  Put insect repellent at the top of your boots, bottom of pant legs, and sleeve cuffs  Wear long sleeves, pants, and shoes  · Use citronella candles outdoors to help keep mosquitoes away  Put a tick and flea collar on pets  Follow up with your healthcare provider as directed:  Write down your questions so you remember to ask them during your visits  © 2017 [address] Information is for End User's use only and may not be sold, redistributed or otherwise used for commercial purposes  All illustrations and images included in CareNotes® are the copyrighted property of [employer]  or Jean Kahn  The above information is an  only  It is not intended as medical advice for individual conditions or treatments   Talk to your doctor, nurse or pharmacist before following any medical regimen to see if it is safe and effective for you  Follow up with PCP in 3-5 days  Proceed to  ER if symptoms worsen  Chief Complaint     Chief Complaint   Patient presents with    Wound Infection     right leg         History of Present Illness       Patient with cellulitis of right lower extremity that has progressed since bug bite about 1 5 days ago  She states she has had cellulitis before  There is a minh where the bug bite happened, but she states she does not remember being bitten and that they had citronella candles in use  Review of Systems   Review of Systems   Constitutional: Negative for fatigue and fever  Skin: Positive for color change and wound  All other systems reviewed and are negative          Current Medications       Current Outpatient Medications:     albuterol (2 5 mg/3 mL) 0 083 % nebulizer solution, Take 1 vial (2 5 mg total) by nebulization every 4 (four) hours as needed for shortness of breath Disp 1 box, Disp: 25 vial, Rfl: 5    albuterol (PROAIR HFA) 90 mcg/act inhaler, Inhale 2 puffs, Disp: , Rfl:     amitriptyline (ELAVIL) 25 mg tablet, Take 1 tablet (25 mg total) by mouth daily at bedtime as needed for sleep, Disp: 90 tablet, Rfl: 1    aspirin (ECOTRIN LOW STRENGTH) 81 mg EC tablet, Take 81 mg by mouth daily, Disp: , Rfl:     atorvastatin (LIPITOR) 20 mg tablet, Take 1 tablet (20 mg total) by mouth daily, Disp: 90 tablet, Rfl: 1    JOELLEN MICROLET LANCETS lancets, Use as instructed BID E11 65, Disp: 100 each, Rfl: 5    Calcium 500 MG tablet, Take 2 tablets by mouth every other day  , Disp: , Rfl:     carbidopa-levodopa (SINEMET)  mg per tablet, Take 1 tablet by mouth 3 (three) times a day, Disp: 270 tablet, Rfl: 1    cholecalciferol (VITAMIN D3) 1,000 units tablet, Take 4 tablets by mouth every other day , Disp: , Rfl:     clonazePAM (KlonoPIN) 2 mg tablet, Take 1 tablet (2 mg total) by mouth daily at bedtime, Disp: 30 tablet, Rfl: 0   denosumab (PROLIA) 60 mg/mL, Inject under the skin every 6 (six) months, Disp: , Rfl:     escitalopram (LEXAPRO) 10 mg tablet, Take 1 5 tablets (15 mg total) by mouth daily, Disp: 45 tablet, Rfl: 3    glucose blood (JOELLEN CONTOUR TEST) test strip, Use as instructed BID  E11 65, Disp: 100 each, Rfl: 5    Glucose Blood (ONETOUCH ULTRA BLUE VI), by In Vitro route 3 (three) times a day, Disp: , Rfl:     hydrochlorothiazide (HYDRODIURIL) 50 mg tablet, Take 1 tablet (50 mg total) by mouth daily (Patient taking differently: Take 50 mg by mouth as needed ), Disp: 90 tablet, Rfl: 3    hydrOXYzine pamoate (VISTARIL) 50 mg capsule, Take 1 capsule (50 mg total) by mouth 3 (three) times a day as needed (rls), Disp: 90 capsule, Rfl: 2    lisinopril (ZESTRIL) 2 5 mg tablet, Take 1 tablet (2 5 mg total) by mouth daily, Disp: 90 tablet, Rfl: 1    metFORMIN (GLUCOPHAGE) 1000 MG tablet, Take 1 tablet (1,000 mg total) by mouth 2 (two) times a day with meals, Disp: 180 tablet, Rfl: 1    methocarbamol (ROBAXIN) 750 mg tablet, Take 1 tablet (750 mg total) by mouth every 8 (eight) hours, Disp: 90 tablet, Rfl: 2    minocycline (MINOCIN) 50 mg capsule, Take 1 capsule (50 mg total) by mouth 2 (two) times a day for 30 days, Disp: 60 capsule, Rfl: 0    nystatin (MYCOSTATIN) cream, Apply 1 application topically 2 (two) times a day To affected area, Disp: , Rfl: 5    pantoprazole (PROTONIX) 40 mg tablet, Take 1 tablet (40 mg total) by mouth daily, Disp: 90 tablet, Rfl: 1    potassium chloride (MICRO-K) 10 MEQ CR capsule, Take 10 mEq by mouth 2 (two) times a day  , Disp: , Rfl:     pramipexole (MIRAPEX) 1 5 MG tablet, Take 1 tablet (1 5 mg total) by mouth 3 (three) times a day, Disp: 270 tablet, Rfl: 1    triamcinolone (KENALOG) 0 1 % cream, Apply topically 2 (two) times a day, Disp: 80 g, Rfl: 5    VITAMIN B COMPLEX-C PO, Take 1 tablet by mouth daily, Disp: , Rfl:     cephalexin (KEFLEX) 500 mg capsule, Take 1 capsule (500 mg total) by mouth 4 (four) times a day for 10 days, Disp: 40 capsule, Rfl: 0    Current Allergies     Allergies as of 08/03/2019 - Reviewed 08/03/2019   Allergen Reaction Noted    Midazolam Nausea Only and Vomiting 07/17/2017    Gabapentin  11/04/2016    Hydrocodone-acetaminophen  12/14/2015    Penicillin v  12/21/2015    Pregabalin  11/04/2016    Propofol  12/14/2015    Sulfa antibiotics  12/14/2015            The following portions of the patient's history were reviewed and updated as appropriate: allergies, current medications, past family history, past medical history, past social history, past surgical history and problem list      Past Medical History:   Diagnosis Date    Anxiety     Cellulitis     LAST ASSESED 2/12/16; RESOLVED 3/9/16    Chronic obstructive pulmonary disease ([facility] [address] ) 3/11/2019    Depression     Diabetes mellitus ([facility] [address] )     Dysuria     LAST ASSESSED 3/7/16; RESOLVED 3/9/16    Esophageal reflux     RESOLVED 1/15/16    Fatigue     RESOLVED 3/9/16    Fracture     rt 4 th metatarsal     Heart murmur     Hypertension     Pneumonia     Restless legs syndrome (RLS)     Vitamin D deficiency        Past Surgical History:   Procedure Laterality Date    APPENDECTOMY      BACK SURGERY      CHOLECYSTECTOMY  1975    FOOT SURGERY Right 2014    HIP SURGERY  2013    HYSTERECTOMY      OTHER SURGICAL HISTORY  2011    LAPAROSCOPIC SLING OPERATION FOR STRESS INCONTINENCE     OVARIAN CYST SURGERY  1971    CYSTECTOMY    NE COLONOSCOPY FLX DX W/COLLJ SPEC WHEN PFRMD N/A 7/10/2018    Procedure: COLONOSCOPY;  Surgeon: Dennis Abdi MD;  Location: [facility]; Service: Gastroenterology    NE ESOPHAGOGASTRODUODENOSCOPY TRANSORAL DIAGNOSTIC N/A 1/28/2019    Procedure: ESOPHAGOGASTRODUODENOSCOPY (EGD); Surgeon: Corey Lora MD;  Location: [facility];   Service: Gastroenterology    SHOULDER SURGERY Right 12/14/2015    REPAIR OF TORN MUSCLES    SKIN BIOPSY      chin 03/07/2019    TONSILLECTOMY         Family History   Problem Relation Age of Onset    Hypertension Mother     Mental illness Mother     Stroke Mother         CVA    Cancer Father     Diabetes type II Father     Heart disease Father         CARDIAC DISORDER    Hypertension Other     Mental illness Other     Migraines Other     Diabetes type II Other     Calcium disorder Other         CALCIUM KIDNEY STONES    Other Other         HERPES ZOSTER INFECTION    Tuberculosis Other     Pulmonary embolism Other         CHRONIC OBSTRUCTIVE PULMONARY DISEASE    Calcium disorder Other         CALISUM KIDNEY STONES    Depression Family     Substance Abuse Family     Substance Abuse Brother          Medications have been verified  Objective   /69   Pulse 105   Temp 97 8 °F (36 6 °C)   Resp 16   Ht 5' 5" (1 651 m)   Wt 80 7 kg (178 lb)   SpO2 96%   BMI 29 62 kg/m²        Physical Exam     Physical Exam   Constitutional: She is oriented to person, place, and time  She appears well-developed and well-nourished  No distress  HENT:   Head: Normocephalic and atraumatic  Eyes: Pupils are equal, round, and reactive to light  Neck: Normal range of motion  Neck supple  Pulmonary/Chest: Effort normal  No respiratory distress  Abdominal: Soft  She exhibits no distension  Musculoskeletal: Normal range of motion  Neurological: She is alert and oriented to person, place, and time  Skin: Skin is warm and dry  Capillary refill takes less than 2 seconds  She is not diaphoretic  There is erythema  Psychiatric: She has a normal mood and affect  Her behavior is normal  Judgment and thought content normal    Nursing note and vitals reviewed

## 2019-08-03 NOTE — TELEPHONE ENCOUNTER
Wilbur Santos 1950  CONFIDENTIALTY NOTICE: This fax transmission is intended only for the addressee  It contains information that is legally privileged,  confidential or otherwise protected from use or disclosure  If you are not the intended recipient, you are strictly prohibited from reviewing,  disclosing, copying using or disseminating any of this information or taking any action in reliance on or regarding this information  If you have  received this fax in error, please notify us immediately by telephone so that we can arrange for its return to us  Page:   Call Id: [id number]  Health Call  Standard Call Report  Health Call  Patient Name: Wilbur Santos  Gender: Female  : 1950  Age: 71 Y 10 M  Return Phone  Number: [phone] (Home)  Address: [place] [address]  City/State/Zip: [facility]  Practice Name:  [facility] Charged:  Physician:  Elmira Ignacio Name:  Relationship To  Patient: Self  Return Phone Number: [phone] (Home)  Presenting Problem: "I think I was bit by a bug and the area  looks infected now "  Service Type: Triage  Charged Service 1: Renata Hackett [address]  Name and  Number:  Nurse Assessment  Nurse: Jeffrey Narayan RN, Karthik Zhang Date/Time: 8/3/2019 3:31:33 PM  Type of assessment required:  ---General (Adult or Child)  Duration of Current S/S  ---Since Thursday  Location/Radiation  ---Right leg above ankle  Temperature (F) and route:  ---Denies fever  Symptom Specific Meds (Dose/Time):  ---None  Other S/S  ---Think she could have been bit by a bug  Bump is itchy and now looks fluid filled  Redness has now spread  States that it is difficult to walk because of discomfort  Denies  red lines or streaks  Pain Scale on scale of 1-10, 10 being the worst:  ---4/10  Symptom progression:  ---same  Intake and Output  Wilbur Santos 1950  CONFIDENTIALTY NOTICE: This fax transmission is intended only for the addressee   It contains information that is legally privileged,  confidential or otherwise protected from use or disclosure  If you are not the intended recipient, you are strictly prohibited from reviewing,  disclosing, copying using or disseminating any of this information or taking any action in reliance on or regarding this information  If you have  received this fax in error, please notify us immediately by telephone so that we can arrange for its return to us  Page: 2 of 2  Call Id: [id number]  Nurse Assessment  ---WNL / WNL  Last Exam/Treatment:  ---7/31/2019 in the office  Protocols  Protocol Title Nurse Date/Time  Wound Infection Kristie Barnett RN, [facility] 8/3/2019 3:35:38 PM  Question Caller Affirmed  Disp  Time Disposition Final User  8/3/2019 3:42:41 PM See Physician within 4 Hours (or PCP  triage)  Kristie Barnett [facility]  8/3/2019 3:43:37 PM RN Triaged Yes Kristie Barnett RN, [address] Advice Given Per Protocol  SEE PHYSICIAN WITHIN 4 HOURS (or PCP triage): * IF OFFICE WILL BE OPEN: You need to be seen within the next 3 or 4  hours  Call your doctor's office now or as soon as it opens  CALL BACK IF: * You become worse  CARE ADVICE per Wound Infection  (Adult) guideline  Caller Understands: Yes  Caller Disagree/Comply: Comply  PreDisposition: Unsure  Comments  User: Sundar Degroot RN Date/Time: 8/3/2019 3:43:08 PM  Will go to [facility]

## 2019-08-03 NOTE — PATIENT INSTRUCTIONS
Stop the minocycline for now until the keflex is completed since the minocycline can decrease the absorption of the keflex  Monitor the redness  Once you are on the keflex for 24 hours, it should not continue to spread  Cellulitis   AMBULATORY CARE:   Cellulitis  is a skin infection caused by bacteria  Cellulitis usually appears on the legs and feet, arms and hands, or face  Common signs and symptoms include the following:   · A red, warm, swollen area on your skin    · Pain when the area is touched    · Bumps or blisters (abscess) that may drain pus    · Bumpy, raised skin that feels like an orange peel  Call 911 if:   · You have sudden trouble breathing or chest pain  Seek care immediately if:   · Your wound gets larger and more painful  · You feel a crackling under your skin when you touch it  · You have purple dots or bumps on your skin, or you see bleeding under your skin  · You have new swelling and pain in your legs  · The red, warm, swollen area gets larger  · You see red streaks coming from the infected area  Contact your healthcare provider if:   · You have a fever  · Your fever or pain does not go away or gets worse  · The area does not get smaller after 2 days of antibiotics  · Your skin is flaking or peeling off  · You have questions or concerns about your condition or care  Treatment for cellulitis  may decrease symptoms, stop the infection from spreading, and cure the infection  Treatment depends on how severe your cellulitis is  Cellulitis may go away on its own  You may  instead need antibiotics to help treat the bacterial infection and medicines for pain  Your healthcare provider may draw a [place] around the edges of your cellulitis  If your cellulitis spreads, your healthcare provider will see it outside of the [place]  Manage your symptoms:   · Elevate the area above the level of your heart  as often as you can   This will help decrease swelling and pain  Prop the area on pillows or blankets to keep it elevated comfortably  · Clean the area daily until the wound scabs over  Gently wash the area with soap and water  Pat dry  Use dressings as directed  · Place cool or warm, wet cloths on the area as directed  Use clean cloths and clean water  Leave it on the area until the cloth is room temperature  Pat the area dry with a clean, dry cloth  The cloths may help decrease pain  Prevent cellulitis:   · Do not scratch bug bites or areas of injury  You increase your risk for cellulitis by scratching these areas  · Do not share personal items, such as towels, clothing, and razors  · Clean exercise equipment  with germ-killing  before and after you use it  · Wash your hands often  Use soap and water  Wash your hands after you use the bathroom, change a child's diapers, or sneeze  Wash your hands before you prepare or eat food  Use lotion to prevent dry, cracked skin  · Wear pressure stockings as directed  You may be told to wear the stockings if you have peripheral edema  The stockings improve blood flow and decrease swelling  · Treat athlete's foot  This can help prevent the spread of a bacterial skin infection  Follow up with your healthcare provider within 3 days, or as directed: Your healthcare provider will check if your cellulitis is getting better  You may need different medicine  Write down your questions so you remember to ask them during your visits  © 2017 [employer] Information is for End User's use only and may not be sold, redistributed or otherwise used for commercial purposes  All illustrations and images included in CareNotes® are the copyrighted property of A [employer] M [employer]  or Jean Kahn  The above information is an  only  It is not intended as medical advice for individual conditions or treatments   Talk to your doctor, nurse or pharmacist before following any medical regimen to see if it is safe and effective for you  Insect Bite or Sting   AMBULATORY CARE:   Most insect bites and stings  are not dangerous and go away without treatment  Common examples of insects that bite or sting are bees, ticks, mosquitoes, spiders, and ants  Insect bites or stings can lead to diseases such as malaria, West Nile virus, Lyme disease, or [place] Spotted Fever  Common signs and symptoms:   · Mild symptoms  include a red bump, pain, swelling, and itching  · Anaphylaxis symptoms  include throat tightness, trouble breathing, tingling, dizziness, and wheezing  Anaphylaxis is a sudden, life-threatening reaction that needs immediate treatment  Call 911 for signs or symptoms of anaphylaxis,  such as trouble breathing, swelling in your mouth or throat, or wheezing  You may also have itching, a rash, hives, or feel like you are going to faint  Seek care immediately if:   · You are stung on your tongue or in your throat  · A white area forms around the bite  · You are sweating badly or have body pain  · You think you were bitten or stung by a poisonous insect  Contact your healthcare provider if:   · You have a fever  · The area becomes red, warm, tender, and swollen beyond the area of the bite or sting  · You have questions or concerns about your condition or care  Steps to take for signs or symptoms of anaphylaxis:   · Immediately  give 1 shot of epinephrine only into the outer thigh muscle  · Leave the shot in place  as directed  Your healthcare provider may recommend you leave it in place for up to 10 seconds before you remove it  This helps make sure all of the epinephrine is delivered  · Call 911 and go to the emergency department,  even if the shot improved symptoms  Do not drive yourself  Bring the used epinephrine shot with you  Treatment  depends on how severe your symptoms are and if you had anaphylaxis before   You may need any of the following:  · Antihistamines  decrease mild symptoms such as itching and rash  · Epinephrine  is medicine used to treat severe allergic reactions such as anaphylaxis  If an insect bites or stings you:   · Remove the stinger  Scrape the stinger out with your fingernail, edge of a credit card, or a knife blade  Do not squeeze the wound  Gently wash the area with soap and water  · Remove the tick  Ticks must be removed as soon as possible so you do not get diseases passed through tick bites  Ask your healthcare provider for more information on tick bites and how to remove ticks  Care for your bite or sting wound:   · Elevate the affected area  Prop the wound above the level of your heart, if possible  Elevate the area for 10 to 20 minutes each hour or as directed by your healthcare provider  · Apply compresses  Soak a clean washcloth in cold water, wring it out, and put it on the bite or sting  Use the compress for 10 to 20 minutes each hour or as directed by your healthcare provider  After 24 to 48 hours, change to warm compresses  · Apply a baking soda paste  Add water to baking soda to make a thick paste  Put the paste on the area for 5 minutes  Rinse gently to remove the paste  Safety precautions to take if you are at risk for anaphylaxis:   · Keep 2 shots of epinephrine with you at all times  You may need a second shot, because epinephrine only works for about 20 minutes and symptoms may return  Your healthcare provider can show you and family members how to give the shot  Check the expiration date every month and replace it before it expires  · Create an action plan  Your healthcare provider can help you create a written plan that explains the allergy and an emergency plan to treat a reaction   The plan explains when to give a second epinephrine shot if symptoms return or do not improve after the first  Give copies of the action plan and emergency instructions to family members, work and school staff, and  providers  Show them how to give a shot of epinephrine  · Carry medical alert identification  Wear medical alert jewelry or carry a card that says you have an insect allergy  Ask your healthcare provider where to get these items  Prevent an insect bite or sting:   · Do not wear bright-colored or flower-print clothing when you plan to spend time outdoors  Do not use hairspray, perfumes, or aftershave  · Do not leave food out  · Empty any standing water  Wash containers with soap and water every 2 days  · Put screens on all open windows and doors  · Put insect repellent that contains DEET on skin that is showing when you go outside  Put insect repellent at the top of your boots, bottom of pant legs, and sleeve cuffs  Wear long sleeves, pants, and shoes  · Use citronella candles outdoors to help keep mosquitoes away  Put a tick and flea collar on pets  Follow up with your healthcare provider as directed:  Write down your questions so you remember to ask them during your visits  © 2017 [employer] Information is for End User's use only and may not be sold, redistributed or otherwise used for commercial purposes  All illustrations and images included in CareNotes® are the copyrighted property of [employer]  or Jean Kahn  The above information is an  only  It is not intended as medical advice for individual conditions or treatments  Talk to your doctor, nurse or pharmacist before following any medical regimen to see if it is safe and effective for you

## 2019-08-03 NOTE — TELEPHONE ENCOUNTER
Carey Silva 1950  CONFIDENTIALTY NOTICE: This fax transmission is intended only for the addressee  It contains information that is legally privileged,  confidential or otherwise protected from use or disclosure  If you are not the intended recipient, you are strictly prohibited from reviewing,  disclosing, copying using or disseminating any of this information or taking any action in reliance on or regarding this information  If you have  received this fax in error, please notify us immediately by telephone so that we can arrange for its return to us  Page:   Call Id: [id number]  Health Call  Standard Call Report  Health Call  Patient Name: Carey Silva  Gender: Female  : 1950  Age: 71 Y 10 M  Return Phone  Number: [phone] (Home)  Address: [place] [address]  City/State/Zip: [facility]  [facility] Charged:  Physician:  0 [facility] Name:  Relationship To  Patient: Self  Return Phone Number: [phone] (Home)  Presenting Problem: " I was bitten by a bug, now my right  rear leg and ankle is swollen "  Service Type: Messages  Charged Service 1: Messages  Pharmacy Name and  Number:  Nurse Assessment  Protocols  Protocol Title Nurse Date/Time  Disp  Time Disposition Final User  8/3/2019 11:47:55 AM Send to Follow Up Tammy Lacy RN, Kiara Ochoa  8/3/2019 12:55:35 PM Close Yes Tono Aguillon RN, Kiara Ochoa  Comments  User: Naveed Rankin RN Date/Time: 8/3/2019 11:47:12 AM  Called back and no answer  Left VM  Will try again later  User: Naveed Rankin RN Date/Time: 8/3/2019 12:55:31 PM  Tried calling patient back and no answer  No call back  Will close chart now

## 2019-08-12 DIAGNOSIS — F41.9 ANXIETY: ICD-10-CM

## 2019-08-12 RX ORDER — ESCITALOPRAM OXALATE 10 MG/1
15 TABLET ORAL DAILY
Qty: 45 TABLET | Refills: 5 | Status: SHIPPED | OUTPATIENT
Start: 2019-08-12 | End: 2020-04-08 | Stop reason: SDUPTHER

## 2019-08-16 ENCOUNTER — OFFICE VISIT (OUTPATIENT)
Dept: INTERNAL MEDICINE CLINIC | Facility: CLINIC | Age: 69
End: 2019-08-16
Payer: MEDICARE

## 2019-08-16 VITALS
RESPIRATION RATE: 18 BRPM | BODY MASS INDEX: 29.66 KG/M2 | SYSTOLIC BLOOD PRESSURE: 112 MMHG | HEART RATE: 84 BPM | TEMPERATURE: 98.6 F | WEIGHT: 178 LBS | DIASTOLIC BLOOD PRESSURE: 60 MMHG | HEIGHT: 65 IN

## 2019-08-16 DIAGNOSIS — M79.674 PAIN OF TOE OF RIGHT FOOT: Primary | ICD-10-CM

## 2019-08-16 PROCEDURE — 99213 OFFICE O/P EST LOW 20 MIN: CPT | Performed by: PHYSICIAN ASSISTANT

## 2019-08-16 NOTE — ASSESSMENT & PLAN NOTE
Pt with possible fx of toe  She prefers xray, will order  May harini tape toes together   Take anti-inflammatory for discomfort and rest

## 2019-08-16 NOTE — PROGRESS NOTES
Assessment/Plan:  Problem List Items Addressed This Visit        Other    Pain of toe of right foot - Primary     Pt with possible fx of toe  She prefers xray, will order  May harini tape toes together  Take anti-inflammatory for discomfort and rest           Relevant Orders    XR foot 3+ vw right           Diagnoses and all orders for this visit:    Pain of toe of right foot  -     XR foot 3+ vw right; Future        Pain of toe of right foot  Pt with possible fx of toe  She prefers xray, will order  May harini tape toes together  Take anti-inflammatory for discomfort and rest        Subjective:      Patient ID: Umm Sharma is a 71 y o  female  Pt presents complaining of pain, redness, and swelling of her 2nd toe on her right foot after hitting it on a baseboard 3 days ago  The following portions of the patient's history were reviewed and updated as appropriate:   She has a past medical history of Anxiety, Cellulitis, Chronic obstructive pulmonary disease ([facility] [address] ) (3/11/2019), Depression, Diabetes mellitus ([facility] [address] ), Dysuria, Esophageal reflux, Fatigue, Fracture, Heart murmur, Hypertension, Pneumonia, Restless legs syndrome (RLS), and Vitamin D deficiency  ,  does not have any pertinent problems on file  ,   has a past surgical history that includes Back surgery; Cholecystectomy (1975); Foot surgery (Right, 2014); Hip surgery (2013); Hysterectomy; Ovarian cyst surgery (1971); Shoulder surgery (Right, 12/14/2015); Other surgical history (2011); Tonsillectomy; pr colonoscopy flx dx w/collj spec when pfrmd (N/A, 7/10/2018); Appendectomy; pr esophagogastroduodenoscopy transoral diagnostic (N/A, 1/28/2019); and Skin biopsy  ,  family history includes Calcium disorder in her other and other; Cancer in her father; Depression in her family; Diabetes type II in her father and other; Heart disease in her father; Hypertension in her mother and other; Mental illness in her mother and other; Migraines in her other;  Other in her other; Pulmonary embolism in her other; Stroke in her mother; Substance Abuse in her brother and family; Tuberculosis in her other  ,   reports that she has quit smoking  Her smoking use included cigarettes  She has never used smokeless tobacco  She reports that she drinks alcohol  She reports that she does not use drugs  ,  is allergic to midazolam; gabapentin; hydrocodone-acetaminophen; penicillin v; pregabalin; propofol; and sulfa antibiotics     Current Outpatient Medications   Medication Sig Dispense Refill    albuterol (2 5 mg/3 mL) 0 083 % nebulizer solution Take 1 vial (2 5 mg total) by nebulization every 4 (four) hours as needed for shortness of breath Disp 1 box 25 vial 5    albuterol (PROAIR HFA) 90 mcg/act inhaler Inhale 2 puffs      amitriptyline (ELAVIL) 25 mg tablet Take 1 tablet (25 mg total) by mouth daily at bedtime as needed for sleep 90 tablet 1    aspirin (ECOTRIN LOW STRENGTH) 81 mg EC tablet Take 81 mg by mouth daily      atorvastatin (LIPITOR) 20 mg tablet Take 1 tablet (20 mg total) by mouth daily 90 tablet 1    [employer] MICROLET LANCETS lancets Use as instructed BID E11 65 100 each 5    Calcium 500 MG tablet Take 2 tablets by mouth every other day        carbidopa-levodopa (SINEMET)  mg per tablet Take 1 tablet by mouth 3 (three) times a day 270 tablet 1    cholecalciferol (VITAMIN D3) 1,000 units tablet Take 4 tablets by mouth every other day       clonazePAM (KlonoPIN) 2 mg tablet Take 1 tablet (2 mg total) by mouth daily at bedtime 30 tablet 0    denosumab (PROLIA) 60 mg/mL Inject under the skin every 6 (six) months      escitalopram (LEXAPRO) 10 mg tablet Take 1 5 tablets (15 mg total) by mouth daily 45 tablet 5    glucose blood (JOELLEN CONTOUR TEST) test strip Use as instructed BID  E11 65 100 each 5    Glucose Blood (ONETOUCH ULTRA BLUE VI) by In Vitro route 3 (three) times a day      hydrochlorothiazide (HYDRODIURIL) 50 mg tablet Take 1 tablet (50 mg total) by mouth daily (Patient taking differently: Take 50 mg by mouth as needed ) 90 tablet 3    hydrOXYzine pamoate (VISTARIL) 50 mg capsule Take 1 capsule (50 mg total) by mouth 3 (three) times a day as needed (rls) 90 capsule 2    lisinopril (ZESTRIL) 2 5 mg tablet Take 1 tablet (2 5 mg total) by mouth daily 90 tablet 1    metFORMIN (GLUCOPHAGE) 1000 MG tablet Take 1 tablet (1,000 mg total) by mouth 2 (two) times a day with meals 180 tablet 1    methocarbamol (ROBAXIN) 750 mg tablet Take 1 tablet (750 mg total) by mouth every 8 (eight) hours 90 tablet 2    nystatin (MYCOSTATIN) cream Apply 1 application topically 2 (two) times a day To affected area  5    pantoprazole (PROTONIX) 40 mg tablet Take 1 tablet (40 mg total) by mouth daily 90 tablet 1    potassium chloride (MICRO-K) 10 MEQ CR capsule Take 10 mEq by mouth 2 (two) times a day        pramipexole (MIRAPEX) 1 5 MG tablet Take 1 tablet (1 5 mg total) by mouth 3 (three) times a day 270 tablet 1    VITAMIN B COMPLEX-C PO Take 1 tablet by mouth daily       No current facility-administered medications for this visit  Review of Systems   Constitutional: Negative for chills and fever  HENT: Negative for congestion, ear pain, hearing loss, postnasal drip, rhinorrhea, sinus pressure, sinus pain, sore throat and trouble swallowing  Eyes: Negative for pain and visual disturbance  Respiratory: Negative for cough, chest tightness, shortness of breath and wheezing  Cardiovascular: Negative  Negative for chest pain, palpitations and leg swelling  Gastrointestinal: Negative for abdominal pain, blood in stool, constipation, diarrhea, nausea and vomiting  Endocrine: Negative for cold intolerance, heat intolerance, polydipsia, polyphagia and polyuria  Genitourinary: Negative for difficulty urinating, dysuria, flank pain and urgency  Musculoskeletal: Positive for arthralgias  Negative for back pain, gait problem and myalgias  Skin: Negative for rash  Allergic/Immunologic: Negative  Neurological: Negative for dizziness, weakness, light-headedness and headaches  Hematological: Negative  Psychiatric/Behavioral: Negative for behavioral problems, dysphoric mood and sleep disturbance  The patient is not nervous/anxious  Objective:  Vitals:    08/16/19 1523   BP: 112/60   Pulse: 84   Resp: 18   Temp: 98 6 °F (37 °C)   TempSrc: Tympanic   Weight: 80 7 kg (178 lb)   Height: 5' 5" (1 651 m)     Body mass index is 29 62 kg/m²  Physical Exam   Constitutional: She is oriented to person, place, and time  She appears well-developed and well-nourished  No distress  HENT:   Head: Normocephalic and atraumatic  Right Ear: External ear normal    Left Ear: External ear normal    Nose: Nose normal    Mouth/Throat: Oropharynx is clear and moist  No oropharyngeal exudate  Eyes: Pupils are equal, round, and reactive to light  Conjunctivae and EOM are normal  Right eye exhibits no discharge  Left eye exhibits no discharge  No scleral icterus  Neck: Normal range of motion  Neck supple  No thyromegaly present  Cardiovascular: Normal rate, regular rhythm and normal heart sounds  Exam reveals no gallop and no friction rub  No murmur heard  Pulmonary/Chest: Effort normal and breath sounds normal  No respiratory distress  She has no wheezes  She has no rales  Abdominal: Soft  Bowel sounds are normal  She exhibits no distension  There is no tenderness  Musculoskeletal: Normal range of motion  She exhibits no edema, tenderness or deformity  Feet:    Neurological: She is alert and oriented to person, place, and time  No cranial nerve deficit  Skin: Skin is warm and dry  She is not diaphoretic  Psychiatric: She has a normal mood and affect   Her behavior is normal  Judgment and thought content normal

## 2019-08-21 ENCOUNTER — APPOINTMENT (OUTPATIENT)
Dept: RADIOLOGY | Facility: CLINIC | Age: 69
End: 2019-08-21
Payer: MEDICARE

## 2019-08-21 DIAGNOSIS — M79.674 PAIN OF TOE OF RIGHT FOOT: ICD-10-CM

## 2019-08-21 DIAGNOSIS — G25.81 RLS (RESTLESS LEGS SYNDROME): ICD-10-CM

## 2019-08-21 DIAGNOSIS — F41.9 ANXIETY: ICD-10-CM

## 2019-08-21 PROCEDURE — 73630 X-RAY EXAM OF FOOT: CPT

## 2019-08-21 RX ORDER — CLONAZEPAM 2 MG/1
2 TABLET ORAL
Qty: 30 TABLET | Refills: 0 | Status: SHIPPED | OUTPATIENT
Start: 2019-08-21 | End: 2019-09-23 | Stop reason: SDUPTHER

## 2019-08-29 DIAGNOSIS — G25.81 RLS (RESTLESS LEGS SYNDROME): ICD-10-CM

## 2019-09-05 ENCOUNTER — TELEPHONE (OUTPATIENT)
Dept: INTERNAL MEDICINE CLINIC | Facility: CLINIC | Age: 69
End: 2019-09-05

## 2019-09-05 NOTE — TELEPHONE ENCOUNTER
Pt called stating that her restless leg syndrome is bad when she has to drive, she wants to jump out of the car  She has appt on Monday that is 45 minutes away and is asking what she can do  Do you want to see her?

## 2019-09-11 DIAGNOSIS — E11.8 TYPE 2 DIABETES MELLITUS WITH COMPLICATION, WITHOUT LONG-TERM CURRENT USE OF INSULIN (HCC): ICD-10-CM

## 2019-09-16 DIAGNOSIS — G89.29 CHRONIC BILATERAL LOW BACK PAIN WITHOUT SCIATICA: ICD-10-CM

## 2019-09-16 DIAGNOSIS — G25.81 RLS (RESTLESS LEGS SYNDROME): ICD-10-CM

## 2019-09-16 DIAGNOSIS — M54.50 CHRONIC BILATERAL LOW BACK PAIN WITHOUT SCIATICA: ICD-10-CM

## 2019-09-16 RX ORDER — METHOCARBAMOL 750 MG/1
750 TABLET, FILM COATED ORAL EVERY 8 HOURS SCHEDULED
Qty: 90 TABLET | Refills: 5 | Status: SHIPPED | OUTPATIENT
Start: 2019-09-16 | End: 2020-03-27 | Stop reason: SDUPTHER

## 2019-09-16 RX ORDER — PRAMIPEXOLE DIHYDROCHLORIDE 1.5 MG/1
1.5 TABLET ORAL 3 TIMES DAILY
Qty: 270 TABLET | Refills: 1 | Status: SHIPPED | OUTPATIENT
Start: 2019-09-16 | End: 2020-07-01 | Stop reason: SDUPTHER

## 2019-09-17 ENCOUNTER — OFFICE VISIT (OUTPATIENT)
Dept: INTERNAL MEDICINE CLINIC | Facility: CLINIC | Age: 69
End: 2019-09-17
Payer: MEDICARE

## 2019-09-17 VITALS
HEART RATE: 91 BPM | TEMPERATURE: 97.1 F | WEIGHT: 175.25 LBS | SYSTOLIC BLOOD PRESSURE: 140 MMHG | OXYGEN SATURATION: 92 % | BODY MASS INDEX: 29.2 KG/M2 | DIASTOLIC BLOOD PRESSURE: 70 MMHG | HEIGHT: 65 IN

## 2019-09-17 DIAGNOSIS — J01.10 ACUTE NON-RECURRENT FRONTAL SINUSITIS: Primary | ICD-10-CM

## 2019-09-17 PROCEDURE — 99213 OFFICE O/P EST LOW 20 MIN: CPT | Performed by: INTERNAL MEDICINE

## 2019-09-17 RX ORDER — BENZONATATE 200 MG/1
200 CAPSULE ORAL 3 TIMES DAILY PRN
Qty: 20 CAPSULE | Refills: 0 | Status: SHIPPED | OUTPATIENT
Start: 2019-09-17 | End: 2019-10-04

## 2019-09-17 RX ORDER — AZITHROMYCIN 250 MG/1
TABLET, FILM COATED ORAL
Qty: 6 TABLET | Refills: 0 | Status: SHIPPED | OUTPATIENT
Start: 2019-09-17 | End: 2019-09-21

## 2019-09-17 RX ORDER — METHOCARBAMOL 750 MG/1
750 TABLET, FILM COATED ORAL EVERY 8 HOURS SCHEDULED
Qty: 90 TABLET | Refills: 0 | Status: SHIPPED | OUTPATIENT
Start: 2019-09-17 | End: 2019-09-17 | Stop reason: SDUPTHER

## 2019-09-17 NOTE — PROGRESS NOTES
Assessment/Plan:    Problem List Items Addressed This Visit     None      Visit Diagnoses     Acute non-recurrent frontal sinusitis    -  Primary    Relevant Medications    azithromycin (ZITHROMAX) 250 mg tablet    benzonatate (TESSALON) 200 MG capsule           Diagnoses and all orders for this visit:    Acute non-recurrent frontal sinusitis  -     azithromycin (ZITHROMAX) 250 mg tablet; Take 2 tablets today then 1 tablet daily x 4 days  -     benzonatate (TESSALON) 200 MG capsule; Take 1 capsule (200 mg total) by mouth 3 (three) times a day as needed for cough        No problem-specific Assessment & Plan notes found for this encounter  Subjective:      Patient ID: Ashanti Juan is a 71 y o  female  Sinusitis   This is a new problem  The current episode started in the past 7 days  The problem is unchanged  There has been no fever  Her pain is at a severity of 3/10  The pain is mild  Associated symptoms include chills, congestion, coughing, headaches and a sore throat  Pertinent negatives include no diaphoresis, ear pain, hoarse voice, neck pain, shortness of breath, sinus pressure, sneezing or swollen glands  Past treatments include nothing  The following portions of the patient's history were reviewed and updated as appropriate:   She has a past medical history of Anxiety, Cellulitis, Chronic obstructive pulmonary disease ([facility] [address] ) (3/11/2019), Depression, Diabetes mellitus ([address] ), Dysuria, Esophageal reflux, Fatigue, Fracture, Heart murmur, Hypertension, Pneumonia, Restless legs syndrome (RLS), and Vitamin D deficiency  ,  does not have any pertinent problems on file  ,   has a past surgical history that includes Back surgery; Cholecystectomy (1975); Foot surgery (Right, 2014); Hip surgery (2013); Hysterectomy; Ovarian cyst surgery (1971); Shoulder surgery (Right, 12/14/2015); Other surgical history (2011); Tonsillectomy; pr colonoscopy flx dx w/collj spec when pfrmd (N/A, 7/10/2018);  Appendectomy; pr esophagogastroduodenoscopy transoral diagnostic (N/A, 1/28/2019); and Skin biopsy  ,  family history includes Calcium disorder in her other and other; Cancer in her father; Depression in her family; Diabetes type II in her father and other; Heart disease in her father; Hypertension in her mother and other; Mental illness in her mother and other; Migraines in her other; Other in her other; Pulmonary embolism in her other; Stroke in her mother; Substance Abuse in her brother and family; Tuberculosis in her other  ,   reports that she has quit smoking  Her smoking use included cigarettes  She has never used smokeless tobacco  She reports that she drinks alcohol  She reports that she does not use drugs  ,  is allergic to midazolam; gabapentin; hydrocodone-acetaminophen; penicillin v; pregabalin; propofol; and sulfa antibiotics     Current Outpatient Medications   Medication Sig Dispense Refill    albuterol (2 5 mg/3 mL) 0 083 % nebulizer solution Take 1 vial (2 5 mg total) by nebulization every 4 (four) hours as needed for shortness of breath Disp 1 box 25 vial 5    albuterol (PROAIR HFA) 90 mcg/act inhaler Inhale 2 puffs      amitriptyline (ELAVIL) 25 mg tablet Take 1 tablet (25 mg total) by mouth daily at bedtime as needed for sleep 90 tablet 1    aspirin (ECOTRIN LOW STRENGTH) 81 mg EC tablet Take 81 mg by mouth daily      atorvastatin (LIPITOR) 20 mg tablet Take 1 tablet (20 mg total) by mouth daily 90 tablet 1    JOELLEN MICROLET LANCETS lancets Use as instructed BID E11 65 100 each 5    Calcium 500 MG tablet Take 2 tablets by mouth every other day        carbidopa-levodopa (SINEMET)  mg per tablet Take 1 tablet by mouth 3 (three) times a day 270 tablet 1    cholecalciferol (VITAMIN D3) 1,000 units tablet Take 4 tablets by mouth every other day       clonazePAM (KlonoPIN) 2 mg tablet Take 1 tablet (2 mg total) by mouth daily at bedtime 30 tablet 0    denosumab (PROLIA) 60 mg/mL Inject under the skin every 6 (six) months      escitalopram (LEXAPRO) 10 mg tablet Take 1 5 tablets (15 mg total) by mouth daily 45 tablet 5    glucose blood (JOELLEN CONTOUR TEST) test strip Use as instructed BID  E11 65 100 each 5    Glucose Blood (ONETOUCH ULTRA BLUE VI) by In Vitro route 3 (three) times a day      hydrochlorothiazide (HYDRODIURIL) 50 mg tablet Take 1 tablet (50 mg total) by mouth daily (Patient taking differently: Take 50 mg by mouth as needed ) 90 tablet 3    hydrOXYzine pamoate (VISTARIL) 50 mg capsule Take 1 capsule (50 mg total) by mouth 3 (three) times a day as needed (rls) 90 capsule 2    lisinopril (ZESTRIL) 2 5 mg tablet Take 1 tablet (2 5 mg total) by mouth daily 90 tablet 1    metFORMIN (GLUCOPHAGE) 1000 MG tablet Take 1 tablet (1,000 mg total) by mouth 2 (two) times a day with meals 180 tablet 1    methocarbamol (ROBAXIN) 750 mg tablet Take 1 tablet (750 mg total) by mouth every 8 (eight) hours 90 tablet 5    nystatin (MYCOSTATIN) cream Apply 1 application topically 2 (two) times a day To affected area  5    pantoprazole (PROTONIX) 40 mg tablet Take 1 tablet (40 mg total) by mouth daily 90 tablet 1    potassium chloride (MICRO-K) 10 MEQ CR capsule Take 10 mEq by mouth 2 (two) times a day        pramipexole (MIRAPEX) 1 5 MG tablet Take 1 tablet (1 5 mg total) by mouth 3 (three) times a day 270 tablet 1    VITAMIN B COMPLEX-C PO Take 1 tablet by mouth daily      azithromycin (ZITHROMAX) 250 mg tablet Take 2 tablets today then 1 tablet daily x 4 days 6 tablet 0    benzonatate (TESSALON) 200 MG capsule Take 1 capsule (200 mg total) by mouth 3 (three) times a day as needed for cough 20 capsule 0     No current facility-administered medications for this visit  Review of Systems   Constitutional: Positive for chills  Negative for diaphoresis  HENT: Positive for congestion and sore throat  Negative for ear pain, hoarse voice, sinus pressure and sneezing  Respiratory: Positive for cough  Negative for shortness of breath  Musculoskeletal: Negative for neck pain  Neurological: Positive for headaches  Objective:  Vitals:    09/17/19 1256   BP: 140/70   Pulse: 91   Temp: (!) 97 1 °F (36 2 °C)   SpO2: 92%   Weight: 79 5 kg (175 lb 4 oz)   Height: 5' 5" (1 651 m)     Body mass index is 29 16 kg/m²  Physical Exam   Constitutional: She is oriented to person, place, and time  She appears well-developed and well-nourished  HENT:   Head: Normocephalic and atraumatic  Nose: Rhinorrhea present  No sinus tenderness  Mouth/Throat: Posterior oropharyngeal erythema present  Eyes: Pupils are equal, round, and reactive to light  EOM are normal    Neck: Normal range of motion  Neck supple  Cardiovascular: Normal rate, regular rhythm, normal heart sounds and intact distal pulses  No murmur heard  Pulmonary/Chest: Effort normal and breath sounds normal  No respiratory distress  She has no wheezes  Abdominal: Soft  Bowel sounds are normal  There is no tenderness  Musculoskeletal: Normal range of motion  She exhibits no edema  Neurological: She is alert and oriented to person, place, and time  Skin: Skin is warm and dry  Psychiatric: She has a normal mood and affect  Nursing note and vitals reviewed         PHQ-9 Depression Screening    PHQ-9:    Frequency of the following problems over the past two weeks:

## 2019-09-23 DIAGNOSIS — F41.9 ANXIETY: ICD-10-CM

## 2019-09-23 DIAGNOSIS — G25.81 RLS (RESTLESS LEGS SYNDROME): ICD-10-CM

## 2019-09-23 RX ORDER — CLONAZEPAM 2 MG/1
2 TABLET ORAL
Qty: 30 TABLET | Refills: 0 | Status: SHIPPED | OUTPATIENT
Start: 2019-09-23 | End: 2019-10-28 | Stop reason: SDUPTHER

## 2019-09-27 ENCOUNTER — TELEPHONE (OUTPATIENT)
Dept: INTERNAL MEDICINE CLINIC | Facility: CLINIC | Age: 69
End: 2019-09-27

## 2019-09-27 NOTE — TELEPHONE ENCOUNTER
Valery Olson called - she saw her orthopedic surgeon yesterday-he is ordering a lumbar MRI w and without contrast     Lindsey Chambers

## 2019-10-03 ENCOUNTER — APPOINTMENT (OUTPATIENT)
Dept: LAB | Facility: CLINIC | Age: 69
End: 2019-10-03
Payer: MEDICARE

## 2019-10-03 ENCOUNTER — TRANSCRIBE ORDERS (OUTPATIENT)
Dept: LAB | Facility: CLINIC | Age: 69
End: 2019-10-03

## 2019-10-03 DIAGNOSIS — M54.16 LUMBAR RADICULOPATHY: Primary | ICD-10-CM

## 2019-10-03 DIAGNOSIS — M54.16 LUMBAR RADICULOPATHY: ICD-10-CM

## 2019-10-03 LAB
ANION GAP SERPL CALCULATED.3IONS-SCNC: 6 MMOL/L (ref 4–13)
BUN SERPL-MCNC: 12 MG/DL (ref 5–25)
CALCIUM SERPL-MCNC: 9.9 MG/DL (ref 8.3–10.1)
CHLORIDE SERPL-SCNC: 104 MMOL/L (ref 100–108)
CO2 SERPL-SCNC: 28 MMOL/L (ref 21–32)
CREAT SERPL-MCNC: 0.89 MG/DL (ref 0.6–1.3)
GFR SERPL CREATININE-BSD FRML MDRD: 66 ML/MIN/1.73SQ M
GLUCOSE SERPL-MCNC: 100 MG/DL (ref 65–140)
POTASSIUM SERPL-SCNC: 4.7 MMOL/L (ref 3.5–5.3)
SODIUM SERPL-SCNC: 138 MMOL/L (ref 136–145)

## 2019-10-03 PROCEDURE — 36415 COLL VENOUS BLD VENIPUNCTURE: CPT

## 2019-10-03 PROCEDURE — 80048 BASIC METABOLIC PNL TOTAL CA: CPT

## 2019-10-04 ENCOUNTER — OFFICE VISIT (OUTPATIENT)
Dept: INTERNAL MEDICINE CLINIC | Facility: CLINIC | Age: 69
End: 2019-10-04
Payer: MEDICARE

## 2019-10-04 VITALS
SYSTOLIC BLOOD PRESSURE: 100 MMHG | RESPIRATION RATE: 18 BRPM | TEMPERATURE: 97.4 F | DIASTOLIC BLOOD PRESSURE: 62 MMHG | BODY MASS INDEX: 29.46 KG/M2 | HEART RATE: 94 BPM | WEIGHT: 176.8 LBS | HEIGHT: 65 IN | OXYGEN SATURATION: 94 %

## 2019-10-04 DIAGNOSIS — E11.9 DIABETIC EYE EXAM (HCC): ICD-10-CM

## 2019-10-04 DIAGNOSIS — Z01.00 DIABETIC EYE EXAM (HCC): ICD-10-CM

## 2019-10-04 DIAGNOSIS — G25.81 RESTLESS LEGS SYNDROME (RLS): ICD-10-CM

## 2019-10-04 DIAGNOSIS — Z12.31 ENCOUNTER FOR SCREENING MAMMOGRAM FOR MALIGNANT NEOPLASM OF BREAST: ICD-10-CM

## 2019-10-04 DIAGNOSIS — Z23 NEED FOR INFLUENZA VACCINATION: ICD-10-CM

## 2019-10-04 DIAGNOSIS — E11.8 TYPE 2 DIABETES MELLITUS WITH COMPLICATION, WITHOUT LONG-TERM CURRENT USE OF INSULIN (HCC): Primary | ICD-10-CM

## 2019-10-04 DIAGNOSIS — G25.81 RLS (RESTLESS LEGS SYNDROME): ICD-10-CM

## 2019-10-04 PROBLEM — M62.830 BACK SPASM: Status: RESOLVED | Noted: 2019-04-24 | Resolved: 2019-10-04

## 2019-10-04 PROBLEM — J02.8 ACUTE PHARYNGITIS DUE TO OTHER SPECIFIED ORGANISMS: Status: RESOLVED | Noted: 2019-03-20 | Resolved: 2019-10-04

## 2019-10-04 PROBLEM — B37.2 CANDIDAL INTERTRIGO: Status: RESOLVED | Noted: 2018-10-03 | Resolved: 2019-10-04

## 2019-10-04 PROBLEM — R19.5 POSITIVE FIT (FECAL IMMUNOCHEMICAL TEST): Status: RESOLVED | Noted: 2018-06-25 | Resolved: 2019-10-04

## 2019-10-04 PROBLEM — L08.9 SKIN INFECTION: Status: RESOLVED | Noted: 2019-04-01 | Resolved: 2019-10-04

## 2019-10-04 PROBLEM — M54.6 ACUTE BILATERAL THORACIC BACK PAIN: Status: RESOLVED | Noted: 2019-04-24 | Resolved: 2019-10-04

## 2019-10-04 PROBLEM — M79.674 PAIN OF TOE OF RIGHT FOOT: Status: RESOLVED | Noted: 2019-08-16 | Resolved: 2019-10-04

## 2019-10-04 LAB — SL AMB POCT HEMOGLOBIN AIC: 6 (ref ?–6.5)

## 2019-10-04 PROCEDURE — 83036 HEMOGLOBIN GLYCOSYLATED A1C: CPT | Performed by: PHYSICIAN ASSISTANT

## 2019-10-04 PROCEDURE — 90662 IIV NO PRSV INCREASED AG IM: CPT | Performed by: PHYSICIAN ASSISTANT

## 2019-10-04 PROCEDURE — G0008 ADMIN INFLUENZA VIRUS VAC: HCPCS | Performed by: PHYSICIAN ASSISTANT

## 2019-10-04 PROCEDURE — 99213 OFFICE O/P EST LOW 20 MIN: CPT | Performed by: PHYSICIAN ASSISTANT

## 2019-10-04 NOTE — PROGRESS NOTES
Assessment/Plan:  Problem List Items Addressed This Visit        Endocrine    Type 2 diabetes mellitus with complication, without long-term current use of insulin ([address] ) - Primary       Lab Results   Component Value Date    HGBA1C 6 0 10/04/2019         Pts symptoms are stable with current regime  No changes at present  Relevant Orders    POCT hemoglobin A1c (Completed)       Other    Restless legs syndrome (RLS)     Pts symptoms are stable with current regime  No changes at present  Other Visit Diagnoses     Diabetic eye exam [facility])        Relevant Orders    Ambulatory referral to Ophthalmology    Encounter for screening mammogram for malignant neoplasm of breast        Relevant Orders    Mammo screening bilateral w 3d & cad    Need for influenza vaccination        Relevant Orders    influenza vaccine, [id number], high-dose, PF 0 5 mL (FLUZONE HIGH-DOSE) (Completed)    RLS (restless legs syndrome)               Diagnoses and all orders for this visit:    Type 2 diabetes mellitus with complication, without long-term current use of insulin ([address] )  -     POCT hemoglobin A1c    Diabetic eye exam ([facility] )  -     Ambulatory referral to Ophthalmology    Encounter for screening mammogram for malignant neoplasm of breast  -     Mammo screening bilateral w 3d & cad; Future    Need for influenza vaccination  -     influenza vaccine, [id number], high-dose, PF 0 5 mL (FLUZONE HIGH-DOSE)    RLS (restless legs syndrome)    Restless legs syndrome (RLS)    Other orders  -     Cancel: Ambulatory referral to Gastroenterology  -     Cancel: Occult Blood, Fecal Immunochemical        Restless legs syndrome (RLS)  Pts symptoms are stable with current regime  No changes at present  Type 2 diabetes mellitus with complication, without long-term current use of insulin (HCC)    Lab Results   Component Value Date    HGBA1C 6 0 10/04/2019         Pts symptoms are stable with current regime  No changes at present  Subjective:      Patient ID: Talia Schreiber is a 71 y o  female  Pt presents for routine visit  She is doing well overall  She is due for an A1C which is 6 0 today  She notes ongoing discomfort associated with her RLS but feels her regime is working well  She has been following with dermatology for her facial rash and this is slightly improved as well  The following portions of the patient's history were reviewed and updated as appropriate:   She has a past medical history of Anxiety, Cellulitis, Chronic obstructive pulmonary disease ([facility] [address] ) (3/11/2019), Depression, Diabetes mellitus ([facility] [address] ), Dysuria, Esophageal reflux, Fatigue, Fracture, Heart murmur, Hypertension, Pneumonia, Restless legs syndrome (RLS), and Vitamin D deficiency  ,  does not have any pertinent problems on file  ,   has a past surgical history that includes Back surgery; Cholecystectomy (1975); Foot surgery (Right, 2014); Hip surgery (2013); Hysterectomy; Ovarian cyst surgery (1971); Shoulder surgery (Right, 12/14/2015); Other surgical history (2011); Tonsillectomy; pr colonoscopy flx dx w/collj spec when pfrmd (N/A, 7/10/2018); Appendectomy; pr esophagogastroduodenoscopy transoral diagnostic (N/A, 1/28/2019); and Skin biopsy  ,  family history includes Calcium disorder in her other and other; Cancer in her father; Depression in her family; Diabetes type II in her father and other; Heart disease in her father; Hypertension in her mother and other; Mental illness in her mother and other; Migraines in her other; Other in her other; Pulmonary embolism in her other; Stroke in her mother; Substance Abuse in her brother and family; Tuberculosis in her other  ,   reports that she has quit smoking  Her smoking use included cigarettes  She has never used smokeless tobacco  She reports that she drinks alcohol  She reports that she does not use drugs  ,  is allergic to midazolam; gabapentin; hydrocodone-acetaminophen; penicillin v; pregabalin; propofol; and sulfa antibiotics     Current Outpatient Medications   Medication Sig Dispense Refill    albuterol (2 5 mg/3 mL) 0 083 % nebulizer solution Take 1 vial (2 5 mg total) by nebulization every 4 (four) hours as needed for shortness of breath Disp 1 box 25 vial 5    albuterol (PROAIR HFA) 90 mcg/act inhaler Inhale 2 puffs      amitriptyline (ELAVIL) 25 mg tablet Take 1 tablet (25 mg total) by mouth daily at bedtime as needed for sleep 90 tablet 1    aspirin (ECOTRIN LOW STRENGTH) 81 mg EC tablet Take 81 mg by mouth daily      atorvastatin (LIPITOR) 20 mg tablet Take 1 tablet (20 mg total) by mouth daily 90 tablet 1    [employer] MICROLET LANCETS lancets Use as instructed BID E11 65 100 each 5    Calcium 500 MG tablet Take 2 tablets by mouth every other day        carbidopa-levodopa (SINEMET)  mg per tablet Take 1 tablet by mouth 3 (three) times a day 270 tablet 1    cholecalciferol (VITAMIN D3) 1,000 units tablet Take 3 tablets by mouth every other day       clonazePAM (KlonoPIN) 2 mg tablet Take 1 tablet (2 mg total) by mouth daily at bedtime 30 tablet 0    denosumab (PROLIA) 60 mg/mL Inject under the skin every 6 (six) months      escitalopram (LEXAPRO) 10 mg tablet Take 1 5 tablets (15 mg total) by mouth daily 45 tablet 5    glucose blood (UniYu CONTOUR TEST) test strip Use as instructed BID  E11 65 100 each 5    Glucose Blood (ONETOUCH ULTRA BLUE VI) by In Vitro route 3 (three) times a day      hydrochlorothiazide (HYDRODIURIL) 50 mg tablet Take 1 tablet (50 mg total) by mouth daily (Patient taking differently: Take 50 mg by mouth as needed ) 90 tablet 3    hydrOXYzine pamoate (VISTARIL) 50 mg capsule Take 1 capsule (50 mg total) by mouth 3 (three) times a day as needed (rls) 90 capsule 2    lisinopril (ZESTRIL) 2 5 mg tablet Take 1 tablet (2 5 mg total) by mouth daily 90 tablet 1    metFORMIN (GLUCOPHAGE) 1000 MG tablet Take 1 tablet (1,000 mg total) by mouth 2 (two) times a day with meals 180 tablet 1    methocarbamol (ROBAXIN) 750 mg tablet Take 1 tablet (750 mg total) by mouth every 8 (eight) hours 90 tablet 5    nystatin (MYCOSTATIN) cream Apply 1 application topically 2 (two) times a day To affected area  5    pantoprazole (PROTONIX) 40 mg tablet Take 1 tablet (40 mg total) by mouth daily 90 tablet 1    potassium chloride (MICRO-K) 10 MEQ CR capsule Take 10 mEq by mouth 2 (two) times a day        pramipexole (MIRAPEX) 1 5 MG tablet Take 1 tablet (1 5 mg total) by mouth 3 (three) times a day 270 tablet 1    VITAMIN B COMPLEX-C PO Take 1 tablet by mouth daily       No current facility-administered medications for this visit  Review of Systems   Constitutional: Negative for chills and fever  HENT: Negative for congestion, ear pain, hearing loss, postnasal drip, rhinorrhea, sinus pressure, sinus pain, sore throat and trouble swallowing  Eyes: Negative for pain and visual disturbance  Respiratory: Negative for cough, chest tightness, shortness of breath and wheezing  Cardiovascular: Negative  Negative for chest pain, palpitations and leg swelling  Gastrointestinal: Negative for abdominal pain, blood in stool, constipation, diarrhea, nausea and vomiting  Endocrine: Negative for cold intolerance, heat intolerance, polydipsia, polyphagia and polyuria  Genitourinary: Negative for difficulty urinating, dysuria, flank pain and urgency  Musculoskeletal: Negative for arthralgias, back pain, gait problem and myalgias  Skin: Negative for rash  Allergic/Immunologic: Negative  Neurological: Negative for dizziness, weakness, light-headedness and headaches  Hematological: Negative  Psychiatric/Behavioral: Negative for behavioral problems, dysphoric mood and sleep disturbance  The patient is not nervous/anxious            PHQ-9 Depression Screening    PHQ-9:    Frequency of the following problems over the past two weeks:       Little interest or pleasure in doing things:  0 - not at all  Feeling down, depressed, or hopeless:  0 - not at all  Trouble falling or staying asleep, or sleeping too much:  0 - not at all  Feeling tired or having little energy:  0 - not at all  Poor appetite or overeatin - not at all  Feeling bad about yourself - or that you are a failure or have let yourself or your family down:  0 - not at all  Trouble concentrating on things, such as reading the newspaper or watching television:  0 - not at all  Moving or speaking so slowly that other people could have noticed  Or the opposite - being so fidgety or restless that you have been moving around a lot more than usual:  0 - not at all  Thoughts that you would be better off dead, or of hurting yourself in some way:  0 - not at all  PHQ-2 Score:  0  PHQ-9 Score:  0          Objective:  Vitals:    10/04/19 1314   BP: 100/62   BP Location: Left arm   Patient Position: Sitting   Cuff Size: Large   Pulse: 94   Resp: 18   Temp: (!) 97 4 °F (36 3 °C)   SpO2: 94%   Weight: 80 2 kg (176 lb 12 8 oz)   Height: 5' 5" (1 651 m)     Body mass index is 29 42 kg/m²  Physical Exam   Constitutional: She is oriented to person, place, and time  She appears well-developed and well-nourished  No distress  HENT:   Head: Normocephalic and atraumatic  Right Ear: External ear normal    Left Ear: External ear normal    Nose: Nose normal    Mouth/Throat: Oropharynx is clear and moist  No oropharyngeal exudate  Eyes: Pupils are equal, round, and reactive to light  Conjunctivae and EOM are normal  Right eye exhibits no discharge  Left eye exhibits no discharge  No scleral icterus  Neck: Normal range of motion  Neck supple  No thyromegaly present  Cardiovascular: Normal rate, regular rhythm and normal heart sounds  Exam reveals no gallop and no friction rub  No murmur heard  Pulmonary/Chest: Effort normal and breath sounds normal  No respiratory distress  She has no wheezes  She has no rales  Abdominal: Soft  Bowel sounds are normal  She exhibits no distension  There is no tenderness  Musculoskeletal: Normal range of motion  She exhibits no edema, tenderness or deformity  Neurological: She is alert and oriented to person, place, and time  No cranial nerve deficit  Skin: Skin is warm and dry  She is not diaphoretic  Psychiatric: She has a normal mood and affect  Her behavior is normal  Judgment and thought content normal        BMI Counseling: Body mass index is 29 42 kg/m²  The BMI is above normal  Nutrition recommendations include reducing portion sizes

## 2019-10-04 NOTE — ASSESSMENT & PLAN NOTE
Lab Results   Component Value Date    HGBA1C 6 0 10/04/2019         Pts symptoms are stable with current regime  No changes at present

## 2019-10-15 ENCOUNTER — OFFICE VISIT (OUTPATIENT)
Dept: INTERNAL MEDICINE CLINIC | Facility: CLINIC | Age: 69
End: 2019-10-15
Payer: MEDICARE

## 2019-10-15 VITALS
HEIGHT: 65 IN | BODY MASS INDEX: 29.16 KG/M2 | RESPIRATION RATE: 16 BRPM | DIASTOLIC BLOOD PRESSURE: 60 MMHG | WEIGHT: 175 LBS | HEART RATE: 76 BPM | TEMPERATURE: 97.6 F | SYSTOLIC BLOOD PRESSURE: 108 MMHG

## 2019-10-15 DIAGNOSIS — G25.81 RLS (RESTLESS LEGS SYNDROME): ICD-10-CM

## 2019-10-15 DIAGNOSIS — G25.81 RESTLESS LEGS SYNDROME (RLS): Primary | ICD-10-CM

## 2019-10-15 PROCEDURE — 99214 OFFICE O/P EST MOD 30 MIN: CPT | Performed by: PHYSICIAN ASSISTANT

## 2019-10-15 NOTE — ASSESSMENT & PLAN NOTE
Will increase sinement to 2 tabs TID  Continue other medications as ordered  Pt is interested in medical marijuana  She was given information for pa gov site to get the process started

## 2019-10-15 NOTE — PROGRESS NOTES
Assessment/Plan:  Problem List Items Addressed This Visit        Other    Restless legs syndrome (RLS) - Primary     Will increase sinement to 2 tabs TID  Continue other medications as ordered  Pt is interested in medical marijuana  She was given information for [employer] site to get the process started  Other Visit Diagnoses     RLS (restless legs syndrome)        Relevant Medications    carbidopa-levodopa (SINEMET)  mg per tablet           Diagnoses and all orders for this visit:    Restless legs syndrome (RLS)    RLS (restless legs syndrome)  -     carbidopa-levodopa (SINEMET)  mg per tablet; Take 2 tablets by mouth 3 (three) times a day        Restless legs syndrome (RLS)  Will increase sinement to 2 tabs TID  Continue other medications as ordered  Pt is interested in medical marijuana  She was given information for [employer] site to get the process started  Subjective:      Patient ID: Helene Viera is a 71 y o  female  Pt presents for worsening of her RLS syndrome  She has no back pain but relates pain in her thighs and her knees  She notes an electric current type sensation in her legs as well  She is presently on sinemet, klonopin, and robaxin with little benefit  She notes an allergy to gabapentin and lyrica  She tried and failed cymbalta  The following portions of the patient's history were reviewed and updated as appropriate:   She has a past medical history of Anxiety, Cellulitis, Chronic obstructive pulmonary disease ([address] ) (3/11/2019), Depression, Diabetes mellitus ([address] ), Dysuria, Esophageal reflux, Fatigue, Fracture, Heart murmur, Hypertension, Pneumonia, Restless legs syndrome (RLS), and Vitamin D deficiency  ,  does not have any pertinent problems on file  ,   has a past surgical history that includes Back surgery; Cholecystectomy (1975); Foot surgery (Right, 2014); Hip surgery (2013); Hysterectomy; Ovarian cyst surgery (1971);  Shoulder surgery (Right, 12/14/2015); Other surgical history (2011); Tonsillectomy; pr colonoscopy flx dx w/collj spec when pfrmd (N/A, 7/10/2018); Appendectomy; pr esophagogastroduodenoscopy transoral diagnostic (N/A, 1/28/2019); and Skin biopsy  ,  family history includes Calcium disorder in her other and other; Cancer in her father; Depression in her family; Diabetes type II in her father and other; Heart disease in her father; Hypertension in her mother and other; Mental illness in her mother and other; Migraines in her other; Other in her other; Pulmonary embolism in her other; Stroke in her mother; Substance Abuse in her brother and family; Tuberculosis in her other  ,   reports that she has quit smoking  Her smoking use included cigarettes  She has never used smokeless tobacco  She reports that she drinks alcohol  She reports that she does not use drugs  ,  is allergic to midazolam; gabapentin; hydrocodone-acetaminophen; penicillin v; pregabalin; propofol; and sulfa antibiotics     Current Outpatient Medications   Medication Sig Dispense Refill    albuterol (2 5 mg/3 mL) 0 083 % nebulizer solution Take 1 vial (2 5 mg total) by nebulization every 4 (four) hours as needed for shortness of breath Disp 1 box 25 vial 5    albuterol (PROAIR HFA) 90 mcg/act inhaler Inhale 2 puffs      amitriptyline (ELAVIL) 25 mg tablet Take 1 tablet (25 mg total) by mouth daily at bedtime as needed for sleep 90 tablet 1    aspirin (ECOTRIN LOW STRENGTH) 81 mg EC tablet Take 81 mg by mouth daily      atorvastatin (LIPITOR) 20 mg tablet Take 1 tablet (20 mg total) by mouth daily 90 tablet 1    JOELLEN MICROLET LANCETS lancets Use as instructed BID E11 65 100 each 5    Calcium 500 MG tablet Take 2 tablets by mouth every other day        carbidopa-levodopa (SINEMET)  mg per tablet Take 2 tablets by mouth 3 (three) times a day 270 tablet 1    cholecalciferol (VITAMIN D3) 1,000 units tablet Take 3 tablets by mouth every other day       clonazePAM (KlonoPIN) 2 mg tablet Take 1 tablet (2 mg total) by mouth daily at bedtime 30 tablet 0    denosumab (PROLIA) 60 mg/mL Inject under the skin every 6 (six) months      escitalopram (LEXAPRO) 10 mg tablet Take 1 5 tablets (15 mg total) by mouth daily 45 tablet 5    glucose blood (JOELLEN CONTOUR TEST) test strip Use as instructed BID  E11 65 100 each 5    Glucose Blood (ONETOUCH ULTRA BLUE VI) by In Vitro route 3 (three) times a day      hydrochlorothiazide (HYDRODIURIL) 50 mg tablet Take 1 tablet (50 mg total) by mouth daily (Patient taking differently: Take 50 mg by mouth as needed ) 90 tablet 3    hydrOXYzine pamoate (VISTARIL) 50 mg capsule Take 1 capsule (50 mg total) by mouth 3 (three) times a day as needed (rls) 90 capsule 2    lisinopril (ZESTRIL) 2 5 mg tablet Take 1 tablet (2 5 mg total) by mouth daily 90 tablet 1    metFORMIN (GLUCOPHAGE) 1000 MG tablet Take 1 tablet (1,000 mg total) by mouth 2 (two) times a day with meals 180 tablet 1    methocarbamol (ROBAXIN) 750 mg tablet Take 1 tablet (750 mg total) by mouth every 8 (eight) hours 90 tablet 5    nystatin (MYCOSTATIN) cream Apply 1 application topically 2 (two) times a day To affected area  5    pantoprazole (PROTONIX) 40 mg tablet Take 1 tablet (40 mg total) by mouth daily 90 tablet 1    potassium chloride (MICRO-K) 10 MEQ CR capsule Take 10 mEq by mouth 2 (two) times a day        pramipexole (MIRAPEX) 1 5 MG tablet Take 1 tablet (1 5 mg total) by mouth 3 (three) times a day 270 tablet 1    VITAMIN B COMPLEX-C PO Take 1 tablet by mouth daily       No current facility-administered medications for this visit  Review of Systems   Constitutional: Negative for chills and fever  HENT: Negative for congestion, ear pain, hearing loss, postnasal drip, rhinorrhea, sinus pressure, sinus pain, sore throat and trouble swallowing  Eyes: Negative for pain and visual disturbance     Respiratory: Negative for cough, chest tightness, shortness of breath and wheezing  Cardiovascular: Negative  Negative for chest pain, palpitations and leg swelling  Gastrointestinal: Negative for abdominal pain, blood in stool, constipation, diarrhea, nausea and vomiting  Endocrine: Negative for cold intolerance, heat intolerance, polydipsia, polyphagia and polyuria  Genitourinary: Negative for difficulty urinating, dysuria, flank pain and urgency  Musculoskeletal: Positive for myalgias  Negative for arthralgias, back pain and gait problem  Skin: Negative for rash  Allergic/Immunologic: Negative  Neurological: Negative for dizziness, weakness, light-headedness and headaches  Hematological: Negative  Psychiatric/Behavioral: Negative for behavioral problems, dysphoric mood and sleep disturbance  The patient is not nervous/anxious  PHQ-9 Depression Screening    PHQ-9:    Frequency of the following problems over the past two weeks:       Little interest or pleasure in doing things:  0 - not at all  Feeling down, depressed, or hopeless:  0 - not at all  Trouble falling or staying asleep, or sleeping too much:  3 - nearly every day  Feeling tired or having little energy:  1 - several days  Poor appetite or overeatin - more than half the days  Feeling bad about yourself - or that you are a failure or have let yourself or your family down:  0 - not at all  Trouble concentrating on things, such as reading the newspaper or watching television:  0 - not at all  Moving or speaking so slowly that other people could have noticed   Or the opposite - being so fidgety or restless that you have been moving around a lot more than usual:  0 - not at all  Thoughts that you would be better off dead, or of hurting yourself in some way:  0 - not at all  PHQ-2 Score:  0  PHQ-9 Score:  6          Objective:  Vitals:    10/15/19 0933   BP: 108/60   Pulse: 76   Resp: 16   Temp: 97 6 °F (36 4 °C)   TempSrc: Tympanic   Weight: 79 4 kg (175 lb)   Height: 5' 5" (1 651 m)     Body mass index is 29 12 kg/m²  Physical Exam   Constitutional: She is oriented to person, place, and time  She appears well-developed and well-nourished  No distress  HENT:   Head: Normocephalic and atraumatic  Right Ear: External ear normal    Left Ear: External ear normal    Nose: Nose normal    Mouth/Throat: Oropharynx is clear and moist  No oropharyngeal exudate  Eyes: Pupils are equal, round, and reactive to light  Conjunctivae and EOM are normal  Right eye exhibits no discharge  Left eye exhibits no discharge  No scleral icterus  Neck: Normal range of motion  Neck supple  No thyromegaly present  Cardiovascular: Normal rate, regular rhythm and normal heart sounds  Exam reveals no gallop and no friction rub  No murmur heard  Pulmonary/Chest: Effort normal and breath sounds normal  No respiratory distress  She has no wheezes  She has no rales  Abdominal: Soft  Bowel sounds are normal  She exhibits no distension  There is no tenderness  Musculoskeletal: Normal range of motion  She exhibits no edema, tenderness or deformity  Neurological: She is alert and oriented to person, place, and time  No cranial nerve deficit  Skin: Skin is warm and dry  She is not diaphoretic  Psychiatric: She has a normal mood and affect   Her behavior is normal  Judgment and thought content normal

## 2019-10-18 ENCOUNTER — TELEPHONE (OUTPATIENT)
Dept: INTERNAL MEDICINE CLINIC | Facility: CLINIC | Age: 69
End: 2019-10-18

## 2019-10-18 NOTE — TELEPHONE ENCOUNTER
Kori Alexander called in reference to the antibiotic her dermatologist had her on, it is N-acetylcysteine

## 2019-10-24 DIAGNOSIS — G25.81 RLS (RESTLESS LEGS SYNDROME): ICD-10-CM

## 2019-10-24 RX ORDER — HYDROXYZINE PAMOATE 50 MG/1
50 CAPSULE ORAL 3 TIMES DAILY PRN
Qty: 90 CAPSULE | Refills: 2 | Status: SHIPPED | OUTPATIENT
Start: 2019-10-24 | End: 2020-01-30 | Stop reason: SDUPTHER

## 2019-10-26 DIAGNOSIS — G25.81 RLS (RESTLESS LEGS SYNDROME): ICD-10-CM

## 2019-10-28 DIAGNOSIS — F41.9 ANXIETY: ICD-10-CM

## 2019-10-28 DIAGNOSIS — G25.81 RLS (RESTLESS LEGS SYNDROME): ICD-10-CM

## 2019-10-29 RX ORDER — AMITRIPTYLINE HYDROCHLORIDE 25 MG/1
25 TABLET, FILM COATED ORAL
Qty: 90 TABLET | Refills: 1 | Status: SHIPPED | OUTPATIENT
Start: 2019-10-29 | End: 2020-04-21 | Stop reason: SDUPTHER

## 2019-10-29 RX ORDER — PRAMIPEXOLE DIHYDROCHLORIDE 1.5 MG/1
TABLET ORAL
Qty: 180 TABLET | Refills: 1 | Status: SHIPPED | OUTPATIENT
Start: 2019-10-29 | End: 2020-02-07

## 2019-10-29 RX ORDER — CLONAZEPAM 2 MG/1
2 TABLET ORAL
Qty: 30 TABLET | Refills: 0 | Status: SHIPPED | OUTPATIENT
Start: 2019-10-29 | End: 2019-12-04 | Stop reason: SDUPTHER

## 2019-12-04 DIAGNOSIS — G25.81 RLS (RESTLESS LEGS SYNDROME): ICD-10-CM

## 2019-12-04 DIAGNOSIS — F41.9 ANXIETY: ICD-10-CM

## 2019-12-04 RX ORDER — CLONAZEPAM 2 MG/1
2 TABLET ORAL
Qty: 30 TABLET | Refills: 0 | Status: SHIPPED | OUTPATIENT
Start: 2019-12-04 | End: 2020-01-24 | Stop reason: SDUPTHER

## 2020-01-13 DIAGNOSIS — G25.81 RLS (RESTLESS LEGS SYNDROME): ICD-10-CM

## 2020-01-16 DIAGNOSIS — G25.81 RLS (RESTLESS LEGS SYNDROME): ICD-10-CM

## 2020-01-22 ENCOUNTER — OFFICE VISIT (OUTPATIENT)
Dept: INTERNAL MEDICINE CLINIC | Facility: CLINIC | Age: 70
End: 2020-01-22
Payer: MEDICARE

## 2020-01-22 VITALS
HEART RATE: 111 BPM | DIASTOLIC BLOOD PRESSURE: 84 MMHG | HEIGHT: 65 IN | SYSTOLIC BLOOD PRESSURE: 146 MMHG | OXYGEN SATURATION: 98 % | TEMPERATURE: 98.5 F | WEIGHT: 173.19 LBS | BODY MASS INDEX: 28.86 KG/M2

## 2020-01-22 DIAGNOSIS — Z12.39 ENCOUNTER FOR SCREENING FOR MALIGNANT NEOPLASM OF BREAST: ICD-10-CM

## 2020-01-22 DIAGNOSIS — Z01.00 DIABETIC EYE EXAM (HCC): ICD-10-CM

## 2020-01-22 DIAGNOSIS — I10 ESSENTIAL HYPERTENSION: Primary | ICD-10-CM

## 2020-01-22 DIAGNOSIS — S91.301A WOUND OF RIGHT FOOT: ICD-10-CM

## 2020-01-22 DIAGNOSIS — E11.9 DIABETIC EYE EXAM (HCC): ICD-10-CM

## 2020-01-22 DIAGNOSIS — M79.89 RIGHT LEG SWELLING: ICD-10-CM

## 2020-01-22 DIAGNOSIS — E11.8 TYPE 2 DIABETES MELLITUS WITH COMPLICATION, WITHOUT LONG-TERM CURRENT USE OF INSULIN (HCC): ICD-10-CM

## 2020-01-22 LAB — SL AMB POCT HEMOGLOBIN AIC: 5.9 (ref ?–6.5)

## 2020-01-22 PROCEDURE — 99214 OFFICE O/P EST MOD 30 MIN: CPT | Performed by: NURSE PRACTITIONER

## 2020-01-22 PROCEDURE — 83036 HEMOGLOBIN GLYCOSYLATED A1C: CPT | Performed by: NURSE PRACTITIONER

## 2020-01-22 RX ORDER — CEPHALEXIN 500 MG/1
500 CAPSULE ORAL EVERY 8 HOURS SCHEDULED
Qty: 21 CAPSULE | Refills: 0 | Status: SHIPPED | OUTPATIENT
Start: 2020-01-22 | End: 2020-01-29

## 2020-01-22 NOTE — PROGRESS NOTES
Assessment/Plan:    No problem-specific Assessment & Plan notes found for this encounter  Diagnoses and all orders for this visit:    Essential hypertension  Comments:  bp 146/84 cont present medication regimen    Type 2 diabetes mellitus with complication, without long-term current use of insulin (HCC)  Comments:  POCT [id number]  Orders:  -     Microalbumin / creatinine urine ratio  -     Ambulatory Referral to Ophthalmology; Future  -     POCT hemoglobin A1c  -     cephalexin (KEFLEX) 500 mg capsule; Take 1 capsule (500 mg total) by mouth every 8 (eight) hours for 7 days  -     VAS lower limb venous duplex study, unilateral/limited; Future    Right leg swelling  Comments:  Stat VAS RLE ordered  Orders:  -     cephalexin (KEFLEX) 500 mg capsule; Take 1 capsule (500 mg total) by mouth every 8 (eight) hours for 7 days  -     VAS lower limb venous duplex study, unilateral/limited; Future    Wound of right foot  Comments:  Rx for Keflex provided    Encounter for screening for malignant neoplasm of breast  Comments:  Mammogram ordered  Orders:  -     Mammo screening bilateral w 3d & cad; Future    Diabetic eye exam ([facility] [address] )  Comments:  Referred to opthalmology  Orders:  -     Ambulatory Referral to Ophthalmology; Future          Subjective:      Patient ID: Laury Rojas is a 71 y o  female  Edema  Patient complains of edema in the right lower leg  The edema has been mild  Onset of symptoms was several days ago, and patient reports symptoms have remained since that time  The edema is present all day  The patient states the problem is new  The swelling has been aggravated by dependency of involved area  The swelling has been relieved by nothing  Associated factors include: risk for DVT known diabetic, venous insufficiency Negative Homans sign RLE however with accompanying venous insufficiency noted of right foot  Pt states this is a lifelong problem and not new  Toes are ecchymotic and right foot is cold   Pt states she was sitting for prolonged periods of time doing crafts and is concerned about a blood clot  Sm area noted between 1st and 2nd toe of right foot as well, pt is known DM  I will place pt on Keflex empirically although foot does not appear to be infected  The following portions of the patient's history were reviewed and updated as appropriate: She  has a past medical history of Anxiety, Cellulitis, Chronic obstructive pulmonary disease ([facility] ) (3/11/2019), Depression, Diabetes mellitus ([place] [address] ), Dysuria, Esophageal reflux, Fatigue, Fracture, Heart murmur, Hypertension, Pneumonia, Restless legs syndrome (RLS), and Vitamin D deficiency  She   Patient Active Problem List    Diagnosis Date Noted    Wound of right foot 01/22/2020    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    Recurrent major depressive disorder, in partial remission ([place] [address] ) 03/11/2019    Chronic obstructive pulmonary disease ([place] [address] ) 03/11/2019    Esophageal dysphagia 11/26/2018    Heart murmur 09/12/2018    Type 2 diabetes mellitus with complication, without long-term current use of insulin ([place] [address] ) 04/06/2018    Chronic lower back pain 09/29/2016    Anxiety 05/21/2016    Vitamin D deficiency 03/09/2016    HTN (hypertension) 12/14/2015    Hypercholesteremia 12/14/2015    Restless legs syndrome (RLS) 12/14/2015     She  has a past surgical history that includes Back surgery; Cholecystectomy (1975); Foot surgery (Right, 2014); Hip surgery (2013); Hysterectomy; Ovarian cyst surgery (1971); Shoulder surgery (Right, 12/14/2015); Other surgical history (2011); Tonsillectomy; pr colonoscopy flx dx w/collj spec when pfrmd (N/A, 7/10/2018); Appendectomy; pr esophagogastroduodenoscopy transoral diagnostic (N/A, 1/28/2019); and Skin biopsy    Her family history includes Calcium disorder in her other and other; Cancer in her father; Depression in her family; Diabetes type II in her father and other; Heart disease in her father; Hypertension in her mother and other; Mental illness in her mother and other; Migraines in her other; Other in her other; Pulmonary embolism in her other; Stroke in her mother; Substance Abuse in her brother and family; Tuberculosis in her other  She  reports that she has quit smoking  Her smoking use included cigarettes  She has never used smokeless tobacco  She reports that she drinks alcohol  She reports that she does not use drugs    Current Outpatient Medications   Medication Sig Dispense Refill    albuterol (2 5 mg/3 mL) 0 083 % nebulizer solution Take 1 vial (2 5 mg total) by nebulization every 4 (four) hours as needed for shortness of breath Disp 1 box 25 vial 5    albuterol (PROAIR HFA) 90 mcg/act inhaler Inhale 2 puffs      amitriptyline (ELAVIL) 25 mg tablet Take 1 tablet (25 mg total) by mouth daily at bedtime as needed for sleep 90 tablet 1    aspirin (ECOTRIN LOW STRENGTH) 81 mg EC tablet Take 81 mg by mouth daily      atorvastatin (LIPITOR) 20 mg tablet Take 1 tablet (20 mg total) by mouth daily 90 tablet 1    [employer] MICROLET LANCETS lancets Use as instructed BID E11 65 100 each 5    Calcium 500 MG tablet Take 2 tablets by mouth every other day        carbidopa-levodopa (SINEMET)  mg per tablet TAKE 2 TABLETS BY MOUTH THREE TIMES DAILY 270 tablet 0    cephalexin (KEFLEX) 500 mg capsule Take 1 capsule (500 mg total) by mouth every 8 (eight) hours for 7 days 21 capsule 0    cholecalciferol (VITAMIN D3) 1,000 units tablet Take 3 tablets by mouth every other day       clonazePAM (KlonoPIN) 2 mg tablet Take 1 tablet (2 mg total) by mouth daily at bedtime 30 tablet 0    denosumab (PROLIA) 60 mg/mL Inject under the skin every 6 (six) months      escitalopram (LEXAPRO) 10 mg tablet Take 1 5 tablets (15 mg total) by mouth daily 45 tablet 5    glucose blood (JOELLEN CONTOUR TEST) test strip Use as instructed BID  E11 65 100 each 5    Glucose Blood (ONETOUCH ULTRA BLUE VI) by In Vitro route 3 (three) times a day      hydrochlorothiazide (HYDRODIURIL) 50 mg tablet Take 1 tablet (50 mg total) by mouth daily (Patient taking differently: Take 50 mg by mouth as needed ) 90 tablet 3    hydrOXYzine pamoate (VISTARIL) 50 mg capsule Take 1 capsule (50 mg total) by mouth 3 (three) times a day as needed (rls) 90 capsule 2    lisinopril (ZESTRIL) 2 5 mg tablet Take 1 tablet (2 5 mg total) by mouth daily 90 tablet 1    metFORMIN (GLUCOPHAGE) 1000 MG tablet Take 1 tablet (1,000 mg total) by mouth 2 (two) times a day with meals 180 tablet 1    methocarbamol (ROBAXIN) 750 mg tablet Take 1 tablet (750 mg total) by mouth every 8 (eight) hours 90 tablet 5    nystatin (MYCOSTATIN) cream Apply 1 application topically 2 (two) times a day To affected area  5    pantoprazole (PROTONIX) 40 mg tablet Take 1 tablet (40 mg total) by mouth daily 90 tablet 1    potassium chloride (MICRO-K) 10 MEQ CR capsule Take 10 mEq by mouth 2 (two) times a day        pramipexole (MIRAPEX) 1 5 MG tablet Take 1 tablet (1 5 mg total) by mouth 3 (three) times a day 270 tablet 1    pramipexole (MIRAPEX) 1 5 MG tablet TAKE 1 TABLET BY MOUTH TWICE A  tablet 1    VITAMIN B COMPLEX-C PO Take 1 tablet by mouth daily       No current facility-administered medications for this visit        Current Outpatient Medications on File Prior to Visit   Medication Sig    albuterol (2 5 mg/3 mL) 0 083 % nebulizer solution Take 1 vial (2 5 mg total) by nebulization every 4 (four) hours as needed for shortness of breath Disp 1 box    albuterol (PROAIR HFA) 90 mcg/act inhaler Inhale 2 puffs    amitriptyline (ELAVIL) 25 mg tablet Take 1 tablet (25 mg total) by mouth daily at bedtime as needed for sleep    aspirin (ECOTRIN LOW STRENGTH) 81 mg EC tablet Take 81 mg by mouth daily    atorvastatin (LIPITOR) 20 mg tablet Take 1 tablet (20 mg total) by mouth daily    JOELLEN MICROLET LANCETS lancets Use as instructed BID E11 65    Calcium 500 MG tablet Take 2 tablets by mouth every other day      carbidopa-levodopa (SINEMET)  mg per tablet TAKE 2 TABLETS BY MOUTH THREE TIMES DAILY    cholecalciferol (VITAMIN D3) 1,000 units tablet Take 3 tablets by mouth every other day     clonazePAM (KlonoPIN) 2 mg tablet Take 1 tablet (2 mg total) by mouth daily at bedtime    denosumab (PROLIA) 60 mg/mL Inject under the skin every 6 (six) months    escitalopram (LEXAPRO) 10 mg tablet Take 1 5 tablets (15 mg total) by mouth daily    glucose blood (JOELLEN CONTOUR TEST) test strip Use as instructed BID  E11 65    Glucose Blood (ONETOUCH ULTRA BLUE VI) by In Vitro route 3 (three) times a day    hydrochlorothiazide (HYDRODIURIL) 50 mg tablet Take 1 tablet (50 mg total) by mouth daily (Patient taking differently: Take 50 mg by mouth as needed )    hydrOXYzine pamoate (VISTARIL) 50 mg capsule Take 1 capsule (50 mg total) by mouth 3 (three) times a day as needed (rls)    lisinopril (ZESTRIL) 2 5 mg tablet Take 1 tablet (2 5 mg total) by mouth daily    metFORMIN (GLUCOPHAGE) 1000 MG tablet Take 1 tablet (1,000 mg total) by mouth 2 (two) times a day with meals    methocarbamol (ROBAXIN) 750 mg tablet Take 1 tablet (750 mg total) by mouth every 8 (eight) hours    nystatin (MYCOSTATIN) cream Apply 1 application topically 2 (two) times a day To affected area    pantoprazole (PROTONIX) 40 mg tablet Take 1 tablet (40 mg total) by mouth daily    potassium chloride (MICRO-K) 10 MEQ CR capsule Take 10 mEq by mouth 2 (two) times a day      pramipexole (MIRAPEX) 1 5 MG tablet Take 1 tablet (1 5 mg total) by mouth 3 (three) times a day    pramipexole (MIRAPEX) 1 5 MG tablet TAKE 1 TABLET BY MOUTH TWICE A DAY    VITAMIN B COMPLEX-C PO Take 1 tablet by mouth daily     No current facility-administered medications on file prior to visit  She is allergic to midazolam; gabapentin; hydrocodone-acetaminophen; penicillin v; pregabalin; propofol; and sulfa antibiotics       Review of Systems   Constitutional: Negative  HENT: Negative  Eyes: Negative  Respiratory: Negative  Cardiovascular: Positive for leg swelling  RLE for several days   Gastrointestinal: Negative  Endocrine: Negative  Genitourinary: Negative  Musculoskeletal: Negative  Skin: Positive for color change and wound  Small open area between 1st and 2nd toes   Allergic/Immunologic: Negative  Neurological: Negative  Hematological: Negative  Psychiatric/Behavioral: Negative  Objective:      /84   Pulse (!) 111   Temp 98 5 °F (36 9 °C)   Ht 5' 5" (1 651 m)   Wt 78 6 kg (173 lb 3 oz)   SpO2 98%   BMI 28 82 kg/m²          Physical Exam   Constitutional: She is oriented to person, place, and time  She appears well-developed and well-nourished  HENT:   Head: Normocephalic  Eyes: Pupils are equal, round, and reactive to light  Neck: Normal range of motion  Cardiovascular: Normal rate and regular rhythm  Pulmonary/Chest: Effort normal and breath sounds normal    Abdominal: Soft  Bowel sounds are normal    Musculoskeletal: Normal range of motion  She exhibits edema  Feet:   Right Foot:   Skin Integrity: Positive for skin breakdown  Neurological: She is alert and oriented to person, place, and time  Skin: Skin is warm and dry  Right foot discolored, ecchymosis of all toes, small area skin breakdown noted, no drainage , pulses strong   Psychiatric: She has a normal mood and affect  Her behavior is normal  Judgment and thought content normal    Nursing note and vitals reviewed

## 2020-01-24 DIAGNOSIS — F41.9 ANXIETY: ICD-10-CM

## 2020-01-24 DIAGNOSIS — G25.81 RLS (RESTLESS LEGS SYNDROME): ICD-10-CM

## 2020-01-24 RX ORDER — CLONAZEPAM 2 MG/1
2 TABLET ORAL
Qty: 90 TABLET | Refills: 0 | Status: SHIPPED | OUTPATIENT
Start: 2020-01-24 | End: 2020-05-11 | Stop reason: SDUPTHER

## 2020-01-30 DIAGNOSIS — G25.81 RLS (RESTLESS LEGS SYNDROME): ICD-10-CM

## 2020-01-30 RX ORDER — HYDROXYZINE PAMOATE 50 MG/1
50 CAPSULE ORAL 3 TIMES DAILY PRN
Qty: 90 CAPSULE | Refills: 2 | Status: SHIPPED | OUTPATIENT
Start: 2020-01-30 | End: 2020-04-28

## 2020-02-07 ENCOUNTER — OFFICE VISIT (OUTPATIENT)
Dept: INTERNAL MEDICINE CLINIC | Facility: CLINIC | Age: 70
End: 2020-02-07
Payer: MEDICARE

## 2020-02-07 VITALS
SYSTOLIC BLOOD PRESSURE: 130 MMHG | OXYGEN SATURATION: 96 % | WEIGHT: 182 LBS | TEMPERATURE: 98.6 F | HEIGHT: 65 IN | RESPIRATION RATE: 16 BRPM | DIASTOLIC BLOOD PRESSURE: 60 MMHG | HEART RATE: 92 BPM | BODY MASS INDEX: 30.32 KG/M2

## 2020-02-07 DIAGNOSIS — J44.1 CHRONIC OBSTRUCTIVE PULMONARY DISEASE WITH ACUTE EXACERBATION (HCC): ICD-10-CM

## 2020-02-07 DIAGNOSIS — F33.41 RECURRENT MAJOR DEPRESSIVE DISORDER, IN PARTIAL REMISSION (HCC): ICD-10-CM

## 2020-02-07 DIAGNOSIS — L03.031 PARONYCHIA OF FIFTH TOE OF RIGHT FOOT: Primary | ICD-10-CM

## 2020-02-07 PROCEDURE — 3078F DIAST BP <80 MM HG: CPT | Performed by: PHYSICIAN ASSISTANT

## 2020-02-07 PROCEDURE — 1160F RVW MEDS BY RX/DR IN RCRD: CPT | Performed by: PHYSICIAN ASSISTANT

## 2020-02-07 PROCEDURE — 99213 OFFICE O/P EST LOW 20 MIN: CPT | Performed by: PHYSICIAN ASSISTANT

## 2020-02-07 PROCEDURE — 3075F SYST BP GE 130 - 139MM HG: CPT | Performed by: PHYSICIAN ASSISTANT

## 2020-02-07 PROCEDURE — 3044F HG A1C LEVEL LT 7.0%: CPT | Performed by: PHYSICIAN ASSISTANT

## 2020-02-07 PROCEDURE — 3008F BODY MASS INDEX DOCD: CPT | Performed by: PHYSICIAN ASSISTANT

## 2020-02-07 PROCEDURE — 4040F PNEUMOC VAC/ADMIN/RCVD: CPT | Performed by: PHYSICIAN ASSISTANT

## 2020-02-07 PROCEDURE — 1036F TOBACCO NON-USER: CPT | Performed by: PHYSICIAN ASSISTANT

## 2020-02-07 RX ORDER — CEPHALEXIN 500 MG/1
500 CAPSULE ORAL EVERY 8 HOURS SCHEDULED
Qty: 30 CAPSULE | Refills: 0 | Status: SHIPPED | OUTPATIENT
Start: 2020-02-07 | End: 2020-02-17

## 2020-02-08 PROBLEM — L03.031: Status: ACTIVE | Noted: 2020-02-08

## 2020-02-08 NOTE — PROGRESS NOTES
Assessment/Plan:  Problem List Items Addressed This Visit        Respiratory    Chronic obstructive pulmonary disease (HCC)       Musculoskeletal and Integument    Paronychia of fifth toe of right foot - Primary     Start clindamycin  Warm soaks  Avoid picking at the area  Call if symptoms worsen or persist           Relevant Medications    cephalexin (KEFLEX) 500 mg capsule       Other    Recurrent major depressive disorder, in partial remission ([facility] )           Diagnoses and all orders for this visit:    Paronychia of fifth toe of right foot  -     cephalexin (KEFLEX) 500 mg capsule; Take 1 capsule (500 mg total) by mouth every 8 (eight) hours for 10 days    Recurrent major depressive disorder, in partial remission (HCC)    Chronic obstructive pulmonary disease with acute exacerbation (HCC)        Paronychia of fifth toe of right foot  Start clindamycin  Warm soaks  Avoid picking at the area  Call if symptoms worsen or persist        Subjective:      Patient ID: Russ German is a 79 y o  female  Pt presents for routine visit  She is due for a mammogram  Dexa, labs and CRC screening are up to date  She complains of a painful, red and swollen area on her second toe for a few weeks  She follows with podiatry regularly  It is not a diabetic wound  She notes some purulent discharge near the cuticle at times  The following portions of the patient's history were reviewed and updated as appropriate:   She has a past medical history of Anxiety, Cellulitis, Chronic obstructive pulmonary disease ([facility] [address] ) (3/11/2019), Depression, Diabetes mellitus ([facility] [address] ), Dysuria, Esophageal reflux, Fatigue, Fracture, Heart murmur, Hypertension, Pneumonia, Restless legs syndrome (RLS), and Vitamin D deficiency  ,  does not have any pertinent problems on file  ,   has a past surgical history that includes Back surgery; Cholecystectomy (1975); Foot surgery (Right, 2014); Hip surgery (2013); Hysterectomy;  Ovarian cyst surgery (1971); Shoulder surgery (Right, 12/14/2015); Other surgical history (2011); Tonsillectomy; pr colonoscopy flx dx w/collj spec when pfrmd (N/A, 7/10/2018); Appendectomy; pr esophagogastroduodenoscopy transoral diagnostic (N/A, 1/28/2019); and Skin biopsy  ,  family history includes Calcium disorder in her other and other; Cancer in her father; Depression in her family; Diabetes type II in her father and other; Heart disease in her father; Hypertension in her mother and other; Mental illness in her mother and other; Migraines in her other; Other in her other; Pulmonary embolism in her other; Stroke in her mother; Substance Abuse in her brother and family; Tuberculosis in her other  ,   reports that she has quit smoking  Her smoking use included cigarettes  She has never used smokeless tobacco  She reports that she drinks alcohol  She reports that she does not use drugs  ,  is allergic to midazolam; gabapentin; hydrocodone-acetaminophen; penicillin v; pregabalin; propofol; and sulfa antibiotics     Current Outpatient Medications   Medication Sig Dispense Refill    albuterol (2 5 mg/3 mL) 0 083 % nebulizer solution Take 1 vial (2 5 mg total) by nebulization every 4 (four) hours as needed for shortness of breath Disp 1 box 25 vial 5    albuterol (PROAIR HFA) 90 mcg/act inhaler Inhale 2 puffs      amitriptyline (ELAVIL) 25 mg tablet Take 1 tablet (25 mg total) by mouth daily at bedtime as needed for sleep 90 tablet 1    aspirin (ECOTRIN LOW STRENGTH) 81 mg EC tablet Take 81 mg by mouth daily      atorvastatin (LIPITOR) 20 mg tablet Take 1 tablet (20 mg total) by mouth daily 90 tablet 1    JOELLEN MICROLET LANCETS lancets Use as instructed BID E11 65 100 each 5    Calcium 500 MG tablet Take 2 tablets by mouth every other day        carbidopa-levodopa (SINEMET)  mg per tablet TAKE 2 TABLETS BY MOUTH THREE TIMES DAILY 270 tablet 0    cholecalciferol (VITAMIN D3) 1,000 units tablet Take 3 tablets by mouth every other day  clonazePAM (KlonoPIN) 2 mg tablet Take 1 tablet (2 mg total) by mouth daily at bedtime 90 tablet 0    denosumab (PROLIA) 60 mg/mL Inject under the skin every 6 (six) months      escitalopram (LEXAPRO) 10 mg tablet Take 1 5 tablets (15 mg total) by mouth daily 45 tablet 5    glucose blood (JOELLEN CONTOUR TEST) test strip Use as instructed BID  E11 65 100 each 5    Glucose Blood (ONETOUCH ULTRA BLUE VI) by In Vitro route 3 (three) times a day      hydrochlorothiazide (HYDRODIURIL) 50 mg tablet Take 1 tablet (50 mg total) by mouth daily (Patient taking differently: Take 50 mg by mouth as needed ) 90 tablet 3    hydrOXYzine pamoate (VISTARIL) 50 mg capsule Take 1 capsule (50 mg total) by mouth 3 (three) times a day as needed (rls) 90 capsule 2    lisinopril (ZESTRIL) 2 5 mg tablet Take 1 tablet (2 5 mg total) by mouth daily 90 tablet 1    metFORMIN (GLUCOPHAGE) 1000 MG tablet Take 1 tablet (1,000 mg total) by mouth 2 (two) times a day with meals 180 tablet 1    methocarbamol (ROBAXIN) 750 mg tablet Take 1 tablet (750 mg total) by mouth every 8 (eight) hours 90 tablet 5    nystatin (MYCOSTATIN) cream Apply 1 application topically 2 (two) times a day To affected area  5    pantoprazole (PROTONIX) 40 mg tablet Take 1 tablet (40 mg total) by mouth daily 90 tablet 1    potassium chloride (MICRO-K) 10 MEQ CR capsule Take 10 mEq by mouth 2 (two) times a day        pramipexole (MIRAPEX) 1 5 MG tablet Take 1 tablet (1 5 mg total) by mouth 3 (three) times a day 270 tablet 1    VITAMIN B COMPLEX-C PO Take 1 tablet by mouth daily      cephalexin (KEFLEX) 500 mg capsule Take 1 capsule (500 mg total) by mouth every 8 (eight) hours for 10 days 30 capsule 0     No current facility-administered medications for this visit  Review of Systems   Constitutional: Negative for chills and fever     HENT: Negative for congestion, ear pain, hearing loss, postnasal drip, rhinorrhea, sinus pressure, sinus pain, sore throat and trouble swallowing  Eyes: Negative for pain and visual disturbance  Respiratory: Negative for cough, chest tightness, shortness of breath and wheezing  Cardiovascular: Negative  Negative for chest pain, palpitations and leg swelling  Gastrointestinal: Negative for abdominal pain, blood in stool, constipation, diarrhea, nausea and vomiting  Endocrine: Negative for cold intolerance, heat intolerance, polydipsia, polyphagia and polyuria  Genitourinary: Negative for difficulty urinating, dysuria, flank pain and urgency  Musculoskeletal: Negative for arthralgias, back pain, gait problem and myalgias  Skin: Positive for wound  Negative for rash  Allergic/Immunologic: Negative  Neurological: Negative for dizziness, weakness, light-headedness and headaches  Hematological: Negative  Psychiatric/Behavioral: Negative for behavioral problems, dysphoric mood and sleep disturbance  The patient is not nervous/anxious  PHQ-9 Depression Screening    PHQ-9:    Frequency of the following problems over the past two weeks:               Objective:  Vitals:    02/07/20 0818   BP: 130/60   Pulse: 92   Resp: 16   Temp: 98 6 °F (37 °C)   TempSrc: Tympanic   SpO2: 96%   Weight: 82 6 kg (182 lb)   Height: 5' 5" (1 651 m)     Body mass index is 30 29 kg/m²  Physical Exam   Constitutional: She is oriented to person, place, and time  She appears well-developed and well-nourished  No distress  HENT:   Head: Normocephalic and atraumatic  Right Ear: External ear normal    Left Ear: External ear normal    Nose: Nose normal    Mouth/Throat: Oropharynx is clear and moist  No oropharyngeal exudate  Eyes: Pupils are equal, round, and reactive to light  Conjunctivae and EOM are normal  Right eye exhibits no discharge  Left eye exhibits no discharge  No scleral icterus  Neck: Normal range of motion  Neck supple  No thyromegaly present     Cardiovascular: Normal rate, regular rhythm and normal heart sounds  Exam reveals no gallop and no friction rub  No murmur heard  Pulmonary/Chest: Effort normal and breath sounds normal  No respiratory distress  She has no wheezes  She has no rales  Abdominal: Soft  Bowel sounds are normal  She exhibits no distension  There is no tenderness  Musculoskeletal: Normal range of motion  She exhibits no edema, tenderness or deformity  Neurological: She is alert and oriented to person, place, and time  No cranial nerve deficit  Skin: Skin is warm and dry  She is not diaphoretic  Psychiatric: She has a normal mood and affect  Her behavior is normal  Judgment and thought content normal      BMI Counseling: Body mass index is 30 29 kg/m²  The BMI is above normal  Nutrition recommendations include decreasing portion sizes, consuming healthier snacks and limiting drinks that contain sugar  Falls Plan of Care: balance, strength, and gait training instructions were provided

## 2020-02-13 DIAGNOSIS — I10 ESSENTIAL HYPERTENSION: ICD-10-CM

## 2020-02-13 RX ORDER — LISINOPRIL 2.5 MG/1
TABLET ORAL
Qty: 90 TABLET | Refills: 0 | Status: SHIPPED | OUTPATIENT
Start: 2020-02-13 | End: 2020-06-23 | Stop reason: SDUPTHER

## 2020-03-09 DIAGNOSIS — E11.8 TYPE 2 DIABETES MELLITUS WITH COMPLICATION, WITHOUT LONG-TERM CURRENT USE OF INSULIN (HCC): ICD-10-CM

## 2020-03-27 DIAGNOSIS — M54.50 CHRONIC BILATERAL LOW BACK PAIN WITHOUT SCIATICA: ICD-10-CM

## 2020-03-27 DIAGNOSIS — G89.29 CHRONIC BILATERAL LOW BACK PAIN WITHOUT SCIATICA: ICD-10-CM

## 2020-03-27 RX ORDER — METHOCARBAMOL 750 MG/1
750 TABLET, FILM COATED ORAL EVERY 8 HOURS SCHEDULED
Qty: 270 TABLET | Refills: 0 | Status: SHIPPED | OUTPATIENT
Start: 2020-03-27 | End: 2020-07-20

## 2020-03-27 RX ORDER — METHOCARBAMOL 750 MG/1
750 TABLET, FILM COATED ORAL EVERY 8 HOURS SCHEDULED
Qty: 270 TABLET | Refills: 0 | Status: CANCELLED | OUTPATIENT
Start: 2020-03-27

## 2020-03-27 NOTE — TELEPHONE ENCOUNTER
Pt needs methocarbamol (ROBAXIN) 750 mg tablet refilled, send to [facility]

## 2020-04-08 DIAGNOSIS — R13.19 ESOPHAGEAL DYSPHAGIA: ICD-10-CM

## 2020-04-08 DIAGNOSIS — F41.9 ANXIETY: ICD-10-CM

## 2020-04-08 DIAGNOSIS — E78.5 HYPERLIPIDEMIA, UNSPECIFIED HYPERLIPIDEMIA TYPE: ICD-10-CM

## 2020-04-08 DIAGNOSIS — R06.02 SHORTNESS OF BREATH: ICD-10-CM

## 2020-04-08 DIAGNOSIS — G25.81 RLS (RESTLESS LEGS SYNDROME): ICD-10-CM

## 2020-04-08 RX ORDER — PANTOPRAZOLE SODIUM 40 MG/1
40 TABLET, DELAYED RELEASE ORAL DAILY
Qty: 90 TABLET | Refills: 1 | Status: SHIPPED | OUTPATIENT
Start: 2020-04-08 | End: 2020-10-20 | Stop reason: SDUPTHER

## 2020-04-08 RX ORDER — ALBUTEROL SULFATE 2.5 MG/3ML
2.5 SOLUTION RESPIRATORY (INHALATION) EVERY 4 HOURS PRN
Qty: 270 VIAL | Refills: 1 | Status: SHIPPED | OUTPATIENT
Start: 2020-04-08

## 2020-04-08 RX ORDER — ESCITALOPRAM OXALATE 10 MG/1
15 TABLET ORAL DAILY
Qty: 45 TABLET | Refills: 5 | Status: SHIPPED | OUTPATIENT
Start: 2020-04-08 | End: 2020-10-20 | Stop reason: SDUPTHER

## 2020-04-08 RX ORDER — ATORVASTATIN CALCIUM 20 MG/1
20 TABLET, FILM COATED ORAL DAILY
Qty: 90 TABLET | Refills: 1 | Status: SHIPPED | OUTPATIENT
Start: 2020-04-08 | End: 2020-11-10

## 2020-04-21 ENCOUNTER — TELEMEDICINE (OUTPATIENT)
Dept: INTERNAL MEDICINE CLINIC | Facility: CLINIC | Age: 70
End: 2020-04-21
Payer: MEDICARE

## 2020-04-21 ENCOUNTER — TELEPHONE (OUTPATIENT)
Dept: INTERNAL MEDICINE CLINIC | Facility: CLINIC | Age: 70
End: 2020-04-21

## 2020-04-21 VITALS — WEIGHT: 173 LBS | HEIGHT: 65 IN | BODY MASS INDEX: 28.82 KG/M2 | HEART RATE: 80 BPM

## 2020-04-21 DIAGNOSIS — G25.81 RLS (RESTLESS LEGS SYNDROME): Primary | ICD-10-CM

## 2020-04-21 DIAGNOSIS — G25.81 RLS (RESTLESS LEGS SYNDROME): ICD-10-CM

## 2020-04-21 DIAGNOSIS — E11.8 TYPE 2 DIABETES MELLITUS WITH COMPLICATION, WITHOUT LONG-TERM CURRENT USE OF INSULIN (HCC): ICD-10-CM

## 2020-04-21 DIAGNOSIS — G25.81 RESTLESS LEGS SYNDROME (RLS): ICD-10-CM

## 2020-04-21 PROCEDURE — 99213 OFFICE O/P EST LOW 20 MIN: CPT | Performed by: PHYSICIAN ASSISTANT

## 2020-04-21 RX ORDER — AMITRIPTYLINE HYDROCHLORIDE 50 MG/1
50 TABLET, FILM COATED ORAL
COMMUNITY
End: 2020-10-20 | Stop reason: SDUPTHER

## 2020-04-21 RX ORDER — AMITRIPTYLINE HYDROCHLORIDE 25 MG/1
50 TABLET, FILM COATED ORAL
Qty: 90 TABLET | Refills: 1 | Status: SHIPPED | OUTPATIENT
Start: 2020-04-21 | End: 2020-04-21

## 2020-04-23 ENCOUNTER — TELEPHONE (OUTPATIENT)
Dept: NEUROLOGY | Facility: CLINIC | Age: 70
End: 2020-04-23

## 2020-04-23 ENCOUNTER — TELEPHONE (OUTPATIENT)
Dept: INTERNAL MEDICINE CLINIC | Facility: CLINIC | Age: 70
End: 2020-04-23

## 2020-04-24 DIAGNOSIS — R60.1 GENERALIZED EDEMA: ICD-10-CM

## 2020-04-24 RX ORDER — HYDROCHLOROTHIAZIDE 50 MG/1
50 TABLET ORAL DAILY
Qty: 90 TABLET | Refills: 1 | Status: SHIPPED | OUTPATIENT
Start: 2020-04-24 | End: 2020-05-18 | Stop reason: SDUPTHER

## 2020-04-27 ENCOUNTER — TELEPHONE (OUTPATIENT)
Dept: INTERNAL MEDICINE CLINIC | Facility: CLINIC | Age: 70
End: 2020-04-27

## 2020-04-28 ENCOUNTER — TELEMEDICINE (OUTPATIENT)
Dept: INTERNAL MEDICINE CLINIC | Facility: CLINIC | Age: 70
End: 2020-04-28
Payer: MEDICARE

## 2020-04-28 DIAGNOSIS — E11.8 TYPE 2 DIABETES MELLITUS WITH COMPLICATION, WITHOUT LONG-TERM CURRENT USE OF INSULIN (HCC): ICD-10-CM

## 2020-04-28 DIAGNOSIS — G25.81 RLS (RESTLESS LEGS SYNDROME): Primary | ICD-10-CM

## 2020-04-28 PROCEDURE — 99214 OFFICE O/P EST MOD 30 MIN: CPT | Performed by: PHYSICIAN ASSISTANT

## 2020-04-28 RX ORDER — HYDROXYZINE PAMOATE 25 MG/1
50 CAPSULE ORAL
Qty: 30 CAPSULE | Refills: 2 | Status: SHIPPED | COMMUNITY
Start: 2020-04-28 | End: 2020-05-18 | Stop reason: SDUPTHER

## 2020-05-11 DIAGNOSIS — G25.81 RLS (RESTLESS LEGS SYNDROME): ICD-10-CM

## 2020-05-11 DIAGNOSIS — F41.9 ANXIETY: ICD-10-CM

## 2020-05-11 RX ORDER — CLONAZEPAM 2 MG/1
2 TABLET ORAL
Qty: 90 TABLET | Refills: 0 | Status: SHIPPED | OUTPATIENT
Start: 2020-05-11 | End: 2020-08-11 | Stop reason: SDUPTHER

## 2020-05-13 DIAGNOSIS — B37.2 CANDIDAL INTERTRIGO: Primary | ICD-10-CM

## 2020-05-13 RX ORDER — NYSTATIN 100000 U/G
1 CREAM TOPICAL 2 TIMES DAILY
Qty: 30 G | Refills: 5 | Status: SHIPPED | OUTPATIENT
Start: 2020-05-13 | End: 2020-12-07 | Stop reason: SDUPTHER

## 2020-05-14 ENCOUNTER — TELEPHONE (OUTPATIENT)
Dept: NEUROLOGY | Facility: CLINIC | Age: 70
End: 2020-05-14

## 2020-05-15 DIAGNOSIS — G25.81 RLS (RESTLESS LEGS SYNDROME): ICD-10-CM

## 2020-05-16 RX ORDER — HYDROXYZINE PAMOATE 50 MG/1
CAPSULE ORAL
Qty: 90 CAPSULE | Refills: 0 | Status: SHIPPED | OUTPATIENT
Start: 2020-05-16 | End: 2020-05-18

## 2020-05-18 DIAGNOSIS — G25.81 RLS (RESTLESS LEGS SYNDROME): ICD-10-CM

## 2020-05-18 DIAGNOSIS — R60.1 GENERALIZED EDEMA: ICD-10-CM

## 2020-05-18 RX ORDER — HYDROXYZINE PAMOATE 25 MG/1
50 CAPSULE ORAL
Qty: 90 CAPSULE | Refills: 1 | Status: SHIPPED | COMMUNITY
Start: 2020-05-18 | End: 2020-07-20

## 2020-05-18 RX ORDER — HYDROCHLOROTHIAZIDE 50 MG/1
50 TABLET ORAL DAILY
Qty: 90 TABLET | Refills: 1 | Status: SHIPPED | OUTPATIENT
Start: 2020-05-18 | End: 2020-09-21 | Stop reason: ALTCHOICE

## 2020-05-26 ENCOUNTER — TELEPHONE (OUTPATIENT)
Dept: INTERNAL MEDICINE CLINIC | Facility: CLINIC | Age: 70
End: 2020-05-26

## 2020-06-03 ENCOUNTER — APPOINTMENT (OUTPATIENT)
Dept: LAB | Facility: CLINIC | Age: 70
End: 2020-06-03
Payer: MEDICARE

## 2020-06-03 ENCOUNTER — TRANSCRIBE ORDERS (OUTPATIENT)
Dept: LAB | Facility: CLINIC | Age: 70
End: 2020-06-03

## 2020-06-03 DIAGNOSIS — E55.9 VITAMIN D DEFICIENCY: ICD-10-CM

## 2020-06-03 DIAGNOSIS — G25.81 RLS (RESTLESS LEGS SYNDROME): ICD-10-CM

## 2020-06-03 DIAGNOSIS — M81.0 OSTEOPOROSIS, UNSPECIFIED OSTEOPOROSIS TYPE, UNSPECIFIED PATHOLOGICAL FRACTURE PRESENCE: Primary | ICD-10-CM

## 2020-06-03 DIAGNOSIS — M81.0 OSTEOPOROSIS, UNSPECIFIED OSTEOPOROSIS TYPE, UNSPECIFIED PATHOLOGICAL FRACTURE PRESENCE: ICD-10-CM

## 2020-06-03 DIAGNOSIS — E11.8 TYPE 2 DIABETES MELLITUS WITH COMPLICATION, WITHOUT LONG-TERM CURRENT USE OF INSULIN (HCC): ICD-10-CM

## 2020-06-03 LAB
25(OH)D3 SERPL-MCNC: 27.1 NG/ML (ref 30–100)
ANION GAP SERPL CALCULATED.3IONS-SCNC: 4 MMOL/L (ref 4–13)
BASOPHILS # BLD AUTO: 0.1 THOUSANDS/ΜL (ref 0–0.1)
BASOPHILS NFR BLD AUTO: 1 % (ref 0–1)
BUN SERPL-MCNC: 18 MG/DL (ref 5–25)
CALCIUM SERPL-MCNC: 10 MG/DL (ref 8.3–10.1)
CHLORIDE SERPL-SCNC: 105 MMOL/L (ref 100–108)
CO2 SERPL-SCNC: 29 MMOL/L (ref 21–32)
CREAT SERPL-MCNC: 0.84 MG/DL (ref 0.6–1.3)
EOSINOPHIL # BLD AUTO: 0.18 THOUSAND/ΜL (ref 0–0.61)
EOSINOPHIL NFR BLD AUTO: 2 % (ref 0–6)
ERYTHROCYTE [DISTWIDTH] IN BLOOD BY AUTOMATED COUNT: 15.1 % (ref 11.6–15.1)
EST. AVERAGE GLUCOSE BLD GHB EST-MCNC: 131 MG/DL
GFR SERPL CREATININE-BSD FRML MDRD: 71 ML/MIN/1.73SQ M
GLUCOSE P FAST SERPL-MCNC: 81 MG/DL (ref 65–99)
HBA1C MFR BLD: 6.2 %
HCT VFR BLD AUTO: 37.1 % (ref 34.8–46.1)
HGB BLD-MCNC: 11.5 G/DL (ref 11.5–15.4)
IMM GRANULOCYTES # BLD AUTO: 0.04 THOUSAND/UL (ref 0–0.2)
IMM GRANULOCYTES NFR BLD AUTO: 0 % (ref 0–2)
IRON SERPL-MCNC: 24 UG/DL (ref 50–170)
LYMPHOCYTES # BLD AUTO: 2.97 THOUSANDS/ΜL (ref 0.6–4.47)
LYMPHOCYTES NFR BLD AUTO: 27 % (ref 14–44)
MCH RBC QN AUTO: 27.4 PG (ref 26.8–34.3)
MCHC RBC AUTO-ENTMCNC: 31 G/DL (ref 31.4–37.4)
MCV RBC AUTO: 88 FL (ref 82–98)
MONOCYTES # BLD AUTO: 0.66 THOUSAND/ΜL (ref 0.17–1.22)
MONOCYTES NFR BLD AUTO: 6 % (ref 4–12)
NEUTROPHILS # BLD AUTO: 6.94 THOUSANDS/ΜL (ref 1.85–7.62)
NEUTS SEG NFR BLD AUTO: 64 % (ref 43–75)
NRBC BLD AUTO-RTO: 0 /100 WBCS
PLATELET # BLD AUTO: 281 THOUSANDS/UL (ref 149–390)
PMV BLD AUTO: 9.9 FL (ref 8.9–12.7)
POTASSIUM SERPL-SCNC: 4.6 MMOL/L (ref 3.5–5.3)
RBC # BLD AUTO: 4.2 MILLION/UL (ref 3.81–5.12)
SODIUM SERPL-SCNC: 138 MMOL/L (ref 136–145)
VIT B12 SERPL-MCNC: 415 PG/ML (ref 100–900)
WBC # BLD AUTO: 10.89 THOUSAND/UL (ref 4.31–10.16)

## 2020-06-03 PROCEDURE — 86334 IMMUNOFIX E-PHORESIS SERUM: CPT | Performed by: PATHOLOGY

## 2020-06-03 PROCEDURE — 82607 VITAMIN B-12: CPT

## 2020-06-03 PROCEDURE — 86334 IMMUNOFIX E-PHORESIS SERUM: CPT

## 2020-06-03 PROCEDURE — 84165 PROTEIN E-PHORESIS SERUM: CPT

## 2020-06-03 PROCEDURE — 84165 PROTEIN E-PHORESIS SERUM: CPT | Performed by: PATHOLOGY

## 2020-06-03 PROCEDURE — 83036 HEMOGLOBIN GLYCOSYLATED A1C: CPT

## 2020-06-03 PROCEDURE — 83540 ASSAY OF IRON: CPT

## 2020-06-03 PROCEDURE — 85025 COMPLETE CBC W/AUTO DIFF WBC: CPT | Performed by: NURSE PRACTITIONER

## 2020-06-03 PROCEDURE — 80048 BASIC METABOLIC PNL TOTAL CA: CPT

## 2020-06-03 PROCEDURE — 82306 VITAMIN D 25 HYDROXY: CPT

## 2020-06-03 PROCEDURE — 36415 COLL VENOUS BLD VENIPUNCTURE: CPT | Performed by: NURSE PRACTITIONER

## 2020-06-04 LAB
ALBUMIN SERPL ELPH-MCNC: 4 G/DL (ref 3.5–5)
ALBUMIN SERPL ELPH-MCNC: 60.6 % (ref 52–65)
ALPHA1 GLOB SERPL ELPH-MCNC: 0.29 G/DL (ref 0.1–0.4)
ALPHA1 GLOB SERPL ELPH-MCNC: 4.4 % (ref 2.5–5)
ALPHA2 GLOB SERPL ELPH-MCNC: 0.83 G/DL (ref 0.4–1.2)
ALPHA2 GLOB SERPL ELPH-MCNC: 12.5 % (ref 7–13)
BETA GLOB ABNORMAL SERPL ELPH-MCNC: 0.57 G/DL (ref 0.4–0.8)
BETA1 GLOB SERPL ELPH-MCNC: 8.7 % (ref 5–13)
BETA2 GLOB SERPL ELPH-MCNC: 5.2 % (ref 2–8)
BETA2+GAMMA GLOB SERPL ELPH-MCNC: 0.34 G/DL (ref 0.2–0.5)
GAMMA GLOB ABNORMAL SERPL ELPH-MCNC: 0.57 G/DL (ref 0.5–1.6)
GAMMA GLOB SERPL ELPH-MCNC: 8.6 % (ref 12–22)
IGG/ALB SER: 1.54 {RATIO} (ref 1.1–1.8)
INTERPRETATION UR IFE-IMP: NORMAL
PROT PATTERN SERPL ELPH-IMP: ABNORMAL
PROT SERPL-MCNC: 6.6 G/DL (ref 6.4–8.2)

## 2020-06-08 DIAGNOSIS — D64.9 ANEMIA, UNSPECIFIED TYPE: Primary | ICD-10-CM

## 2020-06-23 DIAGNOSIS — I10 ESSENTIAL HYPERTENSION: ICD-10-CM

## 2020-06-23 RX ORDER — LISINOPRIL 2.5 MG/1
2.5 TABLET ORAL DAILY
Qty: 90 TABLET | Refills: 1 | Status: SHIPPED | OUTPATIENT
Start: 2020-06-23 | End: 2020-10-20 | Stop reason: SDUPTHER

## 2020-07-01 DIAGNOSIS — G25.81 RLS (RESTLESS LEGS SYNDROME): ICD-10-CM

## 2020-07-01 RX ORDER — PRAMIPEXOLE DIHYDROCHLORIDE 1.5 MG/1
1.5 TABLET ORAL 3 TIMES DAILY
Qty: 270 TABLET | Refills: 1 | Status: SHIPPED | OUTPATIENT
Start: 2020-07-01 | End: 2020-09-16 | Stop reason: SDUPTHER

## 2020-07-20 ENCOUNTER — OFFICE VISIT (OUTPATIENT)
Dept: INTERNAL MEDICINE CLINIC | Facility: CLINIC | Age: 70
End: 2020-07-20
Payer: MEDICARE

## 2020-07-20 VITALS
OXYGEN SATURATION: 97 % | RESPIRATION RATE: 16 BRPM | HEART RATE: 98 BPM | HEIGHT: 65 IN | SYSTOLIC BLOOD PRESSURE: 116 MMHG | DIASTOLIC BLOOD PRESSURE: 58 MMHG | TEMPERATURE: 98.3 F | BODY MASS INDEX: 28.99 KG/M2 | WEIGHT: 174 LBS

## 2020-07-20 DIAGNOSIS — E11.9 ENCOUNTER FOR DIABETIC FOOT EXAM (HCC): ICD-10-CM

## 2020-07-20 DIAGNOSIS — Z00.00 MEDICARE ANNUAL WELLNESS VISIT, SUBSEQUENT: Primary | ICD-10-CM

## 2020-07-20 DIAGNOSIS — R23.3 PETECHIAE: ICD-10-CM

## 2020-07-20 DIAGNOSIS — E11.8 TYPE 2 DIABETES MELLITUS WITH COMPLICATION, WITHOUT LONG-TERM CURRENT USE OF INSULIN (HCC): ICD-10-CM

## 2020-07-20 DIAGNOSIS — E55.9 VITAMIN D DEFICIENCY: ICD-10-CM

## 2020-07-20 DIAGNOSIS — E61.1 IRON DEFICIENCY: ICD-10-CM

## 2020-07-20 PROBLEM — M96.1 LUMBAR POST-LAMINECTOMY SYNDROME: Status: ACTIVE | Noted: 2020-05-26

## 2020-07-20 PROBLEM — M54.17 LUMBOSACRAL RADICULITIS: Status: ACTIVE | Noted: 2020-06-10

## 2020-07-20 PROBLEM — G89.4 CHRONIC PAIN DISORDER: Status: ACTIVE | Noted: 2020-05-26

## 2020-07-20 PROCEDURE — 1170F FXNL STATUS ASSESSED: CPT | Performed by: NURSE PRACTITIONER

## 2020-07-20 PROCEDURE — 3078F DIAST BP <80 MM HG: CPT | Performed by: NURSE PRACTITIONER

## 2020-07-20 PROCEDURE — 99214 OFFICE O/P EST MOD 30 MIN: CPT | Performed by: NURSE PRACTITIONER

## 2020-07-20 PROCEDURE — 1160F RVW MEDS BY RX/DR IN RCRD: CPT | Performed by: NURSE PRACTITIONER

## 2020-07-20 PROCEDURE — 1125F AMNT PAIN NOTED PAIN PRSNT: CPT | Performed by: NURSE PRACTITIONER

## 2020-07-20 PROCEDURE — 3008F BODY MASS INDEX DOCD: CPT | Performed by: NURSE PRACTITIONER

## 2020-07-20 PROCEDURE — 3044F HG A1C LEVEL LT 7.0%: CPT | Performed by: NURSE PRACTITIONER

## 2020-07-20 PROCEDURE — 1036F TOBACCO NON-USER: CPT | Performed by: NURSE PRACTITIONER

## 2020-07-20 PROCEDURE — G0439 PPPS, SUBSEQ VISIT: HCPCS | Performed by: NURSE PRACTITIONER

## 2020-07-20 PROCEDURE — 1123F ACP DISCUSS/DSCN MKR DOCD: CPT | Performed by: NURSE PRACTITIONER

## 2020-07-20 PROCEDURE — 3074F SYST BP LT 130 MM HG: CPT | Performed by: NURSE PRACTITIONER

## 2020-07-20 PROCEDURE — 4040F PNEUMOC VAC/ADMIN/RCVD: CPT | Performed by: NURSE PRACTITIONER

## 2020-07-20 RX ORDER — BACLOFEN 10 MG/1
TABLET ORAL 3 TIMES DAILY
COMMUNITY
End: 2022-07-22

## 2020-07-20 RX ORDER — PNV NO.95/FERROUS FUM/FOLIC AC 28MG-0.8MG
325 TABLET ORAL DAILY
Qty: 30 EACH | Refills: 3 | Status: SHIPPED | COMMUNITY
Start: 2020-07-20

## 2020-07-20 RX ORDER — ERGOCALCIFEROL 1.25 MG/1
50000 CAPSULE ORAL WEEKLY
Qty: 12 CAPSULE | Refills: 3 | Status: SHIPPED | OUTPATIENT
Start: 2020-07-20

## 2020-07-20 RX ORDER — KETOCONAZOLE 20 MG/G
CREAM TOPICAL
COMMUNITY

## 2020-07-20 NOTE — PROGRESS NOTES
Assessment/Plan:    No problem-specific Assessment & Plan notes found for this encounter  Diagnoses and all orders for this visit:    Medicare annual wellness visit, subsequent  Comments:  completed today    Iron deficiency  Comments:  pt started on Iron tabs 325 mg daily labs pending   Orders:  -     Ferrous Sulfate (IRON) 325 (65 Fe) MG TABS; Take 1 tablet (325 mg total) by mouth daily  -     CBC and differential; Future  -     Iron Panel (Includes Ferritin, Iron Sat%, Iron, and TIBC); Future    Type 2 diabetes mellitus with complication, without long-term current use of insulin ([facility])  Comments:  hgba1c 6 2 cont Metformin as directed    Vitamin D deficiency  Comments:  vitamin D low, Vitamin D2 50,000 IU weekly ordered  Orders:  -     ergocalciferol (VITAMIN D2) 50,000 units; Take 1 capsule (50,000 Units total) by mouth once a week    Petechiae  Comments:  pt advised to stay out of the son, as rash is lessening  Orders:  -     CBC and differential; Future  -     Iron Panel (Includes Ferritin, Iron Sat%, Iron, and TIBC); Future    Encounter for diabetic foot exam ([place] [id number] )  Comments:  completed today    Other orders  -     baclofen 10 mg tablet; baclofen  10 mg tabs  -     ketoconazole (NIZORAL) 2 % cream; ketoconazole  2 % crea          Subjective:      Patient ID: Esvin Germain is a 79 y o  female  79 yr old arrives for AWV today  Pt c/o rash on lower extremities, appears like petechiae exacerbated by excess sun    Pt with iron deficiency as well, has labs pending regarding iron, otherwise started with pain management Dr [place] and feels so much better since on Baclofen, is going to taper off some of her medication under his supervision        The following portions of the patient's history were reviewed and updated as appropriate: She  has a past medical history of Anxiety, Cellulitis, Chronic obstructive pulmonary disease ([facility] [address] ) (3/11/2019), Depression, [facility] mellitus [facility] ), Dysuria, Esophageal reflux, Fatigue, Fracture, Heart murmur, Hypertension, Pneumonia, Restless legs syndrome (RLS), and Vitamin D deficiency  She   Patient Active Problem List    Diagnosis Date Noted    Petechiae 07/20/2020    Lumbosacral radiculitis 06/10/2020    Chronic pain disorder 05/26/2020    Lumbar post-laminectomy syndrome 05/26/2020    Paronychia of fifth toe of right foot 02/08/2020    Wound of right foot 01/22/2020    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    Recurrent major depressive disorder, in partial remission ([place] [address] ) 03/11/2019    Chronic obstructive pulmonary disease ([place] [address] ) 03/11/2019    Esophageal dysphagia 11/26/2018    Heart murmur 09/12/2018    Type 2 diabetes mellitus with complication, without long-term current use of insulin ([place] [address] ) 04/06/2018    Chronic lower back pain 09/29/2016    Anxiety 05/21/2016    Vitamin D deficiency 03/09/2016    HTN (hypertension) 12/14/2015    Hypercholesteremia 12/14/2015    Restless legs syndrome (RLS) 12/14/2015     She  has a past surgical history that includes Back surgery; Cholecystectomy (1975); Foot surgery (Right, 2014); Hip surgery (2013); Hysterectomy; Ovarian cyst surgery (1971); Shoulder surgery (Right, 12/14/2015); Other surgical history (2011); Tonsillectomy; pr colonoscopy flx dx w/collj spec when pfrmd (N/A, 7/10/2018); Appendectomy; pr esophagogastroduodenoscopy transoral diagnostic (N/A, 1/28/2019); and Skin biopsy  Her family history includes Calcium disorder in her other and other; Cancer in her father; Depression in her family; Diabetes type II in her father and other; Heart disease in her father; Hypertension in her mother and other; Mental illness in her mother and other; Migraines in her other; Other in her other; Pulmonary embolism in her other; Stroke in her mother; Substance Abuse in her brother and family; Tuberculosis in her other  She  reports that she has quit smoking  Her smoking use included cigarettes   She has never used smokeless tobacco  She reports that she drinks alcohol  She reports that she does not use drugs    Current Outpatient Medications   Medication Sig Dispense Refill    albuterol (2 5 mg/3 mL) 0 083 % nebulizer solution Take 1 vial (2 5 mg total) by nebulization every 4 (four) hours as needed for shortness of breath Disp 1 box 270 vial 1    albuterol (PROAIR HFA) 90 mcg/act inhaler Inhale 2 puffs      amitriptyline (ELAVIL) 50 mg tablet Take 50 mg by mouth daily at bedtime Take 25mg daily      aspirin (ECOTRIN LOW STRENGTH) 81 mg EC tablet Take 81 mg by mouth daily      atorvastatin (LIPITOR) 20 mg tablet Take 1 tablet (20 mg total) by mouth daily 90 tablet 1    [employer] MICROLET LANCETS lancets Use as instructed BID E11 65 100 each 5    Calcium 500 MG tablet Take 2 tablets by mouth every other day        carbidopa-levodopa (SINEMET)  mg per tablet Take 2 tablets by mouth 3 (three) times a day 270 tablet 5    cholecalciferol (VITAMIN D3) 1,000 units tablet Take 3 tablets by mouth every other day       clonazePAM (KlonoPIN) 2 mg tablet Take 1 tablet (2 mg total) by mouth daily at bedtime 90 tablet 0    denosumab (PROLIA) 60 mg/mL Inject under the skin every 6 (six) months      escitalopram (LEXAPRO) 10 mg tablet Take 1 5 tablets (15 mg total) by mouth daily (Patient taking differently: Take 10 mg by mouth daily Take 10mg daily) 45 tablet 5    glucose blood ([employer] CONTOUR TEST) test strip Use as instructed BID  E11 65 100 each 5    Glucose Blood (ONETOUCH ULTRA BLUE VI) by In Vitro route 3 (three) times a day      hydrochlorothiazide (HYDRODIURIL) 50 mg tablet Take 1 tablet (50 mg total) by mouth daily 90 tablet 1    lisinopril (ZESTRIL) 2 5 mg tablet Take 1 tablet (2 5 mg total) by mouth daily 90 tablet 1    metFORMIN (GLUCOPHAGE) 1000 MG tablet Take 1 tablet (1,000 mg total) by mouth 2 (two) times a day with meals 180 tablet 1    nystatin (MYCOSTATIN) cream Apply 2 g (1 application total) topically 2 (two) times a day To affected area 30 g 5    pantoprazole (PROTONIX) 40 mg tablet Take 1 tablet (40 mg total) by mouth daily 90 tablet 1    potassium chloride (MICRO-K) 10 MEQ CR capsule Take 10 mEq by mouth 2 (two) times a day        pramipexole (MIRAPEX) 1 5 MG tablet Take 1 tablet (1 5 mg total) by mouth 3 (three) times a day 270 tablet 1    VITAMIN B COMPLEX-C PO Take 1 tablet by mouth daily      baclofen 10 mg tablet baclofen  10 mg tabs      ergocalciferol (VITAMIN D2) 50,000 units Take 1 capsule (50,000 Units total) by mouth once a week 12 capsule 3    Ferrous Sulfate (IRON) 325 (65 Fe) MG TABS Take 1 tablet (325 mg total) by mouth daily 30 each 3    ketoconazole (NIZORAL) 2 % cream ketoconazole  2 % crea       No current facility-administered medications for this visit        Current Outpatient Medications on File Prior to Visit   Medication Sig    albuterol (2 5 mg/3 mL) 0 083 % nebulizer solution Take 1 vial (2 5 mg total) by nebulization every 4 (four) hours as needed for shortness of breath Disp 1 box    albuterol (PROAIR HFA) 90 mcg/act inhaler Inhale 2 puffs    amitriptyline (ELAVIL) 50 mg tablet Take 50 mg by mouth daily at bedtime Take 25mg daily    aspirin (ECOTRIN LOW STRENGTH) 81 mg EC tablet Take 81 mg by mouth daily    atorvastatin (LIPITOR) 20 mg tablet Take 1 tablet (20 mg total) by mouth daily    JOELLEN MICROLET LANCETS lancets Use as instructed BID E11 65    Calcium 500 MG tablet Take 2 tablets by mouth every other day      carbidopa-levodopa (SINEMET)  mg per tablet Take 2 tablets by mouth 3 (three) times a day    cholecalciferol (VITAMIN D3) 1,000 units tablet Take 3 tablets by mouth every other day     clonazePAM (KlonoPIN) 2 mg tablet Take 1 tablet (2 mg total) by mouth daily at bedtime    denosumab (PROLIA) 60 mg/mL Inject under the skin every 6 (six) months    escitalopram (LEXAPRO) 10 mg tablet Take 1 5 tablets (15 mg total) by mouth daily (Patient taking differently: Take 10 mg by mouth daily Take 10mg daily)    glucose blood (JOELLEN CONTOUR TEST) test strip Use as instructed BID  E11 65    Glucose Blood (ONETOUCH ULTRA BLUE VI) by In Vitro route 3 (three) times a day    hydrochlorothiazide (HYDRODIURIL) 50 mg tablet Take 1 tablet (50 mg total) by mouth daily    lisinopril (ZESTRIL) 2 5 mg tablet Take 1 tablet (2 5 mg total) by mouth daily    metFORMIN (GLUCOPHAGE) 1000 MG tablet Take 1 tablet (1,000 mg total) by mouth 2 (two) times a day with meals    nystatin (MYCOSTATIN) cream Apply 2 g (1 application total) topically 2 (two) times a day To affected area    pantoprazole (PROTONIX) 40 mg tablet Take 1 tablet (40 mg total) by mouth daily    potassium chloride (MICRO-K) 10 MEQ CR capsule Take 10 mEq by mouth 2 (two) times a day      pramipexole (MIRAPEX) 1 5 MG tablet Take 1 tablet (1 5 mg total) by mouth 3 (three) times a day    VITAMIN B COMPLEX-C PO Take 1 tablet by mouth daily    baclofen 10 mg tablet baclofen  10 mg tabs    ketoconazole (NIZORAL) 2 % cream ketoconazole  2 % crea    [DISCONTINUED] hydrOXYzine pamoate (VISTARIL) 25 mg capsule Take 2 capsules (50 mg total) by mouth daily at bedtime (Patient not taking: Reported on 7/20/2020)    [DISCONTINUED] methocarbamol (ROBAXIN) 750 mg tablet Take 1 tablet (750 mg total) by mouth every 8 (eight) hours (Patient not taking: Reported on 7/20/2020)     No current facility-administered medications on file prior to visit  She is allergic to midazolam; gabapentin; hydrocodone-acetaminophen; penicillin v; pregabalin; propofol; and sulfa antibiotics       Review of Systems   Constitutional: Negative  HENT: Negative  Eyes: Negative  Respiratory: Negative  Cardiovascular: Negative  Gastrointestinal: Negative  Endocrine: Negative  Genitourinary: Negative  Musculoskeletal: Negative  Skin: Positive for color change and rash  Both lower legs   Allergic/Immunologic: Negative  Neurological: Negative  Hematological: Negative  Psychiatric/Behavioral: Negative  BMI Counseling: Body mass index is 28 96 kg/m²  Discussed the patient's BMI with her  The BMI is above normal  Nutrition recommendations include reducing portion sizes, decreasing overall calorie intake, 3-5 servings of fruits/vegetables daily, reducing fast food intake, consuming healthier snacks, decreasing soda and/or juice intake, moderation in carbohydrate intake, increasing intake of lean protein, reducing intake of saturated fat and trans fat and reducing intake of cholesterol  Exercise recommendations include exercising 3-5 times per week  PHQ-9 Depression Screening    PHQ-9:    Frequency of the following problems over the past two weeks:       Little interest or pleasure in doing things:  0 - not at all  Feeling down, depressed, or hopeless:  0 - not at all  PHQ-2 Score:  0        Depression Screening Follow-up Plan: Patient's depression screening was negative with a PHQ-2 score of 0   Clinically patient does not have depression  No treatment is required  Objective:      /58   Pulse 98   Temp 98 3 °F (36 8 °C) (Tympanic)   Resp 16   Ht 5' 5" (1 651 m)   Wt 78 9 kg (174 lb)   SpO2 97%   BMI 28 96 kg/m²          Physical Exam   Constitutional: She is oriented to person, place, and time  She appears well-developed and well-nourished  HENT:   Head: Normocephalic  Eyes: Pupils are equal, round, and reactive to light  Neck: Normal range of motion  Cardiovascular: Normal rate and regular rhythm  Pulses are weak pulses  Pulses:       Dorsalis pedis pulses are 2+ on the right side, and 2+ on the left side  Posterior tibial pulses are 2+ on the right side, and 2+ on the left side  Pulmonary/Chest: Effort normal and breath sounds normal    Abdominal: Soft  Bowel sounds are normal    Musculoskeletal: Normal range of motion          Feet:    Pt has orthotics on both feet for ? fx 2nd toes both feet   Feet: Right Foot:   Skin Integrity: Negative for ulcer, skin breakdown, erythema, warmth, callus or dry skin  Left Foot:   Skin Integrity: Negative for ulcer, skin breakdown, erythema, warmth, callus or dry skin  Neurological: She is alert and oriented to person, place, and time  Skin: Skin is warm and dry  Petechiae and rash noted  Psychiatric: She has a normal mood and affect  Her behavior is normal  Judgment and thought content normal    Nursing note and vitals reviewed  Patient's shoes and socks removed  Right Foot/Ankle   Right Foot Inspection  Skin Exam: skin normal and skin intact no dry skin, no warmth, no callus, no erythema, no maceration, no abnormal color, no pre-ulcer, no ulcer and no callus                          Toe Exam: ROM and strength within normal limits  Sensory   Vibration: intact  Proprioception: intact   Monofilament testing: intact  Vascular  Capillary refills: < 3 seconds  The right DP pulse is 2+  The right PT pulse is 2+  Left Foot/Ankle  Left Foot Inspection  Skin Exam: skin normal and skin intactno dry skin, no warmth, no erythema, no maceration, normal color, no pre-ulcer, no ulcer and no callus                         Toe Exam: ROM and strength within normal limits                   Sensory   Vibration: intact  Proprioception: intact  Monofilament: intact  Vascular    The left DP pulse is 2+  The left PT pulse is 2+  Assign Risk Category:  Deformity present;  No loss of protective sensation; Weak pulses       Risk: 1

## 2020-07-20 NOTE — PROGRESS NOTES
Assessment and Plan:     Problem List Items Addressed This Visit        Endocrine    Type 2 diabetes mellitus with complication, without long-term current use of insulin (HCC)       Other    Vitamin D deficiency    Relevant Medications    ergocalciferol (VITAMIN D2) 50,000 units      Other Visit Diagnoses     Iron deficiency    -  Primary    Relevant Medications    Ferrous Sulfate (IRON) 325 (65 Fe) MG TABS           Preventive health issues were discussed with patient, and age appropriate screening tests were ordered as noted in patient's After Visit Summary  Personalized health advice and appropriate referrals for health education or preventive services given if needed, as noted in patient's After Visit Summary       History of Present Illness:     Patient presents for Medicare Annual Wellness visit    Patient Care Team:  Heidi Laguerre as PCP - General (Family Medicine)  Simone Cordova MD (Gastroenterology)  Nichole Morgan MD as Endoscopist     Problem List:     Patient Active Problem List   Diagnosis    Anxiety    Chronic lower back pain    Type 2 diabetes mellitus with complication, without long-term current use of insulin ([address] )    HTN (hypertension)    Hypercholesteremia    Restless legs syndrome (RLS)    Vitamin D deficiency    Heart murmur    Esophageal dysphagia    Recurrent major depressive disorder, in partial remission ([address] )    Chronic obstructive pulmonary disease ([address] )    Prurigo    Wound of right foot    Paronychia of fifth toe of right foot    Chronic pain disorder    Lumbar post-laminectomy syndrome    Lumbosacral radiculitis      Past Medical and Surgical History:     Past Medical History:   Diagnosis Date    Anxiety     Cellulitis     LAST ASSESED 2/12/16; RESOLVED 3/9/16    Chronic obstructive pulmonary disease ([address] ) 3/11/2019    Depression     Diabetes mellitus ([address] )     Dysuria     LAST ASSESSED 3/7/16; RESOLVED 3/9/16    Esophageal reflux     RESOLVED 1/15/16    Fatigue     RESOLVED 3/9/16    Fracture     rt 4 th metatarsal     Heart murmur     Hypertension     Pneumonia     Restless legs syndrome (RLS)     Vitamin D deficiency      Past Surgical History:   Procedure Laterality Date    APPENDECTOMY      BACK SURGERY      CHOLECYSTECTOMY  1975    FOOT SURGERY Right 2014    HIP SURGERY  2013    HYSTERECTOMY      OTHER SURGICAL HISTORY  2011    LAPAROSCOPIC SLING OPERATION FOR STRESS INCONTINENCE     OVARIAN CYST SURGERY  1971    CYSTECTOMY    NV COLONOSCOPY FLX DX W/COLLJ SPEC WHEN PFRMD N/A 7/10/2018    Procedure: COLONOSCOPY;  Surgeon: Chapito Torres MD;  [facility]; Service: Gastroenterology    NV ESOPHAGOGASTRODUODENOSCOPY TRANSORAL DIAGNOSTIC N/A 1/28/2019    Procedure: ESOPHAGOGASTRODUODENOSCOPY (EGD); Surgeon: Yan Cortez MD;  Location[facility];   Service: Gastroenterology    SHOULDER SURGERY Right 12/14/2015    REPAIR OF TORN MUSCLES    SKIN BIOPSY      chin 03/07/2019    TONSILLECTOMY        Family History:     Family History   Problem Relation Age of Onset    Hypertension Mother     Mental illness Mother     Stroke Mother         CVA    Cancer Father     Diabetes type II Father     Heart disease Father         CARDIAC DISORDER    Hypertension Other     Mental illness Other     Migraines Other     Diabetes type II Other     Calcium disorder Other         CALCIUM KIDNEY STONES    Other Other         HERPES ZOSTER INFECTION    Tuberculosis Other     Pulmonary embolism Other         CHRONIC OBSTRUCTIVE PULMONARY DISEASE    Calcium disorder Other         CALISUM KIDNEY STONES    Depression Family     Substance Abuse Family     Substance Abuse Brother       Social History:        Social History     Socioeconomic History    Marital status: /Civil Union     Spouse name: None    Number of children: None    Years of education: None    Highest education level: None   Occupational History    Occupation: RETIRED   Social Needs    Financial resource strain: None    Food insecurity:     Worry: None     Inability: None    Transportation needs:     Medical: None     Non-medical: None   Tobacco Use    Smoking status: Former Smoker     Types: Cigarettes    Smokeless tobacco: Never Used    Tobacco comment: 1 a month   Substance and Sexual Activity    Alcohol use: Yes     Comment: rarely    Drug use: No    Sexual activity: None   Lifestyle    Physical activity:     Days per week: None     Minutes per session: None    Stress: None   Relationships    Social connections:     Talks on phone: None     Gets together: None     Attends Christianity service: None     Active member of club or organization: None     Attends meetings of clubs or organizations: None     Relationship status: None    Intimate partner violence:     Fear of current or ex partner: None     Emotionally abused: None     Physically abused: None     Forced sexual activity: None   Other Topics Concern    None   Social History Narrative    ALWAYS USES SEAT BELT     DENTAL CARE, REGULARLY    GOOD DENTAL HYGIENE     [id number] Gary Leonidas        Retired    Lives with Spouse      Medications and Allergies:     Current Outpatient Medications   Medication Sig Dispense Refill    albuterol (2 5 mg/3 mL) 0 083 % nebulizer solution Take 1 vial (2 5 mg total) by nebulization every 4 (four) hours as needed for shortness of breath Disp 1 box 270 vial 1    albuterol (PROAIR HFA) 90 mcg/act inhaler Inhale 2 puffs      amitriptyline (ELAVIL) 50 mg tablet Take 50 mg by mouth daily at bedtime Take 25mg daily      aspirin (ECOTRIN LOW STRENGTH) 81 mg EC tablet Take 81 mg by mouth daily      atorvastatin (LIPITOR) 20 mg tablet Take 1 tablet (20 mg total) by mouth daily 90 tablet 1    JOELLEN MICROLET LANCETS lancets Use as instructed BID E11 65 100 each 5    Calcium 500 MG tablet Take 2 tablets by mouth every other day        carbidopa-levodopa (SINEMET)  mg per tablet Take 2 tablets by mouth 3 (three) times a day 270 tablet 5    cholecalciferol (VITAMIN D3) 1,000 units tablet Take 3 tablets by mouth every other day       clonazePAM (KlonoPIN) 2 mg tablet Take 1 tablet (2 mg total) by mouth daily at bedtime 90 tablet 0    denosumab (PROLIA) 60 mg/mL Inject under the skin every 6 (six) months      escitalopram (LEXAPRO) 10 mg tablet Take 1 5 tablets (15 mg total) by mouth daily (Patient taking differently: Take 10 mg by mouth daily Take 10mg daily) 45 tablet 5    glucose blood (JOELLEN CONTOUR TEST) test strip Use as instructed BID  E11 65 100 each 5    Glucose Blood (ONETOUCH ULTRA BLUE VI) by In Vitro route 3 (three) times a day      hydrochlorothiazide (HYDRODIURIL) 50 mg tablet Take 1 tablet (50 mg total) by mouth daily 90 tablet 1    lisinopril (ZESTRIL) 2 5 mg tablet Take 1 tablet (2 5 mg total) by mouth daily 90 tablet 1    metFORMIN (GLUCOPHAGE) 1000 MG tablet Take 1 tablet (1,000 mg total) by mouth 2 (two) times a day with meals 180 tablet 1    nystatin (MYCOSTATIN) cream Apply 2 g (1 application total) topically 2 (two) times a day To affected area 30 g 5    pantoprazole (PROTONIX) 40 mg tablet Take 1 tablet (40 mg total) by mouth daily 90 tablet 1    potassium chloride (MICRO-K) 10 MEQ CR capsule Take 10 mEq by mouth 2 (two) times a day        pramipexole (MIRAPEX) 1 5 MG tablet Take 1 tablet (1 5 mg total) by mouth 3 (three) times a day 270 tablet 1    VITAMIN B COMPLEX-C PO Take 1 tablet by mouth daily      baclofen 10 mg tablet baclofen  10 mg tabs      ergocalciferol (VITAMIN D2) 50,000 units Take 1 capsule (50,000 Units total) by mouth once a week 12 capsule 3    Ferrous Sulfate (IRON) 325 (65 Fe) MG TABS Take 1 tablet (325 mg total) by mouth daily 30 each 3    ketoconazole (NIZORAL) 2 % cream ketoconazole  2 % crea       No current facility-administered medications for this visit        Allergies   Allergen Reactions  Midazolam Nausea Only and Vomiting    Gabapentin     Hydrocodone-Acetaminophen      Category: Adverse Reaction;     Penicillin V     Pregabalin     Propofol      Exacerbates RSL    Sulfa Antibiotics      Category: Allergy;       Immunizations:     Immunization History   Administered Date(s) Administered    INFLUENZA 11/15/2015, 11/15/2015, 12/05/2016, 11/06/2017    Influenza Split High Dose Preservative Free IM 12/05/2016, 11/06/2017    Influenza TIV (IM) 11/01/2015    Influenza, high dose seasonal 0 5 mL 10/04/2019    Influenza, recombinant, quadrivalent,injectable, preservative free 09/26/2018    Pneumococcal Conjugate 13-Valent 08/31/2016    Pneumococcal Polysaccharide PPV23 01/01/2011    Tdap 01/01/2014, 10/04/2017      Health Maintenance:         Topic Date Due    MAMMOGRAM  09/24/2019    DXA SCAN  10/30/2020    CRC Screening: Colonoscopy  07/10/2028    Hepatitis C Screening  Completed         Topic Date Due    Pneumococcal Vaccine: 65+ Years (2 of 2 - PPSV23) 08/31/2017      Medicare Health Risk Assessment:     /58   Pulse 98   Temp 98 3 °F (36 8 °C) (Tympanic)   Resp 16   Ht 5' 5" (1 651 m)   Wt 78 9 kg (174 lb)   SpO2 97%   BMI 28 96 kg/m²      Tomi is here for her Subsequent Wellness visit  Last Medicare Wellness visit information reviewed, patient interviewed and updates made to the record today  Health Risk Assessment:   Patient rates overall health as good  Patient feels that their physical health rating is same  Eyesight was rated as slightly worse  Hearing was rated as same  Patient feels that their emotional and mental health rating is slightly better  Pain experienced in the last 7 days has been none  Patient states that she has experienced no weight loss or gain in last 6 months  Fall Risk Screening:    In the past year, patient has experienced: no history of falling in past year      Urinary Incontinence Screening:   Patient has not leaked urine accidently in the last six months  Home Safety:  Patient has trouble with stairs inside or outside of their home  Patient has working smoke alarms and has working carbon monoxide detector  Home safety hazards include: none  Nutrition:   Current diet is Diabetic and Limited junk food  Medications:   Patient is currently taking over-the-counter supplements  OTC medications include: see medication list  Patient is able to manage medications  Activities of Daily Living (ADLs)/Instrumental Activities of Daily Living (IADLs):   Walk and transfer into and out of bed and chair?: Yes  Dress and groom yourself?: Yes    Bathe or shower yourself?: Yes    Feed yourself? Yes  Do your laundry/housekeeping?: Yes  Manage your money, pay your bills and track your expenses?: Yes  Make your own meals?: Yes    Do your own shopping?: Yes    Previous Hospitalizations:   Any hospitalizations or ED visits within the last 12 months?: Yes    How many hospitalizations have you had in the last year?: 1-2    Advance Care Planning:   Living will: Yes    Durable POA for healthcare:  Yes    Advanced directive: Yes    Advanced directive counseling given: No    Five wishes given: No    Patient declined ACP directive: No      PREVENTIVE SCREENINGS      Cardiovascular Screening:    General: Screening Not Indicated and History Lipid Disorder      Diabetes Screening:     General: Screening Not Indicated and History Diabetes      Colorectal Cancer Screening:     General: Screening Current      Breast Cancer Screening:     General: Screening Current      Cervical Cancer Screening:    General: Screening Not Indicated      Osteoporosis Screening:    General: Screening Not Indicated and History Osteoporosis      Abdominal Aortic Aneurysm (AAA) Screening:        General: Risks and Benefits Discussed      Lung Cancer Screening:     General: Screening Not Indicated      Hepatitis C Screening:    General: Screening Current    Other Counseling Topics:   Skin self-exam, sunscreen and calcium and vitamin D intake         CONCEPCIÓN Talley

## 2020-07-23 ENCOUNTER — TRANSCRIBE ORDERS (OUTPATIENT)
Dept: LAB | Facility: CLINIC | Age: 70
End: 2020-07-23

## 2020-07-23 ENCOUNTER — APPOINTMENT (OUTPATIENT)
Dept: LAB | Facility: CLINIC | Age: 70
End: 2020-07-23
Payer: MEDICARE

## 2020-07-23 DIAGNOSIS — D64.9 ANEMIA, UNSPECIFIED TYPE: ICD-10-CM

## 2020-07-23 DIAGNOSIS — R23.3 PETECHIAE: ICD-10-CM

## 2020-07-23 DIAGNOSIS — E61.1 IRON DEFICIENCY: ICD-10-CM

## 2020-07-23 LAB
BASOPHILS # BLD AUTO: 0.06 THOUSANDS/ΜL (ref 0–0.1)
BASOPHILS NFR BLD AUTO: 1 % (ref 0–1)
EOSINOPHIL # BLD AUTO: 0.21 THOUSAND/ΜL (ref 0–0.61)
EOSINOPHIL NFR BLD AUTO: 2 % (ref 0–6)
ERYTHROCYTE [DISTWIDTH] IN BLOOD BY AUTOMATED COUNT: 18.3 % (ref 11.6–15.1)
FERRITIN SERPL-MCNC: 7 NG/ML (ref 8–388)
HCT VFR BLD AUTO: 38.7 % (ref 34.8–46.1)
HGB BLD-MCNC: 12.1 G/DL (ref 11.5–15.4)
IMM GRANULOCYTES # BLD AUTO: 0.02 THOUSAND/UL (ref 0–0.2)
IMM GRANULOCYTES NFR BLD AUTO: 0 % (ref 0–2)
IRON SATN MFR SERPL: 10 %
IRON SERPL-MCNC: 48 UG/DL (ref 50–170)
LYMPHOCYTES # BLD AUTO: 3.13 THOUSANDS/ΜL (ref 0.6–4.47)
LYMPHOCYTES NFR BLD AUTO: 35 % (ref 14–44)
MCH RBC QN AUTO: 28.3 PG (ref 26.8–34.3)
MCHC RBC AUTO-ENTMCNC: 31.3 G/DL (ref 31.4–37.4)
MCV RBC AUTO: 91 FL (ref 82–98)
MONOCYTES # BLD AUTO: 0.62 THOUSAND/ΜL (ref 0.17–1.22)
MONOCYTES NFR BLD AUTO: 7 % (ref 4–12)
NEUTROPHILS # BLD AUTO: 4.88 THOUSANDS/ΜL (ref 1.85–7.62)
NEUTS SEG NFR BLD AUTO: 55 % (ref 43–75)
NRBC BLD AUTO-RTO: 0 /100 WBCS
PLATELET # BLD AUTO: 242 THOUSANDS/UL (ref 149–390)
PMV BLD AUTO: 9.9 FL (ref 8.9–12.7)
RBC # BLD AUTO: 4.27 MILLION/UL (ref 3.81–5.12)
TIBC SERPL-MCNC: 489 UG/DL (ref 250–450)
WBC # BLD AUTO: 8.92 THOUSAND/UL (ref 4.31–10.16)

## 2020-07-23 PROCEDURE — 85025 COMPLETE CBC W/AUTO DIFF WBC: CPT

## 2020-07-23 PROCEDURE — 83540 ASSAY OF IRON: CPT

## 2020-07-23 PROCEDURE — 83550 IRON BINDING TEST: CPT

## 2020-07-23 PROCEDURE — 82728 ASSAY OF FERRITIN: CPT

## 2020-07-23 PROCEDURE — 36415 COLL VENOUS BLD VENIPUNCTURE: CPT

## 2020-07-28 ENCOUNTER — TELEPHONE (OUTPATIENT)
Dept: INTERNAL MEDICINE CLINIC | Facility: CLINIC | Age: 70
End: 2020-07-28

## 2020-07-28 NOTE — TELEPHONE ENCOUNTER
Please call pt and tell her iron is low    I think she should consider iron infusions they are absorbed more readily

## 2020-07-30 NOTE — TELEPHONE ENCOUNTER
Pt aware and does not want to do infusion at this time states that Iron level has increased since June and would like to stay on high dose for 4-6 more weeks

## 2020-08-09 ENCOUNTER — NURSE TRIAGE (OUTPATIENT)
Dept: OTHER | Facility: OTHER | Age: 70
End: 2020-08-09

## 2020-08-09 NOTE — TELEPHONE ENCOUNTER
Regarding:  Allergric reaction to silver earrings  ----- Message from Lavinia Coleman sent at 8/9/2020 11:08 AM EDT -----  Allergric reaction to silver earrings  Redness and puss

## 2020-08-09 NOTE — TELEPHONE ENCOUNTER
Reason for Disposition   [1] Looks infected (spreading redness, pus) AND [2] no fever    Answer Assessment - Initial Assessment Questions  1  APPEARANCE of RASH: "Describe the rash "       Patient put in silver earring and had an allergic reaction   2  LOCATION: "Where is the rash located?"       Redness and blistering at site of bilateral ear piercings   3  NUMBER: "How many spots are there?"       Two   4  SIZE: "How big are the spots?" (Inches, centimeters or compare to size of a coin)      Right around the hole   5  ONSET: "When did the rash start?"       Friday   6  ITCHING: "Does the rash itch?" If so, ask: "How bad is the itch?"  (Scale 1-10; or mild, moderate, severe)      1"the itchiness is not that bad"   7  PAIN: "Does the rash hurt?" If so, ask: "How bad is the pain?"  (Scale 1-10; or mild, moderate, severe)      No   8  OTHER SYMPTOMS: "Do you have any other symptoms?" (e g , fever)      Slight pus drainage from each ear  9   PREGNANCY: "Is there any chance you are pregnant?" "When was your last menstrual period?"      N/a    Protocols used: RASH OR REDNESS - LOCALIZED-ADULT-

## 2020-08-10 ENCOUNTER — TELEMEDICINE (OUTPATIENT)
Dept: INTERNAL MEDICINE CLINIC | Facility: CLINIC | Age: 70
End: 2020-08-10
Payer: MEDICARE

## 2020-08-10 DIAGNOSIS — L08.9 SKIN INFECTION: Primary | ICD-10-CM

## 2020-08-10 PROCEDURE — 1170F FXNL STATUS ASSESSED: CPT | Performed by: NURSE PRACTITIONER

## 2020-08-10 PROCEDURE — 1036F TOBACCO NON-USER: CPT | Performed by: NURSE PRACTITIONER

## 2020-08-10 PROCEDURE — 4040F PNEUMOC VAC/ADMIN/RCVD: CPT | Performed by: NURSE PRACTITIONER

## 2020-08-10 PROCEDURE — 3074F SYST BP LT 130 MM HG: CPT | Performed by: NURSE PRACTITIONER

## 2020-08-10 PROCEDURE — 1160F RVW MEDS BY RX/DR IN RCRD: CPT | Performed by: NURSE PRACTITIONER

## 2020-08-10 PROCEDURE — 99213 OFFICE O/P EST LOW 20 MIN: CPT | Performed by: NURSE PRACTITIONER

## 2020-08-10 PROCEDURE — 3044F HG A1C LEVEL LT 7.0%: CPT | Performed by: NURSE PRACTITIONER

## 2020-08-10 PROCEDURE — 3078F DIAST BP <80 MM HG: CPT | Performed by: NURSE PRACTITIONER

## 2020-08-10 RX ORDER — SULFAMETHOXAZOLE AND TRIMETHOPRIM 800; 160 MG/1; MG/1
1 TABLET ORAL EVERY 12 HOURS SCHEDULED
Qty: 14 TABLET | Refills: 0 | Status: SHIPPED | OUTPATIENT
Start: 2020-08-10 | End: 2020-08-17

## 2020-08-10 NOTE — PROGRESS NOTES
Virtual Regular Visit      Assessment/Plan:    Problem List Items Addressed This Visit        Musculoskeletal and Integument    Skin infection - Primary    Relevant Medications    sulfamethoxazole-trimethoprim (BACTRIM DS) 800-160 mg per tablet               Reason for visit is   Chief Complaint   Patient presents with    Virtual Brief Visit     ear infection    Virtual Regular Visit        Encounter provider CONCEPCIÓN Granda    Provider located at [address] [place] [id number]      Recent Visits  No visits were found meeting these conditions  Showing recent visits within past 7 days and meeting all other requirements     Today's Visits  Date Type Provider Dept   08/10/20 Telemedicine CONCEPCIÓN Granda [facility] today's visits and meeting all other requirements     Future Appointments  No visits were found meeting these conditions  Showing future appointments within next 150 days and meeting all other requirements        The patient was identified by name and date of birth  Lele Rueda was informed that this is a telemedicine visit and that the visit is being conducted through [address] [place] [address] and patient was informed that this is not a secure, HIPAA-complaint platform  She agrees to proceed     My office door was closed  No one else was in the room  She acknowledged consent and understanding of privacy and security of the video platform  The patient has agreed to participate and understands they can discontinue the visit at any time  Patient is aware this is a billable service  Subjective  Lele Rueda is a 79 y o  female    79 yr old female presents via face time for pain and suspect infection of bilateral ear lobes related to use of buster silver earrings which she apparently has a sensitivity   Pt states approx 3 days ago she put new silver earrings in both ears and began developing itching and redness now with visible drainage of right ear lobe and redness of left, no itching  Pt is having spinal stimulator placed in one week and is fearful of further infection holding up the procedure  Past Medical History:   Diagnosis Date    Anxiety     Cellulitis     LAST ASSESED 2/12/16; RESOLVED 3/9/16    Chronic obstructive pulmonary disease ([facility] ) 3/11/2019    Depression     Diabetes mellitus ([facility] )     Dysuria     LAST ASSESSED 3/7/16; RESOLVED 3/9/16    Esophageal reflux     RESOLVED 1/15/16    Fatigue     RESOLVED 3/9/16    Fracture     rt 4 th metatarsal     Heart murmur     Hypertension     Pneumonia     Restless legs syndrome (RLS)     Vitamin D deficiency        Past Surgical History:   Procedure Laterality Date    APPENDECTOMY      BACK SURGERY      CHOLECYSTECTOMY  1975    FOOT SURGERY Right 2014    HIP SURGERY  2013    HYSTERECTOMY      OTHER SURGICAL HISTORY  2011    LAPAROSCOPIC SLING OPERATION FOR STRESS INCONTINENCE     OVARIAN CYST SURGERY  1971    CYSTECTOMY    TN COLONOSCOPY FLX DX W/COLLJ SPEC WHEN PFRMD N/A 7/10/2018    Procedure: COLONOSCOPY;  Surgeon: Avila Santizo MD;  [facility]; Service: Gastroenterology    TN ESOPHAGOGASTRODUODENOSCOPY TRANSORAL DIAGNOSTIC N/A 1/28/2019    Procedure: ESOPHAGOGASTRODUODENOSCOPY (EGD); Surgeon: Pilo Perez MD;  Location: [facility];   Service: Gastroenterology    SHOULDER SURGERY Right 12/14/2015    REPAIR OF TORN MUSCLES    SKIN BIOPSY      chin 03/07/2019    TONSILLECTOMY         Current Outpatient Medications   Medication Sig Dispense Refill    albuterol (2 5 mg/3 mL) 0 083 % nebulizer solution Take 1 vial (2 5 mg total) by nebulization every 4 (four) hours as needed for shortness of breath Disp 1 box 270 vial 1    albuterol (PROAIR HFA) 90 mcg/act inhaler Inhale 2 puffs      amitriptyline (ELAVIL) 50 mg tablet Take 50 mg by mouth daily at bedtime Take 25mg daily      aspirin (ECOTRIN LOW STRENGTH) 81 mg EC tablet Take 81 mg by mouth daily      atorvastatin (LIPITOR) 20 mg tablet Take 1 tablet (20 mg total) by mouth daily 90 tablet 1    baclofen 10 mg tablet Take by mouth 3 (three) times a day       Calcium 500 MG tablet Take 2 tablets by mouth every other day        carbidopa-levodopa (SINEMET)  mg per tablet Take 2 tablets by mouth 3 (three) times a day 270 tablet 5    cholecalciferol (VITAMIN D3) 1,000 units tablet Take 3 tablets by mouth every other day       clonazePAM (KlonoPIN) 2 mg tablet Take 1 tablet (2 mg total) by mouth daily at bedtime 90 tablet 0    denosumab (PROLIA) 60 mg/mL Inject under the skin every 6 (six) months      ergocalciferol (VITAMIN D2) 50,000 units Take 1 capsule (50,000 Units total) by mouth once a week 12 capsule 3    escitalopram (LEXAPRO) 10 mg tablet Take 1 5 tablets (15 mg total) by mouth daily (Patient taking differently: Take 10 mg by mouth daily Take 10mg daily) 45 tablet 5    Ferrous Sulfate (IRON) 325 (65 Fe) MG TABS Take 1 tablet (325 mg total) by mouth daily 30 each 3    hydrochlorothiazide (HYDRODIURIL) 50 mg tablet Take 1 tablet (50 mg total) by mouth daily 90 tablet 1    lisinopril (ZESTRIL) 2 5 mg tablet Take 1 tablet (2 5 mg total) by mouth daily 90 tablet 1    metFORMIN (GLUCOPHAGE) 1000 MG tablet Take 1 tablet (1,000 mg total) by mouth 2 (two) times a day with meals 180 tablet 1    pantoprazole (PROTONIX) 40 mg tablet Take 1 tablet (40 mg total) by mouth daily 90 tablet 1    potassium chloride (MICRO-K) 10 MEQ CR capsule Take 10 mEq by mouth 2 (two) times a day        pramipexole (MIRAPEX) 1 5 MG tablet Take 1 tablet (1 5 mg total) by mouth 3 (three) times a day 270 tablet 1    VITAMIN B COMPLEX-C PO Take 1 tablet by mouth daily      JOELLEN MICROLET LANCETS lancets Use as instructed BID E11 65 100 each 5    glucose blood (JOELLEN CONTOUR TEST) test strip Use as instructed BID  E11 65 100 each 5    Glucose Blood (ONETOUCH ULTRA BLUE VI) by In Vitro route 3 (three) times a day      ketoconazole (NIZORAL) 2 % cream ketoconazole  2 % crea      nystatin (MYCOSTATIN) cream Apply 2 g (1 application total) topically 2 (two) times a day To affected area 30 g 5    sulfamethoxazole-trimethoprim (BACTRIM DS) 800-160 mg per tablet Take 1 tablet by mouth every 12 (twelve) hours for 7 days 14 tablet 0     No current facility-administered medications for this visit  Allergies   Allergen Reactions    Midazolam Nausea Only and Vomiting    Gabapentin     Hydrocodone-Acetaminophen      Category: Adverse Reaction;     Penicillin V     Pregabalin     Propofol      Exacerbates RSL       Review of Systems   Constitutional: Negative  HENT: Negative  Eyes: Negative  Respiratory: Negative  Cardiovascular: Negative  Gastrointestinal: Negative  Endocrine: Negative  Genitourinary: Negative  Musculoskeletal: Negative  Skin: Positive for color change and wound  Both earring hole sites red with pus   Allergic/Immunologic: Negative  Neurological: Negative  Hematological: Negative  Psychiatric/Behavioral: Negative  Video Exam    Vitals:       Physical Exam  Vitals signs reviewed  Constitutional:       Appearance: She is well-developed  HENT:      Head: Normocephalic  Eyes:      Pupils: Pupils are equal, round, and reactive to light  Neck:      Musculoskeletal: Normal range of motion  Pulmonary:      Effort: Pulmonary effort is normal       Breath sounds: Normal breath sounds  Abdominal:      General: Bowel sounds are normal       Palpations: Abdomen is soft  Musculoskeletal: Normal range of motion  Skin:     General: Skin is warm and dry  Findings: Erythema present  Comments: Bilateral erythema and white drainage noted from right ear lobe, left ear lobe erythema, no itching noted today   Neurological:      Mental Status: She is alert and oriented to person, place, and time     Psychiatric: Behavior: Behavior normal          Thought Content: Thought content normal          Judgment: Judgment normal           I spent 20 minutes directly with the patient during this visit      VIRTUAL VISIT DISCLAIMER    Emma Parada acknowledges that she has consented to an online visit or consultation  She understands that the online visit is based solely on information provided by her, and that, in the absence of a face-to-face physical evaluation by the physician, the diagnosis she receives is both limited and provisional in terms of accuracy and completeness  This is not intended to replace a full medical face-to-face evaluation by the physician  Emma Parada understands and accepts these terms

## 2020-08-11 DIAGNOSIS — G25.81 RLS (RESTLESS LEGS SYNDROME): ICD-10-CM

## 2020-08-11 DIAGNOSIS — F41.9 ANXIETY: ICD-10-CM

## 2020-08-11 RX ORDER — CLONAZEPAM 2 MG/1
2 TABLET ORAL
Qty: 90 TABLET | Refills: 1 | Status: SHIPPED | OUTPATIENT
Start: 2020-08-11 | End: 2020-08-12

## 2020-08-12 DIAGNOSIS — F41.9 ANXIETY: ICD-10-CM

## 2020-08-12 DIAGNOSIS — G25.81 RLS (RESTLESS LEGS SYNDROME): ICD-10-CM

## 2020-08-12 RX ORDER — CLONAZEPAM 2 MG/1
TABLET ORAL
Qty: 90 TABLET | Refills: 1 | Status: SHIPPED | OUTPATIENT
Start: 2020-08-12 | End: 2020-08-12

## 2020-08-12 RX ORDER — CLONAZEPAM 2 MG/1
TABLET ORAL
Qty: 90 TABLET | Refills: 1 | Status: SHIPPED | OUTPATIENT
Start: 2020-08-12 | End: 2021-03-03 | Stop reason: SDUPTHER

## 2020-08-12 NOTE — TELEPHONE ENCOUNTER
Requested medication(s) are due for refill today: Yes  Patient has already received a courtesy refill: no  Other reason request has been forwarded to provider:

## 2020-08-26 DIAGNOSIS — E11.8 TYPE 2 DIABETES MELLITUS WITH COMPLICATION, WITHOUT LONG-TERM CURRENT USE OF INSULIN (HCC): ICD-10-CM

## 2020-09-03 ENCOUNTER — TELEPHONE (OUTPATIENT)
Dept: INTERNAL MEDICINE CLINIC | Facility: CLINIC | Age: 70
End: 2020-09-03

## 2020-09-16 DIAGNOSIS — G25.81 RLS (RESTLESS LEGS SYNDROME): ICD-10-CM

## 2020-09-16 RX ORDER — PRAMIPEXOLE DIHYDROCHLORIDE 1.5 MG/1
1.5 TABLET ORAL 3 TIMES DAILY
Qty: 270 TABLET | Refills: 1 | Status: SHIPPED | OUTPATIENT
Start: 2020-09-16 | End: 2021-06-07 | Stop reason: SDUPTHER

## 2020-09-21 ENCOUNTER — OFFICE VISIT (OUTPATIENT)
Dept: INTERNAL MEDICINE CLINIC | Facility: CLINIC | Age: 70
End: 2020-09-21
Payer: MEDICARE

## 2020-09-21 VITALS
WEIGHT: 179 LBS | HEIGHT: 65 IN | DIASTOLIC BLOOD PRESSURE: 60 MMHG | TEMPERATURE: 97.1 F | OXYGEN SATURATION: 96 % | BODY MASS INDEX: 29.82 KG/M2 | HEART RATE: 106 BPM | SYSTOLIC BLOOD PRESSURE: 112 MMHG

## 2020-09-21 DIAGNOSIS — M89.9 DISORDER OF BONE, UNSPECIFIED: ICD-10-CM

## 2020-09-21 DIAGNOSIS — R60.9 PERIPHERAL EDEMA: Primary | ICD-10-CM

## 2020-09-21 DIAGNOSIS — E11.8 TYPE 2 DIABETES MELLITUS WITH COMPLICATION, WITHOUT LONG-TERM CURRENT USE OF INSULIN (HCC): ICD-10-CM

## 2020-09-21 DIAGNOSIS — R79.89 LOW VITAMIN D LEVEL: ICD-10-CM

## 2020-09-21 PROBLEM — R60.0 PERIPHERAL EDEMA: Status: ACTIVE | Noted: 2020-09-21

## 2020-09-21 PROCEDURE — 99213 OFFICE O/P EST LOW 20 MIN: CPT | Performed by: NURSE PRACTITIONER

## 2020-09-21 RX ORDER — BETAMETHASONE DIPROPIONATE 0.5 MG/G
CREAM TOPICAL
COMMUNITY

## 2020-09-21 RX ORDER — FUROSEMIDE 40 MG/1
40 TABLET ORAL DAILY
Qty: 90 TABLET | Refills: 2 | Status: SHIPPED | OUTPATIENT
Start: 2020-09-21 | End: 2021-05-20 | Stop reason: SDUPTHER

## 2020-09-21 RX ORDER — CIPROFLOXACIN 500 MG/1
500 TABLET, FILM COATED ORAL EVERY 12 HOURS
COMMUNITY
Start: 2020-08-18 | End: 2020-10-20

## 2020-09-21 RX ORDER — OXYCODONE HYDROCHLORIDE AND ACETAMINOPHEN 5; 325 MG/1; MG/1
1 TABLET ORAL 2 TIMES DAILY PRN
COMMUNITY
Start: 2020-08-27 | End: 2020-10-20

## 2020-09-21 NOTE — PROGRESS NOTES
Assessment/Plan:    No problem-specific Assessment & Plan notes found for this encounter  Diagnoses and all orders for this visit:    Peripheral edema  Comments:  stop HCTZ and start Lasix 40 mg prn for leg swelling along with Kdur  Orders:  -     furosemide (LASIX) 40 mg tablet; Take 1 tablet (40 mg total) by mouth daily    Low vitamin D level  Comments:  labwork ordered  Orders:  -     Vitamin D 25 hydroxy; Future    Disorder of bone, unspecified   Comments:  labwork ordered  Orders:  -     Vitamin D 25 hydroxy; Future    Type 2 diabetes mellitus with complication, without long-term current use of insulin ([facility] )  Comments:  Labwork ordered 12/20  Orders:  -     Hemoglobin A1C; Future    Other orders  -     ciprofloxacin (CIPRO) 500 mg tablet; Take 500 mg by mouth every 12 (twelve) hours  -     oxyCODONE-acetaminophen (PERCOCET) 5-325 mg per tablet; Take 1 tablet by mouth 2 (two) times a day as needed  -     Cancel: PNEUMOCOCCAL POLYSACCHARIDE VACCINE 23-VALENT =>1YO SQ IM  -     betamethasone dipropionate (DIPROSONE) 0 05 % cream; betamethasone dipropionate 0 05 % topical cream          Subjective:      Patient ID: Candice Hope is a 79 y o  female  79 yr old female here with swelling lower extremities, does take HCTZ daily with no relief, pt had recent spinal cord stimulator placed but and notes the swelling was there prior 1/4 edema noted  I will stop the HCTZ and advised the pt to use the Lasix sparingly for edema and with weight gain along with her potassium supplement  Pt verbalized understanding      The following portions of the patient's history were reviewed and updated as appropriate: She  has a past medical history of Anxiety, Cellulitis, Chronic obstructive pulmonary disease ([address] ) (3/11/2019), Depression, Diabetes mellitus ([address] ), Dysuria, Esophageal reflux, Fatigue, Fracture, Heart murmur, Hypertension, Pneumonia, Restless legs syndrome (RLS), and Vitamin D deficiency    She   Patient Active Problem List    Diagnosis Date Noted    Peripheral edema 09/21/2020    Petechiae 07/20/2020    Lumbosacral radiculitis 06/10/2020    Chronic pain disorder 05/26/2020    Lumbar post-laminectomy syndrome 05/26/2020    Paronychia of fifth toe of right foot 02/08/2020    Wound of right foot 01/22/2020    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    Skin infection 04/01/2019    Recurrent major depressive disorder, in partial remission ([facility] ) 03/11/2019    Chronic obstructive pulmonary disease ([place] [address] ) 03/11/2019    Esophageal dysphagia 11/26/2018    Heart murmur 09/12/2018    Type 2 diabetes mellitus with complication, without long-term current use of insulin ([place] [address] ) 04/06/2018    Chronic lower back pain 09/29/2016    Anxiety 05/21/2016    Vitamin D deficiency 03/09/2016    HTN (hypertension) 12/14/2015    Hypercholesteremia 12/14/2015    Restless legs syndrome (RLS) 12/14/2015     She  has a past surgical history that includes Back surgery; Cholecystectomy (1975); Foot surgery (Right, 2014); Hip surgery (2013); Hysterectomy; Ovarian cyst surgery (1971); Shoulder surgery (Right, 12/14/2015); Other surgical history (2011); Tonsillectomy; pr colonoscopy flx dx w/collj spec when pfrmd (N/A, 7/10/2018); Appendectomy; pr esophagogastroduodenoscopy transoral diagnostic (N/A, 1/28/2019); and Skin biopsy  Her family history includes Calcium disorder in her other and other; Cancer in her father; Depression in her family; Diabetes type II in her father and other; Heart disease in her father; Hypertension in her mother and other; Mental illness in her mother and other; Migraines in her other; Other in her other; Pulmonary embolism in her other; Stroke in her mother; Substance Abuse in her brother and family; Tuberculosis in her other  She  reports that she has quit smoking  Her smoking use included cigarettes  She has never used smokeless tobacco  She reports current alcohol use  She reports that she does not use drugs    Current Outpatient Medications   Medication Sig Dispense Refill    albuterol (2 5 mg/3 mL) 0 083 % nebulizer solution Take 1 vial (2 5 mg total) by nebulization every 4 (four) hours as needed for shortness of breath Disp 1 box 270 vial 1    albuterol (PROAIR HFA) 90 mcg/act inhaler Inhale 2 puffs      amitriptyline (ELAVIL) 50 mg tablet Take 50 mg by mouth daily at bedtime Take 25mg daily      aspirin (ECOTRIN LOW STRENGTH) 81 mg EC tablet Take 81 mg by mouth daily      atorvastatin (LIPITOR) 20 mg tablet Take 1 tablet (20 mg total) by mouth daily 90 tablet 1    baclofen 10 mg tablet Take by mouth 3 (three) times a day       Worklight MICROLET LANCETS lancets Use as instructed BID E11 65 100 each 5    betamethasone dipropionate (DIPROSONE) 0 05 % cream betamethasone dipropionate 0 05 % topical cream      Calcium 500 MG tablet Take 2 tablets by mouth every other day        carbidopa-levodopa (SINEMET)  mg per tablet Take 2 tablets by mouth 3 (three) times a day 270 tablet 5    cholecalciferol (VITAMIN D3) 1,000 units tablet Take 3 tablets by mouth every other day       clonazePAM (KlonoPIN) 2 mg tablet TAKE 1 TABLET BY MOUTH ONCE DAILY AT BEDTIME 90 tablet 1    denosumab (PROLIA) 60 mg/mL Inject under the skin every 6 (six) months      ergocalciferol (VITAMIN D2) 50,000 units Take 1 capsule (50,000 Units total) by mouth once a week 12 capsule 3    escitalopram (LEXAPRO) 10 mg tablet Take 1 5 tablets (15 mg total) by mouth daily (Patient taking differently: Take 10 mg by mouth daily Take 10mg daily) 45 tablet 5    Ferrous Sulfate (IRON) 325 (65 Fe) MG TABS Take 1 tablet (325 mg total) by mouth daily 30 each 3    glucose blood (JOELLEN CONTOUR TEST) test strip Use as instructed BID  E11 65 100 each 5    Glucose Blood (ONETOUCH ULTRA BLUE VI) by In Vitro route 3 (three) times a day      ketoconazole (NIZORAL) 2 % cream ketoconazole  2 % crea      lisinopril (ZESTRIL) 2 5 mg tablet Take 1 tablet (2 5 mg total) by mouth daily 90 tablet 1    metFORMIN (GLUCOPHAGE) 1000 MG tablet TAKE 1 TABLET BY MOUTH TWICE DAILY WITH MEALS 180 tablet 1    nystatin (MYCOSTATIN) cream Apply 2 g (1 application total) topically 2 (two) times a day To affected area 30 g 5    oxyCODONE-acetaminophen (PERCOCET) 5-325 mg per tablet Take 1 tablet by mouth 2 (two) times a day as needed      pantoprazole (PROTONIX) 40 mg tablet Take 1 tablet (40 mg total) by mouth daily 90 tablet 1    potassium chloride (MICRO-K) 10 MEQ CR capsule Take 10 mEq by mouth 2 (two) times a day        pramipexole (MIRAPEX) 1 5 MG tablet Take 1 tablet (1 5 mg total) by mouth 3 (three) times a day 270 tablet 1    VITAMIN B COMPLEX-C PO Take 1 tablet by mouth daily      ciprofloxacin (CIPRO) 500 mg tablet Take 500 mg by mouth every 12 (twelve) hours      furosemide (LASIX) 40 mg tablet Take 1 tablet (40 mg total) by mouth daily 90 tablet 2     No current facility-administered medications for this visit        Current Outpatient Medications on File Prior to Visit   Medication Sig    albuterol (2 5 mg/3 mL) 0 083 % nebulizer solution Take 1 vial (2 5 mg total) by nebulization every 4 (four) hours as needed for shortness of breath Disp 1 box    albuterol (PROAIR HFA) 90 mcg/act inhaler Inhale 2 puffs    amitriptyline (ELAVIL) 50 mg tablet Take 50 mg by mouth daily at bedtime Take 25mg daily    aspirin (ECOTRIN LOW STRENGTH) 81 mg EC tablet Take 81 mg by mouth daily    atorvastatin (LIPITOR) 20 mg tablet Take 1 tablet (20 mg total) by mouth daily    baclofen 10 mg tablet Take by mouth 3 (three) times a day     HAUL MICROLET LANCETS lancets Use as instructed BID E11 65    betamethasone dipropionate (DIPROSONE) 0 05 % cream betamethasone dipropionate 0 05 % topical cream    Calcium 500 MG tablet Take 2 tablets by mouth every other day      carbidopa-levodopa (SINEMET)  mg per tablet Take 2 tablets by mouth 3 (three) times a day    cholecalciferol (VITAMIN D3) 1,000 units tablet Take 3 tablets by mouth every other day     clonazePAM (KlonoPIN) 2 mg tablet TAKE 1 TABLET BY MOUTH ONCE DAILY AT BEDTIME    denosumab (PROLIA) 60 mg/mL Inject under the skin every 6 (six) months    ergocalciferol (VITAMIN D2) 50,000 units Take 1 capsule (50,000 Units total) by mouth once a week    escitalopram (LEXAPRO) 10 mg tablet Take 1 5 tablets (15 mg total) by mouth daily (Patient taking differently: Take 10 mg by mouth daily Take 10mg daily)    Ferrous Sulfate (IRON) 325 (65 Fe) MG TABS Take 1 tablet (325 mg total) by mouth daily    glucose blood (JOELLEN CONTOUR TEST) test strip Use as instructed BID  E11 65    Glucose Blood (ONETOUCH ULTRA BLUE VI) by In Vitro route 3 (three) times a day    ketoconazole (NIZORAL) 2 % cream ketoconazole  2 % crea    lisinopril (ZESTRIL) 2 5 mg tablet Take 1 tablet (2 5 mg total) by mouth daily    metFORMIN (GLUCOPHAGE) 1000 MG tablet TAKE 1 TABLET BY MOUTH TWICE DAILY WITH MEALS    nystatin (MYCOSTATIN) cream Apply 2 g (1 application total) topically 2 (two) times a day To affected area    oxyCODONE-acetaminophen (PERCOCET) 5-325 mg per tablet Take 1 tablet by mouth 2 (two) times a day as needed    pantoprazole (PROTONIX) 40 mg tablet Take 1 tablet (40 mg total) by mouth daily    potassium chloride (MICRO-K) 10 MEQ CR capsule Take 10 mEq by mouth 2 (two) times a day      pramipexole (MIRAPEX) 1 5 MG tablet Take 1 tablet (1 5 mg total) by mouth 3 (three) times a day    VITAMIN B COMPLEX-C PO Take 1 tablet by mouth daily    [DISCONTINUED] hydrochlorothiazide (HYDRODIURIL) 50 mg tablet Take 1 tablet (50 mg total) by mouth daily    ciprofloxacin (CIPRO) 500 mg tablet Take 500 mg by mouth every 12 (twelve) hours     No current facility-administered medications on file prior to visit  She is allergic to midazolam; gabapentin; hydrocodone-acetaminophen; penicillin v; pregabalin; and propofol       Review of Systems Constitutional: Negative  HENT: Negative  Eyes: Negative  Respiratory: Negative  Cardiovascular: Positive for leg swelling  Bilateral lower legs   Gastrointestinal: Negative  Endocrine: Negative  Genitourinary: Negative  Musculoskeletal: Negative  Skin: Negative  Allergic/Immunologic: Negative  Neurological: Negative  Hematological: Negative  Psychiatric/Behavioral: Negative  Objective:      /60   Pulse (!) 106   Temp (!) 97 1 °F (36 2 °C)   Ht 5' 5" (1 651 m)   Wt 81 2 kg (179 lb)   SpO2 96%   BMI 29 79 kg/m²          Physical Exam  Vitals signs and nursing note reviewed  Constitutional:       Appearance: She is well-developed  HENT:      Head: Normocephalic  Eyes:      Pupils: Pupils are equal, round, and reactive to light  Neck:      Musculoskeletal: Normal range of motion  Cardiovascular:      Rate and Rhythm: Normal rate and regular rhythm  Pulmonary:      Effort: Pulmonary effort is normal       Breath sounds: Normal breath sounds  Abdominal:      General: Bowel sounds are normal       Palpations: Abdomen is soft  Musculoskeletal: Normal range of motion  Right lower le+ Edema present  Left lower le+ Edema present  Skin:     General: Skin is warm and dry  Neurological:      Mental Status: She is alert and oriented to person, place, and time  Psychiatric:         Behavior: Behavior normal          Thought Content:  Thought content normal          Judgment: Judgment normal

## 2020-10-13 ENCOUNTER — IMMUNIZATIONS (OUTPATIENT)
Dept: INTERNAL MEDICINE CLINIC | Facility: CLINIC | Age: 70
End: 2020-10-13
Payer: MEDICARE

## 2020-10-13 DIAGNOSIS — Z23 ENCOUNTER FOR IMMUNIZATION: ICD-10-CM

## 2020-10-13 PROCEDURE — 90662 IIV NO PRSV INCREASED AG IM: CPT

## 2020-10-13 PROCEDURE — G0008 ADMIN INFLUENZA VIRUS VAC: HCPCS

## 2020-10-20 ENCOUNTER — OFFICE VISIT (OUTPATIENT)
Dept: INTERNAL MEDICINE CLINIC | Facility: CLINIC | Age: 70
End: 2020-10-20
Payer: MEDICARE

## 2020-10-20 ENCOUNTER — TELEPHONE (OUTPATIENT)
Dept: INTERNAL MEDICINE CLINIC | Facility: CLINIC | Age: 70
End: 2020-10-20

## 2020-10-20 VITALS
WEIGHT: 172.2 LBS | RESPIRATION RATE: 14 BRPM | HEART RATE: 102 BPM | DIASTOLIC BLOOD PRESSURE: 62 MMHG | BODY MASS INDEX: 28.69 KG/M2 | TEMPERATURE: 98.3 F | HEIGHT: 65 IN | OXYGEN SATURATION: 96 % | SYSTOLIC BLOOD PRESSURE: 116 MMHG

## 2020-10-20 DIAGNOSIS — G62.9 NEUROPATHY: ICD-10-CM

## 2020-10-20 DIAGNOSIS — F41.9 ANXIETY: ICD-10-CM

## 2020-10-20 DIAGNOSIS — I10 ESSENTIAL HYPERTENSION: ICD-10-CM

## 2020-10-20 DIAGNOSIS — E11.8 TYPE 2 DIABETES MELLITUS WITH COMPLICATION, WITHOUT LONG-TERM CURRENT USE OF INSULIN (HCC): Primary | ICD-10-CM

## 2020-10-20 DIAGNOSIS — R13.19 ESOPHAGEAL DYSPHAGIA: ICD-10-CM

## 2020-10-20 LAB — SL AMB POCT HEMOGLOBIN AIC: 5.9 (ref ?–6.5)

## 2020-10-20 PROCEDURE — 99214 OFFICE O/P EST MOD 30 MIN: CPT | Performed by: INTERNAL MEDICINE

## 2020-10-20 PROCEDURE — 83036 HEMOGLOBIN GLYCOSYLATED A1C: CPT | Performed by: INTERNAL MEDICINE

## 2020-10-20 RX ORDER — LISINOPRIL 2.5 MG/1
2.5 TABLET ORAL DAILY
Qty: 90 TABLET | Refills: 1 | Status: SHIPPED | OUTPATIENT
Start: 2020-10-20 | End: 2021-01-12

## 2020-10-20 RX ORDER — ESCITALOPRAM OXALATE 10 MG/1
10 TABLET ORAL DAILY
Qty: 90 TABLET | Refills: 1 | Status: SHIPPED | OUTPATIENT
Start: 2020-10-20 | End: 2021-05-20 | Stop reason: SDUPTHER

## 2020-10-20 RX ORDER — PANTOPRAZOLE SODIUM 40 MG/1
40 TABLET, DELAYED RELEASE ORAL DAILY
Qty: 90 TABLET | Refills: 1 | Status: SHIPPED | OUTPATIENT
Start: 2020-10-20 | End: 2021-07-23 | Stop reason: SDUPTHER

## 2020-10-20 RX ORDER — AMITRIPTYLINE HYDROCHLORIDE 50 MG/1
50 TABLET, FILM COATED ORAL
Qty: 90 TABLET | Refills: 1 | Status: SHIPPED | OUTPATIENT
Start: 2020-10-20 | End: 2020-10-21 | Stop reason: SDUPTHER

## 2020-10-21 DIAGNOSIS — G62.9 NEUROPATHY: ICD-10-CM

## 2020-10-21 RX ORDER — AMITRIPTYLINE HYDROCHLORIDE 50 MG/1
50 TABLET, FILM COATED ORAL
Qty: 90 TABLET | Refills: 1 | Status: SHIPPED | OUTPATIENT
Start: 2020-10-21 | End: 2021-08-09 | Stop reason: SDUPTHER

## 2020-11-10 DIAGNOSIS — E78.5 HYPERLIPIDEMIA, UNSPECIFIED HYPERLIPIDEMIA TYPE: ICD-10-CM

## 2020-11-10 RX ORDER — ATORVASTATIN CALCIUM 20 MG/1
TABLET, FILM COATED ORAL
Qty: 90 TABLET | Refills: 1 | Status: SHIPPED | OUTPATIENT
Start: 2020-11-10 | End: 2021-08-04 | Stop reason: SDUPTHER

## 2020-11-23 ENCOUNTER — TELEMEDICINE (OUTPATIENT)
Dept: INTERNAL MEDICINE CLINIC | Facility: CLINIC | Age: 70
End: 2020-11-23
Payer: MEDICARE

## 2020-11-23 DIAGNOSIS — J44.9 CHRONIC OBSTRUCTIVE PULMONARY DISEASE, UNSPECIFIED COPD TYPE (HCC): ICD-10-CM

## 2020-11-23 DIAGNOSIS — J40 BRONCHITIS: Primary | ICD-10-CM

## 2020-11-23 PROCEDURE — 99213 OFFICE O/P EST LOW 20 MIN: CPT | Performed by: INTERNAL MEDICINE

## 2020-11-23 RX ORDER — DOXYCYCLINE HYCLATE 100 MG/1
100 CAPSULE ORAL EVERY 12 HOURS SCHEDULED
Qty: 14 CAPSULE | Refills: 0 | Status: SHIPPED | OUTPATIENT
Start: 2020-11-23 | End: 2020-11-30

## 2020-12-07 ENCOUNTER — TELEMEDICINE (OUTPATIENT)
Dept: INTERNAL MEDICINE CLINIC | Facility: CLINIC | Age: 70
End: 2020-12-07
Payer: MEDICARE

## 2020-12-07 ENCOUNTER — TRANSCRIBE ORDERS (OUTPATIENT)
Dept: LAB | Facility: CLINIC | Age: 70
End: 2020-12-07

## 2020-12-07 VITALS — BODY MASS INDEX: 27.82 KG/M2 | HEIGHT: 65 IN | WEIGHT: 167 LBS | TEMPERATURE: 97.5 F

## 2020-12-07 DIAGNOSIS — J32.1 CHRONIC FRONTAL SINUSITIS: ICD-10-CM

## 2020-12-07 DIAGNOSIS — M89.9 DISORDER OF BONE, UNSPECIFIED: Primary | ICD-10-CM

## 2020-12-07 DIAGNOSIS — M81.0 AGE-RELATED OSTEOPOROSIS WITHOUT CURRENT PATHOLOGICAL FRACTURE: ICD-10-CM

## 2020-12-07 DIAGNOSIS — B37.2 CANDIDAL INTERTRIGO: ICD-10-CM

## 2020-12-07 PROCEDURE — 99213 OFFICE O/P EST LOW 20 MIN: CPT | Performed by: INTERNAL MEDICINE

## 2020-12-07 RX ORDER — NYSTATIN 100000 U/G
1 CREAM TOPICAL 2 TIMES DAILY
Qty: 30 G | Refills: 5 | Status: SHIPPED | OUTPATIENT
Start: 2020-12-07 | End: 2022-02-11

## 2020-12-07 RX ORDER — MOXIFLOXACIN HYDROCHLORIDE 400 MG/1
400 TABLET ORAL DAILY
Qty: 14 TABLET | Refills: 0 | Status: SHIPPED | OUTPATIENT
Start: 2020-12-07 | End: 2020-12-21

## 2020-12-19 ENCOUNTER — TELEPHONE (OUTPATIENT)
Dept: OTHER | Facility: OTHER | Age: 70
End: 2020-12-19

## 2020-12-21 ENCOUNTER — TELEPHONE (OUTPATIENT)
Dept: INTERNAL MEDICINE CLINIC | Facility: CLINIC | Age: 70
End: 2020-12-21

## 2021-01-08 ENCOUNTER — LAB (OUTPATIENT)
Dept: LAB | Facility: CLINIC | Age: 71
End: 2021-01-08
Payer: MEDICARE

## 2021-01-08 ENCOUNTER — TRANSCRIBE ORDERS (OUTPATIENT)
Dept: LAB | Facility: CLINIC | Age: 71
End: 2021-01-08

## 2021-01-08 DIAGNOSIS — R79.89 LOW VITAMIN D LEVEL: ICD-10-CM

## 2021-01-08 DIAGNOSIS — M89.9 DISORDER OF BONE, UNSPECIFIED: ICD-10-CM

## 2021-01-08 LAB — 25(OH)D3 SERPL-MCNC: 59.2 NG/ML (ref 30–100)

## 2021-01-08 PROCEDURE — 82306 VITAMIN D 25 HYDROXY: CPT

## 2021-01-08 PROCEDURE — 36415 COLL VENOUS BLD VENIPUNCTURE: CPT

## 2021-01-12 ENCOUNTER — TELEPHONE (OUTPATIENT)
Dept: INTERNAL MEDICINE CLINIC | Facility: CLINIC | Age: 71
End: 2021-01-12

## 2021-01-12 DIAGNOSIS — I10 ESSENTIAL HYPERTENSION: ICD-10-CM

## 2021-01-12 DIAGNOSIS — E61.1 IRON DEFICIENCY: Primary | ICD-10-CM

## 2021-01-12 RX ORDER — LISINOPRIL 2.5 MG/1
TABLET ORAL
Qty: 90 TABLET | Refills: 1 | Status: SHIPPED | OUTPATIENT
Start: 2021-01-12 | End: 2021-12-13 | Stop reason: SDUPTHER

## 2021-01-15 ENCOUNTER — TRANSCRIBE ORDERS (OUTPATIENT)
Dept: LAB | Facility: CLINIC | Age: 71
End: 2021-01-15

## 2021-01-15 ENCOUNTER — LAB (OUTPATIENT)
Dept: LAB | Facility: CLINIC | Age: 71
End: 2021-01-15
Payer: MEDICARE

## 2021-01-15 DIAGNOSIS — E61.1 IRON DEFICIENCY: ICD-10-CM

## 2021-01-15 LAB
BASOPHILS # BLD AUTO: 0.1 THOUSANDS/ΜL (ref 0–0.1)
BASOPHILS NFR BLD AUTO: 1 % (ref 0–1)
EOSINOPHIL # BLD AUTO: 0.14 THOUSAND/ΜL (ref 0–0.61)
EOSINOPHIL NFR BLD AUTO: 1 % (ref 0–6)
ERYTHROCYTE [DISTWIDTH] IN BLOOD BY AUTOMATED COUNT: 13.2 % (ref 11.6–15.1)
FERRITIN SERPL-MCNC: 23 NG/ML (ref 8–388)
HCT VFR BLD AUTO: 44.2 % (ref 34.8–46.1)
HGB BLD-MCNC: 14.1 G/DL (ref 11.5–15.4)
IMM GRANULOCYTES # BLD AUTO: 0.02 THOUSAND/UL (ref 0–0.2)
IMM GRANULOCYTES NFR BLD AUTO: 0 % (ref 0–2)
IRON SATN MFR SERPL: 18 %
IRON SERPL-MCNC: 93 UG/DL (ref 50–170)
LYMPHOCYTES # BLD AUTO: 3.09 THOUSANDS/ΜL (ref 0.6–4.47)
LYMPHOCYTES NFR BLD AUTO: 30 % (ref 14–44)
MCH RBC QN AUTO: 31.5 PG (ref 26.8–34.3)
MCHC RBC AUTO-ENTMCNC: 31.9 G/DL (ref 31.4–37.4)
MCV RBC AUTO: 99 FL (ref 82–98)
MONOCYTES # BLD AUTO: 0.66 THOUSAND/ΜL (ref 0.17–1.22)
MONOCYTES NFR BLD AUTO: 7 % (ref 4–12)
NEUTROPHILS # BLD AUTO: 6.16 THOUSANDS/ΜL (ref 1.85–7.62)
NEUTS SEG NFR BLD AUTO: 61 % (ref 43–75)
NRBC BLD AUTO-RTO: 0 /100 WBCS
PLATELET # BLD AUTO: 200 THOUSANDS/UL (ref 149–390)
PMV BLD AUTO: 11 FL (ref 8.9–12.7)
RBC # BLD AUTO: 4.47 MILLION/UL (ref 3.81–5.12)
TIBC SERPL-MCNC: 504 UG/DL (ref 250–450)
WBC # BLD AUTO: 10.17 THOUSAND/UL (ref 4.31–10.16)

## 2021-01-15 PROCEDURE — 83550 IRON BINDING TEST: CPT

## 2021-01-15 PROCEDURE — 85025 COMPLETE CBC W/AUTO DIFF WBC: CPT

## 2021-01-15 PROCEDURE — 36415 COLL VENOUS BLD VENIPUNCTURE: CPT

## 2021-01-15 PROCEDURE — 83540 ASSAY OF IRON: CPT

## 2021-01-15 PROCEDURE — 82728 ASSAY OF FERRITIN: CPT

## 2021-01-18 ENCOUNTER — TELEPHONE (OUTPATIENT)
Dept: INTERNAL MEDICINE CLINIC | Facility: CLINIC | Age: 71
End: 2021-01-18

## 2021-01-19 ENCOUNTER — HOSPITAL ENCOUNTER (EMERGENCY)
Facility: HOSPITAL | Age: 71
Discharge: HOME | End: 2021-01-19
Attending: EMERGENCY MEDICINE | Admitting: EMERGENCY MEDICINE
Payer: COMMERCIAL

## 2021-01-19 ENCOUNTER — APPOINTMENT (EMERGENCY)
Dept: CT IMAGING | Facility: HOSPITAL | Age: 71
End: 2021-01-19
Payer: COMMERCIAL

## 2021-01-19 VITALS
SYSTOLIC BLOOD PRESSURE: 118 MMHG | BODY MASS INDEX: 30.93 KG/M2 | RESPIRATION RATE: 18 BRPM | TEMPERATURE: 98.4 F | WEIGHT: 185.85 LBS | DIASTOLIC BLOOD PRESSURE: 57 MMHG | HEART RATE: 81 BPM | OXYGEN SATURATION: 93 %
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
## 2021-01-19 DIAGNOSIS — M54.50 LOW BACK PAIN: ICD-10-CM

## 2021-01-19 LAB
ALBUMIN SERPL BCP-MCNC: 3.4 G/DL (ref 3.5–5)
ALP SERPL-CCNC: 60 U/L (ref 46–116)
ALT SERPL W P-5'-P-CCNC: 21 U/L (ref 12–78)
ANION GAP SERPL CALCULATED.3IONS-SCNC: 7 MMOL/L (ref 4–13)
AST SERPL W P-5'-P-CCNC: 13 U/L (ref 5–45)
BASOPHILS # BLD AUTO: 0.05 THOUSANDS/ΜL (ref 0–0.1)
BASOPHILS NFR BLD AUTO: 1 % (ref 0–1)
BILIRUB SERPL-MCNC: 0.6 MG/DL (ref 0.2–1)
BUN SERPL-MCNC: 22 MG/DL (ref 5–25)
CALCIUM ALBUM COR SERPL-MCNC: 9.4 MG/DL (ref 8.3–10.1)
CALCIUM SERPL-MCNC: 8.9 MG/DL (ref 8.3–10.1)
CHLORIDE SERPL-SCNC: 101 MMOL/L (ref 100–108)
CO2 SERPL-SCNC: 32 MMOL/L (ref 21–32)
CREAT SERPL-MCNC: 0.91 MG/DL (ref 0.6–1.3)
EOSINOPHIL # BLD AUTO: 0.18 THOUSAND/ΜL (ref 0–0.61)
EOSINOPHIL NFR BLD AUTO: 2 % (ref 0–6)
ERYTHROCYTE [DISTWIDTH] IN BLOOD BY AUTOMATED COUNT: 13.2 % (ref 11.6–15.1)
GFR SERPL CREATININE-BSD FRML MDRD: 64 ML/MIN/1.73SQ M
GLUCOSE SERPL-MCNC: 180 MG/DL (ref 65–140)
HCT VFR BLD AUTO: 38.2 % (ref 34.8–46.1)
HGB BLD-MCNC: 12.7 G/DL (ref 11.5–15.4)
IMM GRANULOCYTES # BLD AUTO: 0.02 THOUSAND/UL (ref 0–0.2)
IMM GRANULOCYTES NFR BLD AUTO: 0 % (ref 0–2)
LYMPHOCYTES # BLD AUTO: 2.12 THOUSANDS/ΜL (ref 0.6–4.47)
LYMPHOCYTES NFR BLD AUTO: 29 % (ref 14–44)
MCH RBC QN AUTO: 32.1 PG (ref 26.8–34.3)
MCHC RBC AUTO-ENTMCNC: 33.2 G/DL (ref 31.4–37.4)
MCV RBC AUTO: 97 FL (ref 82–98)
MONOCYTES # BLD AUTO: 0.38 THOUSAND/ΜL (ref 0.17–1.22)
MONOCYTES NFR BLD AUTO: 5 % (ref 4–12)
NEUTROPHILS # BLD AUTO: 4.7 THOUSANDS/ΜL (ref 1.85–7.62)
NEUTS SEG NFR BLD AUTO: 63 % (ref 43–75)
NRBC BLD AUTO-RTO: 0 /100 WBCS
PLATELET # BLD AUTO: 177 THOUSANDS/UL (ref 149–390)
PMV BLD AUTO: 10 FL (ref 8.9–12.7)
POTASSIUM SERPL-SCNC: 3.8 MMOL/L (ref 3.5–5.3)
PROT SERPL-MCNC: 6.9 G/DL (ref 6.4–8.2)
RBC # BLD AUTO: 3.96 MILLION/UL (ref 3.81–5.12)
SODIUM SERPL-SCNC: 140 MMOL/L (ref 136–145)
WBC # BLD AUTO: 7.45 THOUSAND/UL (ref 4.31–10.16)

## 2021-01-19 PROCEDURE — 99284 EMERGENCY DEPT VISIT MOD MDM: CPT | Performed by: EMERGENCY MEDICINE

## 2021-01-19 PROCEDURE — 96374 THER/PROPH/DIAG INJ IV PUSH: CPT

## 2021-01-19 PROCEDURE — 80053 COMPREHEN METABOLIC PANEL: CPT | Performed by: EMERGENCY MEDICINE

## 2021-01-19 PROCEDURE — 36415 COLL VENOUS BLD VENIPUNCTURE: CPT | Performed by: EMERGENCY MEDICINE

## 2021-01-19 PROCEDURE — 74177 CT ABD & PELVIS W/CONTRAST: CPT

## 2021-01-19 PROCEDURE — 85025 COMPLETE CBC W/AUTO DIFF WBC: CPT | Performed by: EMERGENCY MEDICINE

## 2021-01-19 PROCEDURE — 99285 EMERGENCY DEPT VISIT HI MDM: CPT

## 2021-01-19 RX ORDER — KETOROLAC TROMETHAMINE 30 MG/ML
15 INJECTION, SOLUTION INTRAMUSCULAR; INTRAVENOUS ONCE
Status: COMPLETED | OUTPATIENT
Start: 2021-01-19 | End: 2021-01-19

## 2021-01-19 RX ADMIN — IOHEXOL 100 ML: 350 INJECTION, SOLUTION INTRAVENOUS at 17:37

## 2021-01-19 RX ADMIN — KETOROLAC TROMETHAMINE 15 MG: 30 INJECTION, SOLUTION INTRAMUSCULAR at 18:07

## 2021-01-19 NOTE — ED NOTES
Patient transported to [address] [place] [address]  [facility]  01/19/21 [id number]

## 2021-01-22 ENCOUNTER — TELEPHONE (OUTPATIENT)
Dept: INTERNAL MEDICINE CLINIC | Facility: CLINIC | Age: 71
End: 2021-01-22
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
## 2021-02-22 DIAGNOSIS — E11.8 TYPE 2 DIABETES MELLITUS WITH COMPLICATION, WITHOUT LONG-TERM CURRENT USE OF INSULIN (HCC): ICD-10-CM

## 2021-03-03 DIAGNOSIS — F41.9 ANXIETY: ICD-10-CM

## 2021-03-03 DIAGNOSIS — E11.8 TYPE 2 DIABETES MELLITUS WITH COMPLICATION, WITHOUT LONG-TERM CURRENT USE OF INSULIN (HCC): ICD-10-CM

## 2021-03-03 DIAGNOSIS — G25.81 RLS (RESTLESS LEGS SYNDROME): ICD-10-CM

## 2021-03-03 RX ORDER — CLONAZEPAM 2 MG/1
2 TABLET ORAL
Qty: 90 TABLET | Refills: 1 | Status: SHIPPED | OUTPATIENT
Start: 2021-03-03 | End: 2022-02-11

## 2021-03-16 ENCOUNTER — TELEPHONE (OUTPATIENT)
Dept: INTERNAL MEDICINE CLINIC | Facility: CLINIC | Age: 71
End: 2021-03-16

## 2021-03-16 DIAGNOSIS — L03.019 PARONYCHIA OF FINGER, UNSPECIFIED LATERALITY: Primary | ICD-10-CM

## 2021-03-16 RX ORDER — DOXYCYCLINE HYCLATE 100 MG/1
100 CAPSULE ORAL EVERY 12 HOURS SCHEDULED
Qty: 14 CAPSULE | Refills: 0 | Status: SHIPPED | OUTPATIENT
Start: 2021-03-16 | End: 2021-03-23

## 2021-03-16 NOTE — TELEPHONE ENCOUNTER
Pt developed an infection in her left middle finger, she was doing her nails and pshed her cuticle back not its painfully red and swollen with no pus she wants to know if she can get an antibiotic for her finger

## 2021-04-27 ENCOUNTER — TRANSCRIBE ORDERS (OUTPATIENT)
Dept: ADMINISTRATIVE | Facility: HOSPITAL | Age: 71
End: 2021-04-27

## 2021-04-27 DIAGNOSIS — Z12.31 ENCOUNTER FOR SCREENING MAMMOGRAM FOR MALIGNANT NEOPLASM OF BREAST: Primary | ICD-10-CM

## 2021-05-04 DIAGNOSIS — E11.8 TYPE 2 DIABETES MELLITUS WITH COMPLICATION, WITHOUT LONG-TERM CURRENT USE OF INSULIN (HCC): Primary | ICD-10-CM

## 2021-05-04 RX ORDER — BLOOD SUGAR DIAGNOSTIC
STRIP MISCELLANEOUS
Qty: 100 STRIP | Refills: 5 | Status: SHIPPED | OUTPATIENT
Start: 2021-05-04 | End: 2022-02-11

## 2021-05-10 ENCOUNTER — OFFICE VISIT (OUTPATIENT)
Dept: FAMILY MEDICINE CLINIC | Facility: CLINIC | Age: 71
End: 2021-05-10
Payer: MEDICARE

## 2021-05-10 VITALS
OXYGEN SATURATION: 96 % | HEIGHT: 65 IN | RESPIRATION RATE: 18 BRPM | HEART RATE: 94 BPM | SYSTOLIC BLOOD PRESSURE: 124 MMHG | BODY MASS INDEX: 28.32 KG/M2 | TEMPERATURE: 96.6 F | DIASTOLIC BLOOD PRESSURE: 66 MMHG | WEIGHT: 170 LBS

## 2021-05-10 DIAGNOSIS — E66.3 OVERWEIGHT WITH BODY MASS INDEX (BMI) OF 28 TO 28.9 IN ADULT: ICD-10-CM

## 2021-05-10 DIAGNOSIS — Z01.00 DIABETIC EYE EXAM (HCC): ICD-10-CM

## 2021-05-10 DIAGNOSIS — Z13.820 SCREENING FOR OSTEOPOROSIS: ICD-10-CM

## 2021-05-10 DIAGNOSIS — Z13.31 DEPRESSION SCREENING NEGATIVE: ICD-10-CM

## 2021-05-10 DIAGNOSIS — E11.9 DIABETIC EYE EXAM (HCC): ICD-10-CM

## 2021-05-10 DIAGNOSIS — F33.41 RECURRENT MAJOR DEPRESSIVE DISORDER, IN PARTIAL REMISSION (HCC): ICD-10-CM

## 2021-05-10 DIAGNOSIS — Z78.0 POST-MENOPAUSAL: ICD-10-CM

## 2021-05-10 DIAGNOSIS — Z76.89 ENCOUNTER TO ESTABLISH CARE WITH NEW DOCTOR: ICD-10-CM

## 2021-05-10 DIAGNOSIS — J44.9 CHRONIC OBSTRUCTIVE PULMONARY DISEASE, UNSPECIFIED COPD TYPE (HCC): ICD-10-CM

## 2021-05-10 DIAGNOSIS — E11.8 TYPE 2 DIABETES MELLITUS WITH COMPLICATION, WITHOUT LONG-TERM CURRENT USE OF INSULIN (HCC): Primary | ICD-10-CM

## 2021-05-10 DIAGNOSIS — Z12.31 ENCOUNTER FOR SCREENING MAMMOGRAM FOR MALIGNANT NEOPLASM OF BREAST: ICD-10-CM

## 2021-05-10 LAB — SL AMB POCT HEMOGLOBIN AIC: 6 (ref ?–6.5)

## 2021-05-10 PROCEDURE — 83036 HEMOGLOBIN GLYCOSYLATED A1C: CPT | Performed by: NURSE PRACTITIONER

## 2021-05-10 PROCEDURE — 99214 OFFICE O/P EST MOD 30 MIN: CPT | Performed by: NURSE PRACTITIONER

## 2021-05-10 RX ORDER — ALBUTEROL SULFATE 90 UG/1
2 AEROSOL, METERED RESPIRATORY (INHALATION) EVERY 4 HOURS PRN
Qty: 8.5 G | Refills: 3 | Status: SHIPPED | OUTPATIENT
Start: 2021-05-10

## 2021-05-10 RX ORDER — TRAMADOL HYDROCHLORIDE 50 MG/1
50 TABLET ORAL 3 TIMES DAILY PRN
COMMUNITY
Start: 2021-04-13

## 2021-05-10 RX ORDER — BLOOD SUGAR DIAGNOSTIC
STRIP MISCELLANEOUS
Qty: 100 STRIP | Refills: 5 | Status: SHIPPED | OUTPATIENT
Start: 2021-05-10

## 2021-05-10 NOTE — PROGRESS NOTES
Assessment/Plan:    Problem List Items Addressed This Visit     Type 2 diabetes mellitus with complication, without long-term current use of insulin ([facility] [address] ) - Primary    Relevant Medications    glucose blood (OneTouch Ultra) test strip    Other Relevant Orders    POCT hemoglobin A1c (Completed)    Lipid Panel with Direct LDL reflex    HEMOGLOBIN A1C W/ EAG ESTIMATION    Recurrent major depressive disorder, in partial remission (HCC)    Chronic obstructive pulmonary disease (HCC)    Relevant Medications    albuterol (ProAir HFA) 90 mcg/act inhaler    fluticasone-umeclidinium-vilanterol (TRELEGY) 100-62 5-25 MCG/INH inhaler    Overweight with body mass index (BMI) of 28 to 28 9 in adult      Other Visit Diagnoses     Diabetic eye exam ([facility] )        Relevant Orders    Ambulatory Referral to Ophthalmology    Screening for osteoporosis        Relevant Orders    DXA bone density spine hip and pelvis    Encounter for screening mammogram for malignant neoplasm of breast        Relevant Orders    Mammo screening bilateral w 3d & cad    Post-menopausal        Relevant Orders    DXA bone density spine hip and pelvis    Depression screening negative        Encounter to establish care with new doctor               Diagnoses and all orders for this visit:    Type 2 diabetes mellitus with complication, without long-term current use of insulin ([facility] [address] )  -     POCT hemoglobin A1c  -     Lipid Panel with Direct LDL reflex; Future  -     HEMOGLOBIN A1C W/ EAG ESTIMATION; Future  -     glucose blood (OneTouch Ultra) test strip; Once daily    Diabetic eye exam [facility])  -     Ambulatory Referral to Ophthalmology; Future    Screening for osteoporosis  -     DXA bone density spine hip and pelvis; Future    Encounter for screening mammogram for malignant neoplasm of breast  -     Mammo screening bilateral w 3d & cad; Future    Chronic obstructive pulmonary disease, unspecified COPD type ([facility] [address] )  -     albuterol (ProAir HFA) 90 mcg/act inhaler;  Inhale 2 puffs every 4 (four) hours as needed for wheezing  -     fluticasone-umeclidinium-vilanterol (TRELEGY) 100-62 5-25 MCG/INH inhaler; Inhale 1 puff daily Rinse mouth after use  Recurrent major depressive disorder, in partial remission ([facility] [address] )    Post-menopausal  -     DXA bone density spine hip and pelvis; Future    Depression screening negative    Encounter to establish care with new doctor    Overweight with body mass index (BMI) of 28 to 28 9 in adult    Other orders  -     traMADol (ULTRAM) 50 mg tablet; Take 50 mg by mouth 3 (three) times a day as needed  -     Cancel: fluticasone-salmeterol (ADVAIR, WIXELA) 100-50 mcg/dose inhaler; Inhale 1 puff 2 (two) times a day Rinse mouth after use  No problem-specific Assessment & Plan notes found for this encounter  Subjective:      Patient ID: Sahra Moody is a 70 y o  female  Presents today to establish care at this office and routine visit  Previous PCP Leann Mejía  Diabetes  She presents for her follow-up diabetic visit  She has type 2 diabetes mellitus  Her disease course has been stable  There are no hypoglycemic associated symptoms  Pertinent negatives for hypoglycemia include no headaches  There are no diabetic associated symptoms  Pertinent negatives for diabetes include no blurred vision and no chest pain  There are no hypoglycemic complications  Symptoms are stable  There are no diabetic complications  Pertinent negatives for diabetic complications include no CVA, PVD or retinopathy  Risk factors for coronary artery disease include diabetes mellitus, hypertension and post-menopausal  Current diabetic treatment includes oral agent (monotherapy)  She is compliant with treatment all of the time  Her weight is fluctuating minimally  She is following a generally healthy and high fiber diet  Meal planning includes avoidance of concentrated sweets  She participates in exercise every other day   She monitors blood glucose at home 1-2 x per day  Her breakfast blood glucose range is generally  mg/dl  An ACE inhibitor/angiotensin II receptor blocker is being taken  She sees a podiatrist Eye exam is not current  Hypertension  This is a chronic problem  The problem is controlled  Pertinent negatives include no anxiety, blurred vision, chest pain, headaches, neck pain, orthopnea, palpitations, peripheral edema, PND or shortness of breath  There are no associated agents to hypertension  Past treatments include ACE inhibitors and diuretics  The current treatment provides moderate improvement  There are no compliance problems  There is no history of angina, kidney disease, CAD/MI, CVA, heart failure, left ventricular hypertrophy, PVD or retinopathy  There is no history of chronic renal disease, a hypertension causing med, sleep apnea or a thyroid problem  Hyperlipidemia  This is a chronic problem  The current episode started more than 1 year ago  Exacerbating diseases include diabetes  She has no history of chronic renal disease, hypothyroidism, liver disease, obesity or nephrotic syndrome  There are no known factors aggravating her hyperlipidemia  Pertinent negatives include no chest pain, focal sensory loss, focal weakness, leg pain, myalgias or shortness of breath  Current antihyperlipidemic treatment includes statins  There are no compliance problems  Risk factors for coronary artery disease include diabetes mellitus, dyslipidemia, hypertension and post-menopausal        The following portions of the patient's history were reviewed and updated as appropriate:   She has a past medical history of Anxiety, Cellulitis, Chronic obstructive pulmonary disease ([address] ) (3/11/2019), Depression, Diabetes mellitus ([address] ), Dysuria, Esophageal reflux, Fatigue, Fracture, Heart murmur, Hypertension, Pneumonia, Restless legs syndrome (RLS), and Vitamin D deficiency  ,  does not have any pertinent problems on file  ,   has a past surgical history that includes Back surgery; Cholecystectomy (1975); Foot surgery (Right, 2014); Hip surgery (2013); Hysterectomy; Ovarian cyst surgery (1971); Shoulder surgery (Right, 12/14/2015); Other surgical history (2011); Tonsillectomy; pr colonoscopy flx dx w/collj spec when pfrmd (N/A, 7/10/2018); Appendectomy; pr esophagogastroduodenoscopy transoral diagnostic (N/A, 1/28/2019); Skin biopsy; and Insertion / placement / revision neurostimulator  ,  family history includes Calcium disorder in her other and other; Cancer in her father; Depression in her family; Diabetes type II in her father and other; Heart disease in her father; Hypertension in her mother and other; Mental illness in her mother and other; Migraines in her other; Other in her other; Pulmonary embolism in her other; Stroke in her mother; Substance Abuse in her brother and family; Tuberculosis in her other  ,   reports that she has been smoking cigarettes  She has been smoking about 0 50 packs per day  She has never used smokeless tobacco  She reports current alcohol use  She reports that she does not use drugs  ,  is allergic to midazolam; moxifloxacin; gabapentin; hydrocodone-acetaminophen; penicillin v; pregabalin; and propofol     Current Outpatient Medications   Medication Sig Dispense Refill    albuterol (2 5 mg/3 mL) 0 083 % nebulizer solution Take 1 vial (2 5 mg total) by nebulization every 4 (four) hours as needed for shortness of breath Disp 1 box 270 vial 1    albuterol (ProAir HFA) 90 mcg/act inhaler Inhale 2 puffs every 4 (four) hours as needed for wheezing 8 5 g 3    aspirin (ECOTRIN LOW STRENGTH) 81 mg EC tablet Take 81 mg by mouth daily      atorvastatin (LIPITOR) 20 mg tablet Take 1 tablet by mouth once daily 90 tablet 1    baclofen 10 mg tablet Take by mouth 3 (three) times a day       JOELLEN MICROLET LANCETS lancets Use as instructed BID E11 65 100 each 5    betamethasone dipropionate (DIPROSONE) 0 05 % cream betamethasone dipropionate 0 05 % topical cream      Calcium 500 MG tablet Take 2 tablets by mouth every other day        cholecalciferol (VITAMIN D3) 1,000 units tablet Take 3 tablets by mouth every other day       clonazePAM (KlonoPIN) 2 mg tablet Take 1 tablet (2 mg total) by mouth daily at bedtime 90 tablet 1    denosumab (PROLIA) 60 mg/mL Inject under the skin every 6 (six) months      Ferrous Sulfate (IRON) 325 (65 Fe) MG TABS Take 1 tablet (325 mg total) by mouth daily 30 each 3    furosemide (LASIX) 40 mg tablet Take 1 tablet (40 mg total) by mouth daily 90 tablet 2    Glucose Blood (ONETOUCH ULTRA BLUE VI) by In Vitro route 3 (three) times a day      glucose blood (OneTouch Ultra) test strip Use 1 strip daily as instructed 100 strip 5    ketoconazole (NIZORAL) 2 % cream ketoconazole  2 % crea      lisinopril (ZESTRIL) 2 5 mg tablet Take 1 tablet by mouth once daily 90 tablet 1    metFORMIN (GLUCOPHAGE) 1000 MG tablet TAKE 1 TABLET BY MOUTH TWICE DAILY WITH MEALS 180 tablet 1    pantoprazole (PROTONIX) 40 mg tablet Take 1 tablet (40 mg total) by mouth daily 90 tablet 1    potassium chloride (MICRO-K) 10 MEQ CR capsule Take 10 mEq by mouth 2 (two) times a day        pramipexole (MIRAPEX) 1 5 MG tablet Take 1 tablet (1 5 mg total) by mouth 3 (three) times a day 270 tablet 1    VITAMIN B COMPLEX-C PO Take 1 tablet by mouth daily      amitriptyline (ELAVIL) 50 mg tablet Take 1 tablet (50 mg total) by mouth daily at bedtime 90 tablet 1    carbidopa-levodopa (SINEMET)  mg per tablet Take 2 tablets by mouth 3 (three) times a day (Patient not taking: Reported on 1/19/2021) 270 tablet 5    ergocalciferol (VITAMIN D2) 50,000 units Take 1 capsule (50,000 Units total) by mouth once a week (Patient not taking: Reported on 1/19/2021) 12 capsule 3    escitalopram (LEXAPRO) 10 mg tablet Take 1 tablet (10 mg total) by mouth daily 90 tablet 1    fluticasone-umeclidinium-vilanterol (TRELEGY) 100-62 5-25 MCG/INH inhaler Inhale 1 puff daily Rinse mouth after use  60 each 5    glucose blood (OneTouch Ultra) test strip Once daily 100 strip 5    nystatin (MYCOSTATIN) cream Apply 2 g (1 application total) topically 2 (two) times a day To affected area (Patient not taking: Reported on 1/19/2021) 30 g 5    traMADol (ULTRAM) 50 mg tablet Take 50 mg by mouth 3 (three) times a day as needed       No current facility-administered medications for this visit  BMI Counseling: Body mass index is 28 29 kg/m²  The BMI is above normal  Nutrition recommendations include decreasing portion sizes, consuming healthier snacks, limiting drinks that contain sugar, moderation in carbohydrate intake and reducing intake of cholesterol  Exercise recommendations include exercising 3-5 times per week  No pharmacotherapy was ordered  Depression Screening and Follow-up Plan: Patient's depression screening was positive with a PHQ-2 score of 0  Their PHQ-9 score was 1  Clincally patient does not have depression  No treatment is required  Falls Plan of Care: balance, strength, and gait training instructions were provided  Medications that increase falls were reviewed  Assessed feet and footwear  Tobacco Cessation Counseling: Tobacco cessation counseling was provided  The patient is sincerely urged to quit consumption of tobacco  She is not ready to quit tobacco  Medication options and side effects of medication discussed  Patient refused medication  Review of Systems   Constitutional: Negative  HENT: Negative  Eyes: Negative  Negative for blurred vision  Respiratory: Negative  Negative for shortness of breath  Cardiovascular: Negative  Negative for chest pain, palpitations, orthopnea and PND  Gastrointestinal: Negative  Endocrine: Negative  Genitourinary: Negative  Musculoskeletal: Negative  Negative for myalgias and neck pain  Skin: Negative  Allergic/Immunologic: Negative  Neurological: Negative    Negative for focal weakness and headaches  Hematological: Negative  Psychiatric/Behavioral: Negative  Objective:  Vitals:    05/10/21 1317   BP: 124/66   Pulse: 94   Resp: 18   Temp: (!) 96 6 °F (35 9 °C)   SpO2: 96%   Weight: 77 1 kg (170 lb)   Height: 5' 5" (1 651 m)     Body mass index is 28 29 kg/m²  Physical Exam  Vitals signs and nursing note reviewed  Constitutional:       Appearance: Normal appearance  She is well-developed  HENT:      Head: Normocephalic and atraumatic  Right Ear: Tympanic membrane, ear canal and external ear normal       Left Ear: Tympanic membrane, ear canal and external ear normal       Nose: Nose normal       Mouth/Throat:      Mouth: Mucous membranes are moist       Pharynx: Uvula midline  Eyes:      General: Lids are normal       Conjunctiva/sclera: Conjunctivae normal       Pupils: Pupils are equal, round, and reactive to light  Neck:      Musculoskeletal: Full passive range of motion without pain, normal range of motion and neck supple  Thyroid: No thyroid mass or thyromegaly  Vascular: No JVD  Trachea: Trachea and phonation normal    Cardiovascular:      Rate and Rhythm: Normal rate and regular rhythm  Pulses: Normal pulses  Heart sounds: S1 normal and S2 normal  Murmur present  No friction rub  No gallop  Pulmonary:      Effort: Pulmonary effort is normal       Breath sounds: Normal breath sounds  Abdominal:      General: Bowel sounds are normal       Palpations: Abdomen is soft  Tenderness: There is no abdominal tenderness  Genitourinary:     Comments: Deferred   Musculoskeletal: Normal range of motion  Right lower leg: No edema  Left lower leg: No edema  Lymphadenopathy:      Head:      Right side of head: No submental, submandibular, tonsillar, preauricular, posterior auricular or occipital adenopathy        Left side of head: No submental, submandibular, tonsillar, preauricular, posterior auricular or occipital adenopathy  Cervical: No cervical adenopathy  Skin:     General: Skin is warm and dry  Capillary Refill: Capillary refill takes less than 2 seconds  Neurological:      General: No focal deficit present  Mental Status: She is alert and oriented to person, place, and time  Cranial Nerves: Cranial nerves are intact  No cranial nerve deficit  Sensory: Sensation is intact  Motor: Motor function is intact  Coordination: Coordination is intact  Gait: Gait is intact  Deep Tendon Reflexes: Reflexes are normal and symmetric  Psychiatric:         Attention and Perception: Attention and perception normal          Mood and Affect: Mood and affect normal          Speech: Speech normal          Behavior: Behavior normal  Behavior is cooperative  Thought Content:  Thought content normal          Cognition and Memory: Cognition normal          Judgment: Judgment normal

## 2021-05-12 ENCOUNTER — TELEPHONE (OUTPATIENT)
Dept: FAMILY MEDICINE CLINIC | Facility: CLINIC | Age: 71
End: 2021-05-12

## 2021-05-12 NOTE — TELEPHONE ENCOUNTER
Patient's Trelegy Is in need of a prior auth       Fax was sent to [address] in error patient see's Evi Alejandro       Key: [id number]  Last name: Mendel Credit  : [id number]

## 2021-05-13 NOTE — TELEPHONE ENCOUNTER
Please call pt and advise of cost of Trelegy  I can send in Advair or generic and see if it cheaper  Please advise

## 2021-05-14 ENCOUNTER — TELEPHONE (OUTPATIENT)
Dept: FAMILY MEDICINE CLINIC | Facility: CLINIC | Age: 71
End: 2021-05-14

## 2021-05-14 DIAGNOSIS — J44.9 CHRONIC OBSTRUCTIVE PULMONARY DISEASE, UNSPECIFIED COPD TYPE (HCC): Primary | ICD-10-CM

## 2021-05-14 NOTE — TELEPHONE ENCOUNTER
advair not covered       Formulary alteratives are as below:      Anoro Ellipta, Bevespi Aerosphere, Breo Ellipta, Serevent Diskus, Spiriva Respimat, Stiolto Respimat, Symbicort

## 2021-05-19 ENCOUNTER — TELEPHONE (OUTPATIENT)
Dept: FAMILY MEDICINE CLINIC | Facility: CLINIC | Age: 71
End: 2021-05-19

## 2021-05-19 NOTE — TELEPHONE ENCOUNTER
Patient had DXA done in 2020 at [facility] ordered by Dr Deniz Wisdom  Insurance will only pay for one every 2 years  Please look for results via care everywhere

## 2021-05-20 DIAGNOSIS — R60.9 PERIPHERAL EDEMA: ICD-10-CM

## 2021-05-20 DIAGNOSIS — F41.9 ANXIETY: ICD-10-CM

## 2021-05-20 RX ORDER — FUROSEMIDE 40 MG/1
40 TABLET ORAL DAILY
Qty: 90 TABLET | Refills: 2 | Status: SHIPPED | OUTPATIENT
Start: 2021-05-20 | End: 2021-05-20 | Stop reason: SDUPTHER

## 2021-05-20 RX ORDER — ESCITALOPRAM OXALATE 10 MG/1
10 TABLET ORAL DAILY
Qty: 90 TABLET | Refills: 1 | Status: SHIPPED | OUTPATIENT
Start: 2021-05-20 | End: 2022-07-22

## 2021-05-20 RX ORDER — FUROSEMIDE 40 MG/1
40 TABLET ORAL DAILY
Qty: 90 TABLET | Refills: 2 | Status: SHIPPED | OUTPATIENT
Start: 2021-05-20 | End: 2021-05-25 | Stop reason: SDUPTHER

## 2021-05-25 DIAGNOSIS — R60.9 PERIPHERAL EDEMA: ICD-10-CM

## 2021-05-25 RX ORDER — FUROSEMIDE 40 MG/1
40 TABLET ORAL DAILY
Qty: 15 TABLET | Refills: 0 | Status: SHIPPED | OUTPATIENT
Start: 2021-05-25 | End: 2021-06-10 | Stop reason: SDUPTHER

## 2021-05-25 NOTE — TELEPHONE ENCOUNTER
Patient has still not received her laxis from mail order and is completely out please send in a short supply to local pharmacy         Patient requests script to be printed out

## 2021-06-02 DIAGNOSIS — R60.9 PERIPHERAL EDEMA: ICD-10-CM

## 2021-06-03 RX ORDER — FUROSEMIDE 40 MG/1
TABLET ORAL
Qty: 15 TABLET | Refills: 0 | OUTPATIENT
Start: 2021-06-03

## 2021-06-07 ENCOUNTER — TRANSCRIBE ORDERS (OUTPATIENT)
Dept: LAB | Facility: CLINIC | Age: 71
End: 2021-06-07

## 2021-06-07 DIAGNOSIS — G25.81 RLS (RESTLESS LEGS SYNDROME): ICD-10-CM

## 2021-06-07 RX ORDER — PRAMIPEXOLE DIHYDROCHLORIDE 1.5 MG/1
1.5 TABLET ORAL 3 TIMES DAILY
Qty: 270 TABLET | Refills: 1 | Status: SHIPPED | OUTPATIENT
Start: 2021-06-07 | End: 2021-06-10 | Stop reason: SDUPTHER

## 2021-06-08 ENCOUNTER — APPOINTMENT (OUTPATIENT)
Dept: LAB | Facility: CLINIC | Age: 71
End: 2021-06-08
Payer: MEDICARE

## 2021-06-08 ENCOUNTER — TRANSCRIBE ORDERS (OUTPATIENT)
Dept: LAB | Facility: CLINIC | Age: 71
End: 2021-06-08

## 2021-06-08 DIAGNOSIS — M81.0 SENILE OSTEOPOROSIS: Primary | ICD-10-CM

## 2021-06-08 DIAGNOSIS — M81.0 SENILE OSTEOPOROSIS: ICD-10-CM

## 2021-06-08 LAB
25(OH)D3 SERPL-MCNC: 55.6 NG/ML (ref 30–100)
ANION GAP SERPL CALCULATED.3IONS-SCNC: 4 MMOL/L (ref 4–13)
BUN SERPL-MCNC: 29 MG/DL (ref 5–25)
CALCIUM SERPL-MCNC: 9.7 MG/DL (ref 8.3–10.1)
CHLORIDE SERPL-SCNC: 104 MMOL/L (ref 100–108)
CO2 SERPL-SCNC: 31 MMOL/L (ref 21–32)
CREAT SERPL-MCNC: 0.81 MG/DL (ref 0.6–1.3)
GFR SERPL CREATININE-BSD FRML MDRD: 73 ML/MIN/1.73SQ M
GLUCOSE SERPL-MCNC: 104 MG/DL (ref 65–140)
POTASSIUM SERPL-SCNC: 4 MMOL/L (ref 3.5–5.3)
SODIUM SERPL-SCNC: 139 MMOL/L (ref 136–145)

## 2021-06-08 PROCEDURE — 80048 BASIC METABOLIC PNL TOTAL CA: CPT

## 2021-06-08 PROCEDURE — 82306 VITAMIN D 25 HYDROXY: CPT

## 2021-06-08 PROCEDURE — 36415 COLL VENOUS BLD VENIPUNCTURE: CPT

## 2021-06-10 DIAGNOSIS — G25.81 RLS (RESTLESS LEGS SYNDROME): ICD-10-CM

## 2021-06-10 DIAGNOSIS — R60.9 PERIPHERAL EDEMA: ICD-10-CM

## 2021-06-10 RX ORDER — FUROSEMIDE 40 MG/1
40 TABLET ORAL DAILY
Qty: 30 TABLET | Refills: 0 | Status: SHIPPED | OUTPATIENT
Start: 2021-06-10

## 2021-06-10 RX ORDER — PRAMIPEXOLE DIHYDROCHLORIDE 1.5 MG/1
1.5 TABLET ORAL 3 TIMES DAILY
Qty: 270 TABLET | Refills: 1 | Status: SHIPPED | OUTPATIENT
Start: 2021-06-10 | End: 2021-06-11 | Stop reason: SDUPTHER

## 2021-06-11 DIAGNOSIS — G25.81 RLS (RESTLESS LEGS SYNDROME): ICD-10-CM

## 2021-06-11 RX ORDER — FUROSEMIDE 40 MG/1
TABLET ORAL
Qty: 15 TABLET | OUTPATIENT
Start: 2021-06-11

## 2021-06-11 RX ORDER — PRAMIPEXOLE DIHYDROCHLORIDE 1.5 MG/1
1.5 TABLET ORAL 3 TIMES DAILY
Qty: 270 TABLET | Refills: 1 | Status: SHIPPED | OUTPATIENT
Start: 2021-06-11 | End: 2022-02-11

## 2021-06-11 NOTE — TELEPHONE ENCOUNTER
patient mirapex was sent to [facility] yesterday it is to costly there patient would like it sent to [address] [facility]   Please resend

## 2021-07-23 DIAGNOSIS — R13.19 ESOPHAGEAL DYSPHAGIA: ICD-10-CM

## 2021-07-23 RX ORDER — PANTOPRAZOLE SODIUM 40 MG/1
40 TABLET, DELAYED RELEASE ORAL DAILY
Qty: 90 TABLET | Refills: 1 | Status: SHIPPED | OUTPATIENT
Start: 2021-07-23 | End: 2021-11-08

## 2021-08-04 DIAGNOSIS — E78.5 HYPERLIPIDEMIA, UNSPECIFIED HYPERLIPIDEMIA TYPE: ICD-10-CM

## 2021-08-04 RX ORDER — ATORVASTATIN CALCIUM 20 MG/1
20 TABLET, FILM COATED ORAL DAILY
Qty: 90 TABLET | Refills: 1 | Status: SHIPPED | OUTPATIENT
Start: 2021-08-04 | End: 2022-02-22 | Stop reason: SDUPTHER

## 2021-08-09 DIAGNOSIS — G62.9 NEUROPATHY: ICD-10-CM

## 2021-08-09 RX ORDER — AMITRIPTYLINE HYDROCHLORIDE 50 MG/1
50 TABLET, FILM COATED ORAL
Qty: 90 TABLET | Refills: 1 | Status: SHIPPED | OUTPATIENT
Start: 2021-08-09 | End: 2022-02-22 | Stop reason: SDUPTHER

## 2021-08-18 DIAGNOSIS — E11.8 TYPE 2 DIABETES MELLITUS WITH COMPLICATION, WITHOUT LONG-TERM CURRENT USE OF INSULIN (HCC): ICD-10-CM

## 2021-10-13 ENCOUNTER — IMMUNIZATIONS (OUTPATIENT)
Dept: FAMILY MEDICINE CLINIC | Facility: CLINIC | Age: 71
End: 2021-10-13
Payer: MEDICARE

## 2021-10-13 DIAGNOSIS — Z23 ENCOUNTER FOR IMMUNIZATION: Primary | ICD-10-CM

## 2021-10-13 PROCEDURE — 90662 IIV NO PRSV INCREASED AG IM: CPT

## 2021-10-13 PROCEDURE — G0008 ADMIN INFLUENZA VIRUS VAC: HCPCS

## 2021-11-07 DIAGNOSIS — R13.19 ESOPHAGEAL DYSPHAGIA: ICD-10-CM

## 2021-11-08 RX ORDER — PANTOPRAZOLE SODIUM 40 MG/1
TABLET, DELAYED RELEASE ORAL
Qty: 90 TABLET | Refills: 0 | Status: SHIPPED | OUTPATIENT
Start: 2021-11-08 | End: 2021-12-13 | Stop reason: SDUPTHER

## 2021-11-11 ENCOUNTER — TELEPHONE (OUTPATIENT)
Dept: ADMINISTRATIVE | Facility: OTHER | Age: 71
End: 2021-11-11

## 2021-11-17 LAB
LEFT EYE DIABETIC RETINOPATHY: NORMAL
RIGHT EYE DIABETIC RETINOPATHY: NORMAL

## 2021-12-13 DIAGNOSIS — I10 ESSENTIAL HYPERTENSION: ICD-10-CM

## 2021-12-13 DIAGNOSIS — R13.19 ESOPHAGEAL DYSPHAGIA: ICD-10-CM

## 2021-12-13 RX ORDER — LISINOPRIL 2.5 MG/1
2.5 TABLET ORAL DAILY
Qty: 90 TABLET | Refills: 1 | Status: SHIPPED | OUTPATIENT
Start: 2021-12-13 | End: 2022-07-06 | Stop reason: SDUPTHER

## 2021-12-13 RX ORDER — PANTOPRAZOLE SODIUM 40 MG/1
40 TABLET, DELAYED RELEASE ORAL DAILY
Qty: 90 TABLET | Refills: 1 | Status: SHIPPED | OUTPATIENT
Start: 2021-12-13 | End: 2022-02-22 | Stop reason: SDUPTHER

## 2021-12-16 ENCOUNTER — TELEPHONE (OUTPATIENT)
Dept: ADMINISTRATIVE | Facility: OTHER | Age: 71
End: 2021-12-16

## 2022-02-11 ENCOUNTER — OFFICE VISIT (OUTPATIENT)
Dept: INTERNAL MEDICINE CLINIC | Facility: CLINIC | Age: 72
End: 2022-02-11
Payer: MEDICARE

## 2022-02-11 VITALS
OXYGEN SATURATION: 95 % | WEIGHT: 175.38 LBS | HEART RATE: 106 BPM | TEMPERATURE: 97.8 F | HEIGHT: 65 IN | SYSTOLIC BLOOD PRESSURE: 124 MMHG | DIASTOLIC BLOOD PRESSURE: 62 MMHG | BODY MASS INDEX: 29.22 KG/M2

## 2022-02-11 DIAGNOSIS — Z01.818 PRE-OP EXAMINATION: Primary | ICD-10-CM

## 2022-02-11 PROBLEM — R23.3 PETECHIAE: Status: RESOLVED | Noted: 2020-07-20 | Resolved: 2022-02-11

## 2022-02-11 PROBLEM — S91.301A WOUND OF RIGHT FOOT: Status: RESOLVED | Noted: 2020-01-22 | Resolved: 2022-02-11

## 2022-02-11 PROCEDURE — 99214 OFFICE O/P EST MOD 30 MIN: CPT | Performed by: PHYSICIAN ASSISTANT

## 2022-02-11 RX ORDER — BROMFENAC SODIUM 0.7 MG/ML
SOLUTION/ DROPS OPHTHALMIC
COMMUNITY
Start: 2022-02-08

## 2022-02-11 NOTE — PROGRESS NOTES
Subjective:     Marina Reagan is a 67 y o  female who presents to the office today for a preoperative consultation at the request of surgeon Dr Kurt Sommer who plans on performing bilateral cataract removal on February 15 and March 1 respectively  Planned anesthesia: local  The patient has the following known anesthesia issues: N/A  Patients bleeding risk: no recent abnormal bleeding  Patient does not have objections to receiving blood products if needed  The following portions of the patient's history were reviewed and updated as appropriate:   She  has a past medical history of Anxiety, Cellulitis, Chronic obstructive pulmonary disease ([facility] [address] ) (3/11/2019), Depression, Diabetes mellitus ([facility] [address] ), Dysuria, Esophageal reflux, Fatigue, Fracture, Heart murmur, Hypertension, Pneumonia, Restless legs syndrome (RLS), and Vitamin D deficiency  She   Patient Active Problem List    Diagnosis Date Noted    Overweight with body mass index (BMI) of 28 to 28 9 in adult 05/10/2021    Peripheral edema 09/21/2020    Lumbosacral radiculitis 06/10/2020    Chronic pain disorder 05/26/2020    Lumbar post-laminectomy syndrome 05/26/2020    Paronychia of fifth toe of right foot 02/08/2020    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    Skin infection 04/01/2019    Recurrent major [facility] disorder, [facility] 03/11/2019    Chronic obstructive pulmonary disease ([facility] [address] ) 03/11/2019    Esophageal dysphagia 11/26/2018    Heart murmur 09/12/2018    Type 2 diabetes mellitus with complication, without long-term current use of insulin ([facility] [address] ) 04/06/2018    Chronic lower back pain 09/29/2016    Anxiety 05/21/2016    Vitamin D deficiency 03/09/2016    HTN (hypertension) 12/14/2015    Hypercholesteremia 12/14/2015    Restless legs syndrome (RLS) 12/14/2015     She  has a past surgical history that includes Back surgery; Cholecystectomy (1975); Foot surgery (Right, 2014); Hip surgery (2013); Hysterectomy; Ovarian cyst surgery (1971);  Shoulder surgery (Right, 12/14/2015); Other surgical history (2011); Tonsillectomy; pr colonoscopy flx dx w/collj spec when pfrmd (N/A, 7/10/2018); Appendectomy; pr esophagogastroduodenoscopy transoral diagnostic (N/A, 1/28/2019); Skin biopsy; and Insertion / placement / revision neurostimulator  Her family history includes Calcium disorder in her other and other; Cancer in her father; Depression in her family; Diabetes type II in her father and other; Heart disease in her father; Hypertension in her mother and other; Mental illness in her mother and other; Migraines in her other; Other in her other; Pulmonary embolism in her other; Stroke in her mother; Substance Abuse in her brother and family; Tuberculosis in her other  She  reports that she has been smoking cigarettes  She has been smoking about 0 50 packs per day  She has never used smokeless tobacco  She reports current alcohol use  She reports that she does not use drugs    Current Outpatient Medications   Medication Sig Dispense Refill    albuterol (2 5 mg/3 mL) 0 083 % nebulizer solution Take 1 vial (2 5 mg total) by nebulization every 4 (four) hours as needed for shortness of breath Disp 1 box 270 vial 1    albuterol (ProAir HFA) 90 mcg/act inhaler Inhale 2 puffs every 4 (four) hours as needed for wheezing 8 5 g 3    aspirin (ECOTRIN LOW STRENGTH) 81 mg EC tablet Take 81 mg by mouth daily      atorvastatin (LIPITOR) 20 mg tablet Take 1 tablet (20 mg total) by mouth daily 90 tablet 1    baclofen 10 mg tablet Take by mouth 3 (three) times a day       [employer] MICROLET LANCETS lancets Use as instructed BID E11 65 100 each 5    Calcium 500 MG tablet Take 2 tablets by mouth every other day        cholecalciferol (VITAMIN D3) 1,000 units tablet Take 3 tablets by mouth every other day       denosumab (PROLIA) 60 mg/mL Inject under the skin every 6 (six) months      Ferrous Sulfate (IRON) 325 (65 Fe) MG TABS Take 1 tablet (325 mg total) by mouth daily 30 each 3    furosemide (LASIX) 40 mg tablet Take 1 tablet (40 mg total) by mouth daily (Patient taking differently: Take 40 mg by mouth daily PRN ) 30 tablet 0    ketoconazole (NIZORAL) 2 % cream PRN       lisinopril (ZESTRIL) 2 5 mg tablet Take 1 tablet (2 5 mg total) by mouth daily 90 tablet 1    metFORMIN (GLUCOPHAGE) 1000 MG tablet Take 1 tablet (1,000 mg total) by mouth 2 (two) times a day with meals 180 tablet 1    pantoprazole (PROTONIX) 40 mg tablet Take 1 tablet (40 mg total) by mouth daily 90 tablet 1    potassium chloride (MICRO-K) 10 MEQ CR capsule Take 10 mEq by mouth 2 (two) times a day PRN w/lasix       tiotropium (SPIRIVA RESPIMAT) 1 25 MCG/ACT AERS inhaler Inhale 2 puffs daily 4 g 3    VITAMIN B COMPLEX-C PO Take 1 tablet by mouth daily      amitriptyline (ELAVIL) 50 mg tablet Take 1 tablet (50 mg total) by mouth daily at bedtime 90 tablet 1    betamethasone dipropionate (DIPROSONE) 0 05 % cream betamethasone dipropionate 0 05 % topical cream (Patient not taking: Reported on 2/11/2022)      ergocalciferol (VITAMIN D2) 50,000 units Take 1 capsule (50,000 Units total) by mouth once a week (Patient not taking: Reported on 1/19/2021) 12 capsule 3    escitalopram (LEXAPRO) 10 mg tablet Take 1 tablet (10 mg total) by mouth daily 90 tablet 1    Glucose Blood (ONETOUCH ULTRA BLUE VI) by In Vitro route 3 (three) times a day (Patient not taking: Reported on 2/11/2022 )      glucose blood (OneTouch Ultra) test strip Once daily (Patient not taking: Reported on 2/11/2022 ) 100 strip 5    Prolensa 0 07 % SOLN  (Patient not taking: Reported on 2/11/2022 )      traMADol (ULTRAM) 50 mg tablet Take 50 mg by mouth 3 (three) times a day as needed (Patient not taking: Reported on 2/11/2022 )       No current facility-administered medications for this visit       Current Outpatient Medications on File Prior to Visit   Medication Sig    albuterol (2 5 mg/3 mL) 0 083 % nebulizer solution Take 1 vial (2 5 mg total) by nebulization every 4 (four) hours as needed for shortness of breath Disp 1 box    albuterol (ProAir HFA) 90 mcg/act inhaler Inhale 2 puffs every 4 (four) hours as needed for wheezing    aspirin (ECOTRIN LOW STRENGTH) 81 mg EC tablet Take 81 mg by mouth daily    atorvastatin (LIPITOR) 20 mg tablet Take 1 tablet (20 mg total) by mouth daily    baclofen 10 mg tablet Take by mouth 3 (three) times a day     [employer] MICROLET LANCETS lancets Use as instructed BID E11 65    Calcium 500 MG tablet Take 2 tablets by mouth every other day      cholecalciferol (VITAMIN D3) 1,000 units tablet Take 3 tablets by mouth every other day     denosumab (PROLIA) 60 mg/mL Inject under the skin every 6 (six) months    Ferrous Sulfate (IRON) 325 (65 Fe) MG TABS Take 1 tablet (325 mg total) by mouth daily    furosemide (LASIX) 40 mg tablet Take 1 tablet (40 mg total) by mouth daily (Patient taking differently: Take 40 mg by mouth daily PRN )    ketoconazole (NIZORAL) 2 % cream PRN     lisinopril (ZESTRIL) 2 5 mg tablet Take 1 tablet (2 5 mg total) by mouth daily    metFORMIN (GLUCOPHAGE) 1000 MG tablet Take 1 tablet (1,000 mg total) by mouth 2 (two) times a day with meals    pantoprazole (PROTONIX) 40 mg tablet Take 1 tablet (40 mg total) by mouth daily    potassium chloride (MICRO-K) 10 MEQ CR capsule Take 10 mEq by mouth 2 (two) times a day PRN w/lasix     tiotropium (SPIRIVA RESPIMAT) 1 25 MCG/ACT AERS inhaler Inhale 2 puffs daily    VITAMIN B COMPLEX-C PO Take 1 tablet by mouth daily    amitriptyline (ELAVIL) 50 mg tablet Take 1 tablet (50 mg total) by mouth daily at bedtime    betamethasone dipropionate (DIPROSONE) 0 05 % cream betamethasone dipropionate 0 05 % topical cream (Patient not taking: Reported on 2/11/2022)    ergocalciferol (VITAMIN D2) 50,000 units Take 1 capsule (50,000 Units total) by mouth once a week (Patient not taking: Reported on 1/19/2021)    escitalopram (LEXAPRO) 10 mg tablet Take 1 tablet (10 mg total) by mouth daily    Glucose Blood (ONETOUCH ULTRA BLUE VI) by In Vitro route 3 (three) times a day (Patient not taking: Reported on 2/11/2022 )    glucose blood (OneTouch Ultra) test strip Once daily (Patient not taking: Reported on 2/11/2022 )    Prolensa 0 07 % SOLN  (Patient not taking: Reported on 2/11/2022 )    traMADol (ULTRAM) 50 mg tablet Take 50 mg by mouth 3 (three) times a day as needed (Patient not taking: Reported on 2/11/2022 )    [DISCONTINUED] carbidopa-levodopa (SINEMET)  mg per tablet Take 2 tablets by mouth 3 (three) times a day (Patient not taking: Reported on 1/19/2021)    [DISCONTINUED] clonazePAM (KlonoPIN) 2 mg tablet Take 1 tablet (2 mg total) by mouth daily at bedtime (Patient not taking: Reported on 2/11/2022 )    [DISCONTINUED] glucose blood (OneTouch Ultra) test strip Use 1 strip daily as instructed (Patient not taking: Reported on 2/11/2022 )    [DISCONTINUED] nystatin (MYCOSTATIN) cream Apply 2 g (1 application total) topically 2 (two) times a day To affected area (Patient not taking: Reported on 1/19/2021)    [DISCONTINUED] pramipexole (MIRAPEX) 1 5 MG tablet Take 1 tablet (1 5 mg total) by mouth 3 (three) times a day (Patient not taking: Reported on 2/11/2022 )     No current facility-administered medications on file prior to visit  She is allergic to midazolam, hydrocodone-acetaminophen, moxifloxacin, gabapentin, hydrocodone-acetaminophen, penicillin v, pregabalin, and propofol       Review of Systems  Constitutional: negative  Ears, nose, mouth, throat, and face: negative  Respiratory: negative  Cardiovascular: negative  Integument/breast: negative  Hematologic/lymphatic: negative  Musculoskeletal:negative     Objective:     /62 (BP Location: Left arm, Patient Position: Sitting)   Pulse (!) 106   Temp 97 8 °F (36 6 °C)   Ht 5' 5" (1 651 m)   Wt 79 5 kg (175 lb 6 oz)   SpO2 95%   BMI 29 18 kg/m²     General Appearance:    Alert, cooperative, no distress, appears stated age   Head:    Normocephalic, without obvious abnormality, atraumatic   Eyes:    PERRL, conjunctiva/corneas clear, EOM's intact, fundi     benign, both eyes   Ears:    Normal TM's and external ear canals, both ears   Nose:   Nares normal, septum midline, mucosa normal, no drainage    or sinus tenderness   Throat:   Lips, mucosa, and tongue normal; teeth and gums normal   Neck:   Supple, symmetrical, trachea midline, no adenopathy;     thyroid:  no enlargement/tenderness/nodules; no carotid    bruit or JVD   Back:     Symmetric, no curvature, ROM normal, no CVA tenderness   Lungs:     Clear to auscultation bilaterally, respirations unlabored   Chest Wall:    No tenderness or deformity    Heart:    Regular rate and rhythm, S1 and S2 normal, no murmur, rub   or gallop   Breast Exam:    No tenderness, masses, or nipple abnormality   Abdomen:     Soft, non-tender, bowel sounds active all four quadrants,     no masses, no organomegaly   Genitalia:    Normal female without lesion, discharge or tenderness   Rectal:    Normal tone, no masses or tenderness; guaiac negative stool   Extremities:   Extremities normal, atraumatic, no cyanosis or edema   Pulses:   2+ and symmetric all extremities   Skin:   Skin color, texture, turgor normal, no rashes or lesions   Lymph nodes:   Cervical, supraclavicular, and axillary nodes normal   Neurologic:   CNII-XII intact, normal strength, sensation and reflexes     throughout         Cardiographics  ECG: no prior ECG      Imaging  Chest x-ray: Not required     Lab Review   not applicable     Assessment:     67 y o  female with planned surgery as above  Known risk factors for perioperative complications: None    Difficulty with intubation is not anticipated  Cardiac Risk Estimation: low    Current medications which may produce withdrawal symptoms if withheld perioperatively: none      Plan:     1  Preoperative workup as follows none    2  Change in medication regimen before surgery: none, continue medication regimen including morning of surgery, with sip of water  3  Enriquez risk Index Score: 5 for age over 79

## 2022-02-22 DIAGNOSIS — G62.9 NEUROPATHY: ICD-10-CM

## 2022-02-22 DIAGNOSIS — E78.5 HYPERLIPIDEMIA, UNSPECIFIED HYPERLIPIDEMIA TYPE: ICD-10-CM

## 2022-02-22 DIAGNOSIS — E11.8 TYPE 2 DIABETES MELLITUS WITH COMPLICATION, WITHOUT LONG-TERM CURRENT USE OF INSULIN (HCC): ICD-10-CM

## 2022-02-22 DIAGNOSIS — R13.19 ESOPHAGEAL DYSPHAGIA: ICD-10-CM

## 2022-02-22 RX ORDER — PANTOPRAZOLE SODIUM 40 MG/1
40 TABLET, DELAYED RELEASE ORAL DAILY
Qty: 90 TABLET | Refills: 0 | Status: SHIPPED | OUTPATIENT
Start: 2022-02-22 | End: 2022-07-28 | Stop reason: SDUPTHER

## 2022-02-22 RX ORDER — ATORVASTATIN CALCIUM 20 MG/1
20 TABLET, FILM COATED ORAL DAILY
Qty: 90 TABLET | Refills: 0 | Status: SHIPPED | OUTPATIENT
Start: 2022-02-22 | End: 2022-07-28 | Stop reason: SDUPTHER

## 2022-02-22 RX ORDER — AMITRIPTYLINE HYDROCHLORIDE 50 MG/1
50 TABLET, FILM COATED ORAL
Qty: 90 TABLET | Refills: 0 | Status: SHIPPED | OUTPATIENT
Start: 2022-02-22 | End: 2022-05-20 | Stop reason: SDUPTHER

## 2022-03-16 ENCOUNTER — OFFICE VISIT (OUTPATIENT)
Dept: INTERNAL MEDICINE CLINIC | Facility: CLINIC | Age: 72
End: 2022-03-16
Payer: MEDICARE

## 2022-03-16 VITALS
WEIGHT: 177.4 LBS | DIASTOLIC BLOOD PRESSURE: 70 MMHG | BODY MASS INDEX: 29.56 KG/M2 | SYSTOLIC BLOOD PRESSURE: 112 MMHG | TEMPERATURE: 98 F | OXYGEN SATURATION: 100 % | HEART RATE: 90 BPM | HEIGHT: 65 IN

## 2022-03-16 DIAGNOSIS — E55.9 VITAMIN D DEFICIENCY: ICD-10-CM

## 2022-03-16 DIAGNOSIS — Z12.31 VISIT FOR SCREENING MAMMOGRAM: ICD-10-CM

## 2022-03-16 DIAGNOSIS — E11.8 TYPE 2 DIABETES MELLITUS WITH COMPLICATION, WITHOUT LONG-TERM CURRENT USE OF INSULIN (HCC): Primary | ICD-10-CM

## 2022-03-16 DIAGNOSIS — Z13.29 SCREENING FOR THYROID DISORDER: ICD-10-CM

## 2022-03-16 DIAGNOSIS — J44.9 CHRONIC OBSTRUCTIVE PULMONARY DISEASE, UNSPECIFIED COPD TYPE (HCC): ICD-10-CM

## 2022-03-16 DIAGNOSIS — F33.41 RECURRENT MAJOR DEPRESSIVE DISORDER, IN PARTIAL REMISSION (HCC): ICD-10-CM

## 2022-03-16 DIAGNOSIS — Z00.00 MEDICARE ANNUAL WELLNESS VISIT, SUBSEQUENT: ICD-10-CM

## 2022-03-16 DIAGNOSIS — E78.00 HYPERCHOLESTEREMIA: ICD-10-CM

## 2022-03-16 DIAGNOSIS — F11.20 CONTINUOUS OPIOID DEPENDENCE (HCC): ICD-10-CM

## 2022-03-16 DIAGNOSIS — Z13.0 SCREENING FOR DEFICIENCY ANEMIA: ICD-10-CM

## 2022-03-16 LAB — SL AMB POCT HEMOGLOBIN AIC: 6.5 (ref ?–6.5)

## 2022-03-16 PROCEDURE — 99213 OFFICE O/P EST LOW 20 MIN: CPT | Performed by: PHYSICIAN ASSISTANT

## 2022-03-16 PROCEDURE — 83036 HEMOGLOBIN GLYCOSYLATED A1C: CPT | Performed by: PHYSICIAN ASSISTANT

## 2022-03-16 PROCEDURE — 1123F ACP DISCUSS/DSCN MKR DOCD: CPT | Performed by: PHYSICIAN ASSISTANT

## 2022-03-16 PROCEDURE — G0439 PPPS, SUBSEQ VISIT: HCPCS | Performed by: PHYSICIAN ASSISTANT

## 2022-03-16 RX ORDER — KETOROLAC TROMETHAMINE 5 MG/ML
SOLUTION OPHTHALMIC
COMMUNITY
Start: 2022-03-04

## 2022-03-16 NOTE — PROGRESS NOTES
Assessment/Plan:  Problem List Items Addressed This Visit        Endocrine    Type 2 diabetes mellitus with complication, without long-term current use of insulin ([place] [address] ) - Primary       Lab Results   Component Value Date    HGBA1C 6 0 05/10/2021   A1C on 3/16/22: 6 5  On ACE and statin  Eye exam UTD with Dr Dykes Lunch exam scheduled with Dr Alpa Alvarez metabolic panel    Microalbumin / creatinine urine ratio       Respiratory    Chronic obstructive pulmonary disease ([place] [address] )       Other    Hypercholesteremia    Relevant Orders    Lipid panel    Vitamin D deficiency    Relevant Orders    Vitamin D 25 hydroxy    Recurrent major depressive disorder, in partial remission ([place] [address] )    Continuous opioid dependence ([place] [address] )      Other Visit Diagnoses     Screening for thyroid disorder        Relevant Orders    TSH, 3rd generation    Screening for deficiency anemia        Relevant Orders    CBC and differential    Visit for screening mammogram        Relevant Orders    Mammo screening bilateral w 3d & cad           Diagnoses and all orders for this visit:    Type 2 diabetes mellitus with complication, without long-term current use of insulin ([place] [address] )  -     Comprehensive metabolic panel; Future  -     Microalbumin / creatinine urine ratio    Hypercholesteremia  -     Lipid panel; Future    Vitamin D deficiency  -     Vitamin D 25 hydroxy; Future    Screening for thyroid disorder  -     TSH, 3rd generation;  Future    Screening for deficiency anemia  -     CBC and differential; Future    Visit for screening mammogram  -     Mammo screening bilateral w 3d & cad; Future    Continuous opioid dependence ([place] [address] )    Chronic obstructive pulmonary disease, unspecified COPD type ([place] [address] )    Recurrent major depressive disorder, in partial remission ([place] [address] )    Other orders  -     ketorolac (ACULAR) 0 5 % ophthalmic solution; INSTILL 1 DROP 4 TIMES DAILY INTO RIGHT EYE FOR 28 DAYS AFTER SURGERY        Type 2 diabetes mellitus with complication, without long-term current use of insulin ([facility])    Lab Results   Component Value Date    HGBA1C 6 0 05/10/2021   A1C on 3/16/22: 6 5  On ACE and statin  Eye exam UTD with Dr Godfrey Monique exam scheduled with Dr Petra Avina        Subjective:      Patient ID: Marina Reagan is a 67 y o  female  Pt presents for routine visit  Subsequent AWV also performed  She is getting a foot exam tomorrow with Dr Petra Avina in [place]  She is on an ACE and statin  She is up to date with dexa scan and CRC screening  Eye exam up to date with Dr Smita Francis  She is due for mammogram and labs  A1C remains well controlled at 6 5  She denies any new complaints or concerns  BP is well controlled  The following portions of the patient's history were reviewed and updated as appropriate:   She has a past medical history of Anxiety, Cellulitis, Chronic obstructive pulmonary disease ([facility] [address] ) (3/11/2019), Depression, Diabetes mellitus ([facility] [address] ), Dysuria, Esophageal reflux, Fatigue, Fracture, Heart murmur, Hypertension, Pneumonia, Restless legs syndrome (RLS), and Vitamin D deficiency  ,  does not have any pertinent problems on file  ,   has a past surgical history that includes Back surgery; Cholecystectomy (1975); Foot surgery (Right, 2014); Hip surgery (2013); Hysterectomy; Ovarian cyst surgery (1971); Shoulder surgery (Right, 12/14/2015); Other surgical history (2011); Tonsillectomy; pr colonoscopy flx dx w/collj spec when pfrmd (N/A, 7/10/2018); Appendectomy; pr esophagogastroduodenoscopy transoral diagnostic (N/A, 1/28/2019); Skin biopsy; and Insertion / placement / revision neurostimulator  ,  family history includes Calcium disorder in her other and other; Cancer in her father; Depression in her family; Diabetes type II in her father and other; Heart disease in her father; Hypertension in her mother and other; Mental illness in her mother and other; Migraines in her other;  Other in her other; Pulmonary embolism in her other; Stroke in her mother; Substance Abuse in her brother and family; Tuberculosis in her other  ,   reports that she has been smoking cigarettes  She has been smoking about 0 50 packs per day  She has never used smokeless tobacco  She reports current alcohol use  She reports that she does not use drugs  ,  is allergic to midazolam, acetaminophen, hydrocodone, hydrocodone-acetaminophen, moxifloxacin, other, gabapentin, hydrocodone-acetaminophen, penicillin v, pregabalin, and propofol     Current Outpatient Medications   Medication Sig Dispense Refill    albuterol (2 5 mg/3 mL) 0 083 % nebulizer solution Take 1 vial (2 5 mg total) by nebulization every 4 (four) hours as needed for shortness of breath Disp 1 box 270 vial 1    albuterol (ProAir HFA) 90 mcg/act inhaler Inhale 2 puffs every 4 (four) hours as needed for wheezing 8 5 g 3    amitriptyline (ELAVIL) 50 mg tablet Take 1 tablet (50 mg total) by mouth daily at bedtime 90 tablet 0    aspirin (ECOTRIN LOW STRENGTH) 81 mg EC tablet Take 81 mg by mouth daily      atorvastatin (LIPITOR) 20 mg tablet Take 1 tablet (20 mg total) by mouth daily 90 tablet 0    baclofen 10 mg tablet Take by mouth 3 (three) times a day       [employer] MICROLET LANCETS lancets Use as instructed BID E11 65 100 each 5    Calcium 500 MG tablet Take 2 tablets by mouth every other day        cholecalciferol (VITAMIN D3) 1,000 units tablet Take 3 tablets by mouth every other day       denosumab (PROLIA) 60 mg/mL Inject under the skin every 6 (six) months      Ferrous Sulfate (IRON) 325 (65 Fe) MG TABS Take 1 tablet (325 mg total) by mouth daily 30 each 3    furosemide (LASIX) 40 mg tablet Take 1 tablet (40 mg total) by mouth daily (Patient taking differently: Take 40 mg by mouth daily PRN ) 30 tablet 0    Glucose Blood (ONETOUCH ULTRA BLUE VI) Test 3 (three) times a day        glucose blood (OneTouch Ultra) test strip Once daily 100 strip 5    ketoconazole (NIZORAL) 2 % cream PRN       ketorolac (ACULAR) 0 5 % ophthalmic solution INSTILL 1 DROP 4 TIMES DAILY INTO RIGHT EYE FOR 28 DAYS AFTER SURGERY      lisinopril (ZESTRIL) 2 5 mg tablet Take 1 tablet (2 5 mg total) by mouth daily 90 tablet 1    metFORMIN (GLUCOPHAGE) 1000 MG tablet Take 1 tablet (1,000 mg total) by mouth 2 (two) times a day with meals 180 tablet 0    pantoprazole (PROTONIX) 40 mg tablet Take 1 tablet (40 mg total) by mouth daily 90 tablet 0    potassium chloride (MICRO-K) 10 MEQ CR capsule Take 10 mEq by mouth 2 (two) times a day PRN w/lasix       tiotropium (SPIRIVA RESPIMAT) 1 25 MCG/ACT AERS inhaler Inhale 2 puffs daily 4 g 3    traMADol (ULTRAM) 50 mg tablet Take 50 mg by mouth 3 (three) times a day as needed        VITAMIN B COMPLEX-C PO Take 1 tablet by mouth daily      betamethasone dipropionate (DIPROSONE) 0 05 % cream betamethasone dipropionate 0 05 % topical cream (Patient not taking: Reported on 2/11/2022)      ergocalciferol (VITAMIN D2) 50,000 units Take 1 capsule (50,000 Units total) by mouth once a week (Patient not taking: Reported on 1/19/2021) 12 capsule 3    escitalopram (LEXAPRO) 10 mg tablet Take 1 tablet (10 mg total) by mouth daily 90 tablet 1    Prolensa 0 07 % SOLN  (Patient not taking: Reported on 2/11/2022 )       No current facility-administered medications for this visit  Review of Systems   Constitutional: Negative for chills and fever  HENT: Negative for congestion, ear pain, hearing loss, postnasal drip, rhinorrhea, sinus pressure, sinus pain, sore throat and trouble swallowing  Eyes: Negative for pain and visual disturbance  Respiratory: Negative for cough, chest tightness, shortness of breath and wheezing  Cardiovascular: Negative  Negative for chest pain, palpitations and leg swelling  Gastrointestinal: Negative for abdominal pain, blood in stool, constipation, diarrhea, nausea and vomiting     Endocrine: Negative for cold intolerance, heat intolerance, polydipsia, polyphagia and polyuria  Genitourinary: Negative for difficulty urinating, dysuria, flank pain and urgency  Musculoskeletal: Negative for arthralgias, back pain, gait problem and myalgias  Skin: Negative for rash  Allergic/Immunologic: Negative  Neurological: Negative for dizziness, weakness, light-headedness and headaches  Hematological: Negative  Psychiatric/Behavioral: Negative for behavioral problems, dysphoric mood and sleep disturbance  The patient is not nervous/anxious  PHQ-2/9 Depression Screening    Little interest or pleasure in doing things: 0 - not at all  Feeling down, depressed, or hopeless: 0 - not at all  Trouble falling or staying asleep, or sleeping too much: 1 - several days  Feeling tired or having little energy: 0 - not at all  Poor appetite or overeatin - not at all  Feeling bad about yourself - or that you are a failure or have let yourself or your family down: 0 - not at all  Trouble concentrating on things, such as reading the newspaper or watching television: 0 - not at all  Moving or speaking so slowly that other people could have noticed  Or the opposite - being so fidgety or restless that you have been moving around a lot more than usual: 0 - not at all  Thoughts that you would be better off dead, or of hurting yourself in some way: 0 - not at all  PHQ-9 Score: 1   PHQ-9 Interpretation: No or Minimal depression           Objective:  Vitals:    22 1130   BP: 112/70   BP Location: Left arm   Patient Position: Sitting   Cuff Size: Standard   Pulse: 90   Temp: 98 °F (36 7 °C)   SpO2: 100%   Weight: 80 5 kg (177 lb 6 4 oz)   Height: 5' 5" (1 651 m)     Body mass index is 29 52 kg/m²  Physical Exam  Constitutional:       General: She is not in acute distress  Appearance: She is well-developed  She is not diaphoretic  HENT:      Head: Normocephalic and atraumatic        Right Ear: External ear normal       Left Ear: External ear normal       Nose: Nose normal       Mouth/Throat:      Pharynx: No oropharyngeal exudate  Eyes:      General: No scleral icterus  Right eye: No discharge  Left eye: No discharge  Conjunctiva/sclera: Conjunctivae normal       Pupils: Pupils are equal, round, and reactive to light  Neck:      Thyroid: No thyromegaly  Cardiovascular:      Rate and Rhythm: Normal rate and regular rhythm  Heart sounds: Normal heart sounds  No murmur heard  No friction rub  No gallop  Pulmonary:      Effort: Pulmonary effort is normal  No respiratory distress  Breath sounds: Normal breath sounds  No wheezing or rales  Abdominal:      General: Bowel sounds are normal  There is no distension  Palpations: Abdomen is soft  Tenderness: There is no abdominal tenderness  Musculoskeletal:         General: No tenderness or deformity  Normal range of motion  Cervical back: Normal range of motion and neck supple  Skin:     General: Skin is warm and dry  Neurological:      Mental Status: She is alert and oriented to person, place, and time  Cranial Nerves: No cranial nerve deficit  Psychiatric:         Behavior: Behavior normal          Thought Content:  Thought content normal          Judgment: Judgment normal

## 2022-03-16 NOTE — PROGRESS NOTES
Assessment and Plan:     Problem List Items Addressed This Visit     None        BMI Counseling: Body mass index is 29 52 kg/m²  The BMI is above normal  Nutrition recommendations include decreasing portion sizes, consuming healthier snacks and limiting drinks that contain sugar  Rationale for BMI follow-up plan is due to patient being overweight or obese  Preventive health issues were discussed with patient, and age appropriate screening tests were ordered as noted in patient's After Visit Summary  Personalized health advice and appropriate referrals for health education or preventive services given if needed, as noted in patient's After Visit Summary       History of Present Illness:     Patient presents for Medicare Annual Wellness visit    Patient Care Team:  Charles Garcia PA-C as PCP - General (Internal Medicine)  Essie Bright MD (Gastroenterology)  Bakari Carrillo MD as Endoscopist     Problem List:     Patient Active Problem List   Diagnosis    Anxiety    Chronic lower back pain    Type 2 diabetes mellitus with complication, without long-term current use of insulin ([address] )    HTN (hypertension)    Hypercholesteremia    Restless legs syndrome (RLS)    Vitamin D deficiency    Heart murmur    Esophageal dysphagia    Recurrent major depressive disorder, in partial remission ([address] )    Chronic obstructive pulmonary disease ([address] )    Skin infection    Prurigo    Paronychia of fifth toe of right foot    Chronic pain disorder    Lumbar post-laminectomy syndrome    Lumbosacral radiculitis    Peripheral edema    Overweight with body mass index (BMI) of 28 to 28 9 in adult      Past Medical and Surgical History:     Past Medical History:   Diagnosis Date    Anxiety     Cellulitis     LAST ASSESED 2/12/16; RESOLVED 3/9/16    Chronic obstructive pulmonary disease ([address] ) 3/11/2019    Depression     Diabetes mellitus ([address] )     Dysuria     LAST ASSESSED 3/7/16; RESOLVED 3/9/16    Esophageal reflux     RESOLVED 1/15/16    Fatigue     RESOLVED 3/9/16    Fracture     rt 4 th metatarsal     Heart murmur     Hypertension     Pneumonia     Restless legs syndrome (RLS)     Vitamin D deficiency      Past Surgical History:   Procedure Laterality Date    APPENDECTOMY      BACK SURGERY      CHOLECYSTECTOMY  1975    FOOT SURGERY Right 2014    HIP SURGERY  2013    HYSTERECTOMY      INSERTION / PLACEMENT / REVISION NEUROSTIMULATOR      OTHER SURGICAL HISTORY  2011    LAPAROSCOPIC SLING OPERATION FOR STRESS INCONTINENCE     OVARIAN CYST SURGERY  1971    CYSTECTOMY    HI COLONOSCOPY FLX DX W/COLLJ SPEC WHEN PFRMD N/A 7/10/2018    Procedure: COLONOSCOPY;  Surgeon: Emily Henderson MD;  Location: [facility]; Service: Gastroenterology    HI ESOPHAGOGASTRODUODENOSCOPY TRANSORAL DIAGNOSTIC N/A 1/28/2019    Procedure: ESOPHAGOGASTRODUODENOSCOPY (EGD); Surgeon: Manley Babinski, MD;  Location: [facility];   Service: Gastroenterology    SHOULDER SURGERY Right 12/14/2015    REPAIR OF TORN MUSCLES    SKIN BIOPSY      chin 03/07/2019    TONSILLECTOMY        Family History:     Family History   Problem Relation Age of Onset    Hypertension Mother     Mental illness Mother     Stroke Mother         CVA    Cancer Father     Diabetes type II Father     Heart disease Father         CARDIAC DISORDER    Hypertension Other     Mental illness Other     Migraines Other     Diabetes type II Other     Calcium disorder Other         CALCIUM KIDNEY STONES    Other Other         HERPES ZOSTER INFECTION    Tuberculosis Other     Pulmonary embolism Other         CHRONIC OBSTRUCTIVE PULMONARY DISEASE    Calcium disorder Other         CALISUM KIDNEY STONES    Depression Family     Substance Abuse Family     Substance Abuse Brother       Social History:     Social History     Socioeconomic History    Marital status: /Civil Union     Spouse name: None    Number of children: None    Years of education: None    Highest education level: None   Occupational History    Occupation: RETIRED   Tobacco Use    Smoking status: Current Some Day Smoker     Packs/day: 0 50     Types: Cigarettes    Smokeless tobacco: Never Used    Tobacco comment: 1 a month   Vaping Use    Vaping Use: Never used   Substance and Sexual Activity    Alcohol use: Yes     Comment: rarely    Drug use: No    Sexual activity: None   Other Topics Concern    None   Social History Narrative    ALWAYS USES SEAT BELT     DENTAL CARE, REGULARLY    GOOD DENTAL HYGIENE     OCC CAFFEINE CONSUMPTION        Retired    Lives with Spouse     Social Determinants of Health     Financial Resource Strain: Not on file   Food Insecurity: Not on file   Transportation Needs: Not on file   Physical Activity: Not on file   Stress: Not on file   Social Connections: Not on file   Intimate Partner Violence: Not on file   Housing Stability: Not on file      Medications and Allergies:     Current Outpatient Medications   Medication Sig Dispense Refill    albuterol (2 5 mg/3 mL) 0 083 % nebulizer solution Take 1 vial (2 5 mg total) by nebulization every 4 (four) hours as needed for shortness of breath Disp 1 box 270 vial 1    albuterol (ProAir HFA) 90 mcg/act inhaler Inhale 2 puffs every 4 (four) hours as needed for wheezing 8 5 g 3    amitriptyline (ELAVIL) 50 mg tablet Take 1 tablet (50 mg total) by mouth daily at bedtime 90 tablet 0    aspirin (ECOTRIN LOW STRENGTH) 81 mg EC tablet Take 81 mg by mouth daily      atorvastatin (LIPITOR) 20 mg tablet Take 1 tablet (20 mg total) by mouth daily 90 tablet 0    baclofen 10 mg tablet Take by mouth 3 (three) times a day       JOELLEN MICROLET LANCETS lancets Use as instructed BID E11 65 100 each 5    Calcium 500 MG tablet Take 2 tablets by mouth every other day        cholecalciferol (VITAMIN D3) 1,000 units tablet Take 3 tablets by mouth every other day       denosumab (PROLIA) 60 mg/mL Inject under the skin every 6 (six) months      Ferrous Sulfate (IRON) 325 (65 Fe) MG TABS Take 1 tablet (325 mg total) by mouth daily 30 each 3    furosemide (LASIX) 40 mg tablet Take 1 tablet (40 mg total) by mouth daily (Patient taking differently: Take 40 mg by mouth daily PRN ) 30 tablet 0    Glucose Blood (ONETOUCH ULTRA BLUE VI) Test 3 (three) times a day        glucose blood (OneTouch Ultra) test strip Once daily 100 strip 5    ketoconazole (NIZORAL) 2 % cream PRN       ketorolac (ACULAR) 0 5 % ophthalmic solution INSTILL 1 DROP 4 TIMES DAILY INTO RIGHT EYE FOR 28 DAYS AFTER SURGERY      lisinopril (ZESTRIL) 2 5 mg tablet Take 1 tablet (2 5 mg total) by mouth daily 90 tablet 1    metFORMIN (GLUCOPHAGE) 1000 MG tablet Take 1 tablet (1,000 mg total) by mouth 2 (two) times a day with meals 180 tablet 0    pantoprazole (PROTONIX) 40 mg tablet Take 1 tablet (40 mg total) by mouth daily 90 tablet 0    potassium chloride (MICRO-K) 10 MEQ CR capsule Take 10 mEq by mouth 2 (two) times a day PRN w/lasix       tiotropium (SPIRIVA RESPIMAT) 1 25 MCG/ACT AERS inhaler Inhale 2 puffs daily 4 g 3    traMADol (ULTRAM) 50 mg tablet Take 50 mg by mouth 3 (three) times a day as needed        VITAMIN B COMPLEX-C PO Take 1 tablet by mouth daily      betamethasone dipropionate (DIPROSONE) 0 05 % cream betamethasone dipropionate 0 05 % topical cream (Patient not taking: Reported on 2/11/2022)      ergocalciferol (VITAMIN D2) 50,000 units Take 1 capsule (50,000 Units total) by mouth once a week (Patient not taking: Reported on 1/19/2021) 12 capsule 3    escitalopram (LEXAPRO) 10 mg tablet Take 1 tablet (10 mg total) by mouth daily 90 tablet 1    Prolensa 0 07 % SOLN  (Patient not taking: Reported on 2/11/2022 )       No current facility-administered medications for this visit       Allergies   Allergen Reactions    Midazolam Nausea Only and Vomiting    Hydrocodone-Acetaminophen Other (See Comments)    Moxifloxacin Diarrhea    Gabapentin     Hydrocodone-Acetaminophen      Category: Adverse Reaction;     Penicillin V     Pregabalin     Propofol      Exacerbates RSL      Immunizations:     Immunization History   Administered Date(s) Administered    COVID-19 MODERNA VACC 0 5 ML IM 04/01/2021, 06/02/2021, 01/27/2022    COVID-19, unspecified 04/01/2021, 05/07/2021    INFLUENZA 11/15/2015, 11/15/2015, 12/05/2016, 11/06/2017    Influenza Split High Dose Preservative Free IM 12/05/2016, 11/06/2017    Influenza, high dose seasonal 0 7 mL 10/04/2019, 10/13/2020, 10/13/2021    Influenza, recombinant, quadrivalent,injectable, preservative free 09/26/2018    Influenza, seasonal, injectable 11/01/2015    Pneumococcal Conjugate 13-Valent 08/31/2016    Pneumococcal Polysaccharide PPV23 01/01/2011    Tdap 01/01/2014, 10/04/2017      Health Maintenance:         Topic Date Due    Breast Cancer Screening: Mammogram  09/24/2020    DXA SCAN  12/08/2022    Colorectal Cancer Screening  07/10/2028    Hepatitis C Screening  Completed         Topic Date Due    Pneumococcal Vaccine: 65+ Years (2 of 2 - PPSV23) 08/31/2021      Medicare Health Risk Assessment:     /70 (BP Location: Left arm, Patient Position: Sitting, Cuff Size: Standard)   Pulse 90   Temp 98 °F (36 7 °C)   Ht 5' 5" (1 651 m)   Wt 80 5 kg (177 lb 6 4 oz)   SpO2 100%   BMI 29 52 kg/m²      Keiko Bee is here for her Subsequent Wellness visit  Health Risk Assessment:   Patient rates overall health as good  Patient feels that their physical health rating is much better  Patient is satisfied with their life  Eyesight was rated as same  Hearing was rated as same  Patient feels that their emotional and mental health rating is slightly better  Patients states they are never, rarely angry  Patient states they are sometimes unusually tired/fatigued  Pain experienced in the last 7 days has been some  Patient's pain rating has been 5/10   Patient states that she has experienced no weight loss or gain in last 6 months  Depression Screening:   PHQ-9 Score: 1      Fall Risk Screening: In the past year, patient has experienced: no history of falling in past year      Urinary Incontinence Screening:   Patient has not leaked urine accidently in the last six months  Home Safety:  Patient has trouble with stairs inside or outside of their home  Patient has working smoke alarms and has working carbon monoxide detector  Home safety hazards include: loose rugs on the floor  Nutrition:   Current diet is Regular, Low Carb and Limited junk food  Medications:   Patient is currently taking over-the-counter supplements  OTC medications include: see medication list  Patient is able to manage medications  Activities of Daily Living (ADLs)/Instrumental Activities of Daily Living (IADLs):   Walk and transfer into and out of bed and chair?: Yes  Dress and groom yourself?: Yes    Bathe or shower yourself?: Yes    Feed yourself? Yes  Do your laundry/housekeeping?: No  Manage your money, pay your bills and track your expenses?: Yes  Make your own meals?: Yes    Do your own shopping?: Yes    Previous Hospitalizations:   Any hospitalizations or ED visits within the last 12 months?: No      Advance Care Planning:   Living will: Yes    Durable POA for healthcare:  Yes    Advanced directive: Yes      Cognitive Screening:   Provider or family/friend/caregiver concerned regarding cognition?: Yes    PREVENTIVE SCREENINGS      Cardiovascular Screening:    General: Screening Not Indicated and History Lipid Disorder      Diabetes Screening:     General: Screening Not Indicated and History Diabetes      Colorectal Cancer Screening:     General: Screening Current      Cervical Cancer Screening:    General: Screening Not Indicated      Osteoporosis Screening:    General: Screening Not Indicated and History Osteoporosis      Lung Cancer Screening:     General: Screening Not Indicated Hepatitis C Screening:    General: Screening Current    Screening, Brief Intervention, and Referral to Treatment (SBIRT)    Screening  Typical number of drinks in a day: 0    Single Item Drug Screening:  How often have you used an illegal drug (including marijuana) or a prescription medication for non-medical reasons in the past year? never    Single Item Drug Screen Score: 0  Interpretation: Negative screen for possible drug use disorder    Review of Current Opioid Use    Opioid Risk Tool (ORT) Interpretation: Complete Opioid Risk Tool (ORT)      Leann Mejía PA-C

## 2022-03-16 NOTE — PATIENT INSTRUCTIONS
Medicare Preventive Visit Patient Instructions  Thank you for completing your Welcome to Medicare Visit or Medicare Annual Wellness Visit today  Your next wellness visit will be due in one year (3/17/2023)  The screening/preventive services that you may require over the next 5-10 years are detailed below  Some tests may not apply to you based off risk factors and/or age  Screening tests ordered at today's visit but not completed yet may show as past due  Also, please note that scanned in results may not display below  Preventive Screenings:  Service Recommendations Previous Testing/Comments   Colorectal Cancer Screening  * Colonoscopy    * Fecal Occult Blood Test (FOBT)/Fecal Immunochemical Test (FIT)  * Fecal DNA/Cologuard Test  * Flexible Sigmoidoscopy Age: 54-65 years old   Colonoscopy: every 10 years (may be performed more frequently if at higher risk)  OR  FOBT/FIT: every 1 year  OR  Cologuard: every 3 years  OR  Sigmoidoscopy: every 5 years  Screening may be recommended earlier than age 48 if at higher risk for colorectal cancer  Also, an individualized decision between you and your healthcare provider will decide whether screening between the ages of 74-80 would be appropriate  Colonoscopy: 07/10/2018  FOBT/FIT: Not on file  Cologuard: Not on file  Sigmoidoscopy: Not on file    Screening Current     Breast Cancer Screening Age: 36 years old  Frequency: every 1-2 years  Not required if history of left and right mastectomy Mammogram: 09/24/2018        Cervical Cancer Screening Between the ages of 21-29, pap smear recommended once every 3 years  Between the ages of 33-67, can perform pap smear with HPV co-testing every 5 years     Recommendations may differ for women with a history of total hysterectomy, cervical cancer, or abnormal pap smears in past  Pap Smear: Not on file    Screening Not Indicated   Hepatitis C Screening Once for adults born between 1945 and 1965  More frequently in patients at high risk for Hepatitis C Hep C Antibody: 06/24/2019    Screening Current   Diabetes Screening 1-2 times per year if you're at risk for diabetes or have pre-diabetes Fasting glucose: 81 mg/dL   A1C: 6 0    Screening Not Indicated  History Diabetes   Cholesterol Screening Once every 5 years if you don't have a lipid disorder  May order more often based on risk factors  Lipid panel: 06/24/2019    Screening Not Indicated  History Lipid Disorder     Other Preventive Screenings Covered by Medicare:  1  Abdominal Aortic Aneurysm (AAA) Screening: covered once if your at risk  You're considered to be at risk if you have a family history of AAA  2  Lung Cancer Screening: covers low dose CT scan once per year if you meet all of the following conditions: (1) Age 50-69; (2) No signs or symptoms of lung cancer; (3) Current smoker or have quit smoking within the last 15 years; (4) You have a tobacco smoking history of at least 30 pack years (packs per day multiplied by number of years you smoked); (5) You get a written order from a healthcare provider  3  Glaucoma Screening: covered annually if you're considered high risk: (1) You have diabetes OR (2) Family history of glaucoma OR (3)  aged 48 and older OR (3)  American aged 72 and older  3  Osteoporosis Screening: covered every 2 years if you meet one of the following conditions: (1) You're estrogen deficient and at risk for osteoporosis based off medical history and other findings; (2) Have a vertebral abnormality; (3) On glucocorticoid therapy for more than 3 months; (4) Have primary hyperparathyroidism; (5) On osteoporosis medications and need to assess response to drug therapy  · Last bone density test (DXA Scan): 12/08/2020   5  HIV Screening: covered annually if you're between the age of 15-65  Also covered annually if you are younger than 13 and older than 72 with risk factors for HIV infection   For pregnant patients, it is covered up to 3 times per pregnancy  Immunizations:  Immunization Recommendations   Influenza Vaccine Annual influenza vaccination during flu season is recommended for all persons aged >= 6 months who do not have contraindications   Pneumococcal Vaccine (Prevnar and Pneumovax)  * Prevnar = PCV13  * Pneumovax = PPSV23   Adults 25-60 years old: 1-3 doses may be recommended based on certain risk factors  Adults 72 years old: Prevnar (PCV13) vaccine recommended followed by Pneumovax (PPSV23) vaccine  If already received PPSV23 since turning 65, then PCV13 recommended at least one year after PPSV23 dose  Hepatitis B Vaccine 3 dose series if at intermediate or high risk (ex: diabetes, end stage renal disease, liver disease)   Tetanus (Td) Vaccine - COST NOT COVERED BY MEDICARE PART B Following completion of primary series, a booster dose should be given every 10 years to maintain immunity against tetanus  Td may also be given as tetanus wound prophylaxis  Tdap Vaccine - COST NOT COVERED BY MEDICARE PART B Recommended at least once for all adults  For pregnant patients, recommended with each pregnancy  Shingles Vaccine (Shingrix) - COST NOT COVERED BY MEDICARE PART B  2 shot series recommended in those aged 48 and above     Health Maintenance Due:      Topic Date Due    Breast Cancer Screening: Mammogram  09/24/2020    DXA SCAN  12/08/2022    Colorectal Cancer Screening  07/10/2028    Hepatitis C Screening  Completed     Immunizations Due:      Topic Date Due    Pneumococcal Vaccine: 65+ Years (2 of 2 - PPSV23) 08/31/2021     Advance Directives   What are advance directives? Advance directives are legal documents that state your wishes and plans for medical care  These plans are made ahead of time in case you lose your ability to make decisions for yourself  Advance directives can apply to any medical decision, such as the treatments you want, and if you want to donate organs  What are the types of advance directives?   There are many types of advance directives, and each state has rules about how to use them  You may choose a combination of any of the following:  · Living will: This is a written record of the treatment you want  You can also choose which treatments you do not want, which to limit, and which to stop at a certain time  This includes surgery, medicine, IV fluid, and tube feedings  · Durable power of  for healthcare [facility]): This is a written record that states who you want to make healthcare choices for you when you are unable to make them for yourself  This person, called a proxy, is usually a family member or a friend  You may choose more than 1 proxy  · Do not resuscitate (DNR) order:  A DNR order is used in case your heart stops beating or you stop breathing  It is a request not to have certain forms of treatment, such as CPR  A DNR order may be included in other types of advance directives  · Medical directive: This covers the care that you want if you are in a coma, near death, or unable to make decisions for yourself  You can list the treatments you want for each condition  Treatment may include pain medicine, surgery, blood transfusions, dialysis, IV or tube feedings, and a ventilator (breathing machine)  · Values history: This document has questions about your views, beliefs, and how you feel and think about life  This information can help others choose the care that you would choose  Why are advance directives important? An advance directive helps you control your care  Although spoken wishes may be used, it is better to have your wishes written down  Spoken wishes can be misunderstood, or not followed  Treatments may be given even if you do not want them  An advance directive may make it easier for your family to make difficult choices about your care  Urinary Incontinence   Urinary incontinence (UI)  is when you lose control of your bladder   UI develops because your bladder cannot store or empty urine properly  The 3 most common types of UI are stress incontinence, urge incontinence, or both  Medicines:   · May be given to help strengthen your bladder control  Report any side effects of medication to your healthcare provider  Do pelvic muscle exercises often:  Your pelvic muscles help you stop urinating  Squeeze these muscles tight for 5 seconds, then relax for 5 seconds  Gradually work up to squeezing for 10 seconds  Do 3 sets of 15 repetitions a day, or as directed  This will help strengthen your pelvic muscles and improve bladder control  Train your bladder:  Go to the bathroom at set times, such as every 2 hours, even if you do not feel the urge to go  You can also try to hold your urine when you feel the urge to go  For example, hold your urine for 5 minutes when you feel the urge to go  As that becomes easier, hold your urine for 10 minutes  Self-care:   · Keep a UI record  Write down how often you leak urine and how much you leak  Make a note of what you were doing when you leaked urine  · Drink liquids as directed  You may need to limit the amount of liquid you drink to help control your urine leakage  Do not drink any liquid right before you go to bed  Limit or do not have drinks that contain caffeine or alcohol  · Prevent constipation  Eat a variety of high-fiber foods  Good examples are high-fiber cereals, beans, vegetables, and whole-grain breads  Walking is the best way to trigger your intestines to have a bowel movement  · Exercise regularly and maintain a healthy weight  Weight loss and exercise will decrease pressure on your bladder and help you control your leakage  · Use a catheter as directed  to help empty your bladder  A catheter is a tiny, plastic tube that is put into your bladder to drain your urine  · Go to behavior therapy as directed  Behavior therapy may be used to help you learn to control your urge to urinate      Cigarette Smoking and Your Health   Risks to your health if you smoke:  Nicotine and other chemicals found in tobacco damage every cell in your body  Even if you are a light smoker, you have an increased risk for cancer, heart disease, and lung disease  If you are pregnant or have diabetes, smoking increases your risk for complications  Benefits to your health if you stop smoking:   · You decrease respiratory symptoms such as coughing, wheezing, and shortness of breath  · You reduce your risk for cancers of the lung, mouth, throat, kidney, bladder, pancreas, stomach, and cervix  If you already have cancer, you increase the benefits of chemotherapy  You also reduce your risk for cancer returning or a second cancer from developing  · You reduce your risk for heart disease, blood clots, heart attack, and stroke  · You reduce your risk for lung infections, and diseases such as pneumonia, asthma, chronic bronchitis, and emphysema  · Your circulation improves  More oxygen can be delivered to your body  If you have diabetes, you lower your risk for complications, such as kidney, artery, and eye diseases  You also lower your risk for nerve damage  Nerve damage can lead to amputations, poor vision, and blindness  · You improve your body's ability to heal and to fight infections  For more information and support to stop smoking:   · [employer]  Phone: 7- 767 - 356-7093  Web Address: www rumr  Weight Management   Why it is important to manage your weight:  Being overweight increases your risk of health conditions such as heart disease, high blood pressure, type 2 diabetes, and certain types of cancer  It can also increase your risk for osteoarthritis, sleep apnea, and other respiratory problems  Aim for a slow, steady weight loss  Even a small amount of weight loss can lower your risk of health problems  How to lose weight safely:  A safe and healthy way to lose weight is to eat fewer calories and get regular exercise   You can lose up about 1 pound a week by decreasing the number of calories you eat by 500 calories each day  Healthy meal plan for weight management:  A healthy meal plan includes a variety of foods, contains fewer calories, and helps you stay healthy  A healthy meal plan includes the following:  · Eat whole-grain foods more often  A healthy meal plan should contain fiber  Fiber is the part of grains, fruits, and vegetables that is not broken down by your body  Whole-grain foods are healthy and provide extra fiber in your diet  Some examples of whole-grain foods are whole-wheat breads and pastas, oatmeal, brown rice, and bulgur  · Eat a variety of vegetables every day  Include dark, leafy greens such as spinach, kale, carl greens, and mustard greens  Eat yellow and orange vegetables such as carrots, sweet potatoes, and winter squash  · Eat a variety of fruits every day  Choose fresh or canned fruit (canned in its own juice or light syrup) instead of juice  Fruit juice has very little or no fiber  · Eat low-fat dairy foods  Drink fat-free (skim) milk or 1% milk  Eat fat-free yogurt and low-fat cottage cheese  Try low-fat cheeses such as mozzarella and other reduced-fat cheeses  · Choose meat and other protein foods that are low in fat  Choose beans or other legumes such as split peas or lentils  Choose fish, skinless poultry (chicken or turkey), or lean cuts of red meat (beef or pork)  Before you cook meat or poultry, cut off any visible fat  · Use less fat and oil  Try baking foods instead of frying them  Add less fat, such as margarine, sour cream, regular salad dressing and mayonnaise to foods  Eat fewer high-fat foods  Some examples of high-fat foods include french fries, doughnuts, ice cream, and cakes  · Eat fewer sweets  Limit foods and drinks that are high in sugar  This includes candy, cookies, regular soda, and sweetened drinks  Exercise:  Exercise at least 30 minutes per day on most days of the week   Some examples of exercise include walking, biking, dancing, and swimming  You can also fit in more physical activity by taking the stairs instead of the elevator or parking farther away from stores  Ask your healthcare provider about the best exercise plan for you  Narcotic (Opioid) Safety    Use narcotics safely:  · Take prescribed narcotics exactly as directed  · Do not give narcotics to others or take narcotics that belong to someone else  · Do not mix narcotics without medicines or alcohol  · Do not drive or operate heavy machinery after you take the narcotic  · Monitor for side effects and notify your healthcare provider if you experienced side effects such as nausea, sleepiness, itching, or trouble thinking clearly  Manage constipation:    Constipation is the most common side effect of narcotic medicine  Constipation is when you have hard, dry bowel movements, or you go longer than usual between bowel movements  Tell your healthcare provider about all changes in your bowel movements while you are taking narcotics  He or she may recommend laxative medicine to help you have a bowel movement  He or she may also change the kind of narcotic you are taking, or change when you take it  The following are more ways you can prevent or relieve constipation:    · Drink liquids as directed  You may need to drink extra liquids to help soften and move your bowels  Ask how much liquid to drink each day and which liquids are best for you  · Eat high-fiber foods  This may help decrease constipation by adding bulk to your bowel movements  High-fiber foods include fruits, vegetables, whole-grain breads and cereals, and beans  Your healthcare provider or dietitian can help you create a high-fiber meal plan  Your provider may also recommend a fiber supplement if you cannot get enough fiber from food  · Exercise regularly  Regular physical activity can help stimulate your intestines   Walking is a good exercise to prevent or relieve constipation  Ask which exercises are best for you  · Schedule a time each day to have a bowel movement  This may help train your body to have regular bowel movements  Bend forward while you are on the toilet to help move the bowel movement out  Sit on the toilet for at least 10 minutes, even if you do not have a bowel movement  Store narcotics safely:   · Store narcotics where others cannot easily get them  Keep them in a locked cabinet or secure area  Do not  keep them in a purse or other bag you carry with you  A person may be looking for something else and find the narcotics  · Make sure narcotics are stored out of the reach of children  A child can easily overdose on narcotics  Narcotics may look like candy to a small child  The best way to dispose of narcotics: The laws vary by country and area  In the [place], the best way is to return the narcotics through a take-back program  This program is offered by the [employer]  The following are options for using the program:  · Take the narcotics to a LILLY collection site  The site is often a law enforcement center  Call your local law enforcement center for scheduled take-back days in your area  You will be given information on where to go if the collection site is in a different location  · Take the narcotics to an approved pharmacy or hospital   A pharmacy or hospital may be set up as a collection site  You will need to ask if it is a LILLY collection site if you were not directed there  A pharmacy or doctor's office may not be able to take back narcotics unless it is a LILLY site  · Use a mail-back system  This means you are given containers to put the narcotics into  You will then mail them in the containers  · Use a take-back drop box  This is a place to leave the narcotics at any time  People and animals will not be able to get into the box   Your local law enforcement agency can tell you where to find a drop box in your area  Other ways to manage pain:   · Ask your healthcare provider about non-narcotic medicines to control pain  Nonprescription medicines include NSAIDs (such as ibuprofen) and acetaminophen  Prescription medicines include muscle relaxers, antidepressants, and steroids  · Pain may be managed without any medicines  Some ways to relieve pain include massage, aromatherapy, or meditation  Physical or occupational therapy may also help  For more information:   · Drug Enforcement Administration  [address] [facility] [address]  Phone: 1- 395 - 694-7503  Web Address: [facility]/drug_disposal/    · [address] and Drug Administration  [facility] , [address] [facility]3]  Phone: 0- 519 - 364-0188  Web Address: http://Med [employer]/     © Copyright [employer] 2018 Information is for End User's use only and may not be sold, redistributed or otherwise used for commercial purposes   All illustrations and images included in CareNotes® are the copyrighted property of A D A M , Inc  or [address] [place] [address] [facility]

## 2022-03-16 NOTE — ASSESSMENT & PLAN NOTE
Lab Results   Component Value Date    HGBA1C 6 0 05/10/2021   A1C on 3/16/22: 6 5  On ACE and statin  Eye exam UTD with Dr Aracelis Santizo exam scheduled with Dr Timothy Mendoza

## 2022-05-20 DIAGNOSIS — E11.8 TYPE 2 DIABETES MELLITUS WITH COMPLICATION, WITHOUT LONG-TERM CURRENT USE OF INSULIN (HCC): ICD-10-CM

## 2022-05-20 DIAGNOSIS — G62.9 NEUROPATHY: ICD-10-CM

## 2022-05-20 RX ORDER — AMITRIPTYLINE HYDROCHLORIDE 50 MG/1
50 TABLET, FILM COATED ORAL
Qty: 90 TABLET | Refills: 0 | Status: SHIPPED | OUTPATIENT
Start: 2022-05-20 | End: 2022-11-16

## 2022-06-27 ENCOUNTER — APPOINTMENT (OUTPATIENT)
Dept: LAB | Facility: CLINIC | Age: 72
End: 2022-06-27
Payer: MEDICARE

## 2022-06-27 DIAGNOSIS — E55.9 AVITAMINOSIS D: ICD-10-CM

## 2022-06-27 DIAGNOSIS — M81.0 SENILE OSTEOPOROSIS: ICD-10-CM

## 2022-06-27 LAB
25(OH)D3 SERPL-MCNC: 35.8 NG/ML (ref 30–100)
ANION GAP SERPL CALCULATED.3IONS-SCNC: 3 MMOL/L (ref 4–13)
BUN SERPL-MCNC: 20 MG/DL (ref 5–25)
CALCIUM SERPL-MCNC: 10.2 MG/DL (ref 8.3–10.1)
CHLORIDE SERPL-SCNC: 102 MMOL/L (ref 100–108)
CO2 SERPL-SCNC: 33 MMOL/L (ref 21–32)
CREAT SERPL-MCNC: 0.9 MG/DL (ref 0.6–1.3)
GFR SERPL CREATININE-BSD FRML MDRD: 64 ML/MIN/1.73SQ M
GLUCOSE P FAST SERPL-MCNC: 113 MG/DL (ref 65–99)
POTASSIUM SERPL-SCNC: 4.8 MMOL/L (ref 3.5–5.3)
SODIUM SERPL-SCNC: 138 MMOL/L (ref 136–145)

## 2022-06-27 PROCEDURE — 80048 BASIC METABOLIC PNL TOTAL CA: CPT

## 2022-06-27 PROCEDURE — 36415 COLL VENOUS BLD VENIPUNCTURE: CPT

## 2022-06-27 PROCEDURE — 82306 VITAMIN D 25 HYDROXY: CPT

## 2022-07-06 DIAGNOSIS — I10 ESSENTIAL HYPERTENSION: ICD-10-CM

## 2022-07-06 RX ORDER — LISINOPRIL 2.5 MG/1
2.5 TABLET ORAL DAILY
Qty: 90 TABLET | Refills: 1 | Status: SHIPPED | OUTPATIENT
Start: 2022-07-06

## 2022-07-18 ENCOUNTER — RA CDI HCC (OUTPATIENT)
Dept: OTHER | Facility: HOSPITAL | Age: 72
End: 2022-07-18

## 2022-07-21 ENCOUNTER — APPOINTMENT (OUTPATIENT)
Dept: LAB | Facility: CLINIC | Age: 72
End: 2022-07-21
Payer: MEDICARE

## 2022-07-21 DIAGNOSIS — E78.00 HYPERCHOLESTEREMIA: ICD-10-CM

## 2022-07-21 DIAGNOSIS — E11.8 TYPE 2 DIABETES MELLITUS WITH COMPLICATION, WITHOUT LONG-TERM CURRENT USE OF INSULIN (HCC): ICD-10-CM

## 2022-07-21 DIAGNOSIS — Z13.0 SCREENING FOR DEFICIENCY ANEMIA: ICD-10-CM

## 2022-07-21 DIAGNOSIS — E55.9 VITAMIN D DEFICIENCY: ICD-10-CM

## 2022-07-21 DIAGNOSIS — Z13.29 SCREENING FOR THYROID DISORDER: ICD-10-CM

## 2022-07-21 LAB
25(OH)D3 SERPL-MCNC: 29.9 NG/ML (ref 30–100)
ALBUMIN SERPL BCP-MCNC: 3.9 G/DL (ref 3.5–5)
ALP SERPL-CCNC: 71 U/L (ref 46–116)
ALT SERPL W P-5'-P-CCNC: 20 U/L (ref 12–78)
ANION GAP SERPL CALCULATED.3IONS-SCNC: 7 MMOL/L (ref 4–13)
AST SERPL W P-5'-P-CCNC: 20 U/L (ref 5–45)
BASOPHILS # BLD AUTO: 0.17 THOUSANDS/ΜL (ref 0–0.1)
BASOPHILS NFR BLD AUTO: 1 % (ref 0–1)
BILIRUB SERPL-MCNC: 0.88 MG/DL (ref 0.2–1)
BUN SERPL-MCNC: 23 MG/DL (ref 5–25)
CALCIUM SERPL-MCNC: 9.8 MG/DL (ref 8.3–10.1)
CHLORIDE SERPL-SCNC: 104 MMOL/L (ref 96–108)
CHOLEST SERPL-MCNC: 122 MG/DL
CO2 SERPL-SCNC: 28 MMOL/L (ref 21–32)
CREAT SERPL-MCNC: 0.96 MG/DL (ref 0.6–1.3)
CREAT UR-MCNC: 112 MG/DL
EOSINOPHIL # BLD AUTO: 0.32 THOUSAND/ΜL (ref 0–0.61)
EOSINOPHIL NFR BLD AUTO: 3 % (ref 0–6)
ERYTHROCYTE [DISTWIDTH] IN BLOOD BY AUTOMATED COUNT: 12.9 % (ref 11.6–15.1)
GFR SERPL CREATININE-BSD FRML MDRD: 59 ML/MIN/1.73SQ M
GLUCOSE P FAST SERPL-MCNC: 108 MG/DL (ref 65–99)
HCT VFR BLD AUTO: 46.3 % (ref 34.8–46.1)
HDLC SERPL-MCNC: 47 MG/DL
HGB BLD-MCNC: 15.5 G/DL (ref 11.5–15.4)
IMM GRANULOCYTES # BLD AUTO: 0.05 THOUSAND/UL (ref 0–0.2)
IMM GRANULOCYTES NFR BLD AUTO: 0 % (ref 0–2)
LDLC SERPL CALC-MCNC: 48 MG/DL (ref 0–100)
LYMPHOCYTES # BLD AUTO: 4.07 THOUSANDS/ΜL (ref 0.6–4.47)
LYMPHOCYTES NFR BLD AUTO: 33 % (ref 14–44)
MCH RBC QN AUTO: 32.8 PG (ref 26.8–34.3)
MCHC RBC AUTO-ENTMCNC: 33.5 G/DL (ref 31.4–37.4)
MCV RBC AUTO: 98 FL (ref 82–98)
MICROALBUMIN UR-MCNC: 9.2 MG/L (ref 0–20)
MICROALBUMIN/CREAT 24H UR: 8 MG/G CREATININE (ref 0–30)
MONOCYTES # BLD AUTO: 0.77 THOUSAND/ΜL (ref 0.17–1.22)
MONOCYTES NFR BLD AUTO: 6 % (ref 4–12)
NEUTROPHILS # BLD AUTO: 6.88 THOUSANDS/ΜL (ref 1.85–7.62)
NEUTS SEG NFR BLD AUTO: 57 % (ref 43–75)
NONHDLC SERPL-MCNC: 75 MG/DL
NRBC BLD AUTO-RTO: 0 /100 WBCS
PLATELET # BLD AUTO: 296 THOUSANDS/UL (ref 149–390)
PMV BLD AUTO: 10.4 FL (ref 8.9–12.7)
POTASSIUM SERPL-SCNC: 4.4 MMOL/L (ref 3.5–5.3)
PROT SERPL-MCNC: 7.4 G/DL (ref 6.4–8.4)
RBC # BLD AUTO: 4.72 MILLION/UL (ref 3.81–5.12)
SODIUM SERPL-SCNC: 139 MMOL/L (ref 135–147)
TRIGL SERPL-MCNC: 136 MG/DL
TSH SERPL DL<=0.05 MIU/L-ACNC: 4.78 UIU/ML (ref 0.45–4.5)
WBC # BLD AUTO: 12.26 THOUSAND/UL (ref 4.31–10.16)

## 2022-07-21 PROCEDURE — 82306 VITAMIN D 25 HYDROXY: CPT

## 2022-07-21 PROCEDURE — 80053 COMPREHEN METABOLIC PANEL: CPT

## 2022-07-21 PROCEDURE — 36415 COLL VENOUS BLD VENIPUNCTURE: CPT

## 2022-07-21 PROCEDURE — 80061 LIPID PANEL: CPT

## 2022-07-21 PROCEDURE — 82570 ASSAY OF URINE CREATININE: CPT | Performed by: PHYSICIAN ASSISTANT

## 2022-07-21 PROCEDURE — 85025 COMPLETE CBC W/AUTO DIFF WBC: CPT

## 2022-07-21 PROCEDURE — 84443 ASSAY THYROID STIM HORMONE: CPT

## 2022-07-21 PROCEDURE — 82043 UR ALBUMIN QUANTITATIVE: CPT | Performed by: PHYSICIAN ASSISTANT

## 2022-07-22 ENCOUNTER — OFFICE VISIT (OUTPATIENT)
Dept: INTERNAL MEDICINE CLINIC | Facility: CLINIC | Age: 72
End: 2022-07-22
Payer: MEDICARE

## 2022-07-22 VITALS
TEMPERATURE: 98.3 F | OXYGEN SATURATION: 92 % | SYSTOLIC BLOOD PRESSURE: 144 MMHG | HEIGHT: 65 IN | WEIGHT: 181.38 LBS | HEART RATE: 100 BPM | BODY MASS INDEX: 30.22 KG/M2 | DIASTOLIC BLOOD PRESSURE: 90 MMHG

## 2022-07-22 DIAGNOSIS — N18.30 STAGE 3 CHRONIC KIDNEY DISEASE, UNSPECIFIED WHETHER STAGE 3A OR 3B CKD (HCC): ICD-10-CM

## 2022-07-22 DIAGNOSIS — M54.50 CHRONIC BILATERAL LOW BACK PAIN WITHOUT SCIATICA: ICD-10-CM

## 2022-07-22 DIAGNOSIS — M54.17 LUMBOSACRAL RADICULITIS: ICD-10-CM

## 2022-07-22 DIAGNOSIS — E11.8 TYPE 2 DIABETES MELLITUS WITH COMPLICATION, WITHOUT LONG-TERM CURRENT USE OF INSULIN (HCC): Primary | ICD-10-CM

## 2022-07-22 DIAGNOSIS — Z12.31 ENCOUNTER FOR SCREENING MAMMOGRAM FOR MALIGNANT NEOPLASM OF BREAST: ICD-10-CM

## 2022-07-22 DIAGNOSIS — E03.9 ACQUIRED HYPOTHYROIDISM: ICD-10-CM

## 2022-07-22 DIAGNOSIS — G89.29 CHRONIC BILATERAL LOW BACK PAIN WITHOUT SCIATICA: ICD-10-CM

## 2022-07-22 PROBLEM — L28.2 PRURIGO: Status: RESOLVED | Noted: 2019-07-31 | Resolved: 2022-07-22

## 2022-07-22 PROBLEM — L08.9 SKIN INFECTION: Status: RESOLVED | Noted: 2019-04-01 | Resolved: 2022-07-22

## 2022-07-22 PROBLEM — L03.031: Status: RESOLVED | Noted: 2020-02-08 | Resolved: 2022-07-22

## 2022-07-22 PROCEDURE — 83036 HEMOGLOBIN GLYCOSYLATED A1C: CPT | Performed by: PHYSICIAN ASSISTANT

## 2022-07-22 PROCEDURE — 2028F FOOT EXAM PERFORMED: CPT | Performed by: PHYSICIAN ASSISTANT

## 2022-07-22 PROCEDURE — 99214 OFFICE O/P EST MOD 30 MIN: CPT | Performed by: PHYSICIAN ASSISTANT

## 2022-07-22 RX ORDER — METHOCARBAMOL 750 MG/1
750 TABLET, FILM COATED ORAL EVERY 8 HOURS SCHEDULED
Qty: 90 TABLET | Refills: 0 | Status: SHIPPED | OUTPATIENT
Start: 2022-07-22 | End: 2022-10-25 | Stop reason: SDUPTHER

## 2022-07-22 RX ORDER — TRAMADOL HYDROCHLORIDE 50 MG/1
50 TABLET ORAL EVERY 6 HOURS PRN
Qty: 90 TABLET | Refills: 0 | Status: SHIPPED | OUTPATIENT
Start: 2022-07-22 | End: 2022-10-25

## 2022-07-22 NOTE — ASSESSMENT & PLAN NOTE
Lab Results   Component Value Date    HGBA1C 6 5 03/16/2022   A1C on 7/22/22: 6 6  On ACE and statin  Eye exam UTD with Dr Tamar Cook exam performed 7/22/22

## 2022-07-22 NOTE — ASSESSMENT & PLAN NOTE
Restart tramadol and robaxin  Directions for use and possible side effects discussed and patient verbalized understanding of these  Patient was informed that this medicine has an abuse potential and may be habit forming  We discussed that this may also cause drowsiness  The medication should not be combined with alcohol  The patient was informed not to operate machinery while taking this medication and should not drive until they know how they respond to the medication  The patient verbalized understanding of this

## 2022-07-22 NOTE — PROGRESS NOTES
Assessment/Plan:  Problem List Items Addressed This Visit        Endocrine    Type 2 diabetes mellitus with complication, without long-term current use of insulin ([facility] [address] ) - Primary       Lab Results   Component Value Date    HGBA1C 6 5 03/16/2022   A1C on 7/22/22: 6 6  On ACE and statin  Eye exam UTD with Dr Dykes Lunch exam performed 7/22/22         Relevant Orders    POCT hemoglobin A1c       Nervous and Auditory    Lumbosacral radiculitis     Restart tramadol and robaxin  Directions for use and possible side effects discussed and patient verbalized understanding of these  Patient was informed that this medicine has an abuse potential and may be habit forming  We discussed that this may also cause drowsiness  The medication should not be combined with alcohol  The patient was informed not to operate machinery while taking this medication and should not drive until they know how they respond to the medication  The patient verbalized understanding of this  Genitourinary    Stage 3 chronic kidney disease, unspecified whether stage 3a or 3b CKD (HCC)       Other    Chronic lower back pain    Relevant Medications    traMADol (Ultram) 50 mg tablet    methocarbamol (ROBAXIN) 750 mg tablet      Other Visit Diagnoses     Encounter for screening mammogram for malignant neoplasm of breast        Relevant Orders    Mammo screening bilateral w 3d & cad    Acquired hypothyroidism        Relevant Orders    TSH, 3rd generation with Free T4 reflex           Diagnoses and all orders for this visit:    Type 2 diabetes mellitus with complication, without long-term current use of insulin (HCC)  -     POCT hemoglobin A1c    Encounter for screening mammogram for malignant neoplasm of breast  -     Mammo screening bilateral w 3d & cad; Future    Stage 3 chronic kidney disease, unspecified whether stage 3a or 3b CKD ([facility] [address] )    Acquired hypothyroidism  -     TSH, 3rd generation with Free T4 reflex;  Future    Chronic bilateral low back pain without sciatica  -     traMADol (Ultram) 50 mg tablet; Take 1 tablet (50 mg total) by mouth every 6 (six) hours as needed for moderate pain  -     methocarbamol (ROBAXIN) 750 mg tablet; Take 1 tablet (750 mg total) by mouth every 8 (eight) hours    Lumbosacral radiculitis      Type 2 diabetes mellitus with complication, without long-term current use of insulin ([facility])    Lab Results   Component Value Date    HGBA1C 6 5 03/16/2022   A1C on 7/22/22: 6 6  On ACE and statin  Eye exam UTD with Dr Aracelis Santizo exam performed 7/22/22    Lumbosacral radiculitis  Restart tramadol and robaxin  Directions for use and possible side effects discussed and patient verbalized understanding of these  Patient was informed that this medicine has an abuse potential and may be habit forming  We discussed that this may also cause drowsiness  The medication should not be combined with alcohol  The patient was informed not to operate machinery while taking this medication and should not drive until they know how they respond to the medication  The patient verbalized understanding of this  Subjective:      Patient ID: Emma Parada is a 67 y o  female  Pt presents for routine visit  Dexa scan, labs and CRC screening are up to date  Labs reviewed and all normal except for mildly elevated TSH at 4 7  Pt defers starting another medication at this time and would like to just repeat the lab in the near future  BP is elevated today which is atypical for her  She notes she threw her back out last night and is quite uncomfortable  Foot exam performed  A1C stable at 6 6  Mammogram is due  Diabetic eye exam performed at [facility]         The following portions of the patient's history were reviewed and updated as appropriate:   She has a past medical history of Anxiety, Cellulitis, Chronic obstructive pulmonary disease ([address] ) (3/11/2019), Depression, Diabetes mellitus ([address] ), Dysuria, Esophageal reflux, Fatigue, Fracture, Heart murmur, Hypertension, Pneumonia, Restless legs syndrome (RLS), Stage 3 chronic kidney disease, unspecified whether stage 3a or 3b CKD ([facility] [address] ) (7/22/2022), and Vitamin D deficiency  ,  does not have any pertinent problems on file  ,   has a past surgical history that includes Back surgery; Cholecystectomy (1975); Foot surgery (Right, 2014); Hip surgery (2013); Hysterectomy; Ovarian cyst surgery (1971); Shoulder surgery (Right, 12/14/2015); Other surgical history (2011); Tonsillectomy; pr colonoscopy flx dx w/collj spec when pfrmd (N/A, 7/10/2018); Appendectomy; pr esophagogastroduodenoscopy transoral diagnostic (N/A, 1/28/2019); Skin biopsy; and Insertion / placement / revision neurostimulator  ,  family history includes Calcium disorder in her other and other; Cancer in her father; Depression in her family; Diabetes type II in her father and other; Heart disease in her father; Hypertension in her mother and other; Mental illness in her mother and other; Migraines in her other; Other in her other; Pulmonary embolism in her other; Stroke in her mother; Substance Abuse in her brother and family; Tuberculosis in her other  ,   reports that she has been smoking cigarettes  She has been smoking about 0 50 packs per day  She has never used smokeless tobacco  She reports current alcohol use  She reports that she does not use drugs  ,  is allergic to midazolam, acetaminophen, hydrocodone, hydrocodone-acetaminophen, moxifloxacin, other, gabapentin, hydrocodone-acetaminophen, penicillin v, pregabalin, and propofol     Current Outpatient Medications   Medication Sig Dispense Refill    albuterol (2 5 mg/3 mL) 0 083 % nebulizer solution Take 1 vial (2 5 mg total) by nebulization every 4 (four) hours as needed for shortness of breath Disp 1 box 270 vial 1    amitriptyline (ELAVIL) 50 mg tablet Take 1 tablet (50 mg total) by mouth daily at bedtime 90 tablet 0    aspirin (ECOTRIN LOW STRENGTH) 81 mg EC tablet Take 81 mg by mouth daily  atorvastatin (LIPITOR) 20 mg tablet Take 1 tablet (20 mg total) by mouth daily 90 tablet 0    Calcium 500 MG tablet Take 2 tablets by mouth every other day        cholecalciferol (VITAMIN D3) 1,000 units tablet Take 3 tablets by mouth every other day       denosumab (PROLIA) 60 mg/mL Inject under the skin every 6 (six) months      escitalopram (LEXAPRO) 10 mg tablet Take 1 tablet (10 mg total) by mouth daily 90 tablet 1    Ferrous Sulfate (IRON) 325 (65 Fe) MG TABS Take 1 tablet (325 mg total) by mouth daily 30 each 3    furosemide (LASIX) 40 mg tablet Take 1 tablet (40 mg total) by mouth daily (Patient taking differently: Take 40 mg by mouth daily PRN) 30 tablet 0    lisinopril (ZESTRIL) 2 5 mg tablet Take 1 tablet (2 5 mg total) by mouth daily 90 tablet 1    metFORMIN (GLUCOPHAGE) 1000 MG tablet Take 1 tablet (1,000 mg total) by mouth in the morning and 1 tablet (1,000 mg total) in the evening  Take with meals   180 tablet 0    methocarbamol (ROBAXIN) 750 mg tablet Take 1 tablet (750 mg total) by mouth every 8 (eight) hours 90 tablet 0    pantoprazole (PROTONIX) 40 mg tablet Take 1 tablet (40 mg total) by mouth daily 90 tablet 0    potassium chloride (MICRO-K) 10 MEQ CR capsule Take 10 mEq by mouth 2 (two) times a day PRN w/lasix       tiotropium (SPIRIVA RESPIMAT) 1 25 MCG/ACT AERS inhaler Inhale 2 puffs daily 4 g 3    traMADol (ULTRAM) 50 mg tablet Take 50 mg by mouth 3 (three) times a day as needed        traMADol (Ultram) 50 mg tablet Take 1 tablet (50 mg total) by mouth every 6 (six) hours as needed for moderate pain 90 tablet 0    VITAMIN B COMPLEX-C PO Take 1 tablet by mouth daily      albuterol (ProAir HFA) 90 mcg/act inhaler Inhale 2 puffs every 4 (four) hours as needed for wheezing 8 5 g 3    JOELLEN MICROLET LANCETS lancets Use as instructed BID E11 65 100 each 5    betamethasone dipropionate (DIPROSONE) 0 05 % cream betamethasone dipropionate 0 05 % topical cream (Patient not taking: Reported on 2/11/2022)      ergocalciferol (VITAMIN D2) 50,000 units Take 1 capsule (50,000 Units total) by mouth once a week (Patient not taking: Reported on 7/22/2022) 12 capsule 3    Glucose Blood (ONETOUCH ULTRA BLUE VI) Test 3 (three) times a day        glucose blood (OneTouch Ultra) test strip Once daily 100 strip 5    ketoconazole (NIZORAL) 2 % cream PRN       ketorolac (ACULAR) 0 5 % ophthalmic solution INSTILL 1 DROP 4 TIMES DAILY INTO RIGHT EYE FOR 28 DAYS AFTER SURGERY      Prolensa 0 07 % SOLN  (Patient not taking: Reported on 2/11/2022 )       No current facility-administered medications for this visit  Review of Systems   Constitutional: Negative for chills and fever  HENT: Negative for congestion, ear pain, hearing loss, postnasal drip, rhinorrhea, sinus pressure, sinus pain, sore throat and trouble swallowing  Eyes: Negative for pain and visual disturbance  Respiratory: Negative for cough, chest tightness, shortness of breath and wheezing  Cardiovascular: Negative  Negative for chest pain, palpitations and leg swelling  Gastrointestinal: Negative for abdominal pain, blood in stool, constipation, diarrhea, nausea and vomiting  Endocrine: Negative for cold intolerance, heat intolerance, polydipsia, polyphagia and polyuria  Genitourinary: Negative for difficulty urinating, dysuria, flank pain and urgency  Musculoskeletal: Positive for back pain, gait problem and myalgias  Negative for arthralgias  Skin: Negative for rash  Allergic/Immunologic: Negative  Neurological: Negative for dizziness, weakness, light-headedness and headaches  Hematological: Negative  Psychiatric/Behavioral: Negative for behavioral problems, dysphoric mood and sleep disturbance  The patient is not nervous/anxious            PHQ-2/9 Depression Screening            Objective:  Vitals:    07/22/22 1104   BP: 144/90   Pulse: 100   Temp: 98 3 °F (36 8 °C)   SpO2: 92%   Weight: 82 3 kg (181 lb 6 oz)   Height: 5' 5" (1 651 m)     Body mass index is 30 18 kg/m²  Physical Exam  Nursing note reviewed  Constitutional:       General: She is in acute distress  Appearance: She is well-developed  HENT:      Head: Normocephalic and atraumatic  Nose: Nose normal    Eyes:      Extraocular Movements: Extraocular movements intact  Cardiovascular:      Pulses: no weak pulses          Dorsalis pedis pulses are 2+ on the right side and 2+ on the left side  Posterior tibial pulses are 2+ on the right side and 2+ on the left side  Pulmonary:      Effort: Pulmonary effort is normal    Musculoskeletal:         General: Normal range of motion  Cervical back: Normal range of motion  Feet:      Right foot:      Skin integrity: No ulcer, skin breakdown, erythema, warmth, callus or dry skin  Left foot:      Skin integrity: No ulcer, skin breakdown, erythema, warmth, callus or dry skin  Neurological:      Mental Status: She is alert and oriented to person, place, and time  Psychiatric:         Behavior: Behavior normal          Thought Content: Thought content normal          Judgment: Judgment normal            Patient's shoes and socks removed  Right Foot/Ankle   Right Foot Inspection  Skin Exam: skin normal and skin intact  No dry skin, no warmth, no callus, no erythema, no maceration, no abnormal color, no pre-ulcer, no ulcer and no callus  Toe Exam: ROM and strength within normal limits  Sensory   Vibration: intact  Proprioception: intact  Monofilament testing: diminished    Vascular  Capillary refills: < 3 seconds  The right DP pulse is 2+  The right PT pulse is 2+  Left Foot/Ankle  Left Foot Inspection  Skin Exam: skin normal and skin intact  No dry skin, no warmth, no erythema, no maceration, normal color, no pre-ulcer, no ulcer and no callus  Toe Exam: ROM and strength within normal limits       Sensory   Vibration: intact  Proprioception: intact  Monofilament testing: intact    Vascular  Capillary refills: < 3 seconds  The left DP pulse is 2+  The left PT pulse is 2+       Assign Risk Category  No deformity present  No loss of protective sensation  No weak pulses  Risk: 0

## 2022-07-25 LAB — SL AMB POCT HEMOGLOBIN AIC: 6.6 (ref ?–6.5)

## 2022-07-28 DIAGNOSIS — E78.5 HYPERLIPIDEMIA, UNSPECIFIED HYPERLIPIDEMIA TYPE: ICD-10-CM

## 2022-07-28 DIAGNOSIS — R13.19 ESOPHAGEAL DYSPHAGIA: ICD-10-CM

## 2022-07-28 RX ORDER — ATORVASTATIN CALCIUM 20 MG/1
20 TABLET, FILM COATED ORAL DAILY
Qty: 90 TABLET | Refills: 1 | Status: SHIPPED | OUTPATIENT
Start: 2022-07-28

## 2022-07-28 RX ORDER — PANTOPRAZOLE SODIUM 40 MG/1
40 TABLET, DELAYED RELEASE ORAL DAILY
Qty: 90 TABLET | Refills: 1 | Status: SHIPPED | OUTPATIENT
Start: 2022-07-28

## 2022-08-03 ENCOUNTER — TELEPHONE (OUTPATIENT)
Dept: INTERNAL MEDICINE CLINIC | Facility: CLINIC | Age: 72
End: 2022-08-03

## 2022-08-03 DIAGNOSIS — E03.9 ACQUIRED HYPOTHYROIDISM: Primary | ICD-10-CM

## 2022-08-03 RX ORDER — LEVOTHYROXINE SODIUM 0.03 MG/1
25 TABLET ORAL
Qty: 90 TABLET | Refills: 3 | Status: SHIPPED | OUTPATIENT
Start: 2022-08-03

## 2022-08-18 DIAGNOSIS — E11.8 TYPE 2 DIABETES MELLITUS WITH COMPLICATION, WITHOUT LONG-TERM CURRENT USE OF INSULIN (HCC): ICD-10-CM

## 2022-09-08 DIAGNOSIS — G62.9 NEUROPATHY: ICD-10-CM

## 2022-09-08 RX ORDER — AMITRIPTYLINE HYDROCHLORIDE 50 MG/1
50 TABLET, FILM COATED ORAL
Qty: 90 TABLET | Refills: 0 | Status: SHIPPED | OUTPATIENT
Start: 2022-09-08 | End: 2023-03-07

## 2022-10-18 ENCOUNTER — RA CDI HCC (OUTPATIENT)
Dept: OTHER | Facility: HOSPITAL | Age: 72
End: 2022-10-18

## 2022-10-18 NOTE — PROGRESS NOTES
E11 22  [address]  coding opportunities          Chart Reviewed number of suggestions sent to Provider: 1     Patients Insurance     Medicare Insurance: [employer]

## 2022-10-24 ENCOUNTER — APPOINTMENT (OUTPATIENT)
Dept: LAB | Facility: CLINIC | Age: 72
End: 2022-10-24
Payer: MEDICARE

## 2022-10-24 DIAGNOSIS — E03.9 ACQUIRED HYPOTHYROIDISM: ICD-10-CM

## 2022-10-24 LAB — TSH SERPL DL<=0.05 MIU/L-ACNC: 2.43 UIU/ML (ref 0.45–4.5)

## 2022-10-24 PROCEDURE — 36415 COLL VENOUS BLD VENIPUNCTURE: CPT

## 2022-10-24 PROCEDURE — 84443 ASSAY THYROID STIM HORMONE: CPT

## 2022-10-25 ENCOUNTER — OFFICE VISIT (OUTPATIENT)
Dept: INTERNAL MEDICINE CLINIC | Facility: CLINIC | Age: 72
End: 2022-10-25
Payer: MEDICARE

## 2022-10-25 VITALS
WEIGHT: 178 LBS | HEART RATE: 108 BPM | SYSTOLIC BLOOD PRESSURE: 120 MMHG | TEMPERATURE: 97.6 F | DIASTOLIC BLOOD PRESSURE: 62 MMHG | HEIGHT: 65 IN | OXYGEN SATURATION: 94 % | BODY MASS INDEX: 29.66 KG/M2

## 2022-10-25 DIAGNOSIS — E11.8 TYPE 2 DIABETES MELLITUS WITH COMPLICATION, WITHOUT LONG-TERM CURRENT USE OF INSULIN (HCC): ICD-10-CM

## 2022-10-25 DIAGNOSIS — I10 PRIMARY HYPERTENSION: ICD-10-CM

## 2022-10-25 DIAGNOSIS — J06.9 UPPER RESPIRATORY TRACT INFECTION, UNSPECIFIED TYPE: Primary | ICD-10-CM

## 2022-10-25 DIAGNOSIS — G89.29 CHRONIC BILATERAL LOW BACK PAIN WITHOUT SCIATICA: ICD-10-CM

## 2022-10-25 DIAGNOSIS — M54.50 CHRONIC BILATERAL LOW BACK PAIN WITHOUT SCIATICA: ICD-10-CM

## 2022-10-25 LAB — SL AMB POCT HEMOGLOBIN AIC: 6.3 (ref ?–6.5)

## 2022-10-25 PROCEDURE — 99214 OFFICE O/P EST MOD 30 MIN: CPT | Performed by: PHYSICIAN ASSISTANT

## 2022-10-25 PROCEDURE — 83036 HEMOGLOBIN GLYCOSYLATED A1C: CPT | Performed by: PHYSICIAN ASSISTANT

## 2022-10-25 RX ORDER — METHOCARBAMOL 750 MG/1
750 TABLET, FILM COATED ORAL EVERY 8 HOURS SCHEDULED
Qty: 90 TABLET | Refills: 0 | Status: SHIPPED | OUTPATIENT
Start: 2022-10-25

## 2022-10-25 NOTE — ASSESSMENT & PLAN NOTE
Lab Results   Component Value Date    HGBA1C 6 3 10/25/2022     Pts symptoms are stable with current regime  No changes at present

## 2022-10-25 NOTE — ASSESSMENT & PLAN NOTE
Treat supportively  OTC produce recommendations provide  Call if no improvement prior to the weekend  Claritin for rhinorrhea and post nasal drip  Mucinex for congestion  Delsym for cough

## 2022-10-25 NOTE — PROGRESS NOTES
Name: Solange Lopez      : 1950      MRN: [id number]  Encounter Provider: Kaila Child PA-C  Encounter Date: 10/25/2022   Encounter department: [address] [place] [address]     1  Upper respiratory tract infection, unspecified type  Assessment & Plan:  Treat supportively  OTC produce recommendations provide  Call if no improvement prior to the weekend  Claritin for rhinorrhea and post nasal drip  Mucinex for congestion  Delsym for cough  2  Chronic bilateral low back pain without sciatica  -     methocarbamol (ROBAXIN) 750 mg tablet; Take 1 tablet (750 mg total) by mouth every 8 (eight) hours    3  Type 2 diabetes mellitus with complication, without long-term current use of insulin [facility])  Assessment & Plan:    Lab Results   Component Value Date    HGBA1C 6 3 10/25/2022     Pts symptoms are stable with current regime  No changes at present  Orders:  -     POCT hemoglobin A1c    4  Primary hypertension        Tobacco Cessation Counseling: Tobacco cessation counseling was provided  The patient is sincerely urged to quit consumption of tobacco  She is not ready to quit tobacco          Subjective      Pt presents for routine visit with URI symptoms as noted below  Rapid covid test in office is negative  She is up to date with labs, dexa scan, AWV And has mammogram scheduled  A1C is controlled at 6 3    URI   This is a new problem  The current episode started in the past 7 days  The problem has been unchanged  There has been no fever  Associated symptoms include congestion, coughing, rhinorrhea, a sore throat and swollen glands  Pertinent negatives include no abdominal pain, chest pain, diarrhea, dysuria, ear pain, headaches, nausea, rash, sinus pain, vomiting or wheezing  She has tried acetaminophen for the symptoms  The treatment provided mild relief  Review of Systems   Constitutional: Negative for chills and fever     HENT: Positive for congestion, rhinorrhea and sore throat  Negative for ear pain, hearing loss, postnasal drip, sinus pressure, sinus pain and trouble swallowing  Eyes: Negative for pain and visual disturbance  Respiratory: Positive for cough  Negative for chest tightness, shortness of breath and wheezing  Cardiovascular: Negative  Negative for chest pain, palpitations and leg swelling  Gastrointestinal: Negative for abdominal pain, blood in stool, constipation, diarrhea, nausea and vomiting  Endocrine: Negative for cold intolerance, heat intolerance, polydipsia, polyphagia and polyuria  Genitourinary: Negative for difficulty urinating, dysuria, flank pain and urgency  Musculoskeletal: Negative for arthralgias, back pain, gait problem and myalgias  Skin: Negative for rash  Allergic/Immunologic: Negative  Neurological: Negative for dizziness, weakness, light-headedness and headaches  Hematological: Negative  Psychiatric/Behavioral: Negative for behavioral problems, dysphoric mood and sleep disturbance  The patient is not nervous/anxious          Current Outpatient Medications on File Prior to Visit   Medication Sig   • albuterol (2 5 mg/3 mL) 0 083 % nebulizer solution Take 1 vial (2 5 mg total) by nebulization every 4 (four) hours as needed for shortness of breath Disp 1 box   • amitriptyline (ELAVIL) 50 mg tablet Take 1 tablet (50 mg total) by mouth daily at bedtime   • aspirin (ECOTRIN LOW STRENGTH) 81 mg EC tablet Take 81 mg by mouth daily   • atorvastatin (LIPITOR) 20 mg tablet Take 1 tablet (20 mg total) by mouth daily   • JOELLEN MICROLET LANCETS lancets Use as instructed BID E11 65   • Calcium 500 MG tablet Take 1 tablet by mouth every other day   • cholecalciferol (VITAMIN D3) 1,000 units tablet Take 3 tablets by mouth every other day    • denosumab (PROLIA) 60 mg/mL Inject under the skin every 6 (six) months   • Ferrous Sulfate (IRON) 325 (65 Fe) MG TABS Take 1 tablet (325 mg total) by mouth daily   • furosemide (LASIX) 40 mg tablet Take 1 tablet (40 mg total) by mouth daily (Patient taking differently: Take 40 mg by mouth daily PRN)   • Glucose Blood (ONETOUCH ULTRA BLUE VI) Test 3 (three) times a day     • glucose blood (OneTouch Ultra) test strip Once daily   • levothyroxine (Euthyrox) 25 mcg tablet Take 1 tablet (25 mcg total) by mouth daily in the early morning   • lisinopril (ZESTRIL) 2 5 mg tablet Take 1 tablet (2 5 mg total) by mouth daily   • metFORMIN (GLUCOPHAGE) 1000 MG tablet Take 1 tablet (1,000 mg total) by mouth 2 (two) times a day with meals   • pantoprazole (PROTONIX) 40 mg tablet Take 1 tablet (40 mg total) by mouth daily   • potassium chloride (MICRO-K) 10 MEQ CR capsule Take 10 mEq by mouth 2 (two) times a day PRN w/lasix    • VITAMIN B COMPLEX-C PO Take 1 tablet by mouth daily   • betamethasone dipropionate (DIPROSONE) 0 05 % cream betamethasone dipropionate 0 05 % topical cream (Patient not taking: No sig reported)   • ergocalciferol (VITAMIN D2) 50,000 units Take 1 capsule (50,000 Units total) by mouth once a week (Patient not taking: No sig reported)   • escitalopram (LEXAPRO) 10 mg tablet Take 1 tablet (10 mg total) by mouth daily   • ketoconazole (NIZORAL) 2 % cream PRN  (Patient not taking: Reported on 10/25/2022)   • ketorolac (ACULAR) 0 5 % ophthalmic solution INSTILL 1 DROP 4 TIMES DAILY INTO RIGHT EYE FOR 28 DAYS AFTER SURGERY (Patient not taking: Reported on 10/25/2022)   • Prolensa 0 07 % SOLN  (Patient not taking: No sig reported)   • traMADol (ULTRAM) 50 mg tablet Take 50 mg by mouth 3 (three) times a day as needed   (Patient not taking: Reported on 10/25/2022)   • [DISCONTINUED] albuterol (ProAir HFA) 90 mcg/act inhaler Inhale 2 puffs every 4 (four) hours as needed for wheezing (Patient not taking: Reported on 10/25/2022)   • [DISCONTINUED] methocarbamol (ROBAXIN) 750 mg tablet Take 1 tablet (750 mg total) by mouth every 8 (eight) hours (Patient not taking: Reported on 10/25/2022)   • [DISCONTINUED] tiotropium (SPIRIVA RESPIMAT) 1 25 MCG/ACT AERS inhaler Inhale 2 puffs daily (Patient not taking: Reported on 10/25/2022)   • [DISCONTINUED] traMADol (Ultram) 50 mg tablet Take 1 tablet (50 mg total) by mouth every 6 (six) hours as needed for moderate pain (Patient not taking: Reported on 10/25/2022)       Objective     /62 (BP Location: Left arm, Patient Position: Sitting)   Pulse (!) 108   Temp 97 6 °F (36 4 °C)   Ht 5' 5" (1 651 m)   Wt 80 7 kg (178 lb)   SpO2 94%   BMI 29 62 kg/m²     Physical Exam  Constitutional:       General: She is not in acute distress  Appearance: She is well-developed  She is not diaphoretic  HENT:      Head: Normocephalic and atraumatic  Right Ear: External ear normal       Left Ear: External ear normal       Nose: Congestion and rhinorrhea present  Mouth/Throat:      Pharynx: No oropharyngeal exudate  Eyes:      General: No scleral icterus  Right eye: No discharge  Left eye: No discharge  Conjunctiva/sclera: Conjunctivae normal       Pupils: Pupils are equal, round, and reactive to light  Neck:      Thyroid: No thyromegaly  Cardiovascular:      Rate and Rhythm: Normal rate and regular rhythm  Heart sounds: Normal heart sounds  No murmur heard  No friction rub  No gallop  Pulmonary:      Effort: Pulmonary effort is normal  No respiratory distress  Breath sounds: Normal breath sounds  No wheezing or rales  Abdominal:      General: Bowel sounds are normal  There is no distension  Palpations: Abdomen is soft  Tenderness: There is no abdominal tenderness  Musculoskeletal:         General: No tenderness or deformity  Normal range of motion  Cervical back: Normal range of motion and neck supple  Skin:     General: Skin is warm and dry  Neurological:      Mental Status: She is alert and oriented to person, place, and time  Cranial Nerves: No cranial nerve deficit     Psychiatric:         Behavior: Behavior normal          Thought Content:  Thought content normal          Judgment: Judgment normal        Leann Mejía PA-C

## 2022-11-16 DIAGNOSIS — E11.8 TYPE 2 DIABETES MELLITUS WITH COMPLICATION, WITHOUT LONG-TERM CURRENT USE OF INSULIN (HCC): ICD-10-CM

## 2022-11-29 ENCOUNTER — CLINICAL SUPPORT (OUTPATIENT)
Dept: INTERNAL MEDICINE CLINIC | Facility: CLINIC | Age: 72
End: 2022-11-29

## 2022-11-29 DIAGNOSIS — Z23 ENCOUNTER FOR IMMUNIZATION: Primary | ICD-10-CM

## 2023-01-19 ENCOUNTER — APPOINTMENT (OUTPATIENT)
Dept: RADIOLOGY | Facility: CLINIC | Age: 73
End: 2023-01-19

## 2023-01-19 ENCOUNTER — OFFICE VISIT (OUTPATIENT)
Dept: URGENT CARE | Facility: CLINIC | Age: 73
End: 2023-01-19

## 2023-01-19 VITALS
TEMPERATURE: 98 F | RESPIRATION RATE: 22 BRPM | SYSTOLIC BLOOD PRESSURE: 127 MMHG | HEART RATE: 110 BPM | OXYGEN SATURATION: 93 % | DIASTOLIC BLOOD PRESSURE: 72 MMHG

## 2023-01-19 DIAGNOSIS — Z53.29 REFUSAL OF CARE BY PATIENT: ICD-10-CM

## 2023-01-19 DIAGNOSIS — R05.1 ACUTE COUGH: ICD-10-CM

## 2023-01-19 DIAGNOSIS — J20.8 ACUTE BRONCHITIS DUE TO OTHER SPECIFIED ORGANISMS: ICD-10-CM

## 2023-01-19 DIAGNOSIS — Z72.0 TOBACCO USE: ICD-10-CM

## 2023-01-19 DIAGNOSIS — R09.02 HYPOXIA: Primary | ICD-10-CM

## 2023-01-19 DIAGNOSIS — R06.02 SHORTNESS OF BREATH: ICD-10-CM

## 2023-01-19 LAB
SARS-COV-2 AG UPPER RESP QL IA: NEGATIVE
VALID CONTROL: NORMAL

## 2023-01-19 RX ORDER — PREDNISONE 20 MG/1
60 TABLET ORAL DAILY
Qty: 15 TABLET | Refills: 0 | Status: SHIPPED | OUTPATIENT
Start: 2023-01-19 | End: 2023-01-24

## 2023-01-19 RX ORDER — AZITHROMYCIN 250 MG/1
TABLET, FILM COATED ORAL
Qty: 6 TABLET | Refills: 0 | Status: SHIPPED | OUTPATIENT
Start: 2023-01-19 | End: 2023-01-23

## 2023-01-19 NOTE — PROGRESS NOTES
[facility]        NAME: Pebbles Cabrera is a 67 y o  female  : 1950    MRN: [id number]  DATE: 2023  TIME: 1:19 PM    Assessment and Plan   Hypoxia [R09 02]  1  Hypoxia  azithromycin (ZITHROMAX) 250 mg tablet    predniSONE 20 mg tablet      2  Acute bronchitis due to other specified organisms  azithromycin (ZITHROMAX) 250 mg tablet    predniSONE 20 mg tablet      3  Acute cough  Poct Covid 19 Rapid Antigen Test    XR chest pa & lateral    azithromycin (ZITHROMAX) 250 mg tablet    predniSONE 20 mg tablet      4  Tobacco use  azithromycin (ZITHROMAX) 250 mg tablet    predniSONE 20 mg tablet      5  Shortness of breath  azithromycin (ZITHROMAX) 250 mg tablet    predniSONE 20 mg tablet      6  Refusal of care by patient      transfer to ED for critial care evaluation due to hypoxia             Patient Instructions       Follow up with PCP in 3-5 days  Proceed to  ER if symptoms worsen  You have refused to go to the ED for evaluation - higher level of care due to hypoxia  Your oxygen saturation on room air with ambulation is 82%  You have signed a form due to refusal    You are to stop smoking  You are to follow up with your PCP  You are to discuss a pulmonologist referral with your PCP   Go to the ED if symptoms worsen - you have refused to go to today         Chief Complaint     Chief Complaint   Patient presents with   • Shortness of Breath   • Cough         History of Present Illness       This is a 67year old female who is a smoker and has chronic bronchitis who 2 days ago developed a non productive cough, shortness of breath  She states she has a rescue inhaler and has used it  She states she has been using nyquil  She denies fevers, chills, n/v/d, ear pain  + sorethroat due to coughing  She denies CHF states uses lasix when has leg swelling  She denies any leg swelling  She states she is short of breath with ambulation  Denies oxygen use         Review of Systems   Review of Systems   Constitutional: Negative  HENT: Positive for congestion and sore throat  Eyes: Negative  Respiratory: Positive for cough and shortness of breath  Cardiovascular: Negative  Gastrointestinal: Negative  Endocrine: Negative  Genitourinary: Negative  Musculoskeletal: Negative  Skin: Negative  Allergic/Immunologic: Negative  Neurological: Negative  Hematological: Negative  Psychiatric/Behavioral: Negative            Current Medications       Current Outpatient Medications:   •  azithromycin (ZITHROMAX) 250 mg tablet, Take 2 tablets today then 1 tablet daily x 4 days, Disp: 6 tablet, Rfl: 0  •  ergocalciferol (VITAMIN D2) 50,000 units, Take 1 capsule (50,000 Units total) by mouth once a week, Disp: 12 capsule, Rfl: 3  •  predniSONE 20 mg tablet, Take 3 tablets (60 mg total) by mouth daily for 5 days, Disp: 15 tablet, Rfl: 0  •  albuterol (2 5 mg/3 mL) 0 083 % nebulizer solution, Take 1 vial (2 5 mg total) by nebulization every 4 (four) hours as needed for shortness of breath Disp 1 box, Disp: 270 vial, Rfl: 1  •  amitriptyline (ELAVIL) 50 mg tablet, Take 1 tablet (50 mg total) by mouth daily at bedtime (Patient not taking: Reported on 1/19/2023), Disp: 90 tablet, Rfl: 0  •  aspirin (ECOTRIN LOW STRENGTH) 81 mg EC tablet, Take 81 mg by mouth daily, Disp: , Rfl:   •  atorvastatin (LIPITOR) 20 mg tablet, Take 1 tablet (20 mg total) by mouth daily, Disp: 90 tablet, Rfl: 1  •  JOELLEN MICROLET LANCETS lancets, Use as instructed BID E11 65, Disp: 100 each, Rfl: 5  •  betamethasone dipropionate (DIPROSONE) 0 05 % cream, betamethasone dipropionate 0 05 % topical cream (Patient not taking: No sig reported), Disp: , Rfl:   •  Calcium 500 MG tablet, Take 1 tablet by mouth every other day, Disp: , Rfl:   •  cholecalciferol (VITAMIN D3) 1,000 units tablet, Take 3 tablets by mouth every other day , Disp: , Rfl:   •  denosumab (PROLIA) 60 mg/mL, Inject under the skin every 6 (six) months, Disp: , Rfl:   •  escitalopram (LEXAPRO) 10 mg tablet, Take 1 tablet (10 mg total) by mouth daily, Disp: 90 tablet, Rfl: 1  •  Ferrous Sulfate (IRON) 325 (65 Fe) MG TABS, Take 1 tablet (325 mg total) by mouth daily, Disp: 30 each, Rfl: 3  •  furosemide (LASIX) 40 mg tablet, Take 1 tablet (40 mg total) by mouth daily (Patient taking differently: Take 40 mg by mouth daily PRN), Disp: 30 tablet, Rfl: 0  •  Glucose Blood (ONETOUCH ULTRA BLUE VI), Test 3 (three) times a day  , Disp: , Rfl:   •  glucose blood (OneTouch Ultra) test strip, Once daily, Disp: 100 strip, Rfl: 5  •  ketoconazole (NIZORAL) 2 % cream, PRN  (Patient not taking: Reported on 10/25/2022), Disp: , Rfl:   •  ketorolac (ACULAR) 0 5 % ophthalmic solution, INSTILL 1 DROP 4 TIMES DAILY INTO RIGHT EYE FOR 28 DAYS AFTER SURGERY (Patient not taking: Reported on 10/25/2022), Disp: , Rfl:   •  levothyroxine (Euthyrox) 25 mcg tablet, Take 1 tablet (25 mcg total) by mouth daily in the early morning, Disp: 90 tablet, Rfl: 3  •  lisinopril (ZESTRIL) 2 5 mg tablet, Take 1 tablet (2 5 mg total) by mouth daily, Disp: 90 tablet, Rfl: 1  •  metFORMIN (GLUCOPHAGE) 1000 MG tablet, Take 1 tablet (1,000 mg total) by mouth 2 (two) times a day with meals, Disp: 180 tablet, Rfl: 1  •  methocarbamol (ROBAXIN) 750 mg tablet, Take 1 tablet (750 mg total) by mouth every 8 (eight) hours, Disp: 90 tablet, Rfl: 0  •  pantoprazole (PROTONIX) 40 mg tablet, Take 1 tablet (40 mg total) by mouth daily, Disp: 90 tablet, Rfl: 1  •  potassium chloride (MICRO-K) 10 MEQ CR capsule, Take 10 mEq by mouth 2 (two) times a day PRN w/lasix , Disp: , Rfl:   •  Prolensa 0 07 % SOLN, , Disp: , Rfl:   •  traMADol (ULTRAM) 50 mg tablet, Take 50 mg by mouth 3 (three) times a day as needed   (Patient not taking: Reported on 10/25/2022), Disp: , Rfl:   •  VITAMIN B COMPLEX-C PO, Take 1 tablet by mouth daily, Disp: , Rfl:     Current Allergies     Allergies as of 01/19/2023 - Reviewed 01/19/2023   Allergen Reaction Noted   • Midazolam Nausea Only and Vomiting 07/17/2017   • Acetaminophen Itching 09/26/2016   • Hydrocodone Itching 09/26/2016   • Hydrocodone-acetaminophen Other (See Comments) 02/11/2022   • Moxifloxacin Diarrhea 05/05/2021   • Other Other (See Comments) 09/20/2016   • Gabapentin  11/04/2016   • Hydrocodone-acetaminophen  12/14/2015   • Penicillin v  12/21/2015   • Pregabalin  11/04/2016   • Propofol  12/14/2015            The following portions of the patient's history were reviewed and updated as appropriate: allergies, current medications, past family history, past medical history, past social history, past surgical history and problem list      Past Medical History:   Diagnosis Date   • Anxiety    • Cellulitis     LAST ASSESED 2/12/16; RESOLVED 3/9/16   • Chronic obstructive pulmonary disease ([facility] [address] ) 3/11/2019   • Depression    • Diabetes mellitus ([facility] [address] )    • Dysuria     LAST ASSESSED 3/7/16; RESOLVED 3/9/16   • Esophageal reflux     RESOLVED 1/15/16   • Fatigue     RESOLVED 3/9/16   • Fracture     rt 4 th metatarsal    • Heart murmur    • Hypertension    • Pneumonia    • Restless legs syndrome (RLS)    • Stage 3 chronic kidney disease, unspecified whether stage 3a or 3b CKD ([facility] [address] ) 7/22/2022   • Vitamin D deficiency        Past Surgical History:   Procedure Laterality Date   • APPENDECTOMY     • BACK SURGERY     • CHOLECYSTECTOMY  1975   • FOOT SURGERY Right 2014   • HIP SURGERY  2013   • HYSTERECTOMY     • INSERTION / PLACEMENT / REVISION NEUROSTIMULATOR     • OTHER SURGICAL HISTORY  2011    LAPAROSCOPIC SLING OPERATION FOR STRESS INCONTINENCE    • OVARIAN CYST SURGERY  1971    CYSTECTOMY   • MT COLONOSCOPY FLX DX W/COLLJ SPEC WHEN PFRMD N/A 7/10/2018    Procedure: COLONOSCOPY;  Surgeon: Chaitanya Ellis MD;  Location: [facility]; Service: Gastroenterology   • MT ESOPHAGOGASTRODUODENOSCOPY TRANSORAL DIAGNOSTIC N/A 1/28/2019    Procedure: ESOPHAGOGASTRODUODENOSCOPY (EGD);   Surgeon: Juan J Guerra III, MD;  Location: [facility]; Service: Gastroenterology   • SHOULDER SURGERY Right 12/14/2015    REPAIR OF TORN MUSCLES   • SKIN BIOPSY      chin 03/07/2019   • TONSILLECTOMY         Family History   Problem Relation Age of Onset   • Hypertension Mother    • Mental illness Mother    • Stroke Mother         CVA   • Cancer Father    • Diabetes type II Father    • Heart disease Father         CARDIAC DISORDER   • Hypertension Other    • Mental illness Other    • Migraines Other    • Diabetes type II Other    • Calcium disorder Other         CALCIUM KIDNEY STONES   • Other Other         HERPES ZOSTER INFECTION   • Tuberculosis Other    • Pulmonary embolism Other         CHRONIC OBSTRUCTIVE PULMONARY DISEASE   • Calcium disorder Other         CALISUM KIDNEY STONES   • Depression Family    • Substance Abuse Family    • Substance Abuse Brother          Medications have been verified  Objective   /72   Pulse (!) 110   Temp 98 °F (36 7 °C)   Resp 22   SpO2 93%   No LMP recorded  Patient is postmenopausal        Physical Exam     Physical Exam  Vitals and nursing note reviewed  Constitutional:       General: She is not in acute distress  Appearance: She is well-developed and normal weight  She is not ill-appearing, toxic-appearing or diaphoretic  HENT:      Head: Normocephalic and atraumatic  Mouth/Throat:      Mouth: Mucous membranes are moist       Pharynx: Oropharynx is clear  Eyes:      Extraocular Movements: Extraocular movements intact  Pupils: Pupils are equal, round, and reactive to light  Cardiovascular:      Rate and Rhythm: Regular rhythm  Tachycardia present  Pulmonary:      Effort: Tachypnea present  Breath sounds: Normal breath sounds  No decreased breath sounds  Chest:      Chest wall: No edema  Musculoskeletal:      Cervical back: Normal range of motion and neck supple  Skin:     General: Skin is warm and dry        Capillary Refill: Capillary refill takes less than 2 seconds  Neurological:      General: No focal deficit present  Mental Status: She is alert and oriented to person, place, and time  Psychiatric:         Mood and Affect: Mood normal          Behavior: Behavior normal          Preliminary reading CXR  No acute process seen  Waiting on rad read    Ambulatory room air pulse ox 82%  2 liters oxygen applied went to 93% - tachypnea decreases with o2    Pt has been informed of preliminary reading CXR and concern regarding hypoxia and need for higher level of care and eval and transfer to ED via EMS  She has refused  I have discussed with pt in great detail of risks:  Respiratory distress, arrest, failure  Cardiac failure, arrest, death  Pt verbalizes understanding of this and signs AMA  Prescribed azithromycin and prednisone    Pt was given strict instructions for PCP follow up and ED if needed

## 2023-01-19 NOTE — PATIENT INSTRUCTIONS
You have refused to go to the ED for evaluation - higher level of care due to hypoxia  Your oxygen saturation on room air with ambulation is 82%  You have signed a form due to refusal    You are to stop smoking  You are to follow up with your PCP     You are to discuss a pulmonologist referral with your PCP   Go to the ED if symptoms worsen - you have refused to go to today

## 2023-01-24 DIAGNOSIS — G89.29 CHRONIC BILATERAL LOW BACK PAIN WITHOUT SCIATICA: ICD-10-CM

## 2023-01-24 DIAGNOSIS — M54.50 CHRONIC BILATERAL LOW BACK PAIN WITHOUT SCIATICA: ICD-10-CM

## 2023-01-24 RX ORDER — METHOCARBAMOL 750 MG/1
750 TABLET, FILM COATED ORAL EVERY 8 HOURS SCHEDULED
Qty: 90 TABLET | Refills: 0 | Status: SHIPPED | OUTPATIENT
Start: 2023-01-24

## 2023-02-15 DIAGNOSIS — I10 ESSENTIAL HYPERTENSION: ICD-10-CM

## 2023-02-15 RX ORDER — LISINOPRIL 2.5 MG/1
2.5 TABLET ORAL DAILY
Qty: 90 TABLET | Refills: 1 | Status: SHIPPED | OUTPATIENT
Start: 2023-02-15

## 2023-02-27 ENCOUNTER — TELEPHONE (OUTPATIENT)
Dept: ADMINISTRATIVE | Facility: OTHER | Age: 73
End: 2023-02-27

## 2023-02-27 ENCOUNTER — OFFICE VISIT (OUTPATIENT)
Dept: INTERNAL MEDICINE CLINIC | Facility: CLINIC | Age: 73
End: 2023-02-27

## 2023-02-27 VITALS
OXYGEN SATURATION: 95 % | DIASTOLIC BLOOD PRESSURE: 76 MMHG | WEIGHT: 170.38 LBS | HEART RATE: 98 BPM | BODY MASS INDEX: 28.39 KG/M2 | TEMPERATURE: 97.6 F | SYSTOLIC BLOOD PRESSURE: 132 MMHG | HEIGHT: 65 IN

## 2023-02-27 DIAGNOSIS — R13.19 ESOPHAGEAL DYSPHAGIA: ICD-10-CM

## 2023-02-27 DIAGNOSIS — F11.20 CONTINUOUS OPIOID DEPENDENCE (HCC): ICD-10-CM

## 2023-02-27 DIAGNOSIS — E78.5 HYPERLIPIDEMIA, UNSPECIFIED HYPERLIPIDEMIA TYPE: ICD-10-CM

## 2023-02-27 DIAGNOSIS — G89.29 CHRONIC BILATERAL LOW BACK PAIN WITHOUT SCIATICA: ICD-10-CM

## 2023-02-27 DIAGNOSIS — E11.8 TYPE 2 DIABETES MELLITUS WITH COMPLICATION, WITHOUT LONG-TERM CURRENT USE OF INSULIN (HCC): Primary | ICD-10-CM

## 2023-02-27 DIAGNOSIS — E08.42 DIABETES MELLITUS DUE TO UNDERLYING CONDITION WITH DIABETIC POLYNEUROPATHY, UNSPECIFIED WHETHER LONG TERM INSULIN USE (HCC): ICD-10-CM

## 2023-02-27 DIAGNOSIS — F33.41 RECURRENT MAJOR DEPRESSIVE DISORDER, IN PARTIAL REMISSION (HCC): ICD-10-CM

## 2023-02-27 DIAGNOSIS — J44.9 CHRONIC OBSTRUCTIVE PULMONARY DISEASE, UNSPECIFIED COPD TYPE (HCC): ICD-10-CM

## 2023-02-27 DIAGNOSIS — F41.9 ANXIETY: ICD-10-CM

## 2023-02-27 DIAGNOSIS — M54.50 CHRONIC BILATERAL LOW BACK PAIN WITHOUT SCIATICA: ICD-10-CM

## 2023-02-27 DIAGNOSIS — N18.30 STAGE 3 CHRONIC KIDNEY DISEASE, UNSPECIFIED WHETHER STAGE 3A OR 3B CKD (HCC): ICD-10-CM

## 2023-02-27 DIAGNOSIS — R68.89 COLD INTOLERANCE: ICD-10-CM

## 2023-02-27 PROBLEM — J06.9 UPPER RESPIRATORY TRACT INFECTION: Status: RESOLVED | Noted: 2022-10-25 | Resolved: 2023-02-27

## 2023-02-27 LAB — SL AMB POCT HEMOGLOBIN AIC: 6 (ref ?–6.5)

## 2023-02-27 RX ORDER — ESCITALOPRAM OXALATE 20 MG/1
20 TABLET ORAL DAILY
Qty: 90 TABLET | Refills: 1 | Status: SHIPPED | OUTPATIENT
Start: 2023-02-27 | End: 2023-08-26

## 2023-02-27 RX ORDER — ATORVASTATIN CALCIUM 20 MG/1
20 TABLET, FILM COATED ORAL DAILY
Qty: 90 TABLET | Refills: 1 | Status: SHIPPED | OUTPATIENT
Start: 2023-02-27

## 2023-02-27 RX ORDER — PANTOPRAZOLE SODIUM 40 MG/1
40 TABLET, DELAYED RELEASE ORAL DAILY
Qty: 90 TABLET | Refills: 1 | Status: SHIPPED | OUTPATIENT
Start: 2023-02-27

## 2023-02-27 NOTE — PROGRESS NOTES
Name: Karol Morales      : 1950      MRN: [id number]  Encounter Provider: Tahira Gonzales PA-C  Encounter Date: 2023   Encounter department: [address] [place] [address]  Type 2 diabetes mellitus with complication, without long-term current use of insulin [facility])  Assessment & Plan:    Lab Results   Component Value Date    HGBA1C 6 0 2023     A1C 6 0 on 23  On ACE and statin  Eye exam scheduled for or exam up to date    Orders:  -     POCT hemoglobin A1c    2  Esophageal dysphagia  -     pantoprazole (PROTONIX) 40 mg tablet; Take 1 tablet (40 mg total) by mouth daily    3  Hyperlipidemia, unspecified hyperlipidemia type  -     atorvastatin (LIPITOR) 20 mg tablet; Take 1 tablet (20 mg total) by mouth daily    4  Chronic obstructive pulmonary disease, unspecified COPD type ([facility] [address] )    5  Continuous opioid dependence ([facility] [address] )    6  Recurrent major depressive disorder, in partial remission (HCC)    7  Stage 3 chronic kidney disease, unspecified whether stage 3a or 3b CKD ([facility] [address] )    8  Anxiety  Assessment & Plan:  Continue lexapro but increase to 20mg to further combat symptoms    Orders:  -     escitalopram (LEXAPRO) 20 mg tablet; Take 1 tablet (20 mg total) by mouth daily    9  Cold intolerance  -     TSH, 3rd generation with Free T4 reflex; Future  -     Iron Panel (Includes Ferritin, Iron Sat%, Iron, and TIBC); Future    10  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, unspecified whether long term insulin use (HCC)  -     Iron Panel (Includes Ferritin, Iron Sat%, Iron, and TIBC); Future    11  Chronic bilateral low back pain without sciatica  Assessment & Plan:  Continue robaxin  Pursue medical marijuana  Directions for use and possible side effects discussed and patient verbalized understanding of these  BMI Counseling: Body mass index is 28 35 kg/m²   The BMI is above normal  Nutrition recommendations include decreasing portion sizes, consuming healthier snacks and limiting drinks that contain sugar  Rationale for BMI follow-up plan is due to patient being overweight or obese  Subjective      Pt presents for routine visit  She is up to date with labs, dexa scan, AWV, foot exam and CRC screening  A1C remains well controlled at 6 0  She is due for mammogram  Eye exam is scheduled for march  She is noting ongoing chronic back pain and will be trying medical marijuana  She also notes increased anxiety and would be agreeable to increasing her lexapro  Review of Systems   Constitutional: Negative for chills and fever  HENT: Negative for congestion, ear pain, hearing loss, postnasal drip, rhinorrhea, sinus pressure, sinus pain, sore throat and trouble swallowing  Eyes: Negative for pain and visual disturbance  Respiratory: Negative for cough, chest tightness, shortness of breath and wheezing  Cardiovascular: Negative  Negative for chest pain, palpitations and leg swelling  Gastrointestinal: Negative for abdominal pain, blood in stool, constipation, diarrhea, nausea and vomiting  Endocrine: Negative for cold intolerance, heat intolerance, polydipsia, polyphagia and polyuria  Genitourinary: Negative for difficulty urinating, dysuria, flank pain and urgency  Musculoskeletal: Negative for arthralgias, back pain, gait problem and myalgias  Skin: Negative for rash  Allergic/Immunologic: Negative  Neurological: Negative for dizziness, weakness, light-headedness and headaches  Hematological: Negative  Psychiatric/Behavioral: Negative for behavioral problems, dysphoric mood and sleep disturbance  The patient is not nervous/anxious          Current Outpatient Medications on File Prior to Visit   Medication Sig   • albuterol (2 5 mg/3 mL) 0 083 % nebulizer solution Take 1 vial (2 5 mg total) by nebulization every 4 (four) hours as needed for shortness of breath Disp 1 box   • aspirin (ECOTRIN LOW STRENGTH) 81 mg EC tablet Take 81 mg by mouth daily   • JOELLEN MICROLET LANCETS lancets Use as instructed BID E11 65   • Calcium 500 MG tablet Take 1 tablet by mouth every other day   • cholecalciferol (VITAMIN D3) 1,000 units tablet Take 3 tablets by mouth every other day    • denosumab (PROLIA) 60 mg/mL Inject under the skin every 6 (six) months   • Ferrous Sulfate (IRON) 325 (65 Fe) MG TABS Take 1 tablet (325 mg total) by mouth daily   • furosemide (LASIX) 40 mg tablet Take 1 tablet (40 mg total) by mouth daily (Patient taking differently: Take 40 mg by mouth daily PRN)   • Glucose Blood (ONETOUCH ULTRA BLUE VI) Test 3 (three) times a day     • glucose blood (OneTouch Ultra) test strip Once daily   • levothyroxine (Euthyrox) 25 mcg tablet Take 1 tablet (25 mcg total) by mouth daily in the early morning   • lisinopril (ZESTRIL) 2 5 mg tablet Take 1 tablet (2 5 mg total) by mouth daily   • metFORMIN (GLUCOPHAGE) 1000 MG tablet Take 1 tablet (1,000 mg total) by mouth 2 (two) times a day with meals   • methocarbamol (ROBAXIN) 750 mg tablet Take 1 tablet (750 mg total) by mouth every 8 (eight) hours   • potassium chloride (MICRO-K) 10 MEQ CR capsule Take 10 mEq by mouth 2 (two) times a day PRN w/lasix    • VITAMIN B COMPLEX-C PO Take 1 tablet by mouth daily   • [DISCONTINUED] atorvastatin (LIPITOR) 20 mg tablet Take 1 tablet (20 mg total) by mouth daily   • [DISCONTINUED] pantoprazole (PROTONIX) 40 mg tablet Take 1 tablet (40 mg total) by mouth daily   • amitriptyline (ELAVIL) 50 mg tablet Take 1 tablet (50 mg total) by mouth daily at bedtime (Patient not taking: Reported on 1/19/2023)   • [DISCONTINUED] betamethasone dipropionate (DIPROSONE) 0 05 % cream betamethasone dipropionate 0 05 % topical cream (Patient not taking: No sig reported)   • [DISCONTINUED] ergocalciferol (VITAMIN D2) 50,000 units Take 1 capsule (50,000 Units total) by mouth once a week (Patient not taking: Reported on 2/27/2023)   • [DISCONTINUED] escitalopram (LEXAPRO) 10 mg tablet Take 1 tablet (10 mg total) by mouth daily   • [DISCONTINUED] ketoconazole (NIZORAL) 2 % cream PRN  (Patient not taking: Reported on 2/27/2023)   • [DISCONTINUED] ketorolac (ACULAR) 0 5 % ophthalmic solution INSTILL 1 DROP 4 TIMES DAILY INTO RIGHT EYE FOR 28 DAYS AFTER SURGERY (Patient not taking: Reported on 10/25/2022)   • [DISCONTINUED] Prolensa 0 07 % SOLN  (Patient not taking: Reported on 2/11/2022)   • [DISCONTINUED] traMADol (ULTRAM) 50 mg tablet Take 50 mg by mouth 3 (three) times a day as needed   (Patient not taking: Reported on 10/25/2022)       Objective     /76 (BP Location: Left arm, Patient Position: Sitting)   Pulse 98   Temp 97 6 °F (36 4 °C)   Ht 5' 5" (1 651 m)   Wt 77 3 kg (170 lb 6 oz)   SpO2 95%   BMI 28 35 kg/m²     Physical Exam  Constitutional:       General: She is not in acute distress  Appearance: She is well-developed  She is not diaphoretic  HENT:      Head: Normocephalic and atraumatic  Right Ear: External ear normal       Left Ear: External ear normal       Nose: Nose normal       Mouth/Throat:      Pharynx: No oropharyngeal exudate  Eyes:      General: No scleral icterus  Right eye: No discharge  Left eye: No discharge  Conjunctiva/sclera: Conjunctivae normal       Pupils: Pupils are equal, round, and reactive to light  Neck:      Thyroid: No thyromegaly  Cardiovascular:      Rate and Rhythm: Normal rate and regular rhythm  Heart sounds: Normal heart sounds  No murmur heard  No friction rub  No gallop  Pulmonary:      Effort: Pulmonary effort is normal  No respiratory distress  Breath sounds: Normal breath sounds  No wheezing or rales  Abdominal:      General: Bowel sounds are normal  There is no distension  Palpations: Abdomen is soft  Tenderness: There is no abdominal tenderness  Musculoskeletal:         General: No tenderness or deformity  Normal range of motion        Cervical back: Normal range of motion and neck supple  Skin:     General: Skin is warm and dry  Neurological:      Mental Status: She is alert and oriented to person, place, and time  Cranial Nerves: No cranial nerve deficit  Psychiatric:         Behavior: Behavior normal          Thought Content:  Thought content normal          Judgment: Judgment normal        Leann Mejía PA-C

## 2023-02-27 NOTE — ASSESSMENT & PLAN NOTE
Lab Results   Component Value Date    HGBA1C 6 0 02/27/2023     · A1C 6 0 on 2/27/23  · On ACE and statin  · Eye exam scheduled for march  · Foor exam up to date

## 2023-02-27 NOTE — TELEPHONE ENCOUNTER
----- Message from Josh Guadalupe sent at 2/27/2023  1:17 PM EST -----  Regarding: DXA  02/27/23 1:17 PM    Hello, our patient Breana Roland has had DEXA Scan completed/performed  Please assist in updating the patient chart by pulling the Care Everywhere (CE) document  The date of service is 12/13/22       Thank you,  Josh Guadalupe  [facility], located within [facility]

## 2023-02-27 NOTE — ASSESSMENT & PLAN NOTE
Continue robaxin  Pursue medical marijuana  Directions for use and possible side effects discussed and patient verbalized understanding of these

## 2023-02-27 NOTE — TELEPHONE ENCOUNTER
Upon review of the In Basket request we were able to locate, review, and update the patient chart as requested for DEXA Scan  Any additional questions or concerns should be emailed to the Practice Liaisons via the appropriate education email address, please do not reply via In Basket      Thank you  Otis Gonzalez

## 2023-03-17 DIAGNOSIS — G89.29 CHRONIC BILATERAL LOW BACK PAIN WITHOUT SCIATICA: ICD-10-CM

## 2023-03-17 DIAGNOSIS — M54.50 CHRONIC BILATERAL LOW BACK PAIN WITHOUT SCIATICA: ICD-10-CM

## 2023-03-17 DIAGNOSIS — R60.9 PERIPHERAL EDEMA: ICD-10-CM

## 2023-03-17 RX ORDER — METHOCARBAMOL 750 MG/1
750 TABLET, FILM COATED ORAL EVERY 8 HOURS SCHEDULED
Qty: 90 TABLET | Refills: 0 | Status: SHIPPED | OUTPATIENT
Start: 2023-03-17

## 2023-03-17 RX ORDER — FUROSEMIDE 40 MG/1
40 TABLET ORAL DAILY
Qty: 30 TABLET | Refills: 2 | Status: SHIPPED | OUTPATIENT
Start: 2023-03-17

## 2023-05-12 ENCOUNTER — OFFICE VISIT (OUTPATIENT)
Dept: LAB | Facility: HOSPITAL | Age: 73
End: 2023-05-12

## 2023-05-12 ENCOUNTER — APPOINTMENT (OUTPATIENT)
Dept: LAB | Facility: HOSPITAL | Age: 73
End: 2023-05-12

## 2023-05-12 DIAGNOSIS — Z01.818 OTHER SPECIFIED PRE-OPERATIVE EXAMINATION: ICD-10-CM

## 2023-05-12 DIAGNOSIS — Z01.818 PREOP TESTING: ICD-10-CM

## 2023-05-12 DIAGNOSIS — R68.89 COLD INTOLERANCE: ICD-10-CM

## 2023-05-12 DIAGNOSIS — M54.50 CHRONIC BILATERAL LOW BACK PAIN WITHOUT SCIATICA: ICD-10-CM

## 2023-05-12 DIAGNOSIS — E08.42 DIABETES MELLITUS DUE TO UNDERLYING CONDITION WITH DIABETIC POLYNEUROPATHY, UNSPECIFIED WHETHER LONG TERM INSULIN USE (HCC): ICD-10-CM

## 2023-05-12 DIAGNOSIS — G89.29 CHRONIC BILATERAL LOW BACK PAIN WITHOUT SCIATICA: ICD-10-CM

## 2023-05-12 LAB
ALBUMIN SERPL BCP-MCNC: 4.4 G/DL (ref 3.5–5)
ALP SERPL-CCNC: 56 U/L (ref 34–104)
ALT SERPL W P-5'-P-CCNC: 12 U/L (ref 7–52)
ANION GAP SERPL CALCULATED.3IONS-SCNC: 8 MMOL/L (ref 4–13)
AST SERPL W P-5'-P-CCNC: 14 U/L (ref 13–39)
BASOPHILS # BLD AUTO: 0.09 THOUSANDS/ÂΜL (ref 0–0.1)
BASOPHILS NFR BLD AUTO: 1 % (ref 0–1)
BILIRUB SERPL-MCNC: 1.52 MG/DL (ref 0.2–1)
BUN SERPL-MCNC: 12 MG/DL (ref 5–25)
CALCIUM SERPL-MCNC: 8.5 MG/DL (ref 8.4–10.2)
CHLORIDE SERPL-SCNC: 102 MMOL/L (ref 96–108)
CO2 SERPL-SCNC: 27 MMOL/L (ref 21–32)
CREAT SERPL-MCNC: 0.68 MG/DL (ref 0.6–1.3)
EOSINOPHIL # BLD AUTO: 0.2 THOUSAND/ÂΜL (ref 0–0.61)
EOSINOPHIL NFR BLD AUTO: 2 % (ref 0–6)
ERYTHROCYTE [DISTWIDTH] IN BLOOD BY AUTOMATED COUNT: 12.1 % (ref 11.6–15.1)
FERRITIN SERPL-MCNC: 23 NG/ML (ref 8–388)
GFR SERPL CREATININE-BSD FRML MDRD: 87 ML/MIN/1.73SQ M
GLUCOSE P FAST SERPL-MCNC: 112 MG/DL (ref 65–99)
HCT VFR BLD AUTO: 47.4 % (ref 34.8–46.1)
HGB BLD-MCNC: 16 G/DL (ref 11.5–15.4)
IMM GRANULOCYTES # BLD AUTO: 0.02 THOUSAND/UL (ref 0–0.2)
IMM GRANULOCYTES NFR BLD AUTO: 0 % (ref 0–2)
IRON SATN MFR SERPL: 28 % (ref 15–50)
IRON SERPL-MCNC: 126 UG/DL (ref 50–170)
LYMPHOCYTES # BLD AUTO: 3.1 THOUSANDS/ÂΜL (ref 0.6–4.47)
LYMPHOCYTES NFR BLD AUTO: 37 % (ref 14–44)
MCH RBC QN AUTO: 33.3 PG (ref 26.8–34.3)
MCHC RBC AUTO-ENTMCNC: 33.8 G/DL (ref 31.4–37.4)
MCV RBC AUTO: 99 FL (ref 82–98)
MONOCYTES # BLD AUTO: 0.67 THOUSAND/ÂΜL (ref 0.17–1.22)
MONOCYTES NFR BLD AUTO: 8 % (ref 4–12)
NEUTROPHILS # BLD AUTO: 4.29 THOUSANDS/ÂΜL (ref 1.85–7.62)
NEUTS SEG NFR BLD AUTO: 52 % (ref 43–75)
NRBC BLD AUTO-RTO: 0 /100 WBCS
PLATELET # BLD AUTO: 242 THOUSANDS/UL (ref 149–390)
PMV BLD AUTO: 10 FL (ref 8.9–12.7)
POTASSIUM SERPL-SCNC: 3.9 MMOL/L (ref 3.5–5.3)
PROT SERPL-MCNC: 6.8 G/DL (ref 6.4–8.4)
RBC # BLD AUTO: 4.81 MILLION/UL (ref 3.81–5.12)
SODIUM SERPL-SCNC: 137 MMOL/L (ref 135–147)
TIBC SERPL-MCNC: 453 UG/DL (ref 250–450)
TSH SERPL DL<=0.05 MIU/L-ACNC: 1.71 UIU/ML (ref 0.45–4.5)
WBC # BLD AUTO: 8.37 THOUSAND/UL (ref 4.31–10.16)

## 2023-05-12 RX ORDER — METHOCARBAMOL 750 MG/1
750 TABLET, FILM COATED ORAL EVERY 8 HOURS SCHEDULED
Qty: 90 TABLET | Refills: 0 | Status: SHIPPED | OUTPATIENT
Start: 2023-05-12

## 2023-05-13 LAB
ATRIAL RATE: 66 BPM
P AXIS: 62 DEGREES
PR INTERVAL: 136 MS
QRS AXIS: -73 DEGREES
QRSD INTERVAL: 120 MS
QT INTERVAL: 450 MS
QTC INTERVAL: 471 MS
T WAVE AXIS: 37 DEGREES
VENTRICULAR RATE: 66 BPM

## 2023-05-15 ENCOUNTER — CONSULT (OUTPATIENT)
Dept: INTERNAL MEDICINE CLINIC | Facility: CLINIC | Age: 73
End: 2023-05-15

## 2023-05-15 VITALS
HEART RATE: 104 BPM | OXYGEN SATURATION: 94 % | SYSTOLIC BLOOD PRESSURE: 130 MMHG | BODY MASS INDEX: 26.79 KG/M2 | HEIGHT: 65 IN | DIASTOLIC BLOOD PRESSURE: 68 MMHG | WEIGHT: 160.8 LBS | TEMPERATURE: 97.6 F

## 2023-05-15 DIAGNOSIS — I10 ESSENTIAL HYPERTENSION: ICD-10-CM

## 2023-05-15 DIAGNOSIS — E11.8 TYPE 2 DIABETES MELLITUS WITH COMPLICATION, WITHOUT LONG-TERM CURRENT USE OF INSULIN (HCC): ICD-10-CM

## 2023-05-15 DIAGNOSIS — Z01.818 PRE-OP EXAMINATION: Primary | ICD-10-CM

## 2023-05-15 RX ORDER — LISINOPRIL 2.5 MG/1
2.5 TABLET ORAL DAILY
Qty: 90 TABLET | Refills: 1 | Status: SHIPPED | OUTPATIENT
Start: 2023-05-15

## 2023-05-15 NOTE — PROGRESS NOTES
Subjective:     Clair Barney is a 68 y o  female who presents to the office today for a preoperative consultation at the request of surgeon Dr Kendra Mendoza who plans on performing thoracic hemilaminectomy and posterior fusion T10-T11 on May 17  Planned anesthesia: general  The patient has the following known anesthesia issues: none  Patients bleeding risk: no recent abnormal bleeding  Patient does not have objections to receiving blood products if needed  The following portions of the patient's history were reviewed and updated as appropriate:   She  has a past medical history of Anxiety, Cellulitis, Chronic obstructive pulmonary disease ([facility] ) (3/11/2019), Depression, Diabetes mellitus ([place] [address] ), Dysuria, Esophageal reflux, Fatigue, Fracture, Heart murmur, Hypertension, Pneumonia, Restless legs syndrome (RLS), Stage 3 chronic kidney disease, unspecified whether stage 3a or 3b CKD ([place] [address] ) (7/22/2022), and Vitamin D deficiency    She   Patient Active Problem List    Diagnosis Date Noted   • Stage 3 chronic kidney disease, unspecified whether stage 3a or 3b CKD ([place] [address] ) 07/22/2022   • Continuous opioid dependence ([place] [address] ) 03/16/2022   • Overweight with body mass index (BMI) of 28 to 28 9 in adult 05/10/2021   • Peripheral edema 09/21/2020   • Lumbosacral radiculitis 06/10/2020   • Chronic pain disorder 05/26/2020   • Lumbar post-laminectomy syndrome 05/26/2020   • Recurrent major depressive disorder, in partial remission ([place] [address] ) 03/11/2019   • Chronic obstructive pulmonary disease ([place] [address] ) 03/11/2019   • Esophageal dysphagia 11/26/2018   • Heart murmur 09/12/2018   • Type 2 diabetes mellitus with complication, without long-term current use of insulin ([place] [address] ) 04/06/2018   • Chronic lower back pain 09/29/2016   • Anxiety 05/21/2016   • Vitamin D deficiency 03/09/2016   • HTN (hypertension) 12/14/2015   • Hypercholesteremia 12/14/2015   • Restless legs syndrome (RLS) 12/14/2015     She  has a past surgical history that includes Back surgery; Cholecystectomy (1975); Foot surgery (Right, 2014); Hip surgery (2013); Hysterectomy; Ovarian cyst surgery (1971); Shoulder surgery (Right, 12/14/2015); Other surgical history (2011); Tonsillectomy; pr colonoscopy flx dx w/collj spec when pfrmd (N/A, 7/10/2018); Appendectomy; pr esophagogastroduodenoscopy transoral diagnostic (N/A, 1/28/2019); Skin biopsy; and Insertion / placement / revision neurostimulator  Her family history includes Calcium disorder in her other and other; Cancer in her father; Depression in her family; Diabetes type II in her father and other; Heart disease in her father; Hypertension in her mother and other; Mental illness in her mother and other; Migraines in her other; Other in her other; Pulmonary embolism in her other; Stroke in her mother; Substance Abuse in her brother and family; Tuberculosis in her other  She  reports that she has been smoking cigarettes  She has been smoking an average of  5 packs per day  She has never used smokeless tobacco  She reports current alcohol use  She reports that she does not use drugs    Current Outpatient Medications   Medication Sig Dispense Refill   • albuterol (2 5 mg/3 mL) 0 083 % nebulizer solution Take 1 vial (2 5 mg total) by nebulization every 4 (four) hours as needed for shortness of breath Disp 1 box 270 vial 1   • aspirin (ECOTRIN LOW STRENGTH) 81 mg EC tablet Take 81 mg by mouth daily     • atorvastatin (LIPITOR) 20 mg tablet Take 1 tablet (20 mg total) by mouth daily 90 tablet 1   • JOELLEN MICROLET LANCETS lancets Use as instructed BID E11 65 100 each 5   • Calcium 500 MG tablet Take 1 tablet by mouth every other day     • cholecalciferol (VITAMIN D3) 1,000 units tablet Take 3 tablets by mouth every other day      • denosumab (PROLIA) 60 mg/mL Inject under the skin every 6 (six) months     • escitalopram (LEXAPRO) 20 mg tablet Take 1 tablet (20 mg total) by mouth daily 90 tablet 1   • Ferrous Sulfate (IRON) 325 (65 Fe) MG TABS Take 1 tablet (325 mg total) by mouth daily 30 each 3   • furosemide (LASIX) 40 mg tablet Take 1 tablet (40 mg total) by mouth daily PRN 30 tablet 2   • Glucose Blood (ONETOUCH ULTRA BLUE VI) Test 3 (three) times a day       • glucose blood (OneTouch Ultra) test strip Once daily 100 strip 5   • levothyroxine (Euthyrox) 25 mcg tablet Take 1 tablet (25 mcg total) by mouth daily in the early morning 90 tablet 3   • lisinopril (ZESTRIL) 2 5 mg tablet Take 1 tablet (2 5 mg total) by mouth daily 90 tablet 1   • metFORMIN (GLUCOPHAGE) 1000 MG tablet Take 1 tablet (1,000 mg total) by mouth 2 (two) times a day with meals 180 tablet 1   • methocarbamol (ROBAXIN) 750 mg tablet Take 1 tablet (750 mg total) by mouth every 8 (eight) hours 90 tablet 0   • pantoprazole (PROTONIX) 40 mg tablet Take 1 tablet (40 mg total) by mouth daily 90 tablet 1   • potassium chloride (MICRO-K) 10 MEQ CR capsule Take 10 mEq by mouth 2 (two) times a day PRN w/lasix      • VITAMIN B COMPLEX-C PO Take 1 tablet by mouth daily       No current facility-administered medications for this visit       Current Outpatient Medications on File Prior to Visit   Medication Sig   • albuterol (2 5 mg/3 mL) 0 083 % nebulizer solution Take 1 vial (2 5 mg total) by nebulization every 4 (four) hours as needed for shortness of breath Disp 1 box   • aspirin (ECOTRIN LOW STRENGTH) 81 mg EC tablet Take 81 mg by mouth daily   • atorvastatin (LIPITOR) 20 mg tablet Take 1 tablet (20 mg total) by mouth daily   • JOELLEN MICROLET LANCETS lancets Use as instructed BID E11 65   • Calcium 500 MG tablet Take 1 tablet by mouth every other day   • cholecalciferol (VITAMIN D3) 1,000 units tablet Take 3 tablets by mouth every other day    • denosumab (PROLIA) 60 mg/mL Inject under the skin every 6 (six) months   • escitalopram (LEXAPRO) 20 mg tablet Take 1 tablet (20 mg total) by mouth daily   • Ferrous Sulfate (IRON) 325 (65 Fe) MG TABS Take 1 tablet (325 mg total) by mouth daily   • "furosemide (LASIX) 40 mg tablet Take 1 tablet (40 mg total) by mouth daily PRN   • Glucose Blood (ONETOUCH ULTRA BLUE VI) Test 3 (three) times a day     • glucose blood (OneTouch Ultra) test strip Once daily   • levothyroxine (Euthyrox) 25 mcg tablet Take 1 tablet (25 mcg total) by mouth daily in the early morning   • methocarbamol (ROBAXIN) 750 mg tablet Take 1 tablet (750 mg total) by mouth every 8 (eight) hours   • pantoprazole (PROTONIX) 40 mg tablet Take 1 tablet (40 mg total) by mouth daily   • potassium chloride (MICRO-K) 10 MEQ CR capsule Take 10 mEq by mouth 2 (two) times a day PRN w/lasix    • VITAMIN B COMPLEX-C PO Take 1 tablet by mouth daily   • [DISCONTINUED] lisinopril (ZESTRIL) 2 5 mg tablet Take 1 tablet (2 5 mg total) by mouth daily   • [DISCONTINUED] metFORMIN (GLUCOPHAGE) 1000 MG tablet Take 1 tablet (1,000 mg total) by mouth 2 (two) times a day with meals   • [DISCONTINUED] amitriptyline (ELAVIL) 50 mg tablet Take 1 tablet (50 mg total) by mouth daily at bedtime (Patient not taking: Reported on 1/19/2023)     No current facility-administered medications on file prior to visit          Review of Systems  Constitutional: negative  Ears, nose, mouth, throat, and face: negative  Respiratory: negative  Cardiovascular: negative  Integument/breast: negative  Musculoskeletal:negative  Behavioral/Psych: negative     Objective:     /68 (BP Location: Left arm, Patient Position: Sitting, Cuff Size: Standard)   Pulse 104   Temp 97 6 °F (36 4 °C)   Ht 5' 5\" (1 651 m)   Wt 72 9 kg (160 lb 12 8 oz)   SpO2 94%   BMI 26 76 kg/m²     General Appearance:    Alert, cooperative, no distress, appears stated age   Head:    Normocephalic, without obvious abnormality, atraumatic   Eyes:    PERRL, conjunctiva/corneas clear, EOM's intact, fundi     benign, both eyes   Ears:    Normal TM's and external ear canals, both ears   Nose:   Nares normal, septum midline, mucosa normal, no drainage    or sinus tenderness " Throat:   Lips, mucosa, and tongue normal; teeth and gums normal   Neck:   Supple, symmetrical, trachea midline, no adenopathy;     thyroid:  no enlargement/tenderness/nodules; no carotid    bruit or JVD   Back:     Symmetric, no curvature, ROM normal, no CVA tenderness   Lungs:     Clear to auscultation bilaterally, respirations unlabored   Chest Wall:    No tenderness or deformity    Heart:    Regular rate and rhythm, S1 and S2 normal, no murmur, rub   or gallop   Breast Exam:    No tenderness, masses, or nipple abnormality   Abdomen:     Soft, non-tender, bowel sounds active all four quadrants,     no masses, no organomegaly   Genitalia:    Normal female without lesion, discharge or tenderness   Rectal:    Normal tone, no masses or tenderness; guaiac negative stool   Extremities:   Extremities normal, atraumatic, no cyanosis or edema   Pulses:   2+ and symmetric all extremities   Skin:   Skin color, texture, turgor normal, no rashes or lesions   Lymph nodes:   Cervical, supraclavicular, and axillary nodes normal   Neurologic:   CNII-XII intact, normal strength, sensation and reflexes     throughout         Cardiographics  ECG: RBBB      Imaging  Chest x-ray: not performed     Lab Review   Appointment on 05/12/2023   Component Date Value   • TSH 3RD GENERATON 05/12/2023 1 707    • Sodium 05/12/2023 137    • Potassium 05/12/2023 3 9    • Chloride 05/12/2023 102    • CO2 05/12/2023 27    • ANION GAP 05/12/2023 8    • BUN 05/12/2023 12    • Creatinine 05/12/2023 0 68    • Glucose, Fasting 05/12/2023 112 (H)    • Calcium 05/12/2023 8 5    • AST 05/12/2023 14    • ALT 05/12/2023 12    • Alkaline Phosphatase 05/12/2023 56    • Total Protein 05/12/2023 6 8    • Albumin 05/12/2023 4 4    • Total Bilirubin 05/12/2023 1 52 (H)    • eGFR 05/12/2023 87    • WBC 05/12/2023 8 37    • RBC 05/12/2023 4 81    • Hemoglobin 05/12/2023 16 0 (H)    • Hematocrit 05/12/2023 47 4 (H)    • MCV 05/12/2023 99 (H)    • MCH 05/12/2023 33 3 • MCHC 05/12/2023 33 8    • RDW 05/12/2023 12 1    • MPV 05/12/2023 10 0    • Platelets 01/09/9272 242    • nRBC 05/12/2023 0    • Neutrophils Relative 05/12/2023 52    • Immat GRANS % 05/12/2023 0    • Lymphocytes Relative 05/12/2023 37    • Monocytes Relative 05/12/2023 8    • Eosinophils Relative 05/12/2023 2    • Basophils Relative 05/12/2023 1    • Neutrophils Absolute 05/12/2023 4 29    • Immature Grans Absolute 05/12/2023 0 02    • Lymphocytes Absolute 05/12/2023 3 10    • Monocytes Absolute 05/12/2023 0 67    • Eosinophils Absolute 05/12/2023 0 20    • Basophils Absolute 05/12/2023 0 09    • Iron Saturation 05/12/2023 28    • TIBC 05/12/2023 453 (H)    • Iron 05/12/2023 126    • Ferritin 05/12/2023 23    Office Visit on 05/12/2023   Component Date Value   • Ventricular Rate 05/12/2023 66    • Atrial Rate 05/12/2023 66    • NE Interval 05/12/2023 136    • QRSD Interval 05/12/2023 120    • QT Interval 05/12/2023 450    • QTC Interval 05/12/2023 471    • P Axis 05/12/2023 62    • QRS Axis 05/12/2023 -73    • T Wave Axis 05/12/2023 37         Assessment:     68 y o  female with planned surgery as above  Known risk factors for perioperative complications: Diabetes mellitus    Difficulty with intubation is not anticipated  Cardiac Risk Estimation: low    Current medications which may produce withdrawal symptoms if withheld perioperatively: none      Plan:     1  Preoperative workup as follows ECG, hemoglobin, hematocrit, electrolytes, creatinine  2  Change in medication regimen before surgery: discontinue ASA 14 days before surgery    3  Enriquez risk Index Score: 5 giving pt a less than 1% chance of perioperative cardiac complications

## 2023-05-18 ENCOUNTER — TRANSITIONAL CARE MANAGEMENT (OUTPATIENT)
Dept: INTERNAL MEDICINE CLINIC | Facility: CLINIC | Age: 73
End: 2023-05-18

## 2023-06-19 ENCOUNTER — APPOINTMENT (EMERGENCY)
Dept: RADIOLOGY | Facility: HOSPITAL | Age: 73
End: 2023-06-19
Payer: MEDICARE

## 2023-06-19 ENCOUNTER — HOSPITAL ENCOUNTER (EMERGENCY)
Facility: HOSPITAL | Age: 73
Discharge: HOME | End: 2023-06-19
Attending: FAMILY MEDICINE
Payer: MEDICARE

## 2023-06-19 VITALS
OXYGEN SATURATION: 93 % | HEIGHT: 64 IN | HEART RATE: 74 BPM | BODY MASS INDEX: 25.95 KG/M2 | WEIGHT: 152 LBS | SYSTOLIC BLOOD PRESSURE: 168 MMHG | DIASTOLIC BLOOD PRESSURE: 77 MMHG | TEMPERATURE: 97.5 F | RESPIRATION RATE: 17 BRPM

## 2023-06-19 DIAGNOSIS — M25.529 ELBOW PAIN: Primary | ICD-10-CM

## 2023-06-19 DIAGNOSIS — M25.519 PAIN IN SHOULDER: ICD-10-CM

## 2023-06-19 PROCEDURE — 73080 X-RAY EXAM OF ELBOW: CPT

## 2023-06-19 PROCEDURE — 73030 X-RAY EXAM OF SHOULDER: CPT

## 2023-06-19 PROCEDURE — 99283 EMERGENCY DEPT VISIT LOW MDM: CPT

## 2023-06-19 RX ORDER — OXYCODONE HYDROCHLORIDE 5 MG/1
5 TABLET ORAL EVERY 4 HOURS PRN
Qty: 10 TABLET | Refills: 0 | Status: SHIPPED | OUTPATIENT
Start: 2023-06-19 | End: 2023-06-19 | Stop reason: SDUPTHER

## 2023-06-19 RX ORDER — OXYCODONE HYDROCHLORIDE 5 MG/1
5 TABLET ORAL EVERY 6 HOURS PRN
Qty: 10 TABLET | Refills: 0 | Status: SHIPPED | OUTPATIENT
Start: 2023-06-19 | End: 2023-06-29

## 2023-06-19 RX ORDER — OXYCODONE HYDROCHLORIDE 5 MG/1
5 TABLET ORAL ONCE
Status: COMPLETED | OUTPATIENT
Start: 2023-06-19 | End: 2023-06-19

## 2023-06-19 RX ADMIN — OXYCODONE HYDROCHLORIDE 5 MG: 5 TABLET ORAL at 12:36

## 2023-06-19 NOTE — DISCHARGE INSTRUCTIONS
"Please follow up with orthopedics, a referral was placed  Be sure to remove your sling multiple times a day for gentle range of motion to avoid \"frozen shoulder  \"   Take oxycodone for severe pain only  Otherwise take tylenol 650 mg every 8 hours       "

## 2023-06-19 NOTE — ED TRIAGE NOTES
"Via WR w/complaint of right shoulder & elbow pain; states \"I slept wrong Friday & it has been hurting since\"; taking Tylenol w/no relief \"I can't take anything else per my surgeon\"; contacted PCP today who referred pt to ED for evaluation  "

## 2023-06-19 NOTE — ED PROVIDER NOTES
"History  Chief Complaint   Patient presents with   • Shoulder Pain     [address] w/complaint of right shoulder & elbow pain; states \"I slept wrong Friday & it has been hurting since\"; taking Tylenol w/no relief \"I can't take anything else per my surgeon\"; contacted PCP today who referred pt to ED for evaluation   • Elbow Pain     Pt is a 69 yo female pmhx of diabetes, and hypothyroidism arriving for evaluation of shoulder and elbow pain  Patient states that Friday at noon she woke up from a nap with a stiff right arm and when she went to stretch she heard a pop in her right shoulder, and had immediate pain  She said that this pain felt identical to the two other times she tore her bicep and needed surgical repair on both occasions  Patient states called her pcp this morning and they stated that she needed to come to the ED  The pain radiates into her neck  Nothing improves her sxs and moving her right arm exacerbates the pain  Patient states took tylenol this morning prior to arrival without relief  Patient has ability to flex and extend her right elbow  Patient has limited range of motion of right shoulder secondary to pain  Patient is n/v intact  Patient has no obvious deformity  Patient has previous surgical scar  Prior to Admission Medications   Prescriptions Last Dose Informant Patient Reported? Taking?    JOELLEN MICROLET LANCETS lancets  Self No No   Sig: Use as instructed BID E11 65   Calcium 500 MG tablet  Self Yes No   Sig: Take 1 tablet by mouth every other day   Ferrous Sulfate (IRON) 325 (65 Fe) MG TABS   No No   Sig: Take 1 tablet (325 mg total) by mouth daily   Glucose Blood (ONETOUCH ULTRA BLUE VI)  Self Yes No   Sig: Test 3 (three) times a day     VITAMIN B COMPLEX-C PO  Self Yes No   Sig: Take 1 tablet by mouth daily   albuterol (2 5 mg/3 mL) 0 083 % nebulizer solution   No No   Sig: Take 1 vial (2 5 mg total) by nebulization every 4 (four) hours as needed for shortness of breath Disp 1 box " aspirin (ECOTRIN LOW STRENGTH) 81 mg EC tablet  Self Yes No   Sig: Take 81 mg by mouth daily   atorvastatin (LIPITOR) 20 mg tablet   No No   Sig: Take 1 tablet (20 mg total) by mouth daily   cholecalciferol (VITAMIN D3) 1,000 units tablet  Self Yes No   Sig: Take 3 tablets by mouth every other day    denosumab (PROLIA) 60 mg/mL  Self Yes No   Sig: Inject under the skin every 6 (six) months   escitalopram (LEXAPRO) 20 mg tablet   No No   Sig: Take 1 tablet (20 mg total) by mouth daily   furosemide (LASIX) 40 mg tablet   No No   Sig: Take 1 tablet (40 mg total) by mouth daily PRN   glucose blood (OneTouch Ultra) test strip   No No   Sig: Once daily   levothyroxine (Euthyrox) 25 mcg tablet   No No   Sig: Take 1 tablet (25 mcg total) by mouth daily in the early morning   lisinopril (ZESTRIL) 2 5 mg tablet   No No   Sig: Take 1 tablet (2 5 mg total) by mouth daily   metFORMIN (GLUCOPHAGE) 1000 MG tablet   No No   Sig: Take 1 tablet (1,000 mg total) by mouth 2 (two) times a day with meals   methocarbamol (ROBAXIN) 750 mg tablet   No No   Sig: Take 1 tablet (750 mg total) by mouth every 8 (eight) hours   pantoprazole (PROTONIX) 40 mg tablet   No No   Sig: Take 1 tablet (40 mg total) by mouth daily   potassium chloride (MICRO-K) 10 MEQ CR capsule  Self Yes No   Sig: Take 10 mEq by mouth 2 (two) times a day PRN w/lasix       Facility-Administered Medications: None       Past Medical History:   Diagnosis Date   • Anxiety    • Cellulitis     LAST ASSESED 2/12/16; RESOLVED 3/9/16   • Chronic obstructive pulmonary disease ([facility] [address] ) 3/11/2019   • Depression    • Diabetes mellitus ([facility] [address] )    • Dysuria     LAST ASSESSED 3/7/16; RESOLVED 3/9/16   • Esophageal reflux     RESOLVED 1/15/16   • Fatigue     RESOLVED 3/9/16   • Fracture     rt 4 th metatarsal    • Heart murmur    • Hypertension    • Pneumonia    • Restless legs syndrome (RLS)    • Stage 3 chronic kidney disease, unspecified whether stage 3a or 3b CKD ([facility] [address] ) 7/22/2022   • Vitamin D deficiency        Past Surgical History:   Procedure Laterality Date   • APPENDECTOMY     • BACK SURGERY     • CHOLECYSTECTOMY  1975   • FOOT SURGERY Right 2014   • HIP SURGERY  2013   • HYSTERECTOMY     • INSERTION / Jean Joselito / REVISION NEUROSTIMULATOR     • OTHER SURGICAL HISTORY  2011    LAPAROSCOPIC SLING OPERATION FOR STRESS INCONTINENCE    • OVARIAN CYST SURGERY  1971    CYSTECTOMY   • AL COLONOSCOPY FLX DX W/COLLJ SPEC WHEN PFRMD N/A 7/10/2018    Procedure: COLONOSCOPY;  Surgeon: Mark Miner MD;  Location: [facility]; Service: Gastroenterology   • AL ESOPHAGOGASTRODUODENOSCOPY TRANSORAL DIAGNOSTIC N/A 1/28/2019    Procedure: ESOPHAGOGASTRODUODENOSCOPY (EGD); Surgeon: Nitin Small MD;  Location: [facility]; Service: Gastroenterology   • SHOULDER SURGERY Right 12/14/2015    REPAIR OF TORN MUSCLES   • SKIN BIOPSY      chin 03/07/2019   • TONSILLECTOMY         Family History   Problem Relation Age of Onset   • Hypertension Mother    • Mental illness Mother    • Stroke Mother         CVA   • Cancer Father    • Diabetes type II Father    • Heart disease Father         CARDIAC DISORDER   • Hypertension Other    • Mental illness Other    • Migraines Other    • Diabetes type II Other    • Calcium disorder Other         CALCIUM KIDNEY STONES   • Other Other         HERPES ZOSTER INFECTION   • Tuberculosis Other    • Pulmonary embolism Other         CHRONIC OBSTRUCTIVE PULMONARY DISEASE   • Calcium disorder Other         CALISUM KIDNEY STONES   • Depression Family    • Substance Abuse Family    • Substance Abuse Brother      I have reviewed and agree with the history as documented      E-Cigarette/Vaping   • E-Cigarette Use Never User      E-Cigarette/Vaping Substances   • Nicotine No    • THC No    • CBD No    • Flavoring No    • Other No    • Unknown No      Social History     Tobacco Use   • Smoking status: Some Days     Packs/day: 0 50     Types: Cigarettes   • Smokeless tobacco: Never   • Tobacco comments:     1 a month   Vaping Use   • Vaping Use: Never used   Substance Use Topics   • Alcohol use: Yes     Comment: rarely   • Drug use: No       Review of Systems   Constitutional: Negative  HENT: Negative  Eyes: Negative  Respiratory: Negative  Cardiovascular: Negative  Gastrointestinal: Negative  Endocrine: Negative  Genitourinary: Negative  Musculoskeletal: Negative  Skin: Negative  Allergic/Immunologic: Negative  Neurological: Negative  Hematological: Negative  Psychiatric/Behavioral: Negative  All other systems reviewed and are negative  Physical Exam  Physical Exam  Vitals and nursing note reviewed  Constitutional:       General: She is not in acute distress  Appearance: Normal appearance  She is normal weight  She is not ill-appearing or toxic-appearing  HENT:      Head: Normocephalic  Right Ear: External ear normal       Left Ear: External ear normal       Nose: Nose normal       Mouth/Throat:      Mouth: Mucous membranes are moist       Pharynx: Oropharynx is clear  Eyes:      General:         Right eye: No discharge  Left eye: No discharge  Extraocular Movements: Extraocular movements intact  Conjunctiva/sclera: Conjunctivae normal       Pupils: Pupils are equal, round, and reactive to light  Cardiovascular:      Rate and Rhythm: Normal rate and regular rhythm  Pulses: Normal pulses  Heart sounds: Normal heart sounds  Pulmonary:      Effort: Pulmonary effort is normal       Breath sounds: Normal breath sounds  Abdominal:      General: Abdomen is flat  Bowel sounds are normal  There is no distension  Palpations: Abdomen is soft  There is no mass  Tenderness: There is no abdominal tenderness  There is no right CVA tenderness, left CVA tenderness, guarding or rebound  Hernia: No hernia is present  Musculoskeletal:      Right shoulder: Tenderness and bony tenderness present   No swelling, deformity, effusion, laceration or crepitus  Decreased range of motion  Normal strength  Normal pulse  Left shoulder: Normal       Right upper arm: Normal       Left upper arm: Normal       Right elbow: No swelling, deformity, effusion or lacerations  Decreased range of motion  Tenderness present  Left elbow: Normal       Right forearm: Normal       Left forearm: Normal       Right wrist: Normal       Left wrist: Normal       Right hand: Normal       Left hand: Normal         Arms:       Cervical back: Normal range of motion and neck supple  Skin:     General: Skin is warm  Capillary Refill: Capillary refill takes less than 2 seconds  Neurological:      General: No focal deficit present  Mental Status: She is alert and oriented to person, place, and time  Mental status is at baseline  GCS: GCS eye subscore is 4  GCS verbal subscore is 5  GCS motor subscore is 6  Psychiatric:         Mood and Affect: Mood normal          Behavior: Behavior normal          Thought Content: Thought content normal          Judgment: Judgment normal          Vital Signs  ED Triage Vitals [06/19/23 1119]   Temperature Pulse Respirations Blood Pressure SpO2   97 5 °F (36 4 °C) 74 17 168/77 93 %      Temp Source Heart Rate Source Patient Position - Orthostatic VS BP Location FiO2 (%)   Tympanic Monitor Lying Left arm --      Pain Score       8           Vitals:    06/19/23 1119   BP: 168/77   Pulse: 74   Patient Position - Orthostatic VS: Lying         Visual Acuity      ED Medications  Medications   oxyCODONE (ROXICODONE) IR tablet 5 mg (5 mg Oral Given 6/19/23 1236)       Diagnostic Studies  Results Reviewed     None                 XR elbow 3+ views RIGHT   Final Result by Sagar Akbar MD (06/19 1256)      No acute osseous abnormality        Findings are stable      Workstation performed: [id number]         XR shoulder 2+ views RIGHT   Final Result by Sagar Akbar MD (06/19 1303) "  Addendum (preliminary) 1 of 1 by Jordana Kan MD (06/19 1303)   ADDENDUM:      The impression should read intact appearing total shoulder arthroplasty      Final                 Procedures  Procedures         ED Course                               SBIRT 20yo+    Flowsheet Row Most Recent Value   Initial Alcohol Screen: US AUDIT-C     1  How often do you have a drink containing alcohol? 0 Filed at: 06/19/2023 1121   2  How many drinks containing alcohol do you have on a typical day you are drinking? 0 Filed at: 06/19/2023 1121   3b  FEMALE Any Age, or MALE 65+: How often do you have 4 or more drinks on one occassion? 0 Filed at: 06/19/2023 1121   Audit-C Score 0 Filed at: 06/19/2023 1121   ROSY: How many times in the past year have you    Used an illegal drug or used a prescription medication for non-medical reasons? Never Filed at: 06/19/2023 1121                    Medical Decision Making  Differential diagnosis including right shoulder dislocation, right shoulder fracture, right elbow fracture  Patient is a 60-year-old female with a prior history of right shoulder arthroplasty arriving for evaluation after she stretched, felt a \"pop,\" in her right shoulder  Patient said she has been having right shoulder pain that radiates into her right elbow  Patient has ability flex and extend her right elbow without difficulty  Has limited range of motion of her right shoulder secondary to pain  Reports her primary concern is that she has a fracture  Patient denies chest pain, shortness of breath, dizziness, nausea/vomiting, headache, neck pain  Injury occurred from a specific movement and is muscle skeletal in nature  Patient has no numbness or tingling into extremity  No concern for infectious etiology of pain as there is no crepitus, no pain out of proportion, no erythema, no gross effusion  Vital signs stable, no tachycardia, or fever  No acute fracture or dislocation seen on x-ray    Patient is " "satisfied with a sling  Discussed to remove sling and perform gentle range of motion to avoid \"frozen shoulder  \"  Patient aware to take Tylenol as needed for pain  Did discuss her recent PDMP with oxycodone  Patient reports that she had back surgery, and was through her acute phase of pain after surgery and threw all the oxycodone away as she no longer required it  Patient provided with prescription for short course of oxycodone due to acute injury  Patient provided with orthopedic follow-up  Patient provided with orthopedic referral   Aware to call orthopedics today  Patient states that she does not wish to fall at [facility] as it is too far away, I would like a local orthopedist   Discussed return precautions with patient  Patient is stable for discharge as her pain is improved  Reviewed reasons to return to ed  Patient verbalized understanding of diagnosis and agreement with discharge plan of care as well as understanding of reasons to return to ed  Amount and/or Complexity of Data Reviewed  Radiology: ordered  Risk  Prescription drug management  Disposition  Final diagnoses:   Elbow pain   Pain in shoulder     Time reflects when diagnosis was documented in both MDM as applicable and the Disposition within this note     Time User Action Codes Description Comment    6/19/2023  1:17 PM Milly Snyder Add [M25 529] Elbow pain     6/19/2023  1:17 PM Milly Snyder Add [M25 519] Pain in shoulder       ED Disposition     ED Disposition   Discharge    Condition   Stable    Date/Time   Mon Jun 19, 2023  1:17 PM    Comment   Nyasia Likes discharge to home/self care                 Follow-up Information     Follow up With Specialties Details Why Contact Info Additional Information    [facility] Schedule an appointment as soon as possible for a visit today For further evaluation of symptoms [address] " [place] [address]  [address] [place] [address] Specialists [employer], [address], barbara [place], [place], [address]    [facility] Emergency Department Emergency Medicine Go to  For further evaluation of symptoms [address] [place] [id number] [phone] [facility], [address] [place], [employer], [address] [facility]          Discharge Medication List as of 6/19/2023  1:21 PM      CONTINUE these medications which have CHANGED    Details   oxyCODONE (Roxicodone) 5 immediate release tablet Take 1 tablet (5 mg total) by mouth every 6 (six) hours as needed for moderate pain for up to 10 days Max Daily Amount: 20 mg, Starting Mon 6/19/2023, Until Thu 6/29/2023 at 2359, Normal         CONTINUE these medications which have NOT CHANGED    Details   albuterol (2 5 mg/3 mL) 0 083 % nebulizer solution Take 1 vial (2 5 mg total) by nebulization every 4 (four) hours as needed for shortness of breath Disp 1 box, Starting Wed 4/8/2020, Normal      aspirin (ECOTRIN LOW STRENGTH) 81 mg EC tablet Take 81 mg by mouth daily, Historical Med      atorvastatin (LIPITOR) 20 mg tablet Take 1 tablet (20 mg total) by mouth daily, Starting Mon 2/27/2023, Normal      JOELLEN MICROLET LANCETS lancets Use as instructed BID E11 65, Normal      Calcium 500 MG tablet Take 1 tablet by mouth every other day, Starting Mon 12/14/2015, Historical Med      cholecalciferol (VITAMIN D3) 1,000 units tablet Take 3 tablets by mouth every other day , Starting Wed 6/28/2017, Historical Med      denosumab (PROLIA) 60 mg/mL Inject under the skin every 6 (six) months, Starting Wed 9/13/2017, Historical Med      escitalopram (LEXAPRO) 20 mg tablet Take 1 tablet (20 mg total) by mouth daily, Starting Mon 2/27/2023, Until Sat 8/26/2023, Normal      Ferrous Sulfate (IRON) 325 (65 Fe) MG TABS Take 1 tablet (325 mg total) by mouth daily, Starting Mon 7/20/2020, Sample      furosemide (LASIX) 40 mg tablet Take 1 tablet (40 mg total) by mouth daily PRN, Starting Fri 3/17/2023, Normal      !! Glucose Blood (ONETOUCH ULTRA BLUE VI) Test 3 (three) times a day  , Starting Mon 12/14/2015, Historical Med      !! glucose blood (OneTouch Ultra) test strip Once daily, Normal      levothyroxine (Euthyrox) 25 mcg tablet Take 1 tablet (25 mcg total) by mouth daily in the early morning, Starting Wed 8/3/2022, Normal      lisinopril (ZESTRIL) 2 5 mg tablet Take 1 tablet (2 5 mg total) by mouth daily, Starting Mon 5/15/2023, Normal      metFORMIN (GLUCOPHAGE) 1000 MG tablet Take 1 tablet (1,000 mg total) by mouth 2 (two) times a day with meals, Starting Mon 5/15/2023, Normal      methocarbamol (ROBAXIN) 750 mg tablet Take 1 tablet (750 mg total) by mouth every 8 (eight) hours, Starting Fri 5/12/2023, Normal      pantoprazole (PROTONIX) 40 mg tablet Take 1 tablet (40 mg total) by mouth daily, Starting Mon 2/27/2023, Normal      potassium chloride (MICRO-K) 10 MEQ CR capsule Take 10 mEq by mouth 2 (two) times a day PRN w/lasix , Historical Med      VITAMIN B COMPLEX-C PO Take 1 tablet by mouth daily, Starting Wed 6/28/2017, Historical Med       !! - Potential duplicate medications found  Please discuss with provider                PDMP Review       Value Time User    PDMP Reviewed  Yes 3/3/2021 10:59 AM Ru Gerber DO          ED Provider  Electronically Signed by           CONCEPCIÓN Cortez  06/20/23 1042

## 2023-06-20 ENCOUNTER — VBI (OUTPATIENT)
Dept: ADMINISTRATIVE | Facility: OTHER | Age: 73
End: 2023-06-20

## 2023-06-20 NOTE — TELEPHONE ENCOUNTER
[facility]    ED Visit Information     Ed visit date: 6/19/23  Diagnosis Description: elbow pain  In Network? Yes, [facility] [employer]  Discharge status: Home  Discharged with meds ? Yes  Number of ED visits to date: 1  ED Severity:3     Outreach Information    Outreach successful: No 3  Date letter mailed:no my chart  Date Finalized:6/22/23    Care Coordination    Follow up appointment with specialilty: yes ortho appt 6/23/23   Transportation issues ?  NA    Value Base Outreach    06/20/2023 12:33 PM EDT by Franklyn Mcclelland 06/20/2023 12:33 PM EDT by Franklyn Amador (Self) [phone] ([facility])[phone] [facility])  [phone] (Mobile) Remove  - Left Message    06/21/2023 09:47 AM EDT by Franklyn Mcclelland 06/21/2023 09:47 AM EDT by Franklyn Amador (Self) [phone] ([facility])[phone] [facility])  [phone] (Mobile) Remove  - Left Message    06/22/2023 11:22 AM EDT by Franklyn Mcclelland 06/22/2023 11:22 AM EDT by Franklyn Amador (Self) [phone] ([facility])[phone] (Mobile)  [phone] (Mobile) Remove  - Left Message

## 2023-06-23 ENCOUNTER — OFFICE VISIT (OUTPATIENT)
Dept: OBGYN CLINIC | Facility: CLINIC | Age: 73
End: 2023-06-23
Payer: MEDICARE

## 2023-06-23 VITALS
WEIGHT: 115.8 LBS | HEART RATE: 80 BPM | DIASTOLIC BLOOD PRESSURE: 70 MMHG | SYSTOLIC BLOOD PRESSURE: 116 MMHG | BODY MASS INDEX: 19.77 KG/M2 | HEIGHT: 64 IN

## 2023-06-23 DIAGNOSIS — M25.519 PAIN IN SHOULDER: ICD-10-CM

## 2023-06-23 DIAGNOSIS — M25.529 ELBOW PAIN: ICD-10-CM

## 2023-06-23 PROCEDURE — 99204 OFFICE O/P NEW MOD 45 MIN: CPT | Performed by: FAMILY MEDICINE

## 2023-06-23 NOTE — PROGRESS NOTES
"[facility] SPECIAL[facility] [place] [id number]  [facility] [id number] [phone]      Chief Complaint:  Chief Complaint   Patient presents with   • Right Shoulder - Pain   • Right Elbow - Pain       Vitals:  /70 (BP Location: Left arm, Patient Position: Sitting, Cuff Size: Standard)   Pulse 80   Ht 5' 4\" (1 626 m)   Wt 52 5 kg (115 lb 12 8 oz)   BMI 19 88 kg/m²     The following portions of the patient's history were reviewed and updated as appropriate: allergies, current medications, past family history, past medical history, past social history, past surgical history, and problem list       Subjective:   Patient ID: Bettejane Aschoff is a 68 y o  female  Here c/o R shoulder/elbow pain  Hx of biceps tear with repair in the past x2  Reached up after sleeping and felt pain/pop  Hurts to move elbow  Seen in ER  XR done  Sling placed  Better in flexion  Hurts to move shoulder  Sharp pain  Tylenol/robaxin PRN  Oxycodone PRN  Burning pain    RIGHT ELBOW     INDICATION:   pain      COMPARISON: 12/30/2015  VIEWS:  XR ELBOW 3+ VW RIGHT  Images: 4     FINDINGS:     There is no acute fracture or dislocation      There is no joint effusion      Mild degenerative changes involve medial and lateral aspects of the elbow     No lytic or blastic osseous lesion      Soft tissues are unremarkable      IMPRESSION:     No acute osseous abnormality      Findings are stable      Review of Systems   Constitutional: Negative for fatigue and fever  Respiratory: Negative for shortness of breath  Cardiovascular: Negative for chest pain  Gastrointestinal: Negative for abdominal pain and nausea  Genitourinary: Negative for dysuria  Musculoskeletal: Positive for arthralgias  Skin: Negative for rash and wound  Neurological: Negative for weakness and headaches         Objective:  Right Shoulder Exam     Tenderness   The patient is experiencing tenderness in the " acromion  Range of Motion   Active abduction: abnormal   Passive abduction: abnormal   Extension: normal   External rotation: normal   Forward flexion: abnormal     Muscle Strength   The patient has normal right shoulder strength  Comments:  R elbow- neg yergason/speeds  Motor 5/5            Physical Exam  Constitutional:       Appearance: Normal appearance  She is normal weight  Eyes:      Extraocular Movements: Extraocular movements intact  Pulmonary:      Effort: Pulmonary effort is normal    Musculoskeletal:         General: Tenderness present  Cervical back: Normal range of motion  Skin:     General: Skin is warm and dry  Neurological:      General: No focal deficit present  Mental Status: She is alert and oriented to person, place, and time  Mental status is at baseline  Psychiatric:         Mood and Affect: Mood normal          Behavior: Behavior normal          Thought Content: Thought content normal          Judgment: Judgment normal          I have personally reviewed pertinent films in PACS and my interpretation is XR_  R shoulder-  arthroplasty intact, no fx  R elbow- no fx  Assessment/Plan:  Assessment/Plan   Diagnoses and all orders for this visit:    Elbow pain  -     Ambulatory Referral to Orthopedic Surgery  -     Ambulatory Referral to Orthopedic Surgery; Future    Pain in shoulder  -     Ambulatory Referral to Orthopedic Surgery  -     Ambulatory Referral to Orthopedic Surgery; Future        Return for referral to [address], MD Azelaic Acid Pregnancy And Lactation Text: This medication is considered safe during pregnancy and breast feeding. Winlevi Counseling:  I discussed with the patient the risks of topical clascoterone including but not limited to erythema, scaling, itching, and stinging. Patient voiced their understanding. Topical Retinoid Pregnancy And Lactation Text: This medication is Pregnancy Category C. It is unknown if this medication is excreted in breast milk. Bactrim Pregnancy And Lactation Text: This medication is Pregnancy Category D and is known to cause fetal risk.  It is also excreted in breast milk. Topical Clindamycin Counseling: Patient counseled that this medication may cause skin irritation or allergic reactions.  In the event of skin irritation, the patient was advised to reduce the amount of the drug applied or use it less frequently.   The patient verbalized understanding of the proper use and possible adverse effects of clindamycin.  All of the patient's questions and concerns were addressed. Benzoyl Peroxide Pregnancy And Lactation Text: This medication is Pregnancy Category C. It is unknown if benzoyl peroxide is excreted in breast milk. Tazorac Pregnancy And Lactation Text: This medication is not safe during pregnancy. It is unknown if this medication is excreted in breast milk. Dapsone Counseling: I discussed with the patient the risks of dapsone including but not limited to hemolytic anemia, agranulocytosis, rashes, methemoglobinemia, kidney failure, peripheral neuropathy, headaches, GI upset, and liver toxicity.  Patients who start dapsone require monitoring including baseline LFTs and weekly CBCs for the first month, then every month thereafter.  The patient verbalized understanding of the proper use and possible adverse effects of dapsone.  All of the patient's questions and concerns were addressed. Bactrim Counseling:  I discussed with the patient the risks of sulfa antibiotics including but not limited to GI upset, allergic reaction, drug rash, diarrhea, dizziness, photosensitivity, and yeast infections.  Rarely, more serious reactions can occur including but not limited to aplastic anemia, agranulocytosis, methemoglobinemia, blood dyscrasias, liver or kidney failure, lung infiltrates or desquamative/blistering drug rashes. Erythromycin Counseling:  I discussed with the patient the risks of erythromycin including but not limited to GI upset, allergic reaction, drug rash, diarrhea, increase in liver enzymes, and yeast infections. Spironolactone Pregnancy And Lactation Text: This medication can cause feminization of the male fetus and should be avoided during pregnancy. The active metabolite is also found in breast milk. Aklief counseling:  Patient advised to apply a pea-sized amount only at bedtime and wait 30 minutes after washing their face before applying.  If too drying, patient may add a non-comedogenic moisturizer.  The most commonly reported side effects including irritation, redness, scaling, dryness, stinging, burning, itching, and increased risk of sunburn.  The patient verbalized understanding of the proper use and possible adverse effects of retinoids.  All of the patient's questions and concerns were addressed. Spironolactone Counseling: Patient advised regarding risks of diarrhea, abdominal pain, hyperkalemia, birth defects (for female patients), liver toxicity and renal toxicity. The patient may need blood work to monitor liver and kidney function and potassium levels while on therapy. The patient verbalized understanding of the proper use and possible adverse effects of spironolactone.  All of the patient's questions and concerns were addressed. Winlevi Pregnancy And Lactation Text: This medication is considered safe during pregnancy and breastfeeding. Benzoyl Peroxide Counseling: Patient counseled that medicine may cause skin irritation and bleach clothing.  In the event of skin irritation, the patient was advised to reduce the amount of the drug applied or use it less frequently.   The patient verbalized understanding of the proper use and possible adverse effects of benzoyl peroxide.  All of the patient's questions and concerns were addressed. Dapsone Pregnancy And Lactation Text: This medication is Pregnancy Category C and is not considered safe during pregnancy or breast feeding. Doxycycline Counseling:  Patient counseled regarding possible photosensitivity and increased risk for sunburn.  Patient instructed to avoid sunlight, if possible.  When exposed to sunlight, patients should wear protective clothing, sunglasses, and sunscreen.  The patient was instructed to call the office immediately if the following severe adverse effects occur:  hearing changes, easy bruising/bleeding, severe headache, or vision changes.  The patient verbalized understanding of the proper use and possible adverse effects of doxycycline.  All of the patient's questions and concerns were addressed. Sarecycline Counseling: Patient advised regarding possible photosensitivity and discoloration of the teeth, skin, lips, tongue and gums.  Patient instructed to avoid sunlight, if possible.  When exposed to sunlight, patients should wear protective clothing, sunglasses, and sunscreen.  The patient was instructed to call the office immediately if the following severe adverse effects occur:  hearing changes, easy bruising/bleeding, severe headache, or vision changes.  The patient verbalized understanding of the proper use and possible adverse effects of sarecycline.  All of the patient's questions and concerns were addressed. High Dose Vitamin A Pregnancy And Lactation Text: High dose vitamin A therapy is contraindicated during pregnancy and breast feeding. Isotretinoin Pregnancy And Lactation Text: This medication is Pregnancy Category X and is considered extremely dangerous during pregnancy. It is unknown if it is excreted in breast milk. Azithromycin Pregnancy And Lactation Text: This medication is considered safe during pregnancy and is also secreted in breast milk. Tetracycline Counseling: Patient counseled regarding possible photosensitivity and increased risk for sunburn.  Patient instructed to avoid sunlight, if possible.  When exposed to sunlight, patients should wear protective clothing, sunglasses, and sunscreen.  The patient was instructed to call the office immediately if the following severe adverse effects occur:  hearing changes, easy bruising/bleeding, severe headache, or vision changes.  The patient verbalized understanding of the proper use and possible adverse effects of tetracycline.  All of the patient's questions and concerns were addressed. Patient understands to avoid pregnancy while on therapy due to potential birth defects. Minocycline Pregnancy And Lactation Text: This medication is Pregnancy Category D and not consider safe during pregnancy. It is also excreted in breast milk. Aklief Pregnancy And Lactation Text: It is unknown if this medication is safe to use during pregnancy.  It is unknown if this medication is excreted in breast milk.  Breastfeeding women should use the topical cream on the smallest area of the skin for the shortest time needed while breastfeeding.  Do not apply to nipple and areola. Erythromycin Pregnancy And Lactation Text: This medication is Pregnancy Category B and is considered safe during pregnancy. It is also excreted in breast milk. Birth Control Pills Counseling: Birth Control Pill Counseling: I discussed with the patient the potential side effects of OCPs including but not limited to increased risk of stroke, heart attack, thrombophlebitis, deep venous thrombosis, hepatic adenomas, breast changes, GI upset, headaches, and depression.  The patient verbalized understanding of the proper use and possible adverse effects of OCPs. All of the patient's questions and concerns were addressed. Tazorac Counseling:  Patient advised that medication is irritating and drying.  Patient may need to apply sparingly and wash off after an hour before eventually leaving it on overnight.  The patient verbalized understanding of the proper use and possible adverse effects of tazorac.  All of the patient's questions and concerns were addressed. Doxycycline Pregnancy And Lactation Text: This medication is Pregnancy Category D and not consider safe during pregnancy. It is also excreted in breast milk but is considered safe for shorter treatment courses. Azithromycin Counseling:  I discussed with the patient the risks of azithromycin including but not limited to GI upset, allergic reaction, drug rash, diarrhea, and yeast infections. Include Pregnancy/Lactation Warning?: No High Dose Vitamin A Counseling: Side effects reviewed, pt to contact office should one occur. Detail Level: Zone Detail Level: Detailed Birth Control Pills Pregnancy And Lactation Text: This medication should be avoided if pregnant and for the first 30 days post-partum. Minocycline Counseling: Patient advised regarding possible photosensitivity and discoloration of the teeth, skin, lips, tongue and gums.  Patient instructed to avoid sunlight, if possible.  When exposed to sunlight, patients should wear protective clothing, sunglasses, and sunscreen.  The patient was instructed to call the office immediately if the following severe adverse effects occur:  hearing changes, easy bruising/bleeding, severe headache, or vision changes.  The patient verbalized understanding of the proper use and possible adverse effects of minocycline.  All of the patient's questions and concerns were addressed. Topical Retinoid counseling:  Patient advised to apply a pea-sized amount only at bedtime and wait 30 minutes after washing their face before applying.  If too drying, patient may add a non-comedogenic moisturizer. The patient verbalized understanding of the proper use and possible adverse effects of retinoids.  All of the patient's questions and concerns were addressed. Azelaic Acid Counseling: Patient counseled that medicine may cause skin irritation and to avoid applying near the eyes.  In the event of skin irritation, the patient was advised to reduce the amount of the drug applied or use it less frequently.   The patient verbalized understanding of the proper use and possible adverse effects of azelaic acid.  All of the patient's questions and concerns were addressed. Topical Sulfur Applications Counseling: Topical Sulfur Counseling: Patient counseled that this medication may cause skin irritation or allergic reactions.  In the event of skin irritation, the patient was advised to reduce the amount of the drug applied or use it less frequently.   The patient verbalized understanding of the proper use and possible adverse effects of topical sulfur application.  All of the patient's questions and concerns were addressed. Topical Sulfur Applications Pregnancy And Lactation Text: This medication is Pregnancy Category C and has an unknown safety profile during pregnancy. It is unknown if this topical medication is excreted in breast milk. Topical Clindamycin Pregnancy And Lactation Text: This medication is Pregnancy Category B and is considered safe during pregnancy. It is unknown if it is excreted in breast milk. Isotretinoin Counseling: Patient should get monthly blood tests, not donate blood, not drive at night if vision affected, not share medication, and not undergo elective surgery for 6 months after tx completed. Side effects reviewed, pt to contact office should one occur.

## 2023-06-27 ENCOUNTER — TELEPHONE (OUTPATIENT)
Dept: OBGYN CLINIC | Facility: CLINIC | Age: 73
End: 2023-06-27

## 2023-06-27 NOTE — TELEPHONE ENCOUNTER
Caller: Patient    Doctor: Deja Lorenzo    Reason for call: Pt called and said she is in extreme pain and cannot sleep. The pain has not let up and she does not know what to do. She is scheduled for first available with Dr Linda Galvan on 7/18, but would like advice on what to do in the meantime.     Call back#: [phone]

## 2023-07-18 ENCOUNTER — OFFICE VISIT (OUTPATIENT)
Dept: OBGYN CLINIC | Facility: CLINIC | Age: 73
End: 2023-07-18
Payer: MEDICARE

## 2023-07-18 VITALS
SYSTOLIC BLOOD PRESSURE: 116 MMHG | WEIGHT: 115.9 LBS | DIASTOLIC BLOOD PRESSURE: 65 MMHG | BODY MASS INDEX: 19.79 KG/M2 | HEART RATE: 93 BPM | HEIGHT: 64 IN

## 2023-07-18 DIAGNOSIS — S46.911A STRAIN OF ACROMIOCLAVICULAR JOINT, RIGHT, INITIAL ENCOUNTER: ICD-10-CM

## 2023-07-18 DIAGNOSIS — M25.519 PAIN IN SHOULDER: ICD-10-CM

## 2023-07-18 PROCEDURE — 99213 OFFICE O/P EST LOW 20 MIN: CPT | Performed by: ORTHOPAEDIC SURGERY

## 2023-07-18 NOTE — PROGRESS NOTES
ASSESSMENT/PLAN:    Diagnoses and all orders for this visit:    Strain of acromioclavicular joint, right, initial encounter  -     Ambulatory Referral to Orthopedic Surgery    Pain in shoulder  -     Ambulatory Referral to Orthopedic Surgery        X-rays of the patient's right shoulder are consistent with a well-seated right shoulder replacement. The prosthesis is in good alignment. There are no signs of loosening. There are no fractures or dislocations. It seems that the patient has a strain of her [facility] joint. A corticosteroid injection was offered to the patient to which she declined. She will follow-up with our office as needed. She is acceptable to this plan. Return if symptoms worsen or fail to improve. Functionally, the patient is doing much better regards her right shoulder including the reverse hip prosthesis. This is anatomic position. The patient has nearly full range of motion since her initial injury. Continue home exercise program.  Injection therapy was declined. Upon as-needed basis. If her condition changes, she would not hesitate to let us know      _____________________________________________________  CHIEF COMPLAINT:  Chief Complaint   Patient presents with   • Right Shoulder - Pain         SUBJECTIVE:  Gus Islas is a 68 y.o. female who presents to our office complaining of right shoulder pain. The patient has a history of a right shoulder replacement from 1992. She states approximately 5 weeks ago she was stretching her right shoulder when she heard a "crack" and had immediate pain. She states her symptoms have been improving since the injury. She denies any numbness or tingling. She denies any fever or chills. She denies any falls or trauma.     The following portions of the patient's history were reviewed and updated as appropriate: allergies, current medications, past family history, past medical history, past social history, past surgical history and problem list.    PAST MEDICAL HISTORY:  Past Medical History:   Diagnosis Date   • Anxiety    • Cellulitis     LAST ASSESED 2/12/16; RESOLVED 3/9/16   • Chronic obstructive pulmonary disease ([address]) 3/11/2019   • Depression    • Diabetes mellitus ([address])    • Dysuria     LAST ASSESSED 3/7/16; RESOLVED 3/9/16   • Esophageal reflux     RESOLVED 1/15/16   • Fatigue     RESOLVED 3/9/16   • Fracture     rt 4 th metatarsal    • Heart murmur    • Hypertension    • Pneumonia    • Restless legs syndrome (RLS)    • Stage 3 chronic kidney disease, unspecified whether stage 3a or 3b CKD ([address]) 7/22/2022   • Vitamin D deficiency        PAST SURGICAL HISTORY:  Past Surgical History:   Procedure Laterality Date   • APPENDECTOMY     • BACK SURGERY     • CHOLECYSTECTOMY  1975   • FOOT SURGERY Right 2014   • HIP SURGERY  2013   • HYSTERECTOMY     • INSERTION / PLACEMENT / REVISION NEUROSTIMULATOR     • OTHER SURGICAL HISTORY  2011    LAPAROSCOPIC SLING OPERATION FOR STRESS INCONTINENCE    • OVARIAN CYST SURGERY  1971    CYSTECTOMY   • OR COLONOSCOPY FLX DX W/COLLJ SPEC WHEN PFRMD N/A 07/10/2018    Procedure: COLONOSCOPY;  Surgeon: Christina Perez MD;  Location: [facility]; Service: Gastroenterology   • OR ESOPHAGOGASTRODUODENOSCOPY TRANSORAL DIAGNOSTIC N/A 01/28/2019    Procedure: ESOPHAGOGASTRODUODENOSCOPY (EGD); Surgeon: Ozzy Roque MD;  Location: [facility];   Service: Gastroenterology   • SHOULDER SURGERY Right 12/14/2015    REPAIR OF TORN MUSCLES   • SKIN BIOPSY      chin 03/07/2019   • THORACIC SPINE SURGERY N/A 05/08/2023   • TONSILLECTOMY         FAMILY HISTORY:  Family History   Problem Relation Age of Onset   • Hypertension Mother    • Mental illness Mother    • Stroke Mother         CVA   • Cancer Father    • Diabetes type II Father    • Heart disease Father         CARDIAC DISORDER   • Hypertension Other    • Mental illness Other    • Migraines Other    • Diabetes type II Other    • Calcium disorder Other         CALCIUM KIDNEY STONES   • Other Other         HERPES ZOSTER INFECTION   • Tuberculosis Other    • Pulmonary embolism Other         CHRONIC OBSTRUCTIVE PULMONARY DISEASE   • Calcium disorder Other         CALISUM KIDNEY STONES   • Depression Family    • Substance Abuse Family    • Substance Abuse Brother        SOCIAL HISTORY:  Social History     Tobacco Use   • Smoking status: Some Days     Packs/day: 0.50     Types: Cigarettes   • Smokeless tobacco: Never   • Tobacco comments:     1 a month   Vaping Use   • Vaping Use: Never used   Substance Use Topics   • Alcohol use: Yes     Comment: rarely   • Drug use: No       MEDICATIONS:    Current Outpatient Medications:   •  albuterol (2.5 mg/3 mL) 0.083 % nebulizer solution, Take 1 vial (2.5 mg total) by nebulization every 4 (four) hours as needed for shortness of breath Disp 1 box, Disp: 270 vial, Rfl: 1  •  aspirin (ECOTRIN LOW STRENGTH) 81 mg EC tablet, Take 81 mg by mouth daily, Disp: , Rfl:   •  atorvastatin (LIPITOR) 20 mg tablet, Take 1 tablet (20 mg total) by mouth daily, Disp: 90 tablet, Rfl: 1  •  JOELLEN MICROLET LANCETS lancets, Use as instructed BID E11.65, Disp: 100 each, Rfl: 5  •  Calcium 500 MG tablet, Take 1 tablet by mouth every other day, Disp: , Rfl:   •  cholecalciferol (VITAMIN D3) 1,000 units tablet, Take 3 tablets by mouth every other day , Disp: , Rfl:   •  denosumab (PROLIA) 60 mg/mL, Inject under the skin every 6 (six) months, Disp: , Rfl:   •  escitalopram (LEXAPRO) 20 mg tablet, Take 1 tablet (20 mg total) by mouth daily, Disp: 90 tablet, Rfl: 1  •  Ferrous Sulfate (IRON) 325 (65 Fe) MG TABS, Take 1 tablet (325 mg total) by mouth daily, Disp: 30 each, Rfl: 3  •  furosemide (LASIX) 40 mg tablet, Take 1 tablet (40 mg total) by mouth daily PRN, Disp: 30 tablet, Rfl: 2  •  Glucose Blood (ONETOUCH ULTRA BLUE VI), Test 3 (three) times a day  , Disp: , Rfl:   •  glucose blood (OneTouch Ultra) test strip, Once daily, Disp: 100 strip, Rfl: 5  •  levothyroxine (Euthyrox) 25 mcg tablet, Take 1 tablet (25 mcg total) by mouth daily in the early morning, Disp: 90 tablet, Rfl: 3  •  lisinopril (ZESTRIL) 2.5 mg tablet, Take 1 tablet (2.5 mg total) by mouth daily, Disp: 90 tablet, Rfl: 1  •  metFORMIN (GLUCOPHAGE) 1000 MG tablet, Take 1 tablet (1,000 mg total) by mouth 2 (two) times a day with meals, Disp: 180 tablet, Rfl: 1  •  methocarbamol (ROBAXIN) 750 mg tablet, Take 1 tablet (750 mg total) by mouth every 8 (eight) hours, Disp: 90 tablet, Rfl: 0  •  pantoprazole (PROTONIX) 40 mg tablet, Take 1 tablet (40 mg total) by mouth daily, Disp: 90 tablet, Rfl: 1  •  potassium chloride (MICRO-K) 10 MEQ CR capsule, Take 10 mEq by mouth 2 (two) times a day PRN w/lasix , Disp: , Rfl:   •  VITAMIN B COMPLEX-C PO, Take 1 tablet by mouth daily, Disp: , Rfl:     ALLERGIES:  Allergies   Allergen Reactions   • Midazolam Nausea Only and Vomiting   • Acetaminophen Itching   • Hydrocodone Itching   • Hydrocodone-Acetaminophen Other (See Comments)   • Moxifloxacin Diarrhea   • Naproxen Itching   • Other Other (See Comments)   • Gabapentin    • Hydrocodone-Acetaminophen      Category: Adverse Reaction;    • Penicillin V    • Pregabalin    • Propofol      Exacerbates RSL       ROS:  Review of Systems     Constitutional: Negative for fatigue, fever or loss of appetite. HENT: Negative. Respiratory: Negative for shortness of breath, dyspnea. Cardiovascular: Negative for chest pain/tightness. Gastrointestinal: Negative for abdominal pain, N/V. Endocrine: Negative for cold/heat intolerance, unexplained weight loss/gain. Genitourinary: Negative for flank pain, dysuria, hematuria. Musculoskeletal: Positive for arthralgia   Skin: Negative for rash. Neurological: Negative for numbness or tingling  Psychiatric/Behavioral: Negative for agitation.   _____________________________________________________  PHYSICAL EXAMINATION:    Blood pressure 116/65, pulse 93, height 5' 4" (1.626 m), weight 52.6 kg (115 lb 14.4 oz). Constitutional: Oriented to person, place, and time. Appears well-developed and well-nourished. No distress. HENT:   Head: Normocephalic. Eyes: Conjunctivae are normal. Right eye exhibits no discharge. Left eye exhibits no discharge. No scleral icterus. Cardiovascular: Normal rate. Pulmonary/Chest: Effort normal.   Neurological: Alert and oriented to person, place, and time. Skin: Skin is warm and dry. No rash noted. Not diaphoretic. No erythema. No pallor. Psychiatric: Normal mood and affect. Behavior is normal. Judgment and thought content normal.      MUSCULOSKELETAL EXAMINATION:   Physical Exam  Ortho Exam    Right upper extremity is neurovascularly intact  Fingers are pink and mobile  Compartments are soft  Range of motion of the shoulder is from 0 to 140 degrees of forward flexion and abduction  No tenderness to palpation along biceps  There is tenderness to palpation along AC joint  Brisk cap refill  Sensation intact  Objective:  BP Readings from Last 1 Encounters:   07/18/23 116/65      Wt Readings from Last 1 Encounters:   07/18/23 52.6 kg (115 lb 14.4 oz)        BMI:   Estimated body mass index is 19.89 kg/m² as calculated from the following:    Height as of this encounter: 5' 4" (1.626 m). Weight as of this encounter: 52.6 kg (115 lb 14.4 oz).           Scribe Attestation    I,:  Rosalinda Ramirez PA-C am acting as a scribe while in the presence of the attending physician.:       I,:  Neftali Rodgers DO personally performed the services described in this documentation    as scribed in my presence.:

## 2023-09-08 DIAGNOSIS — E03.9 ACQUIRED HYPOTHYROIDISM: ICD-10-CM

## 2023-09-08 RX ORDER — LEVOTHYROXINE SODIUM 0.03 MG/1
TABLET ORAL
Qty: 90 TABLET | Refills: 0 | Status: SHIPPED | OUTPATIENT
Start: 2023-09-08

## 2023-10-05 DIAGNOSIS — F41.9 ANXIETY: ICD-10-CM

## 2023-10-06 RX ORDER — ESCITALOPRAM OXALATE 20 MG/1
20 TABLET ORAL DAILY
Qty: 90 TABLET | Refills: 0 | Status: SHIPPED | OUTPATIENT
Start: 2023-10-06

## 2023-11-22 DIAGNOSIS — E78.5 HYPERLIPIDEMIA, UNSPECIFIED HYPERLIPIDEMIA TYPE: ICD-10-CM

## 2023-11-22 RX ORDER — ATORVASTATIN CALCIUM 20 MG/1
20 TABLET, FILM COATED ORAL DAILY
Qty: 90 TABLET | Refills: 1 | Status: SHIPPED | OUTPATIENT
Start: 2023-11-22

## 2023-11-28 DIAGNOSIS — R13.19 ESOPHAGEAL DYSPHAGIA: ICD-10-CM

## 2023-11-28 DIAGNOSIS — E11.8 TYPE 2 DIABETES MELLITUS WITH COMPLICATION, WITHOUT LONG-TERM CURRENT USE OF INSULIN (HCC): ICD-10-CM

## 2023-11-28 RX ORDER — PANTOPRAZOLE SODIUM 40 MG/1
40 TABLET, DELAYED RELEASE ORAL DAILY
Qty: 90 TABLET | Refills: 1 | Status: SHIPPED | OUTPATIENT
Start: 2023-11-28

## 2023-11-30 ENCOUNTER — TELEPHONE (OUTPATIENT)
Dept: ADMINISTRATIVE | Facility: OTHER | Age: 73
End: 2023-11-30

## 2023-11-30 NOTE — TELEPHONE ENCOUNTER
----- Message from Teagan Villalobos sent at 11/30/2023 10:47 AM EST -----  Regarding: diabetic eye exam  11/30/23 10:53 AM    Hello, our patient Nereyda Anthony has had Diabetic Eye Exam completed/performed. Please assist in updating the patient chart by pulling the Care Everywhere (CE) document. The date of service is 03/2023.      Thank you,  Teagan [facility]

## 2023-12-11 DIAGNOSIS — E03.9 ACQUIRED HYPOTHYROIDISM: ICD-10-CM

## 2023-12-11 DIAGNOSIS — I10 ESSENTIAL HYPERTENSION: ICD-10-CM

## 2023-12-11 RX ORDER — LISINOPRIL 2.5 MG/1
2.5 TABLET ORAL DAILY
Qty: 90 TABLET | Refills: 3 | Status: SHIPPED | OUTPATIENT
Start: 2023-12-11

## 2023-12-11 RX ORDER — LEVOTHYROXINE SODIUM 0.03 MG/1
TABLET ORAL
Qty: 90 TABLET | Refills: 0 | Status: SHIPPED | OUTPATIENT
Start: 2023-12-11

## 2023-12-11 NOTE — TELEPHONE ENCOUNTER
Patient needs refill on lisinopril (ZESTRIL) 2.5 mg tablet and levothyroxine 25 mcg tablet please send to [facility]

## 2023-12-18 ENCOUNTER — IMMUNIZATIONS (OUTPATIENT)
Dept: INTERNAL MEDICINE CLINIC | Facility: CLINIC | Age: 73
End: 2023-12-18
Payer: MEDICARE

## 2023-12-18 DIAGNOSIS — E11.8 TYPE 2 DIABETES MELLITUS WITH COMPLICATION, WITHOUT LONG-TERM CURRENT USE OF INSULIN (HCC): ICD-10-CM

## 2023-12-18 DIAGNOSIS — Z23 ENCOUNTER FOR IMMUNIZATION: Primary | ICD-10-CM

## 2023-12-18 DIAGNOSIS — Z12.31 ENCOUNTER FOR SCREENING MAMMOGRAM FOR BREAST CANCER: ICD-10-CM

## 2023-12-18 LAB — SL AMB POCT HEMOGLOBIN AIC: 5.8 (ref ?–6.5)

## 2023-12-18 PROCEDURE — G0008 ADMIN INFLUENZA VIRUS VAC: HCPCS

## 2023-12-18 PROCEDURE — 90472 IMMUNIZATION ADMIN EACH ADD: CPT

## 2023-12-18 PROCEDURE — 90471 IMMUNIZATION ADMIN: CPT

## 2023-12-18 PROCEDURE — 83036 HEMOGLOBIN GLYCOSYLATED A1C: CPT

## 2023-12-18 PROCEDURE — 90662 IIV NO PRSV INCREASED AG IM: CPT

## 2024-01-08 DIAGNOSIS — F41.9 ANXIETY: ICD-10-CM

## 2024-01-08 RX ORDER — ESCITALOPRAM OXALATE 20 MG/1
20 TABLET ORAL DAILY
Qty: 90 TABLET | Refills: 1 | Status: SHIPPED | OUTPATIENT
Start: 2024-01-08

## 2024-03-04 ENCOUNTER — OFFICE VISIT (OUTPATIENT)
Dept: INTERNAL MEDICINE CLINIC | Facility: CLINIC | Age: 74
End: 2024-03-04
Payer: MEDICARE

## 2024-03-04 VITALS
SYSTOLIC BLOOD PRESSURE: 124 MMHG | DIASTOLIC BLOOD PRESSURE: 72 MMHG | WEIGHT: 153.6 LBS | TEMPERATURE: 97.3 F | HEIGHT: 65 IN | OXYGEN SATURATION: 96 % | HEART RATE: 86 BPM | BODY MASS INDEX: 25.59 KG/M2

## 2024-03-04 DIAGNOSIS — J44.9 CHRONIC OBSTRUCTIVE PULMONARY DISEASE, UNSPECIFIED COPD TYPE (HCC): ICD-10-CM

## 2024-03-04 DIAGNOSIS — E78.5 HYPERLIPIDEMIA, UNSPECIFIED HYPERLIPIDEMIA TYPE: ICD-10-CM

## 2024-03-04 DIAGNOSIS — E55.9 VITAMIN D DEFICIENCY: ICD-10-CM

## 2024-03-04 DIAGNOSIS — E08.42 DIABETES MELLITUS DUE TO UNDERLYING CONDITION WITH DIABETIC POLYNEUROPATHY, UNSPECIFIED WHETHER LONG TERM INSULIN USE (HCC): Primary | ICD-10-CM

## 2024-03-04 DIAGNOSIS — Z13.0 SCREENING FOR DEFICIENCY ANEMIA: ICD-10-CM

## 2024-03-04 DIAGNOSIS — Z13.29 SCREENING FOR THYROID DISORDER: ICD-10-CM

## 2024-03-04 DIAGNOSIS — E03.9 ACQUIRED HYPOTHYROIDISM: ICD-10-CM

## 2024-03-04 DIAGNOSIS — E11.8 TYPE 2 DIABETES MELLITUS WITH COMPLICATION, WITHOUT LONG-TERM CURRENT USE OF INSULIN (HCC): ICD-10-CM

## 2024-03-04 DIAGNOSIS — Z00.00 MEDICARE ANNUAL WELLNESS VISIT, SUBSEQUENT: ICD-10-CM

## 2024-03-04 DIAGNOSIS — B35.6 TINEA CRURIS: ICD-10-CM

## 2024-03-04 DIAGNOSIS — F33.41 RECURRENT MAJOR DEPRESSIVE DISORDER, IN PARTIAL REMISSION (HCC): ICD-10-CM

## 2024-03-04 DIAGNOSIS — Z12.31 ENCOUNTER FOR SCREENING MAMMOGRAM FOR BREAST CANCER: ICD-10-CM

## 2024-03-04 DIAGNOSIS — E78.00 HYPERCHOLESTEREMIA: ICD-10-CM

## 2024-03-04 DIAGNOSIS — I10 PRIMARY HYPERTENSION: ICD-10-CM

## 2024-03-04 DIAGNOSIS — G25.81 RLS (RESTLESS LEGS SYNDROME): ICD-10-CM

## 2024-03-04 DIAGNOSIS — E46 PROTEIN-CALORIE MALNUTRITION, UNSPECIFIED SEVERITY (HCC): ICD-10-CM

## 2024-03-04 DIAGNOSIS — Z23 ENCOUNTER FOR IMMUNIZATION: ICD-10-CM

## 2024-03-04 PROBLEM — N18.30 STAGE 3 CHRONIC KIDNEY DISEASE, UNSPECIFIED WHETHER STAGE 3A OR 3B CKD (HCC): Status: RESOLVED | Noted: 2022-07-22 | Resolved: 2024-03-04

## 2024-03-04 PROBLEM — F11.20 CONTINUOUS OPIOID DEPENDENCE (HCC): Status: RESOLVED | Noted: 2022-03-16 | Resolved: 2024-03-04

## 2024-03-04 PROCEDURE — 99214 OFFICE O/P EST MOD 30 MIN: CPT | Performed by: PHYSICIAN ASSISTANT

## 2024-03-04 PROCEDURE — G0009 ADMIN PNEUMOCOCCAL VACCINE: HCPCS | Performed by: PHYSICIAN ASSISTANT

## 2024-03-04 PROCEDURE — 90677 PCV20 VACCINE IM: CPT | Performed by: PHYSICIAN ASSISTANT

## 2024-03-04 PROCEDURE — G0439 PPPS, SUBSEQ VISIT: HCPCS | Performed by: PHYSICIAN ASSISTANT

## 2024-03-04 RX ORDER — LEVOTHYROXINE SODIUM 0.03 MG/1
TABLET ORAL
Qty: 90 TABLET | Refills: 1 | Status: SHIPPED | OUTPATIENT
Start: 2024-03-04

## 2024-03-04 RX ORDER — BLOOD SUGAR DIAGNOSTIC
STRIP MISCELLANEOUS
Qty: 100 STRIP | Refills: 5 | Status: SHIPPED | OUTPATIENT
Start: 2024-03-04

## 2024-03-04 RX ORDER — CARBIDOPA AND LEVODOPA 25; 100 MG/1; MG/1
1 TABLET, EXTENDED RELEASE ORAL 2 TIMES DAILY
Qty: 60 TABLET | Refills: 2 | Status: SHIPPED | OUTPATIENT
Start: 2024-03-04

## 2024-03-04 RX ORDER — KETOCONAZOLE 20 MG/G
CREAM TOPICAL DAILY
Qty: 30 G | Refills: 0 | Status: SHIPPED | OUTPATIENT
Start: 2024-03-04

## 2024-03-04 RX ORDER — CLONAZEPAM 2 MG/1
2 TABLET ORAL 2 TIMES DAILY
Qty: 30 TABLET | Refills: 2 | Status: SHIPPED | OUTPATIENT
Start: 2024-03-04

## 2024-03-04 RX ORDER — ATORVASTATIN CALCIUM 20 MG/1
20 TABLET, FILM COATED ORAL DAILY
Qty: 90 TABLET | Refills: 1 | Status: SHIPPED | OUTPATIENT
Start: 2024-03-04

## 2024-03-04 NOTE — ASSESSMENT & PLAN NOTE
Lab Results   Component Value Date    HGBA1C 5.8 12/18/2023      Defers foot exam. Update eye exam. Update labs and adjust treatment as need

## 2024-03-04 NOTE — ASSESSMENT & PLAN NOTE
Start ketoconazole. Directions for use and possible side effects discussed and patient verbalized understanding of these.

## 2024-03-04 NOTE — PATIENT INSTRUCTIONS
Medicare Preventive Visit Patient Instructions  Thank you for completing your Welcome to Medicare Visit or Medicare Annual Wellness Visit today. Your next wellness visit will be due in one year (3/5/2025).  The screening/preventive services that you may require over the next 5-10 years are detailed below. Some tests may not apply to you based off risk factors and/or age. Screening tests ordered at today's visit but not completed yet may show as past due. Also, please note that scanned in results may not display below.  Preventive Screenings:  Service Recommendations Previous Testing/Comments   Colorectal Cancer Screening  * Colonoscopy    * Fecal Occult Blood Test (FOBT)/Fecal Immunochemical Test (FIT)  * Fecal DNA/Cologuard Test  * Flexible Sigmoidoscopy Age: 45-75 years old   Colonoscopy: every 10 years (may be performed more frequently if at higher risk)  OR  FOBT/FIT: every 1 year  OR  Cologuard: every 3 years  OR  Sigmoidoscopy: every 5 years  Screening may be recommended earlier than age 45 if at higher risk for colorectal cancer. Also, an individualized decision between you and your healthcare provider will decide whether screening between the ages of 76-85 would be appropriate. Colonoscopy: 07/10/2018  FOBT/FIT: Not on file  Cologuard: Not on file  Sigmoidoscopy: Not on file    Screening Current     Breast Cancer Screening Age: 40+ years old  Frequency: every 1-2 years  Not required if history of left and right mastectomy Mammogram: 09/24/2018        Cervical Cancer Screening Between the ages of 21-29, pap smear recommended once every 3 years.   Between the ages of 30-65, can perform pap smear with HPV co-testing every 5 years.   Recommendations may differ for women with a history of total hysterectomy, cervical cancer, or abnormal pap smears in past. Pap Smear: Not on file    Screening Not Indicated   Hepatitis C Screening Once for adults born between 1945 and 1965  More frequently in patients at high risk  for Hepatitis C Hep C Antibody: 06/24/2019    Screening Current   Diabetes Screening 1-2 times per year if you're at risk for diabetes or have pre-diabetes Fasting glucose: 112 mg/dL (5/12/2023)  A1C: 5.8 (12/18/2023)  Screening Not Indicated  History Diabetes   Cholesterol Screening Once every 5 years if you don't have a lipid disorder. May order more often based on risk factors. Lipid panel: 07/21/2022    Screening Not Indicated  History Lipid Disorder     Other Preventive Screenings Covered by Medicare:  Abdominal Aortic Aneurysm (AAA) Screening: covered once if your at risk. You're considered to be at risk if you have a family history of AAA.  Lung Cancer Screening: covers low dose CT scan once per year if you meet all of the following conditions: (1) Age 55-77; (2) No signs or symptoms of lung cancer; (3) Current smoker or have quit smoking within the last 15 years; (4) You have a tobacco smoking history of at least 20 pack years (packs per day multiplied by number of years you smoked); (5) You get a written order from a healthcare provider.  Glaucoma Screening: covered annually if you're considered high risk: (1) You have diabetes OR (2) Family history of glaucoma OR (3)  aged 50 and older OR (4)  American aged 65 and older  Osteoporosis Screening: covered every 2 years if you meet one of the following conditions: (1) You're estrogen deficient and at risk for osteoporosis based off medical history and other findings; (2) Have a vertebral abnormality; (3) On glucocorticoid therapy for more than 3 months; (4) Have primary hyperparathyroidism; (5) On osteoporosis medications and need to assess response to drug therapy.   Last bone density test (DXA Scan): 12/13/2022.  HIV Screening: covered annually if you're between the age of 15-65. Also covered annually if you are younger than 15 and older than 65 with risk factors for HIV infection. For pregnant patients, it is covered up to 3 times  per pregnancy.    Immunizations:  Immunization Recommendations   Influenza Vaccine Annual influenza vaccination during flu season is recommended for all persons aged >= 6 months who do not have contraindications   Pneumococcal Vaccine   * Pneumococcal conjugate vaccine = PCV13 (Prevnar 13), PCV15 (Vaxneuvance), PCV20 (Prevnar 20)  * Pneumococcal polysaccharide vaccine = PPSV23 (Pneumovax) Adults 19-63 yo with certain risk factors or if 65+ yo  If never received any pneumonia vaccine: recommend Prevnar 20 (PCV20)  Give PCV20 if previously received 1 dose of PCV13 or PPSV23   Hepatitis B Vaccine 3 dose series if at intermediate or high risk (ex: diabetes, end stage renal disease, liver disease)   Respiratory syncytial virus (RSV) Vaccine - COVERED BY MEDICARE PART D  * RSVPreF3 (Arexvy) CDC recommends that adults 60 years of age and older may receive a single dose of RSV vaccine using shared clinical decision-making (SCDM)   Tetanus (Td) Vaccine - COST NOT COVERED BY MEDICARE PART B Following completion of primary series, a booster dose should be given every 10 years to maintain immunity against tetanus. Td may also be given as tetanus wound prophylaxis.   Tdap Vaccine - COST NOT COVERED BY MEDICARE PART B Recommended at least once for all adults. For pregnant patients, recommended with each pregnancy.   Shingles Vaccine (Shingrix) - COST NOT COVERED BY MEDICARE PART B  2 shot series recommended in those 19 years and older who have or will have weakened immune systems or those 50 years and older     Health Maintenance Due:      Topic Date Due   • Breast Cancer Screening: Mammogram  09/24/2019   • DXA SCAN  12/13/2024   • Colorectal Cancer Screening  07/10/2028   • Hepatitis C Screening  Completed     Immunizations Due:      Topic Date Due   • Pneumococcal Vaccine: 65+ Years (3 of 3 - PPSV23 or PCV20) 08/31/2017   • COVID-19 Vaccine (6 - 2023-24 season) 09/01/2023     Advance Directives   What are advance directives?   Advance directives are legal documents that state your wishes and plans for medical care. These plans are made ahead of time in case you lose your ability to make decisions for yourself. Advance directives can apply to any medical decision, such as the treatments you want, and if you want to donate organs.   What are the types of advance directives?  There are many types of advance directives, and each state has rules about how to use them. You may choose a combination of any of the following:  Living will:  This is a written record of the treatment you want. You can also choose which treatments you do not want, which to limit, and which to stop at a certain time. This includes surgery, medicine, IV fluid, and tube feedings.   Durable power of  for healthcare (DPAHC):  This is a written record that states who you want to make healthcare choices for you when you are unable to make them for yourself. This person, called a proxy, is usually a family member or a friend. You may choose more than 1 proxy.  Do not resuscitate (DNR) order:  A DNR order is used in case your heart stops beating or you stop breathing. It is a request not to have certain forms of treatment, such as CPR. A DNR order may be included in other types of advance directives.  Medical directive:  This covers the care that you want if you are in a coma, near death, or unable to make decisions for yourself. You can list the treatments you want for each condition. Treatment may include pain medicine, surgery, blood transfusions, dialysis, IV or tube feedings, and a ventilator (breathing machine).  Values history:  This document has questions about your views, beliefs, and how you feel and think about life. This information can help others choose the care that you would choose.  Why are advance directives important?  An advance directive helps you control your care. Although spoken wishes may be used, it is better to have your wishes written down.  Spoken wishes can be misunderstood, or not followed. Treatments may be given even if you do not want them. An advance directive may make it easier for your family to make difficult choices about your care.   Urinary Incontinence   Urinary incontinence (UI)  is when you lose control of your bladder. UI develops because your bladder cannot store or empty urine properly. The 3 most common types of UI are stress incontinence, urge incontinence, or both.  Medicines:   May be given to help strengthen your bladder control. Report any side effects of medication to your healthcare provider.  Do pelvic muscle exercises often:  Your pelvic muscles help you stop urinating. Squeeze these muscles tight for 5 seconds, then relax for 5 seconds. Gradually work up to squeezing for 10 seconds. Do 3 sets of 15 repetitions a day, or as directed. This will help strengthen your pelvic muscles and improve bladder control.  Train your bladder:  Go to the bathroom at set times, such as every 2 hours, even if you do not feel the urge to go. You can also try to hold your urine when you feel the urge to go. For example, hold your urine for 5 minutes when you feel the urge to go. As that becomes easier, hold your urine for 10 minutes.   Self-care:   Keep a UI record.  Write down how often you leak urine and how much you leak. Make a note of what you were doing when you leaked urine.  Drink liquids as directed. You may need to limit the amount of liquid you drink to help control your urine leakage. Do not drink any liquid right before you go to bed. Limit or do not have drinks that contain caffeine or alcohol.   Prevent constipation.  Eat a variety of high-fiber foods. Good examples are high-fiber cereals, beans, vegetables, and whole-grain breads. Walking is the best way to trigger your intestines to have a bowel movement.  Exercise regularly and maintain a healthy weight.  Weight loss and exercise will decrease pressure on your bladder and help  you control your leakage.   Use a catheter as directed  to help empty your bladder. A catheter is a tiny, plastic tube that is put into your bladder to drain your urine.   Go to behavior therapy as directed.  Behavior therapy may be used to help you learn to control your urge to urinate.    Cigarette Smoking and Your Health   Risks to your health if you smoke:  Nicotine and other chemicals found in tobacco damage every cell in your body. Even if you are a light smoker, you have an increased risk for cancer, heart disease, and lung disease. If you are pregnant or have diabetes, smoking increases your risk for complications.   Benefits to your health if you stop smoking:   You decrease respiratory symptoms such as coughing, wheezing, and shortness of breath.   You reduce your risk for cancers of the lung, mouth, throat, kidney, bladder, pancreas, stomach, and cervix. If you already have cancer, you increase the benefits of chemotherapy. You also reduce your risk for cancer returning or a second cancer from developing.   You reduce your risk for heart disease, blood clots, heart attack, and stroke.   You reduce your risk for lung infections, and diseases such as pneumonia, asthma, chronic bronchitis, and emphysema.  Your circulation improves. More oxygen can be delivered to your body. If you have diabetes, you lower your risk for complications, such as kidney, artery, and eye diseases. You also lower your risk for nerve damage. Nerve damage can lead to amputations, poor vision, and blindness.  You improve your body's ability to heal and to fight infections.  For more information and support to stop smoking:   [employer]  Phone: 6- 406 - 121-1050  Web Address: [email or web address]  Weight Management   Why it is important to manage your weight:  Being overweight increases your risk of health conditions such as heart disease, high blood pressure, type 2 diabetes, and certain types of cancer. It can also increase your risk  for osteoarthritis, sleep apnea, and other respiratory problems. Aim for a slow, steady weight loss. Even a small amount of weight loss can lower your risk of health problems.  How to lose weight safely:  A safe and healthy way to lose weight is to eat fewer calories and get regular exercise. You can lose up about 1 pound a week by decreasing the number of calories you eat by 500 calories each day.   Healthy meal plan for weight management:  A healthy meal plan includes a variety of foods, contains fewer calories, and helps you stay healthy. A healthy meal plan includes the following:  Eat whole-grain foods more often.  A healthy meal plan should contain fiber. Fiber is the part of grains, fruits, and vegetables that is not broken down by your body. Whole-grain foods are healthy and provide extra fiber in your diet. Some examples of whole-grain foods are whole-wheat breads and pastas, oatmeal, brown rice, and bulgur.  Eat a variety of vegetables every day.  Include dark, leafy greens such as spinach, kale, carl greens, and mustard greens. Eat yellow and orange vegetables such as carrots, sweet potatoes, and winter squash.   Eat a variety of fruits every day.  Choose fresh or canned fruit (canned in its own juice or light syrup) instead of juice. Fruit juice has very little or no fiber.  Eat low-fat dairy foods.  Drink fat-free (skim) milk or 1% milk. Eat fat-free yogurt and low-fat cottage cheese. Try low-fat cheeses such as mozzarella and other reduced-fat cheeses.  Choose meat and other protein foods that are low in fat.  Choose beans or other legumes such as split peas or lentils. Choose fish, skinless poultry (chicken or turkey), or lean cuts of red meat (beef or pork). Before you cook meat or poultry, cut off any visible fat.   Use less fat and oil.  Try baking foods instead of frying them. Add less fat, such as margarine, sour cream, regular salad dressing and mayonnaise to foods. Eat fewer high-fat foods.  Some examples of high-fat foods include french fries, doughnuts, ice cream, and cakes.  Eat fewer sweets.  Limit foods and drinks that are high in sugar. This includes candy, cookies, regular soda, and sweetened drinks.  Exercise:  Exercise at least 30 minutes per day on most days of the week. Some examples of exercise include walking, biking, dancing, and swimming. You can also fit in more physical activity by taking the stairs instead of the elevator or parking farther away from stores. Ask your healthcare provider about the best exercise plan for you.      © Copyright [employer] 2018 Information is for End User's use only and may not be sold, redistributed or otherwise used for commercial purposes. All illustrations and images included in CareNotes® are the copyrighted property of A.D.A.M., Inc. or "[employer]

## 2024-03-07 ENCOUNTER — TELEPHONE (OUTPATIENT)
Dept: FAMILY MEDICINE CLINIC | Facility: CLINIC | Age: 74
End: 2024-03-07

## 2024-03-07 ENCOUNTER — TELEPHONE (OUTPATIENT)
Dept: INTERNAL MEDICINE CLINIC | Facility: CLINIC | Age: 74
End: 2024-03-07

## 2024-03-07 NOTE — TELEPHONE ENCOUNTER
She may want to try taking one instead of both meds to see if it helps her restless legs without being too hard to tolerate

## 2024-03-07 NOTE — TELEPHONE ENCOUNTER
"The following message was left on our voicemail    \"Yes, my name is Rama Linda. I left a message from Lala, according with the Sinemet and the Klonopin and I have drastic side effects. My number is [phone]. Thank you.\"  "

## 2024-03-07 NOTE — TELEPHONE ENCOUNTER
Patient called and said after she took 2 tablets of sinemet and 1 tablet of klonopin that her legs feel like rubber and are weak. She is now using a walker to get around. Her first dose was yesterday morning. She does not feel comforatble taking the medication anymore.

## 2024-03-08 NOTE — TELEPHONE ENCOUNTER
Patient will discontinue the sinemet. She will take the klonopin. She said her legs are a little better today but she is still using the walker to get around.

## 2024-04-11 ENCOUNTER — APPOINTMENT (OUTPATIENT)
Dept: LAB | Facility: CLINIC | Age: 74
End: 2024-04-11
Payer: MEDICARE

## 2024-04-11 DIAGNOSIS — M81.0 SENILE OSTEOPOROSIS: ICD-10-CM

## 2024-04-11 DIAGNOSIS — Z13.29 SCREENING FOR THYROID DISORDER: ICD-10-CM

## 2024-04-11 DIAGNOSIS — E78.00 HYPERCHOLESTEREMIA: ICD-10-CM

## 2024-04-11 DIAGNOSIS — Z13.0 SCREENING FOR DEFICIENCY ANEMIA: ICD-10-CM

## 2024-04-11 DIAGNOSIS — E55.9 VITAMIN D DEFICIENCY: ICD-10-CM

## 2024-04-11 DIAGNOSIS — E11.8 TYPE 2 DIABETES MELLITUS WITH COMPLICATION, WITHOUT LONG-TERM CURRENT USE OF INSULIN (HCC): ICD-10-CM

## 2024-04-11 LAB
25(OH)D3 SERPL-MCNC: 32.5 NG/ML (ref 30–100)
ANION GAP SERPL CALCULATED.3IONS-SCNC: 8 MMOL/L (ref 4–13)
BUN SERPL-MCNC: 13 MG/DL (ref 5–25)
CALCIUM SERPL-MCNC: 9.6 MG/DL (ref 8.4–10.2)
CHLORIDE SERPL-SCNC: 105 MMOL/L (ref 96–108)
CO2 SERPL-SCNC: 29 MMOL/L (ref 21–32)
CREAT SERPL-MCNC: 0.79 MG/DL (ref 0.6–1.3)
GFR SERPL CREATININE-BSD FRML MDRD: 73 ML/MIN/1.73SQ M
GLUCOSE SERPL-MCNC: 78 MG/DL (ref 65–140)
POTASSIUM SERPL-SCNC: 4.4 MMOL/L (ref 3.5–5.3)
SODIUM SERPL-SCNC: 142 MMOL/L (ref 135–147)

## 2024-04-11 PROCEDURE — 36415 COLL VENOUS BLD VENIPUNCTURE: CPT

## 2024-04-11 PROCEDURE — 82306 VITAMIN D 25 HYDROXY: CPT

## 2024-04-11 PROCEDURE — 80048 BASIC METABOLIC PNL TOTAL CA: CPT

## 2024-05-16 DIAGNOSIS — G25.81 RLS (RESTLESS LEGS SYNDROME): ICD-10-CM

## 2024-05-16 NOTE — TELEPHONE ENCOUNTER
Reason for call:   [x] Refill   [] Prior Auth  [] Other:     Office:   [x] PCP/Provider - Leann Mejía/Brant Carranza  [] Specialty/Provider -     Medication: Klonopin    Dose/Frequency: 2 mg     Quantity: #30    Pharmacy: [facility] [place]    Does the patient have enough for 3 days?   [] Yes   [x] No - Send as HP to POD

## 2024-05-17 RX ORDER — CLONAZEPAM 2 MG/1
2 TABLET ORAL 2 TIMES DAILY
Qty: 30 TABLET | Refills: 2 | Status: SHIPPED | OUTPATIENT
Start: 2024-05-17

## 2024-06-04 DIAGNOSIS — R13.19 ESOPHAGEAL DYSPHAGIA: ICD-10-CM

## 2024-06-05 RX ORDER — PANTOPRAZOLE SODIUM 40 MG/1
40 TABLET, DELAYED RELEASE ORAL DAILY
Qty: 90 TABLET | Refills: 1 | Status: SHIPPED | OUTPATIENT
Start: 2024-06-05

## 2024-06-21 DIAGNOSIS — E11.8 TYPE 2 DIABETES MELLITUS WITH COMPLICATION, WITHOUT LONG-TERM CURRENT USE OF INSULIN (HCC): ICD-10-CM

## 2024-07-01 ENCOUNTER — APPOINTMENT (OUTPATIENT)
Dept: LAB | Facility: CLINIC | Age: 74
End: 2024-07-01
Payer: MEDICARE

## 2024-07-01 DIAGNOSIS — E11.8 DM TYPE 2 CAUSING COMPLICATION (HCC): Primary | ICD-10-CM

## 2024-07-01 LAB
25(OH)D3 SERPL-MCNC: 68.4 NG/ML (ref 30–100)
ALBUMIN SERPL BCG-MCNC: 4.4 G/DL (ref 3.5–5)
ALP SERPL-CCNC: 36 U/L (ref 34–104)
ALT SERPL W P-5'-P-CCNC: 10 U/L (ref 7–52)
ANION GAP SERPL CALCULATED.3IONS-SCNC: 13 MMOL/L (ref 4–13)
AST SERPL W P-5'-P-CCNC: 15 U/L (ref 13–39)
BASOPHILS # BLD AUTO: 0.11 THOUSANDS/ÂΜL (ref 0–0.1)
BASOPHILS NFR BLD AUTO: 1 % (ref 0–1)
BILIRUB SERPL-MCNC: 1.74 MG/DL (ref 0.2–1)
BUN SERPL-MCNC: 13 MG/DL (ref 5–25)
CALCIUM SERPL-MCNC: 10.6 MG/DL (ref 8.4–10.2)
CHLORIDE SERPL-SCNC: 103 MMOL/L (ref 96–108)
CHOLEST SERPL-MCNC: 100 MG/DL
CO2 SERPL-SCNC: 29 MMOL/L (ref 21–32)
CREAT SERPL-MCNC: 0.68 MG/DL (ref 0.6–1.3)
EOSINOPHIL # BLD AUTO: 0.28 THOUSAND/ÂΜL (ref 0–0.61)
EOSINOPHIL NFR BLD AUTO: 3 % (ref 0–6)
ERYTHROCYTE [DISTWIDTH] IN BLOOD BY AUTOMATED COUNT: 12.2 % (ref 11.6–15.1)
EST. AVERAGE GLUCOSE BLD GHB EST-MCNC: 105 MG/DL
GFR SERPL CREATININE-BSD FRML MDRD: 86 ML/MIN/1.73SQ M
GLUCOSE P FAST SERPL-MCNC: 90 MG/DL (ref 65–99)
HBA1C MFR BLD: 5.3 %
HCT VFR BLD AUTO: 47 % (ref 34.8–46.1)
HDLC SERPL-MCNC: 48 MG/DL
HGB BLD-MCNC: 16 G/DL (ref 11.5–15.4)
IMM GRANULOCYTES # BLD AUTO: 0.01 THOUSAND/UL (ref 0–0.2)
IMM GRANULOCYTES NFR BLD AUTO: 0 % (ref 0–2)
LDLC SERPL CALC-MCNC: 34 MG/DL (ref 0–100)
LYMPHOCYTES # BLD AUTO: 3.59 THOUSANDS/ÂΜL (ref 0.6–4.47)
LYMPHOCYTES NFR BLD AUTO: 42 % (ref 14–44)
MCH RBC QN AUTO: 34.2 PG (ref 26.8–34.3)
MCHC RBC AUTO-ENTMCNC: 34 G/DL (ref 31.4–37.4)
MCV RBC AUTO: 100 FL (ref 82–98)
MONOCYTES # BLD AUTO: 0.64 THOUSAND/ÂΜL (ref 0.17–1.22)
MONOCYTES NFR BLD AUTO: 8 % (ref 4–12)
NEUTROPHILS # BLD AUTO: 3.9 THOUSANDS/ÂΜL (ref 1.85–7.62)
NEUTS SEG NFR BLD AUTO: 46 % (ref 43–75)
NONHDLC SERPL-MCNC: 52 MG/DL
NRBC BLD AUTO-RTO: 0 /100 WBCS
PLATELET # BLD AUTO: 213 THOUSANDS/UL (ref 149–390)
PMV BLD AUTO: 10.8 FL (ref 8.9–12.7)
POTASSIUM SERPL-SCNC: 3.6 MMOL/L (ref 3.5–5.3)
PROT SERPL-MCNC: 6.7 G/DL (ref 6.4–8.4)
RBC # BLD AUTO: 4.68 MILLION/UL (ref 3.81–5.12)
SODIUM SERPL-SCNC: 145 MMOL/L (ref 135–147)
TRIGL SERPL-MCNC: 91 MG/DL
TSH SERPL DL<=0.05 MIU/L-ACNC: 1.24 UIU/ML (ref 0.45–4.5)
WBC # BLD AUTO: 8.53 THOUSAND/UL (ref 4.31–10.16)

## 2024-07-02 ENCOUNTER — APPOINTMENT (OUTPATIENT)
Dept: LAB | Facility: CLINIC | Age: 74
End: 2024-07-02
Payer: MEDICARE

## 2024-07-02 ENCOUNTER — OFFICE VISIT (OUTPATIENT)
Dept: INTERNAL MEDICINE CLINIC | Facility: CLINIC | Age: 74
End: 2024-07-02
Payer: MEDICARE

## 2024-07-02 VITALS
OXYGEN SATURATION: 95 % | SYSTOLIC BLOOD PRESSURE: 102 MMHG | TEMPERATURE: 97 F | WEIGHT: 141 LBS | DIASTOLIC BLOOD PRESSURE: 62 MMHG | HEART RATE: 76 BPM | BODY MASS INDEX: 23.49 KG/M2 | HEIGHT: 65 IN

## 2024-07-02 DIAGNOSIS — E11.8 TYPE 2 DIABETES MELLITUS WITH COMPLICATION, WITHOUT LONG-TERM CURRENT USE OF INSULIN (HCC): Primary | ICD-10-CM

## 2024-07-02 DIAGNOSIS — I10 PRIMARY HYPERTENSION: ICD-10-CM

## 2024-07-02 DIAGNOSIS — G25.81 RLS (RESTLESS LEGS SYNDROME): ICD-10-CM

## 2024-07-02 PROCEDURE — G2211 COMPLEX E/M VISIT ADD ON: HCPCS | Performed by: PHYSICIAN ASSISTANT

## 2024-07-02 PROCEDURE — 82043 UR ALBUMIN QUANTITATIVE: CPT

## 2024-07-02 PROCEDURE — 82570 ASSAY OF URINE CREATININE: CPT

## 2024-07-02 PROCEDURE — 99213 OFFICE O/P EST LOW 20 MIN: CPT | Performed by: PHYSICIAN ASSISTANT

## 2024-07-02 RX ORDER — CLONAZEPAM 2 MG/1
2 TABLET ORAL 2 TIMES DAILY
Qty: 30 TABLET | Refills: 2 | Status: SHIPPED | OUTPATIENT
Start: 2024-07-02

## 2024-07-02 NOTE — ASSESSMENT & PLAN NOTE
Lab Results   Component Value Date    HGBA1C 5.3 07/01/2024   A1C on 6/2024: 5.3  On ACE and statin  Eye exam UTD with Dr. Rey  Foot exam performed 7/2/24

## 2024-07-02 NOTE — PROGRESS NOTES
Ambulatory Visit  Name: Rama Linda      : 1950      MRN: [id number]  Encounter Provider: Leann Mejía PA-C  Encounter Date: 2024   Encounter department: [facility]    Assessment & Plan   1. Type 2 diabetes mellitus with complication, without long-term current use of insulin ([facility])  Assessment & Plan:    Lab Results   Component Value Date    HGBA1C 5.3 2024   A1C on 2024: 5.3  On ACE and statin  Eye exam UTD with Dr. Rey  Foot exam performed 24  2. RLS (restless legs syndrome)  -     clonazePAM (KlonoPIN) 2 mg tablet; Take 1 tablet (2 mg total) by mouth 2 (two) times a day  3. Primary hypertension  Assessment & Plan:  Pts symptoms are stable with current regime. No changes at present.          History of Present Illness     Pt presents for routine visit. She is doing well overall. Dexa and labs are current. Recent A1C excellent at 5.3. BP is nicely controlled. Foot exam performed. Eye exam due and she is in the process of scheduling. She defers mammogram. She is doing well since restarting her RLS medications. She has a fairly large lipoma on her back near her right shoulder blade. This does not bother her.         Review of Systems   Constitutional:  Negative for chills and fever.   HENT:  Negative for congestion, ear pain, hearing loss, postnasal drip, rhinorrhea, sinus pressure, sinus pain, sore throat and trouble swallowing.    Eyes:  Negative for pain and visual disturbance.   Respiratory:  Negative for cough, chest tightness, shortness of breath and wheezing.    Cardiovascular: Negative.  Negative for chest pain, palpitations and leg swelling.   Gastrointestinal:  Negative for abdominal pain, blood in stool, constipation, diarrhea, nausea and vomiting.   Endocrine: Negative for cold intolerance, heat intolerance, polydipsia, polyphagia and polyuria.   Genitourinary:  Negative for difficulty urinating, dysuria, flank pain and urgency.   Musculoskeletal:   "Negative for arthralgias, back pain, gait problem and myalgias.   Skin:  Negative for rash.   Allergic/Immunologic: Negative.    Neurological:  Negative for dizziness, weakness, light-headedness and headaches.   Hematological: Negative.    Psychiatric/Behavioral:  Negative for behavioral problems, dysphoric mood and sleep disturbance. The patient is not nervous/anxious.        Objective     /62   Pulse 76   Temp (!) 97 °F (36.1 °C)   Ht 5' 4.5\" (1.638 m)   Wt 64 kg (141 lb)   SpO2 95%   BMI 23.83 kg/m²     Physical Exam  Constitutional:       Appearance: She is well-developed.   HENT:      Head: Normocephalic and atraumatic.      Right Ear: External ear normal.      Left Ear: External ear normal.      Nose: Nose normal.   Eyes:      Conjunctiva/sclera: Conjunctivae normal.   Cardiovascular:      Rate and Rhythm: Normal rate and regular rhythm.      Pulses: no weak pulses.           Dorsalis pedis pulses are 2+ on the right side and 2+ on the left side.        Posterior tibial pulses are 2+ on the right side and 2+ on the left side.      Heart sounds: Normal heart sounds.   Pulmonary:      Effort: Pulmonary effort is normal.      Breath sounds: Normal breath sounds.   Abdominal:      General: Bowel sounds are normal.      Palpations: Abdomen is soft.   Musculoskeletal:         General: Normal range of motion.      Cervical back: Normal range of motion and neck supple.   Feet:      Right foot:      Skin integrity: No ulcer, skin breakdown, erythema, warmth, callus or dry skin.      Left foot:      Skin integrity: No ulcer, skin breakdown, erythema, warmth, callus or dry skin.   Skin:     General: Skin is warm and dry.          Neurological:      Mental Status: She is alert and oriented to person, place, and time.   Psychiatric:         Behavior: Behavior normal.         Thought Content: Thought content normal.         Judgment: Judgment normal.       Administrative Statements             Diabetic Foot " Exam    Patient's shoes and socks removed.    Right Foot/Ankle   Right Foot Inspection  Skin Exam: skin normal and skin intact. No dry skin, no warmth, no callus, no erythema, no maceration, no abnormal color, no pre-ulcer, no ulcer and no callus.     Toe Exam: ROM and strength within normal limits.     Sensory   Vibration: intact  Proprioception: intact  Monofilament testing: intact    Vascular  Capillary refills: < 3 seconds  The right DP pulse is 2+. The right PT pulse is 2+.     Left Foot/Ankle  Left Foot Inspection  Skin Exam: skin normal and skin intact. No dry skin, no warmth, no erythema, no maceration, normal color, no pre-ulcer, no ulcer and no callus.     Toe Exam: ROM and strength within normal limits.     Sensory   Vibration: intact  Proprioception: intact  Monofilament testing: intact    Vascular  Capillary refills: < 3 seconds  The left DP pulse is 2+. The left PT pulse is 2+.     Assign Risk Category  No deformity present  No loss of protective sensation  No weak pulses  Risk: 0

## 2024-07-03 LAB
CREAT UR-MCNC: 94.5 MG/DL
MICROALBUMIN UR-MCNC: 8.9 MG/L
MICROALBUMIN/CREAT 24H UR: 9 MG/G CREATININE (ref 0–30)

## 2024-07-19 DIAGNOSIS — F41.9 ANXIETY: ICD-10-CM

## 2024-07-20 RX ORDER — ESCITALOPRAM OXALATE 20 MG/1
20 TABLET ORAL DAILY
Qty: 90 TABLET | Refills: 1 | Status: SHIPPED | OUTPATIENT
Start: 2024-07-20

## 2024-08-07 DIAGNOSIS — E03.9 ACQUIRED HYPOTHYROIDISM: ICD-10-CM

## 2024-08-07 RX ORDER — LEVOTHYROXINE SODIUM 0.03 MG/1
TABLET ORAL
Qty: 90 TABLET | Refills: 1 | Status: SHIPPED | OUTPATIENT
Start: 2024-08-07

## 2024-08-07 NOTE — TELEPHONE ENCOUNTER
Patient notified of result.     Electronically signed by: Christopher Riley DO  10/6/2021         Reason for call:   [x] Refill   [] Prior Auth  [] Other:     Office:   [x] PCP/Provider - Leann Mejía PA-C /[facility]   [] Specialty/Provider -     Medication: levothyroxine 25 mcg tablet     Dose/Frequency: Take 1 tablet by mouth once daily in the early morning     Quantity: 90    Pharmacy: [facility] [address] [place] [address] [id number] [phone]     Does the patient have enough for 3 days?   [x] Yes   [] No - Send as HP to POD

## 2024-08-28 DIAGNOSIS — E78.5 HYPERLIPIDEMIA, UNSPECIFIED HYPERLIPIDEMIA TYPE: ICD-10-CM

## 2024-08-28 RX ORDER — ATORVASTATIN CALCIUM 20 MG/1
20 TABLET, FILM COATED ORAL DAILY
Qty: 90 TABLET | Refills: 1 | Status: SHIPPED | OUTPATIENT
Start: 2024-08-28

## 2024-09-23 DIAGNOSIS — G25.81 RLS (RESTLESS LEGS SYNDROME): ICD-10-CM

## 2024-09-23 NOTE — TELEPHONE ENCOUNTER
Reason for call:   [x] Refill   [] Prior Auth  [] Other:     Office:   [x] PCP/Provider -   [] Specialty/Provider -     Medication:     clonazePAM (KlonoPIN) 2 mg tablet       Dose/Frequency: Take 1 tablet (2 mg total) by mouth 2 (two) times a day     Quantity:  30 tablet     Pharmacy: [facility] [address] [place] [address] [address] [phone]     Does the patient have enough for 3 days?   [] Yes   [x] No - Send as HP to POD

## 2024-09-24 RX ORDER — CLONAZEPAM 2 MG/1
2 TABLET ORAL 2 TIMES DAILY
Qty: 30 TABLET | Refills: 0 | Status: SHIPPED | OUTPATIENT
Start: 2024-09-24

## 2024-10-08 DIAGNOSIS — E11.8 TYPE 2 DIABETES MELLITUS WITH COMPLICATION, WITHOUT LONG-TERM CURRENT USE OF INSULIN (HCC): ICD-10-CM

## 2024-10-08 NOTE — TELEPHONE ENCOUNTER
Reason for call:   [x] Refill   [] Prior Auth  [] Other:     Office:   [x] PCP/Provider -   [] Specialty/Provider -     metFORMIN (GLUCOPHAGE) 1000 MG tablet 1,000 mg, Oral, 2 times daily with meals       Quantity: 90    [facility]    Does the patient have enough for 3 days?   [x] Yes   [] No - Send as HP to POD

## 2024-10-21 DIAGNOSIS — G25.81 RLS (RESTLESS LEGS SYNDROME): ICD-10-CM

## 2024-10-21 RX ORDER — CLONAZEPAM 2 MG/1
2 TABLET ORAL 2 TIMES DAILY
Qty: 60 TABLET | Refills: 0 | Status: SHIPPED | OUTPATIENT
Start: 2024-10-21

## 2024-10-21 NOTE — TELEPHONE ENCOUNTER
Reason for call:   [x] Refill   [] Prior Auth  [] Other:     Office:   [x] PCP/Provider - : Leann Mejía PA-C   [] Specialty/Provider -     Medication:     clonazePAM (KlonoPIN) 2 mg tablet       Dose/Frequency:     Take 1 tablet (2 mg total) by mouth 2 (two) times a day       Quantity: 30    Pharmacy: [facility] [address] [place] [address] [address] [phone]     Does the patient have enough for 3 days?   [x] Yes   [] No - Send as HP to POD

## 2024-11-08 ENCOUNTER — TELEPHONE (OUTPATIENT)
Age: 74
End: 2024-11-08

## 2024-11-08 NOTE — TELEPHONE ENCOUNTER
Patient had to cancel her 11/4 appointment last minute and called to reschedule it today.  Leann is booked out until mid-March.  This appointment is for a diabetic check and she has also developed a hernia in the left lower quadrant the size of a lemon that she would like to have addressed.  She does get twinges of pain.  She does not feel this should wait another 4 months to be addressed.  Please advise.

## 2024-11-11 ENCOUNTER — OFFICE VISIT (OUTPATIENT)
Dept: INTERNAL MEDICINE CLINIC | Facility: CLINIC | Age: 74
End: 2024-11-11
Payer: MEDICARE

## 2024-11-11 VITALS
SYSTOLIC BLOOD PRESSURE: 150 MMHG | HEART RATE: 89 BPM | OXYGEN SATURATION: 93 % | BODY MASS INDEX: 21.83 KG/M2 | WEIGHT: 131 LBS | DIASTOLIC BLOOD PRESSURE: 84 MMHG | TEMPERATURE: 97.9 F | HEIGHT: 65 IN

## 2024-11-11 DIAGNOSIS — Z23 ENCOUNTER FOR IMMUNIZATION: ICD-10-CM

## 2024-11-11 DIAGNOSIS — R19.04 LEFT LOWER QUADRANT ABDOMINAL MASS: Primary | ICD-10-CM

## 2024-11-11 PROCEDURE — 90662 IIV NO PRSV INCREASED AG IM: CPT | Performed by: PHYSICIAN ASSISTANT

## 2024-11-11 PROCEDURE — 99213 OFFICE O/P EST LOW 20 MIN: CPT | Performed by: PHYSICIAN ASSISTANT

## 2024-11-11 PROCEDURE — G0008 ADMIN INFLUENZA VIRUS VAC: HCPCS | Performed by: PHYSICIAN ASSISTANT

## 2024-11-11 NOTE — PROGRESS NOTES
Ambulatory Visit  Name: Rama Linda      : 1950      MRN: [id number]  Encounter Provider: Leann Mejía PA-C  Encounter Date: 2024   Encounter department: [facility]    Assessment & Plan  Encounter for immunization    Orders:    influenza vaccine, high-dose, PF 0.5 mL (Fluzone High Dose)    Left lower quadrant abdominal mass  Will get CT to better evaluate and treat according to results    Orders:    CT abdomen pelvis w contrast; Future      Depression Screening and Follow-up Plan: Patient was screened for depression during today's encounter. They screened negative with a PHQ-9 score of 0.      History of Present Illness     Pt presents for evaluation of mass in her left lower abdomen. She notes this abruptly after showering a few weeks. She notes she didn't notice it at all prior to a few weeks ago. No injury. The mass is visible and palpable. It is approx the size of a lemon and firm. No pain. BM are inconssitent, at times normal, at times loose and with fecal urgency. Previous abdominal surgeries include hysterectomy, appendectomy, and cholecystectomy.         History obtained from : patient  Review of Systems   Constitutional:  Negative for chills and fever.   HENT:  Negative for congestion, ear pain, hearing loss, postnasal drip, rhinorrhea, sinus pressure, sinus pain, sore throat and trouble swallowing.    Eyes:  Negative for pain and visual disturbance.   Respiratory:  Negative for cough, chest tightness, shortness of breath and wheezing.    Cardiovascular: Negative.  Negative for chest pain, palpitations and leg swelling.   Gastrointestinal:  Negative for abdominal pain, blood in stool, constipation, diarrhea, nausea and vomiting.   Endocrine: Negative for cold intolerance, heat intolerance, polydipsia, polyphagia and polyuria.   Genitourinary:  Negative for difficulty urinating, dysuria, flank pain and urgency.   Musculoskeletal:  Negative for arthralgias, back pain,  gait problem and myalgias.   Skin:  Negative for rash.   Allergic/Immunologic: Negative.    Neurological:  Negative for dizziness, weakness, light-headedness and headaches.   Hematological: Negative.    Psychiatric/Behavioral:  Negative for behavioral problems, dysphoric mood and sleep disturbance. The patient is not nervous/anxious.      Current Outpatient Medications on File Prior to Visit   Medication Sig Dispense Refill    albuterol (2.5 mg/3 mL) 0.083 % nebulizer solution Take 1 vial (2.5 mg total) by nebulization every 4 (four) hours as needed for shortness of breath Disp 1 box 270 vial 1    aspirin (ECOTRIN LOW STRENGTH) 81 mg EC tablet Take 81 mg by mouth daily      atorvastatin (LIPITOR) 20 mg tablet Take 1 tablet by mouth once daily 90 tablet 1    Capriza MICROLET LANCETS lancets Use as instructed BID E11.65 100 each 5    Calcium 500 MG tablet Take 1 tablet by mouth every other day      cholecalciferol (VITAMIN D3) 1,000 units tablet Take 3 tablets by mouth every other day       denosumab (PROLIA) 60 mg/mL Inject under the skin every 6 (six) months      escitalopram (LEXAPRO) 20 mg tablet Take 1 tablet by mouth once daily 90 tablet 1    Ferrous Sulfate (IRON) 325 (65 Fe) MG TABS Take 1 tablet (325 mg total) by mouth daily 30 each 3    furosemide (LASIX) 40 mg tablet Take 1 tablet (40 mg total) by mouth daily PRN 30 tablet 2    Glucose Blood (ONETOUCH ULTRA BLUE VI) Test 3 (three) times a day        glucose blood (OneTouch Ultra) test strip Once daily 100 strip 5    ketoconazole (NIZORAL) 2 % cream Apply topically daily 30 g 0    levothyroxine 25 mcg tablet Take 1 tablet by mouth once daily in the early morning 90 tablet 1    lisinopril (ZESTRIL) 2.5 mg tablet Take 1 tablet (2.5 mg total) by mouth daily 90 tablet 3    Magnesium 80 MG TABS Take 25 mg by mouth in the morning      metFORMIN (GLUCOPHAGE) 1000 MG tablet Take 1 tablet (1,000 mg total) by mouth 2 (two) times a day with meals 180 tablet 1     "methocarbamol (ROBAXIN) 750 mg tablet Take 1 tablet (750 mg total) by mouth every 8 (eight) hours 90 tablet 0    pantoprazole (PROTONIX) 40 mg tablet Take 1 tablet (40 mg total) by mouth daily 90 tablet 1    potassium chloride (MICRO-K) 10 MEQ CR capsule Take 10 mEq by mouth 2 (two) times a day PRN w/lasix       VITAMIN B COMPLEX-C PO Take 1 tablet by mouth daily      clonazePAM (KlonoPIN) 2 mg tablet Take 1 tablet (2 mg total) by mouth 2 (two) times a day 60 tablet 0     No current facility-administered medications on file prior to visit.      Social History     Tobacco Use    Smoking status: Some Days     Current packs/day: 0.50     Types: Cigarettes     Passive exposure: Current    Smokeless tobacco: Never    Tobacco comments:     1 a month   Vaping Use    Vaping status: Never Used   Substance and Sexual Activity    Alcohol use: Yes     Comment: rarely    Drug use: No    Sexual activity: Yes         Objective     /84   Pulse 89   Temp 97.9 °F (36.6 °C) (Tympanic)   Ht 5' 4.5\" (1.638 m)   Wt 59.4 kg (131 lb)   SpO2 93%   BMI 22.14 kg/m²     Physical Exam  Vitals and nursing note reviewed.   Constitutional:       General: She is not in acute distress.     Appearance: Normal appearance. She is well-developed. She is not diaphoretic.   HENT:      Head: Normocephalic and atraumatic.      Right Ear: External ear normal.      Left Ear: External ear normal.      Nose: Nose normal.      Mouth/Throat:      Pharynx: No oropharyngeal exudate.   Eyes:      General: No scleral icterus.        Right eye: No discharge.         Left eye: No discharge.      Conjunctiva/sclera: Conjunctivae normal.      Pupils: Pupils are equal, round, and reactive to light.   Neck:      Thyroid: No thyromegaly.   Cardiovascular:      Rate and Rhythm: Normal rate and regular rhythm.      Heart sounds: Normal heart sounds. No murmur heard.     No friction rub. No gallop.   Pulmonary:      Effort: Pulmonary effort is normal. No " respiratory distress.      Breath sounds: Normal breath sounds. No wheezing or rales.   Abdominal:      General: Bowel sounds are normal. There is no distension.      Palpations: Abdomen is soft. There is mass (LLQ approx size of a lemon, non tender).      Tenderness: There is no abdominal tenderness.   Musculoskeletal:         General: No tenderness or deformity. Normal range of motion.      Cervical back: Normal range of motion and neck supple.   Skin:     General: Skin is warm and dry.   Neurological:      Mental Status: She is alert and oriented to person, place, and time.      Cranial Nerves: No cranial nerve deficit.   Psychiatric:         Behavior: Behavior normal.         Thought Content: Thought content normal.         Judgment: Judgment normal.       Administrative Statements   I have spent a total time of 20 minutes in caring for this patient on the day of the visit/encounter including Risks and benefits of tx options, Instructions for management, Patient and family education, Importance of tx compliance, Risk factor reductions, Impressions, Counseling / Coordination of care, Documenting in the medical record, Reviewing / ordering tests, medicine, procedures  , and Obtaining or reviewing history  .

## 2024-11-11 NOTE — ASSESSMENT & PLAN NOTE
Will get CT to better evaluate and treat according to results    Orders:    CT abdomen pelvis w contrast; Future

## 2024-11-13 DIAGNOSIS — I10 ESSENTIAL HYPERTENSION: ICD-10-CM

## 2024-11-14 RX ORDER — LISINOPRIL 2.5 MG/1
2.5 TABLET ORAL DAILY
Qty: 90 TABLET | Refills: 1 | Status: SHIPPED | OUTPATIENT
Start: 2024-11-14

## 2024-12-02 DIAGNOSIS — R13.19 ESOPHAGEAL DYSPHAGIA: ICD-10-CM

## 2024-12-03 RX ORDER — PANTOPRAZOLE SODIUM 40 MG/1
40 TABLET, DELAYED RELEASE ORAL DAILY
Qty: 90 TABLET | Refills: 1 | Status: SHIPPED | OUTPATIENT
Start: 2024-12-03

## 2024-12-07 DIAGNOSIS — G25.81 RLS (RESTLESS LEGS SYNDROME): ICD-10-CM

## 2024-12-09 RX ORDER — CLONAZEPAM 2 MG/1
2 TABLET ORAL 2 TIMES DAILY
Qty: 60 TABLET | Refills: 0 | Status: SHIPPED | OUTPATIENT
Start: 2024-12-09

## 2024-12-10 ENCOUNTER — TELEPHONE (OUTPATIENT)
Dept: ADMINISTRATIVE | Facility: OTHER | Age: 74
End: 2024-12-10

## 2024-12-10 NOTE — TELEPHONE ENCOUNTER
----- Message from Leann Mejía PA-C sent at 12/9/2024  3:17 PM EST -----  03/14/24 8:11 AM    Hello, our patient  has had Diabetic Eye Exam completed/performed. Please assist in updating the patient chart by making an External outreach to Dr. Rey. The date of service is current.    Thank you,  Leann Mejía PA-C  [facility]

## 2024-12-10 NOTE — TELEPHONE ENCOUNTER
Upon review of the In Basket request and the patient's chart, initial outreach has been made via fax to facility. Please see Contacts section for details.     Thank you  Marlen Corbett

## 2024-12-10 NOTE — LETTER
Diabetic Eye Exam Form    Date Requested: 12/10/24  Patient: Rama Linda  Patient : 1950   Referring Provider: Leann Mejía PA-C      DIABETIC Eye Exam Date _______________________________      Type of Exam MUST be documented for Diabetic Eye Exams. Please CHECK ONE.     Retinal Exam       Dilated Retinal Exam       OCT       Optomap-Iris Exam      Fundus Photography       Left Eye - Please check Retinopathy or No Retinopathy        Exam did show retinopathy    Exam did not show retinopathy       Right Eye - Please check Retinopathy or No Retinopathy       Exam did show retinopathy    Exam did not show retinopathy       Comments __________________________________________________________    Practice Providing Exam ______________________________________________    Exam Performed By (print name) _______________________________________      Provider Signature ___________________________________________________      These reports are needed for  compliance.  Please fax this completed form and a copy of the Diabetic Eye Exam report to our office located at [address] [place], [place] [id number] as soon as possible via Fax [phone] attention Marlen: Phone [phone]  We thank you for your assistance in treating our mutual patient.

## 2024-12-12 NOTE — TELEPHONE ENCOUNTER
Upon review of the In Basket request we were able to locate, review, and update the patient chart as requested for Diabetic Eye Exam.    Patient last seen 2021    Any additional questions or concerns should be emailed to the Practice Liaisons via the appropriate education email address, please do not reply via In Basket.    Thank you  Marlen Corbett   [facility]

## 2025-01-09 DIAGNOSIS — F41.9 ANXIETY: ICD-10-CM

## 2025-01-10 RX ORDER — ESCITALOPRAM OXALATE 20 MG/1
20 TABLET ORAL DAILY
Qty: 90 TABLET | Refills: 0 | Status: SHIPPED | OUTPATIENT
Start: 2025-01-10

## 2025-01-17 DIAGNOSIS — G25.81 RLS (RESTLESS LEGS SYNDROME): ICD-10-CM

## 2025-01-17 RX ORDER — CLONAZEPAM 2 MG/1
2 TABLET ORAL 2 TIMES DAILY
Qty: 60 TABLET | Refills: 0 | Status: SHIPPED | OUTPATIENT
Start: 2025-01-17

## 2025-01-21 ENCOUNTER — TELEPHONE (OUTPATIENT)
Age: 75
End: 2025-01-21

## 2025-01-21 DIAGNOSIS — E11.8 TYPE 2 DIABETES MELLITUS WITH COMPLICATION, WITHOUT LONG-TERM CURRENT USE OF INSULIN (HCC): Primary | ICD-10-CM

## 2025-01-21 NOTE — TELEPHONE ENCOUNTER
Pt called, has a CT scan on 2/11, she spoke with the [facility]. They need pcp to order a bun creatinine lab order so that she can get the CT scan done. Please advise

## 2025-02-12 DIAGNOSIS — E03.9 ACQUIRED HYPOTHYROIDISM: ICD-10-CM

## 2025-02-12 NOTE — TELEPHONE ENCOUNTER
Reason for call:   [x] Refill   [] Prior Auth  [] Other:     Office:   [x] PCP/Provider - Leann Mejía PA-C   [] Specialty/Provider -     Medication: levothyroxine 25 mcg tablet     Dose/Frequency: Take 1 tablet by mouth once daily in the early morning     Quantity: 90    [facility] [facility] [address]     Does the patient have enough for 3 days?   [x] Yes   [] No - Send as HP to POD

## 2025-02-13 RX ORDER — LEVOTHYROXINE SODIUM 25 UG/1
TABLET ORAL
Qty: 90 TABLET | Refills: 0 | Status: SHIPPED | OUTPATIENT
Start: 2025-02-13

## 2025-02-24 DIAGNOSIS — G25.81 RLS (RESTLESS LEGS SYNDROME): ICD-10-CM

## 2025-02-24 DIAGNOSIS — E78.5 HYPERLIPIDEMIA, UNSPECIFIED HYPERLIPIDEMIA TYPE: ICD-10-CM

## 2025-02-24 RX ORDER — ATORVASTATIN CALCIUM 20 MG/1
20 TABLET, FILM COATED ORAL DAILY
Qty: 90 TABLET | Refills: 1 | Status: SHIPPED | OUTPATIENT
Start: 2025-02-24

## 2025-02-24 NOTE — TELEPHONE ENCOUNTER
Reason for call:   [x] Refill   [] Prior Auth  [] Other:     Office:   [x] PCP/Provider - Lala Mejía  [] Specialty/Provider -     Medication: Clonazepam 2mg    Dose/Frequency: 1 tab bid    Quantity: 60    Pharmacy: [facility] [address] [phone]     Does the patient have enough for 3 days?   [x] Yes   [] No - Send as HP to POD

## 2025-02-25 RX ORDER — CLONAZEPAM 2 MG/1
2 TABLET ORAL 2 TIMES DAILY
Qty: 60 TABLET | Refills: 0 | Status: SHIPPED | OUTPATIENT
Start: 2025-02-25

## 2025-03-14 ENCOUNTER — APPOINTMENT (OUTPATIENT)
Dept: LAB | Facility: CLINIC | Age: 75
End: 2025-03-14
Payer: MEDICARE

## 2025-03-14 DIAGNOSIS — E11.8 TYPE 2 DIABETES MELLITUS WITH COMPLICATION, WITHOUT LONG-TERM CURRENT USE OF INSULIN (HCC): ICD-10-CM

## 2025-03-14 LAB
ANION GAP SERPL CALCULATED.3IONS-SCNC: 7 MMOL/L (ref 4–13)
BUN SERPL-MCNC: 21 MG/DL (ref 5–25)
CALCIUM SERPL-MCNC: 9.9 MG/DL (ref 8.4–10.2)
CHLORIDE SERPL-SCNC: 102 MMOL/L (ref 96–108)
CO2 SERPL-SCNC: 32 MMOL/L (ref 21–32)
CREAT SERPL-MCNC: 0.78 MG/DL (ref 0.6–1.3)
GFR SERPL CREATININE-BSD FRML MDRD: 74 ML/MIN/1.73SQ M
GLUCOSE SERPL-MCNC: 107 MG/DL (ref 65–140)
POTASSIUM SERPL-SCNC: 4.1 MMOL/L (ref 3.5–5.3)
SODIUM SERPL-SCNC: 141 MMOL/L (ref 135–147)

## 2025-03-14 PROCEDURE — 36415 COLL VENOUS BLD VENIPUNCTURE: CPT

## 2025-03-14 PROCEDURE — 80048 BASIC METABOLIC PNL TOTAL CA: CPT

## 2025-03-17 ENCOUNTER — HOSPITAL ENCOUNTER (OUTPATIENT)
Dept: CT IMAGING | Facility: HOSPITAL | Age: 75
Discharge: HOME | End: 2025-03-17
Payer: MEDICARE

## 2025-03-17 DIAGNOSIS — R19.04 LEFT LOWER QUADRANT ABDOMINAL MASS: ICD-10-CM

## 2025-03-17 PROCEDURE — 74177 CT ABD & PELVIS W/CONTRAST: CPT

## 2025-03-17 RX ADMIN — IOHEXOL 100 ML: 350 INJECTION, SOLUTION INTRAVENOUS at 10:51

## 2025-03-27 ENCOUNTER — RESULTS FOLLOW-UP (OUTPATIENT)
Dept: INTERNAL MEDICINE CLINIC | Facility: CLINIC | Age: 75
End: 2025-03-27

## 2025-03-28 ENCOUNTER — RA CDI HCC (OUTPATIENT)
Dept: OTHER | Facility: HOSPITAL | Age: 75
End: 2025-03-28

## 2025-04-01 ENCOUNTER — OFFICE VISIT (OUTPATIENT)
Dept: INTERNAL MEDICINE CLINIC | Facility: CLINIC | Age: 75
End: 2025-04-01
Payer: MEDICARE

## 2025-04-01 VITALS
DIASTOLIC BLOOD PRESSURE: 62 MMHG | HEIGHT: 65 IN | WEIGHT: 126 LBS | TEMPERATURE: 96.6 F | HEART RATE: 49 BPM | SYSTOLIC BLOOD PRESSURE: 120 MMHG | BODY MASS INDEX: 20.99 KG/M2 | OXYGEN SATURATION: 96 %

## 2025-04-01 DIAGNOSIS — Z00.00 MEDICARE ANNUAL WELLNESS VISIT, SUBSEQUENT: Primary | ICD-10-CM

## 2025-04-01 DIAGNOSIS — Z12.31 ENCOUNTER FOR SCREENING MAMMOGRAM FOR BREAST CANCER: ICD-10-CM

## 2025-04-01 DIAGNOSIS — G25.81 RLS (RESTLESS LEGS SYNDROME): ICD-10-CM

## 2025-04-01 DIAGNOSIS — E11.8 TYPE 2 DIABETES MELLITUS WITH COMPLICATION, WITHOUT LONG-TERM CURRENT USE OF INSULIN (HCC): ICD-10-CM

## 2025-04-01 DIAGNOSIS — E08.42 DIABETES MELLITUS DUE TO UNDERLYING CONDITION WITH DIABETIC POLYNEUROPATHY, UNSPECIFIED WHETHER LONG TERM INSULIN USE (HCC): ICD-10-CM

## 2025-04-01 DIAGNOSIS — M54.50 CHRONIC BILATERAL LOW BACK PAIN WITHOUT SCIATICA: ICD-10-CM

## 2025-04-01 DIAGNOSIS — J44.9 CHRONIC OBSTRUCTIVE PULMONARY DISEASE, UNSPECIFIED COPD TYPE (HCC): ICD-10-CM

## 2025-04-01 DIAGNOSIS — G89.29 CHRONIC BILATERAL LOW BACK PAIN WITHOUT SCIATICA: ICD-10-CM

## 2025-04-01 DIAGNOSIS — E28.39 PRIMARY OVARIAN FAILURE: ICD-10-CM

## 2025-04-01 LAB — SL AMB POCT HEMOGLOBIN AIC: 5 (ref ?–6.5)

## 2025-04-01 PROCEDURE — 83036 HEMOGLOBIN GLYCOSYLATED A1C: CPT | Performed by: PHYSICIAN ASSISTANT

## 2025-04-01 PROCEDURE — G0439 PPPS, SUBSEQ VISIT: HCPCS | Performed by: PHYSICIAN ASSISTANT

## 2025-04-01 RX ORDER — METHOCARBAMOL 750 MG/1
750 TABLET, FILM COATED ORAL EVERY 8 HOURS SCHEDULED
Qty: 90 TABLET | Refills: 0 | Status: SHIPPED | OUTPATIENT
Start: 2025-04-01

## 2025-04-01 NOTE — ASSESSMENT & PLAN NOTE
Orders:  •  Ambulatory Referral to Rheumatology; Future  •  methocarbamol (ROBAXIN) 750 mg tablet; Take 1 tablet (750 mg total) by mouth every 8 (eight) hours

## 2025-04-01 NOTE — PROGRESS NOTES
Name: Rama Linda      : 1950      MRN: [id number]  Encounter Provider: Leann Mejía PA-C  Encounter Date: 2025   Encounter department: [facility]    Assessment & Plan  Encounter for screening mammogram for breast cancer    Orders:  •  Mammo screening bilateral w 3d and cad; Future    Type 2 diabetes mellitus with complication, without long-term current use of insulin (HCC)    Lab Results   Component Value Date    HGBA1C 5.0 2025       Orders:  •  POCT hemoglobin A1c  •  Ambulatory Referral to Ophthalmology; Future    Primary ovarian failure    Orders:  •  DXA bone density spine hip and pelvis; Future    Chronic bilateral low back pain without sciatica    Orders:  •  Ambulatory Referral to Rheumatology; Future  •  methocarbamol (ROBAXIN) 750 mg tablet; Take 1 tablet (750 mg total) by mouth every 8 (eight) hours    RLS (restless legs syndrome)    Orders:  •  Ambulatory Referral to Rheumatology; Future    Chronic obstructive pulmonary disease, unspecified COPD type (HCC)         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, unspecified whether long term insulin use ([facility])    Lab Results   Component Value Date    HGBA1C 5.0 2025            Medicare annual wellness visit, subsequent           Depression Screening and Follow-up Plan: Patient's depression screening was positive with a PHQ-9 score of 8.   Continue regular follow-up with their mental health provider who is managing their mental health condition(s).     Falls Plan of Care: balance, strength, and gait training instructions were provided.     Urinary Incontinence Plan of Care: counseling topics discussed: practice Kegel (pelvic floor strengthening) exercises.       Preventive health issues were discussed with patient, and age appropriate screening tests were ordered as noted in patient's After Visit Summary. Personalized health advice and appropriate referrals for health education  or preventive services given if needed, as noted in patient's After Visit Summary.    History of Present Illness     HPI   Patient Care Team:  Leann Mejía PA-C as PCP - General (Internal Medicine)  Josef James III, MD (Gastroenterology)  Jose Carlos Lambert MD as Endoscopist    Review of Systems   Constitutional:  Negative for chills and fever.   HENT:  Negative for congestion, ear pain, hearing loss, postnasal drip, rhinorrhea, sinus pressure, sinus pain, sore throat and trouble swallowing.    Eyes:  Negative for pain and visual disturbance.   Respiratory:  Negative for cough, chest tightness, shortness of breath and wheezing.    Cardiovascular: Negative.  Negative for chest pain, palpitations and leg swelling.   Gastrointestinal:  Negative for abdominal pain, blood in stool, constipation, diarrhea, nausea and vomiting.   Endocrine: Negative for cold intolerance, heat intolerance, polydipsia, polyphagia and polyuria.   Genitourinary:  Negative for difficulty urinating, dysuria, flank pain and urgency.   Musculoskeletal:  Negative for arthralgias, back pain, gait problem and myalgias.   Skin:  Negative for rash.   Allergic/Immunologic: Negative.    Neurological:  Negative for dizziness, weakness, light-headedness and headaches.   Hematological: Negative.    Psychiatric/Behavioral:  Negative for behavioral problems, dysphoric mood and sleep disturbance. The patient is not nervous/anxious.      Medical History Reviewed by provider this encounter:       Annual Wellness Visit Questionnaire   Rama is here for her Subsequent Wellness visit. Last Medicare Wellness visit information reviewed, patient interviewed, no change since last AWV.     Health Risk Assessment:   Patient rates overall health as fair. Patient feels that their physical health rating is slightly worse. Patient is satisfied with their life. Eyesight was rated as same. Hearing was rated as same. Patient feels that their emotional and mental health  rating is same. Patients states they are never, rarely angry. Patient states they are never, rarely unusually tired/fatigued. Pain experienced in the last 7 days has been a lot. Patient's pain rating has been 5/10. Patient states that she has experienced weight loss or gain in last 6 months.     Depression Screening:   PHQ-9 Score: 8      Fall Risk Screening:   In the past year, patient has experienced: history of falling in past year    Number of falls: 2 or more  Injured during fall?: No    Feels unsteady when standing or walking?: Yes    Worried about falling?: Yes      Urinary Incontinence Screening:   Patient has leaked urine accidently in the last six months.     Home Safety:  Patient has trouble with stairs inside or outside of their home. Patient has working smoke alarms and has working carbon monoxide detector. Home safety hazards include: none.     Nutrition:   Current diet is Diabetic.     Medications:   Patient is currently taking over-the-counter supplements. OTC medications include: see medication list. Patient is not able to manage medications.     Activities of Daily Living (ADLs)/Instrumental Activities of Daily Living (IADLs):   Walk and transfer into and out of bed and chair?: Yes  Dress and groom yourself?: Yes    Bathe or shower yourself?: Yes    Feed yourself? Yes  Do your laundry/housekeeping?: Yes  Manage your money, pay your bills and track your expenses?: Yes  Make your own meals?: Yes    Do your own shopping?: Yes    Previous Hospitalizations:   Any hospitalizations or ED visits within the last 12 months?: No      Advance Care Planning:   Living will: No    Durable POA for healthcare: No    Advanced directive: Yes    ACP document given: Yes      Cognitive Screening:   Provider or family/friend/caregiver concerned regarding cognition?: No    PREVENTIVE SCREENINGS      Cardiovascular Screening:    General: Screening Not Indicated and History Lipid Disorder      Diabetes Screening:      General: Screening Not Indicated and History Diabetes      Colorectal Cancer Screening:     General: Screening Current      Breast Cancer Screening:       Due for: Mammogram        Cervical Cancer Screening:    General: Screening Not Indicated      Osteoporosis Screening:    General: Screening Not Indicated and History Osteoporosis      Abdominal Aortic Aneurysm (AAA) Screening:        General: Screening Not Indicated      Lung Cancer Screening:     General: Screening Not Indicated      Hepatitis C Screening:    General: Screening Current    Screening, Brief Intervention, and Referral to Treatment (SBIRT)     Screening  Typical number of drinks in a day: 0  Typical number of drinks in a week: 0  Interpretation: Low risk drinking behavior.    Single Item Drug Screening:  How often have you used an illegal drug (including marijuana) or a prescription medication for non-medical reasons in the past year? never    Single Item Drug Screen Score: 0  Interpretation: Negative screen for possible drug use disorder    Review of Current Opioid Use  Opioid Risk Tool (ORT) Score: 5  Opioid Risk Tool (ORT) Interpretation: Score 4-7: Moderate risk for opioid misuse    Social Drivers of Health     Financial Resource Strain: Low Risk  (3/4/2024)    Overall Financial Resource Strain (CARDIA)    • Difficulty of Paying Living Expenses: Not hard at all   Food Insecurity: No Food Insecurity (4/1/2025)    Hunger Vital Sign    • Worried About Running Out of Food in the Last Year: Never true    • Ran Out of Food in the Last Year: Never true   Transportation Needs: No Transportation Needs (4/1/2025)    PRAPARE - Transportation    • Lack of Transportation (Medical): No    • Lack of Transportation (Non-Medical): No   Housing Stability: Low Risk  (4/1/2025)    Housing Stability Vital Sign    • Unable to Pay for Housing in the Last Year: No    • Number of Times Moved in the Last Year: 0    • Homeless in the Last Year: No   Utilities: Not At Risk  "(4/1/2025)    [facility] Utilities    • Threatened with loss of utilities: No     No results found.    Objective   /62 (Patient Position: Sitting, Cuff Size: Large)   Pulse (!) 49   Temp (!) 96.6 °F (35.9 °C) (Tympanic)   Ht 5' 4.5\" (1.638 m)   Wt 57.2 kg (126 lb)   SpO2 96%   BMI 21.29 kg/m²     Physical Exam  Vitals and nursing note reviewed.   Constitutional:       Appearance: She is well-developed.   HENT:      Head: Normocephalic and atraumatic.      Right Ear: External ear normal.      Left Ear: External ear normal.      Nose: Nose normal.   Eyes:      Conjunctiva/sclera: Conjunctivae normal.   Cardiovascular:      Rate and Rhythm: Normal rate and regular rhythm.      Heart sounds: Normal heart sounds.   Pulmonary:      Effort: Pulmonary effort is normal.      Breath sounds: Normal breath sounds.   Abdominal:      General: Bowel sounds are normal.      Palpations: Abdomen is soft.   Musculoskeletal:         General: Normal range of motion.      Cervical back: Normal range of motion and neck supple.   Skin:     General: Skin is warm and dry.   Neurological:      Mental Status: She is alert and oriented to person, place, and time.   Psychiatric:         Behavior: Behavior normal.         Thought Content: Thought content normal.         Judgment: Judgment normal.         "

## 2025-04-01 NOTE — ASSESSMENT & PLAN NOTE
Lab Results   Component Value Date    HGBA1C 5.0 04/01/2025       Orders:  •  POCT hemoglobin A1c  •  Ambulatory Referral to Ophthalmology; Future

## 2025-04-01 NOTE — PATIENT INSTRUCTIONS
Medicare Preventive Visit Patient Instructions  Thank you for completing your Welcome to Medicare Visit or Medicare Annual Wellness Visit today. Your next wellness visit will be due in one year (4/2/2026).  The screening/preventive services that you may require over the next 5-10 years are detailed below. Some tests may not apply to you based off risk factors and/or age. Screening tests ordered at today's visit but not completed yet may show as past due. Also, please note that scanned in results may not display below.  Preventive Screenings:  Service Recommendations Previous Testing/Comments   Colorectal Cancer Screening  * Colonoscopy    * Fecal Occult Blood Test (FOBT)/Fecal Immunochemical Test (FIT)  * Fecal DNA/Cologuard Test  * Flexible Sigmoidoscopy Age: 45-75 years old   Colonoscopy: every 10 years (may be performed more frequently if at higher risk)  OR  FOBT/FIT: every 1 year  OR  Cologuard: every 3 years  OR  Sigmoidoscopy: every 5 years  Screening may be recommended earlier than age 45 if at higher risk for colorectal cancer. Also, an individualized decision between you and your healthcare provider will decide whether screening between the ages of 76-85 would be appropriate. Colonoscopy: 07/10/2018  FOBT/FIT: Not on file  Cologuard: Not on file  Sigmoidoscopy: Not on file    Screening Current     Breast Cancer Screening Age: 40+ years old  Frequency: every 1-2 years  Not required if history of left and right mastectomy Mammogram: 09/24/2018        Cervical Cancer Screening Between the ages of 21-29, pap smear recommended once every 3 years.   Between the ages of 30-65, can perform pap smear with HPV co-testing every 5 years.   Recommendations may differ for women with a history of total hysterectomy, cervical cancer, or abnormal pap smears in past. Pap Smear: Not on file    Screening Not Indicated   Hepatitis C Screening Once for adults born between 1945 and 1965  More frequently in patients at high risk  for Hepatitis C Hep C Antibody: 06/24/2019    Screening Current   Diabetes Screening 1-2 times per year if you're at risk for diabetes or have pre-diabetes Fasting glucose: 90 mg/dL (7/1/2024)  A1C: 5.0 (4/1/2025)  Screening Not Indicated  History Diabetes   Cholesterol Screening Once every 5 years if you don't have a lipid disorder. May order more often based on risk factors. Lipid panel: 07/01/2024    Screening Not Indicated  History Lipid Disorder     Other Preventive Screenings Covered by Medicare:  Abdominal Aortic Aneurysm (AAA) Screening: covered once if your at risk. You're considered to be at risk if you have a family history of AAA.  Lung Cancer Screening: covers low dose CT scan once per year if you meet all of the following conditions: (1) Age 55-77; (2) No signs or symptoms of lung cancer; (3) Current smoker or have quit smoking within the last 15 years; (4) You have a tobacco smoking history of at least 20 pack years (packs per day multiplied by number of years you smoked); (5) You get a written order from a healthcare provider.  Glaucoma Screening: covered annually if you're considered high risk: (1) You have diabetes OR (2) Family history of glaucoma OR (3)  aged 50 and older OR (4)  American aged 65 and older  Osteoporosis Screening: covered every 2 years if you meet one of the following conditions: (1) You're estrogen deficient and at risk for osteoporosis based off medical history and other findings; (2) Have a vertebral abnormality; (3) On glucocorticoid therapy for more than 3 months; (4) Have primary hyperparathyroidism; (5) On osteoporosis medications and need to assess response to drug therapy.   Last bone density test (DXA Scan): 12/13/2022.  HIV Screening: covered annually if you're between the age of 15-65. Also covered annually if you are younger than 15 and older than 65 with risk factors for HIV infection. For pregnant patients, it is covered up to 3 times per  pregnancy.    Immunizations:  Immunization Recommendations   Influenza Vaccine Annual influenza vaccination during flu season is recommended for all persons aged >= 6 months who do not have contraindications   Pneumococcal Vaccine   * Pneumococcal conjugate vaccine = PCV13 (Prevnar 13), PCV15 (Vaxneuvance), PCV20 (Prevnar 20)  * Pneumococcal polysaccharide vaccine = PPSV23 (Pneumovax) Adults 19-63 yo with certain risk factors or if 65+ yo  If never received any pneumonia vaccine: recommend Prevnar 20 (PCV20)  Give PCV20 if previously received 1 dose of PCV13 or PPSV23   Hepatitis B Vaccine 3 dose series if at intermediate or high risk (ex: diabetes, end stage renal disease, liver disease)   Respiratory syncytial virus (RSV) Vaccine - COVERED BY MEDICARE PART D  * RSVPreF3 (Arexvy) CDC recommends that adults 60 years of age and older may receive a single dose of RSV vaccine using shared clinical decision-making (SCDM)   Tetanus (Td) Vaccine - COST NOT COVERED BY MEDICARE PART B Following completion of primary series, a booster dose should be given every 10 years to maintain immunity against tetanus. Td may also be given as tetanus wound prophylaxis.   Tdap Vaccine - COST NOT COVERED BY MEDICARE PART B Recommended at least once for all adults. For pregnant patients, recommended with each pregnancy.   Shingles Vaccine (Shingrix) - COST NOT COVERED BY MEDICARE PART B  2 shot series recommended in those 19 years and older who have or will have weakened immune systems or those 50 years and older     Health Maintenance Due:      Topic Date Due   • Breast Cancer Screening: Mammogram  09/24/2019   • DXA SCAN  12/13/2024   • Colorectal Cancer Screening  07/10/2028   • Hepatitis C Screening  Completed     Immunizations Due:      Topic Date Due   • COVID-19 Vaccine (6 - 2024-25 season) 09/01/2024     Advance Directives   What are advance directives?  Advance directives are legal documents that state your wishes and plans for  medical care. These plans are made ahead of time in case you lose your ability to make decisions for yourself. Advance directives can apply to any medical decision, such as the treatments you want, and if you want to donate organs.   What are the types of advance directives?  There are many types of advance directives, and each state has rules about how to use them. You may choose a combination of any of the following:  Living will:  This is a written record of the treatment you want. You can also choose which treatments you do not want, which to limit, and which to stop at a certain time. This includes surgery, medicine, IV fluid, and tube feedings.   Durable power of  for healthcare (DPAHC):  This is a written record that states who you want to make healthcare choices for you when you are unable to make them for yourself. This person, called a proxy, is usually a family member or a friend. You may choose more than 1 proxy.  Do not resuscitate (DNR) order:  A DNR order is used in case your heart stops beating or you stop breathing. It is a request not to have certain forms of treatment, such as CPR. A DNR order may be included in other types of advance directives.  Medical directive:  This covers the care that you want if you are in a coma, near death, or unable to make decisions for yourself. You can list the treatments you want for each condition. Treatment may include pain medicine, surgery, blood transfusions, dialysis, IV or tube feedings, and a ventilator (breathing machine).  Values history:  This document has questions about your views, beliefs, and how you feel and think about life. This information can help others choose the care that you would choose.  Why are advance directives important?  An advance directive helps you control your care. Although spoken wishes may be used, it is better to have your wishes written down. Spoken wishes can be misunderstood, or not followed. Treatments may be given  even if you do not want them. An advance directive may make it easier for your family to make difficult choices about your care.   Fall Prevention    Fall prevention  includes ways to make your home and other areas safer. It also includes ways you can move more carefully to prevent a fall. Health conditions that cause changes in your blood pressure, vision, or muscle strength and coordination may increase your risk for falls. Medicines may also increase your risk for falls if they make you dizzy, weak, or sleepy.   Fall prevention tips:   Stand or sit up slowly.    Use assistive devices as directed.    Wear shoes that fit well and have soles that .    Wear a personal alarm.    Stay active.    Manage your medical conditions.    Home Safety Tips:  Add items to prevent falls in the bathroom.    Keep paths clear.    Install bright lights in your home.    Keep items you use often on shelves within reach.    Paint or place reflective tape on the edges of your stairs.    Urinary Incontinence   Urinary incontinence (UI)  is when you lose control of your bladder. UI develops because your bladder cannot store or empty urine properly. The 3 most common types of UI are stress incontinence, urge incontinence, or both.  Medicines:   May be given to help strengthen your bladder control. Report any side effects of medication to your healthcare provider.  Do pelvic muscle exercises often:  Your pelvic muscles help you stop urinating. Squeeze these muscles tight for 5 seconds, then relax for 5 seconds. Gradually work up to squeezing for 10 seconds. Do 3 sets of 15 repetitions a day, or as directed. This will help strengthen your pelvic muscles and improve bladder control.  Train your bladder:  Go to the bathroom at set times, such as every 2 hours, even if you do not feel the urge to go. You can also try to hold your urine when you feel the urge to go. For example, hold your urine for 5 minutes when you feel the urge to go. As  that becomes easier, hold your urine for 10 minutes.   Self-care:   Keep a UI record.  Write down how often you leak urine and how much you leak. Make a note of what you were doing when you leaked urine.  Drink liquids as directed. You may need to limit the amount of liquid you drink to help control your urine leakage. Do not drink any liquid right before you go to bed. Limit or do not have drinks that contain caffeine or alcohol.   Prevent constipation.  Eat a variety of high-fiber foods. Good examples are high-fiber cereals, beans, vegetables, and whole-grain breads. Walking is the best way to trigger your intestines to have a bowel movement.  Exercise regularly and maintain a healthy weight.  Weight loss and exercise will decrease pressure on your bladder and help you control your leakage.   Use a catheter as directed  to help empty your bladder. A catheter is a tiny, plastic tube that is put into your bladder to drain your urine.   Go to behavior therapy as directed.  Behavior therapy may be used to help you learn to control your urge to urinate.    Cigarette Smoking and Your Health   Risks to your health if you smoke:  Nicotine and other chemicals found in tobacco damage every cell in your body. Even if you are a light smoker, you have an increased risk for cancer, heart disease, and lung disease. If you are pregnant or have diabetes, smoking increases your risk for complications.   Benefits to your health if you stop smoking:   You decrease respiratory symptoms such as coughing, wheezing, and shortness of breath.   You reduce your risk for cancers of the lung, mouth, throat, kidney, bladder, pancreas, stomach, and cervix. If you already have cancer, you increase the benefits of chemotherapy. You also reduce your risk for cancer returning or a second cancer from developing.   You reduce your risk for heart disease, blood clots, heart attack, and stroke.   You reduce your risk for lung infections, and diseases  such as pneumonia, asthma, chronic bronchitis, and emphysema.  Your circulation improves. More oxygen can be delivered to your body. If you have diabetes, you lower your risk for complications, such as kidney, artery, and eye diseases. You also lower your risk for nerve damage. Nerve damage can lead to amputations, poor vision, and blindness.  You improve your body's ability to heal and to fight infections.  For more information and support to stop smoking:   [employer]  Phone: 8- 349 - 742-7225  Web Address: www.[employer]  Narcotic (Opioid) Safety    Use narcotics safely:  Take prescribed narcotics exactly as directed  Do not give narcotics to others or take narcotics that belong to someone else  Do not mix narcotics without medicines or alcohol  Do not drive or operate heavy machinery after you take the narcotic  Monitor for side effects and notify your healthcare provider if you experienced side effects such as nausea, sleepiness, itching, or trouble thinking clearly.    Manage constipation:    Constipation is the most common side effect of narcotic medicine. Constipation is when you have hard, dry bowel movements, or you go longer than usual between bowel movements. Tell your healthcare provider about all changes in your bowel movements while you are taking narcotics. He or she may recommend laxative medicine to help you have a bowel movement. He or she may also change the kind of narcotic you are taking, or change when you take it. The following are more ways you can prevent or relieve constipation:    Drink liquids as directed.  You may need to drink extra liquids to help soften and move your bowels. Ask how much liquid to drink each day and which liquids are best for you.  Eat high-fiber foods.  This may help decrease constipation by adding bulk to your bowel movements. High-fiber foods include fruits, vegetables, whole-grain breads and cereals, and beans. Your healthcare provider or dietitian can help  you create a high-fiber meal plan. Your provider may also recommend a fiber supplement if you cannot get enough fiber from food.  Exercise regularly.  Regular physical activity can help stimulate your intestines. Walking is a good exercise to prevent or relieve constipation. Ask which exercises are best for you.  Schedule a time each day to have a bowel movement.  This may help train your body to have regular bowel movements. Bend forward while you are on the toilet to help move the bowel movement out. Sit on the toilet for at least 10 minutes, even if you do not have a bowel movement.    Store narcotics safely:   Store narcotics where others cannot easily get them.  Keep them in a locked cabinet or secure area. Do not  keep them in a purse or other bag you carry with you. A person may be looking for something else and find the narcotics.  Make sure narcotics are stored out of the reach of children.  A child can easily overdose on narcotics. Narcotics may look like candy to a small child.    The best way to dispose of narcotics:      The laws vary by country and area. In the [place], the best way is to return the narcotics through a take-back program. This program is offered by the [place] Drug Enforcement Agency (LILLY). The following are options for using the program:  Take the narcotics to a LILLY collection site.  The site is often a law enforcement center. Call your local law enforcement center for scheduled take-back days in your area. You will be given information on where to go if the collection site is in a different location.  Take the narcotics to an approved pharmacy or hospital.  A pharmacy or hospital may be set up as a collection site. You will need to ask if it is a LILLY collection site if you were not directed there. A pharmacy or doctor's office may not be able to take back narcotics unless it is a LILLY site.  Use a mail-back system.  This means you are given containers to put the narcotics into. You  will then mail them in the containers.  Use a take-back drop box.  This is a place to leave the narcotics at any time. People and animals will not be able to get into the box. Your local law enforcement agency can tell you where to find a drop box in your area.    Other ways to manage pain:   Ask your healthcare provider about non-narcotic medicines to control pain.  Nonprescription medicines include NSAIDs (such as ibuprofen) and acetaminophen. Prescription medicines include muscle relaxers, antidepressants, and steroids.  Pain may be managed without any medicines.  Some ways to relieve pain include massage, aromatherapy, or meditation. Physical or occupational therapy may also help.    For more information:   Drug Enforcement Administration  [address] [place] [address]  Phone: 9- 643 - 839-2531  Web Address: https://www.[employer]iversion.[facility] [place]gov[place]drug_disposal/     Food and Drug Administration  [address] [place] [address]  Phone: 9- 426 - 508-4233  Web Address: http://www.fda.gov     © Copyright [employer] 2018 Information is for End User's use only and may not be sold, redistributed or otherwise used for commercial purposes. All illustrations and images included in CareNotes® are the copyrighted property of [employer]M., Inc. or [employer]

## 2025-04-11 ENCOUNTER — TELEPHONE (OUTPATIENT)
Dept: ADMINISTRATIVE | Facility: HOSPITAL | Age: 75
End: 2025-04-11

## 2025-04-24 DIAGNOSIS — G25.81 RLS (RESTLESS LEGS SYNDROME): ICD-10-CM

## 2025-04-24 RX ORDER — CLONAZEPAM 2 MG/1
2 TABLET ORAL 2 TIMES DAILY
Qty: 60 TABLET | Refills: 0 | Status: SHIPPED | OUTPATIENT
Start: 2025-04-24

## 2025-04-24 NOTE — TELEPHONE ENCOUNTER
Reason for call:   [x] Refill   [] Prior Auth  [] Other:     Office:   [x] PCP/Provider - Leann Mejía   [] Specialty/Provider -     Medication: clonazepam     Dose/Frequency: 2 mg take 2 times daily     Quantity: 60    Pharmacy: [facility]   Does the patient have enough for 3 days?   [] Yes   [x] No - Send as HP to POD    Mail Away Pharmacy   Does the patient have enough for 10 days?   [] Yes   [] No - Send as HP to POD

## 2025-05-12 DIAGNOSIS — E03.9 ACQUIRED HYPOTHYROIDISM: ICD-10-CM

## 2025-05-12 RX ORDER — LEVOTHYROXINE SODIUM 25 UG/1
25 TABLET ORAL EVERY MORNING
Qty: 90 TABLET | Refills: 1 | Status: SHIPPED | OUTPATIENT
Start: 2025-05-12

## 2025-05-19 DIAGNOSIS — F41.9 ANXIETY: ICD-10-CM

## 2025-05-20 RX ORDER — ESCITALOPRAM OXALATE 20 MG/1
20 TABLET ORAL DAILY
Qty: 90 TABLET | Refills: 1 | Status: SHIPPED | OUTPATIENT
Start: 2025-05-20

## 2025-05-27 DIAGNOSIS — M54.50 CHRONIC BILATERAL LOW BACK PAIN WITHOUT SCIATICA: ICD-10-CM

## 2025-05-27 DIAGNOSIS — G89.29 CHRONIC BILATERAL LOW BACK PAIN WITHOUT SCIATICA: ICD-10-CM

## 2025-05-28 RX ORDER — METHOCARBAMOL 750 MG/1
750 TABLET, FILM COATED ORAL EVERY 8 HOURS
Qty: 90 TABLET | Refills: 0 | Status: SHIPPED | OUTPATIENT
Start: 2025-05-28

## 2025-06-23 DIAGNOSIS — G25.81 RLS (RESTLESS LEGS SYNDROME): ICD-10-CM

## 2025-06-23 NOTE — TELEPHONE ENCOUNTER
Reason for call:   [x] Refill   [] Prior Auth  [] Other:     Office:   [x] PCP/Provider - [facility]  [] Specialty/Provider -     Medication:   ~ clonazePAM (KlonoPIN) 2 mg tablet - Take 1 tablet (2 mg total) by mouth 2 (two) times a day     Pharmacy:   [facility] [address] [place], [place] - [address]     Local Pharmacy   Does the patient have enough for 3 days?   [x] Yes   [] No - Send as HP to POD

## 2025-06-24 DIAGNOSIS — R13.19 ESOPHAGEAL DYSPHAGIA: ICD-10-CM

## 2025-06-24 RX ORDER — CLONAZEPAM 2 MG/1
2 TABLET ORAL 2 TIMES DAILY
Qty: 60 TABLET | Refills: 0 | Status: SHIPPED | OUTPATIENT
Start: 2025-06-24

## 2025-06-25 RX ORDER — PANTOPRAZOLE SODIUM 40 MG/1
40 TABLET, DELAYED RELEASE ORAL DAILY
Qty: 90 TABLET | Refills: 1 | Status: SHIPPED | OUTPATIENT
Start: 2025-06-25

## 2025-06-27 DIAGNOSIS — I10 ESSENTIAL HYPERTENSION: ICD-10-CM

## 2025-06-27 RX ORDER — LISINOPRIL 2.5 MG/1
2.5 TABLET ORAL DAILY
Qty: 90 TABLET | Refills: 1 | Status: SHIPPED | OUTPATIENT
Start: 2025-06-27

## 2025-06-27 NOTE — TELEPHONE ENCOUNTER
Reason for call:   [x] Refill   [] Prior Auth  [] Other:     Office:   [x] PCP/Provider - [facility]  Authorized By: Leann Mejía PA-C  [] Specialty/Provider -     Medication:     lisinopril (ZESTRIL) 2.5 mg tablet 2.5 mg, Daily         Pharmacy: [facility] [address] [facility] [address]     Local Pharmacy   Does the patient have enough for 3 days?   [] Yes   [x] No - Send as HP to POD    Mail Away Pharmacy   Does the patient have enough for 10 days?   [] Yes   [] No - Send as HP to POD

## 2025-07-07 ENCOUNTER — HOSPITAL ENCOUNTER (INPATIENT)
Facility: HOSPITAL | Age: 75
LOS: 4 days | Discharge: HOME HEALTH | DRG: 871 | End: 2025-07-11
Attending: EMERGENCY MEDICINE | Admitting: INTERNAL MEDICINE
Payer: MEDICARE

## 2025-07-07 ENCOUNTER — APPOINTMENT (EMERGENCY)
Dept: RADIOLOGY | Facility: HOSPITAL | Age: 75
DRG: 871 | End: 2025-07-07
Payer: MEDICARE

## 2025-07-07 DIAGNOSIS — J81.1 PULMONARY EDEMA: ICD-10-CM

## 2025-07-07 DIAGNOSIS — J96.01 ACUTE RESPIRATORY FAILURE WITH HYPOXIA (HCC): ICD-10-CM

## 2025-07-07 DIAGNOSIS — J18.9 PNEUMONIA: ICD-10-CM

## 2025-07-07 DIAGNOSIS — R09.02 HYPOXIA: ICD-10-CM

## 2025-07-07 DIAGNOSIS — A41.9 SEPSIS DUE TO PNEUMONIA (HCC): ICD-10-CM

## 2025-07-07 DIAGNOSIS — A41.9 SEPSIS (HCC): ICD-10-CM

## 2025-07-07 DIAGNOSIS — J18.9 SEPSIS DUE TO PNEUMONIA (HCC): ICD-10-CM

## 2025-07-07 DIAGNOSIS — J44.1 COPD EXACERBATION (HCC): Primary | ICD-10-CM

## 2025-07-07 PROBLEM — E11.8 TYPE 2 DIABETES MELLITUS WITH COMPLICATION, WITHOUT LONG-TERM CURRENT USE OF INSULIN (HCC): Chronic | Status: ACTIVE | Noted: 2018-04-06

## 2025-07-07 PROBLEM — E66.3 OVERWEIGHT WITH BODY MASS INDEX (BMI) OF 28 TO 28.9 IN ADULT: Status: RESOLVED | Noted: 2021-05-10 | Resolved: 2025-07-07

## 2025-07-07 PROBLEM — E11.40 TYPE 2 DIABETES MELLITUS WITH DIABETIC NEUROPATHY, WITHOUT LONG-TERM CURRENT USE OF INSULIN (HCC): Chronic | Status: RESOLVED | Noted: 2024-03-04 | Resolved: 2025-07-07

## 2025-07-07 PROBLEM — E11.40 TYPE 2 DIABETES MELLITUS WITH DIABETIC NEUROPATHY, WITHOUT LONG-TERM CURRENT USE OF INSULIN (HCC): Chronic | Status: ACTIVE | Noted: 2024-03-04

## 2025-07-07 LAB
ALBUMIN SERPL BCG-MCNC: 3.9 G/DL (ref 3.5–5)
ALP SERPL-CCNC: 48 U/L (ref 34–104)
ALT SERPL W P-5'-P-CCNC: 8 U/L (ref 7–52)
ANION GAP SERPL CALCULATED.3IONS-SCNC: 10 MMOL/L (ref 4–13)
APTT PPP: 29 SECONDS (ref 23–34)
AST SERPL W P-5'-P-CCNC: 21 U/L (ref 13–39)
ATRIAL RATE: 119 BPM
BACTERIA UR QL AUTO: ABNORMAL /HPF
BASOPHILS # BLD MANUAL: 0.12 THOUSAND/UL (ref 0–0.1)
BASOPHILS NFR MAR MANUAL: 1 % (ref 0–1)
BILIRUB SERPL-MCNC: 1.64 MG/DL (ref 0.2–1)
BILIRUB UR QL STRIP: ABNORMAL
BNP SERPL-MCNC: 418 PG/ML (ref 0–100)
BUN SERPL-MCNC: 24 MG/DL (ref 5–25)
CALCIUM SERPL-MCNC: 9.6 MG/DL (ref 8.4–10.2)
CHLORIDE SERPL-SCNC: 99 MMOL/L (ref 96–108)
CLARITY UR: ABNORMAL
CO2 SERPL-SCNC: 28 MMOL/L (ref 21–32)
COARSE GRAN CASTS URNS QL MICRO: ABNORMAL /LPF
COLOR UR: ABNORMAL
CREAT SERPL-MCNC: 0.89 MG/DL (ref 0.6–1.3)
EOSINOPHIL # BLD MANUAL: 0 THOUSAND/UL (ref 0–0.4)
EOSINOPHIL NFR BLD MANUAL: 0 % (ref 0–6)
ERYTHROCYTE [DISTWIDTH] IN BLOOD BY AUTOMATED COUNT: 11.6 % (ref 11.6–15.1)
FLUAV RNA RESP QL NAA+PROBE: NEGATIVE
FLUBV RNA RESP QL NAA+PROBE: NEGATIVE
GFR SERPL CREATININE-BSD FRML MDRD: 63 ML/MIN/1.73SQ M
GLUCOSE SERPL-MCNC: 181 MG/DL (ref 65–140)
GLUCOSE SERPL-MCNC: 216 MG/DL (ref 65–140)
GLUCOSE SERPL-MCNC: 224 MG/DL (ref 65–140)
GLUCOSE UR STRIP-MCNC: NEGATIVE MG/DL
HCT VFR BLD AUTO: 44.3 % (ref 34.8–46.1)
HGB BLD-MCNC: 14.9 G/DL (ref 11.5–15.4)
HGB UR QL STRIP.AUTO: ABNORMAL
HYALINE CASTS #/AREA URNS LPF: ABNORMAL /LPF
INR PPP: 1.12 (ref 0.85–1.19)
KETONES UR STRIP-MCNC: NEGATIVE MG/DL
LACTATE SERPL-SCNC: 1.8 MMOL/L (ref 0.5–2)
LEUKOCYTE ESTERASE UR QL STRIP: NEGATIVE
LYMPHOCYTES # BLD AUTO: 1.51 THOUSAND/UL (ref 0.6–4.47)
LYMPHOCYTES # BLD AUTO: 12 % (ref 14–44)
MCH RBC QN AUTO: 33.2 PG (ref 26.8–34.3)
MCHC RBC AUTO-ENTMCNC: 33.6 G/DL (ref 31.4–37.4)
MCV RBC AUTO: 99 FL (ref 82–98)
METAMYELOCYTE ABSOLUTE CT: 0.23 THOUSAND/UL (ref 0–0.1)
METAMYELOCYTES NFR BLD MANUAL: 2 % (ref 0–1)
MONOCYTES # BLD AUTO: 1.28 THOUSAND/UL (ref 0–1.22)
MONOCYTES NFR BLD: 11 % (ref 4–12)
NEUTROPHILS # BLD MANUAL: 8.5 THOUSAND/UL (ref 1.85–7.62)
NEUTS BAND NFR BLD MANUAL: 27 % (ref 0–8)
NEUTS SEG NFR BLD AUTO: 46 % (ref 43–75)
NITRITE UR QL STRIP: POSITIVE
NON-SQ EPI CELLS URNS QL MICRO: ABNORMAL /HPF
P AXIS: 80 DEGREES
PH UR STRIP.AUTO: 6 [PH]
PLATELET # BLD AUTO: 198 THOUSANDS/UL (ref 149–390)
PLATELET BLD QL SMEAR: ADEQUATE
PMV BLD AUTO: 10.4 FL (ref 8.9–12.7)
POTASSIUM SERPL-SCNC: 3.8 MMOL/L (ref 3.5–5.3)
PR INTERVAL: 132 MS
PROCALCITONIN SERPL-MCNC: 0.89 NG/ML
PROT SERPL-MCNC: 6.9 G/DL (ref 6.4–8.4)
PROT UR STRIP-MCNC: ABNORMAL MG/DL
PROTHROMBIN TIME: 14.9 SECONDS (ref 12.3–15)
QRS AXIS: 270 DEGREES
QRSD INTERVAL: 124 MS
QT INTERVAL: 318 MS
QTC INTERVAL: 447 MS
RBC # BLD AUTO: 4.49 MILLION/UL (ref 3.81–5.12)
RBC #/AREA URNS AUTO: ABNORMAL /HPF
RBC MORPH BLD: NORMAL
RSV RNA RESP QL NAA+PROBE: NEGATIVE
SARS-COV-2 RNA RESP QL NAA+PROBE: NEGATIVE
SODIUM SERPL-SCNC: 137 MMOL/L (ref 135–147)
SP GR UR STRIP.AUTO: >=1.03
T WAVE AXIS: 88 DEGREES
UROBILINOGEN UR QL STRIP.AUTO: 0.2 E.U./DL
VARIANT LYMPHS # BLD AUTO: 1 %
VENTRICULAR RATE: 119 BPM
WBC # BLD AUTO: 11.65 THOUSAND/UL (ref 4.31–10.16)
WBC #/AREA URNS AUTO: ABNORMAL /HPF

## 2025-07-07 PROCEDURE — 94664 DEMO&/EVAL PT USE INHALER: CPT

## 2025-07-07 PROCEDURE — 85610 PROTHROMBIN TIME: CPT | Performed by: EMERGENCY MEDICINE

## 2025-07-07 PROCEDURE — 96367 TX/PROPH/DG ADDL SEQ IV INF: CPT

## 2025-07-07 PROCEDURE — 85007 BL SMEAR W/DIFF WBC COUNT: CPT | Performed by: EMERGENCY MEDICINE

## 2025-07-07 PROCEDURE — 99223 1ST HOSP IP/OBS HIGH 75: CPT | Performed by: PHYSICIAN ASSISTANT

## 2025-07-07 PROCEDURE — 36415 COLL VENOUS BLD VENIPUNCTURE: CPT | Performed by: EMERGENCY MEDICINE

## 2025-07-07 PROCEDURE — 84145 PROCALCITONIN (PCT): CPT | Performed by: EMERGENCY MEDICINE

## 2025-07-07 PROCEDURE — 93010 ELECTROCARDIOGRAM REPORT: CPT | Performed by: INTERNAL MEDICINE

## 2025-07-07 PROCEDURE — 81001 URINALYSIS AUTO W/SCOPE: CPT | Performed by: EMERGENCY MEDICINE

## 2025-07-07 PROCEDURE — 83880 ASSAY OF NATRIURETIC PEPTIDE: CPT | Performed by: EMERGENCY MEDICINE

## 2025-07-07 PROCEDURE — 93005 ELECTROCARDIOGRAM TRACING: CPT

## 2025-07-07 PROCEDURE — 85027 COMPLETE CBC AUTOMATED: CPT | Performed by: EMERGENCY MEDICINE

## 2025-07-07 PROCEDURE — 94644 CONT INHLJ TX 1ST HOUR: CPT

## 2025-07-07 PROCEDURE — 96374 THER/PROPH/DIAG INJ IV PUSH: CPT

## 2025-07-07 PROCEDURE — 85730 THROMBOPLASTIN TIME PARTIAL: CPT | Performed by: EMERGENCY MEDICINE

## 2025-07-07 PROCEDURE — 83605 ASSAY OF LACTIC ACID: CPT | Performed by: EMERGENCY MEDICINE

## 2025-07-07 PROCEDURE — 71045 X-RAY EXAM CHEST 1 VIEW: CPT

## 2025-07-07 PROCEDURE — 94760 N-INVAS EAR/PLS OXIMETRY 1: CPT

## 2025-07-07 PROCEDURE — 99291 CRITICAL CARE FIRST HOUR: CPT | Performed by: EMERGENCY MEDICINE

## 2025-07-07 PROCEDURE — 80053 COMPREHEN METABOLIC PANEL: CPT | Performed by: EMERGENCY MEDICINE

## 2025-07-07 PROCEDURE — 87040 BLOOD CULTURE FOR BACTERIA: CPT | Performed by: EMERGENCY MEDICINE

## 2025-07-07 PROCEDURE — 99285 EMERGENCY DEPT VISIT HI MDM: CPT

## 2025-07-07 PROCEDURE — 96365 THER/PROPH/DIAG IV INF INIT: CPT

## 2025-07-07 PROCEDURE — 82948 REAGENT STRIP/BLOOD GLUCOSE: CPT

## 2025-07-07 PROCEDURE — 96375 TX/PRO/DX INJ NEW DRUG ADDON: CPT

## 2025-07-07 PROCEDURE — 0241U HB NFCT DS VIR RESP RNA 4 TRGT: CPT | Performed by: EMERGENCY MEDICINE

## 2025-07-07 RX ORDER — FUROSEMIDE 10 MG/ML
40 INJECTION INTRAMUSCULAR; INTRAVENOUS ONCE
Status: COMPLETED | OUTPATIENT
Start: 2025-07-07 | End: 2025-07-07

## 2025-07-07 RX ORDER — ONDANSETRON 2 MG/ML
4 INJECTION INTRAMUSCULAR; INTRAVENOUS EVERY 6 HOURS PRN
Status: DISCONTINUED | OUTPATIENT
Start: 2025-07-07 | End: 2025-07-11 | Stop reason: HOSPADM

## 2025-07-07 RX ORDER — CALCIUM CARBONATE 500(1250)
1 TABLET ORAL EVERY OTHER DAY
Status: DISCONTINUED | OUTPATIENT
Start: 2025-07-08 | End: 2025-07-11 | Stop reason: HOSPADM

## 2025-07-07 RX ORDER — ATORVASTATIN CALCIUM 20 MG/1
20 TABLET, FILM COATED ORAL
Status: DISCONTINUED | OUTPATIENT
Start: 2025-07-07 | End: 2025-07-11 | Stop reason: HOSPADM

## 2025-07-07 RX ORDER — ALBUTEROL SULFATE 5 MG/ML
10 SOLUTION RESPIRATORY (INHALATION) ONCE
Status: COMPLETED | OUTPATIENT
Start: 2025-07-07 | End: 2025-07-07

## 2025-07-07 RX ORDER — ENOXAPARIN SODIUM 100 MG/ML
40 INJECTION SUBCUTANEOUS DAILY
Status: DISCONTINUED | OUTPATIENT
Start: 2025-07-08 | End: 2025-07-11 | Stop reason: HOSPADM

## 2025-07-07 RX ORDER — SODIUM CHLORIDE FOR INHALATION 0.9 %
12 VIAL, NEBULIZER (ML) INHALATION ONCE
Status: COMPLETED | OUTPATIENT
Start: 2025-07-07 | End: 2025-07-07

## 2025-07-07 RX ORDER — INSULIN LISPRO 100 [IU]/ML
1-5 INJECTION, SOLUTION INTRAVENOUS; SUBCUTANEOUS
Status: DISCONTINUED | OUTPATIENT
Start: 2025-07-07 | End: 2025-07-11 | Stop reason: HOSPADM

## 2025-07-07 RX ORDER — B-COMPLEX WITH VITAMIN C
TABLET ORAL DAILY
Status: DISCONTINUED | OUTPATIENT
Start: 2025-07-08 | End: 2025-07-07

## 2025-07-07 RX ORDER — METHOCARBAMOL 750 MG/1
750 TABLET, FILM COATED ORAL EVERY 8 HOURS
Status: DISCONTINUED | OUTPATIENT
Start: 2025-07-07 | End: 2025-07-11 | Stop reason: HOSPADM

## 2025-07-07 RX ORDER — ESCITALOPRAM OXALATE 10 MG/1
20 TABLET ORAL DAILY
Status: DISCONTINUED | OUTPATIENT
Start: 2025-07-08 | End: 2025-07-11 | Stop reason: HOSPADM

## 2025-07-07 RX ORDER — METHYLPREDNISOLONE SODIUM SUCCINATE 40 MG/ML
40 INJECTION, POWDER, LYOPHILIZED, FOR SOLUTION INTRAMUSCULAR; INTRAVENOUS EVERY 8 HOURS SCHEDULED
Status: DISCONTINUED | OUTPATIENT
Start: 2025-07-08 | End: 2025-07-08

## 2025-07-07 RX ORDER — ASPIRIN 81 MG/1
81 TABLET ORAL DAILY
Status: DISCONTINUED | OUTPATIENT
Start: 2025-07-08 | End: 2025-07-11 | Stop reason: HOSPADM

## 2025-07-07 RX ORDER — INSULIN LISPRO 100 [IU]/ML
1-5 INJECTION, SOLUTION INTRAVENOUS; SUBCUTANEOUS
Status: DISCONTINUED | OUTPATIENT
Start: 2025-07-08 | End: 2025-07-11 | Stop reason: HOSPADM

## 2025-07-07 RX ORDER — ACETAMINOPHEN 325 MG/1
650 TABLET ORAL EVERY 6 HOURS PRN
Status: DISCONTINUED | OUTPATIENT
Start: 2025-07-07 | End: 2025-07-11 | Stop reason: HOSPADM

## 2025-07-07 RX ORDER — GUAIFENESIN/DEXTROMETHORPHAN 100-10MG/5
10 SYRUP ORAL EVERY 4 HOURS PRN
Status: DISCONTINUED | OUTPATIENT
Start: 2025-07-07 | End: 2025-07-11 | Stop reason: HOSPADM

## 2025-07-07 RX ORDER — LEVOTHYROXINE SODIUM 25 UG/1
25 TABLET ORAL
Status: DISCONTINUED | OUTPATIENT
Start: 2025-07-08 | End: 2025-07-11 | Stop reason: HOSPADM

## 2025-07-07 RX ORDER — ASCORBIC ACID, THIAMINE MONONITRATE,RIBOFLAVIN, NIACINAMIDE, PYRIDOXINE HYDROCHLORIDE, FOLIC ACID, CYANOCOBALAMIN, BIOTIN, CALCIUM PANTOTHENATE, 100; 1.5; 1.7; 20; 10; 1; 6000; 150000; 5 MG/1; MG/1; MG/1; MG/1; MG/1; MG/1; UG/1; UG/1; MG/1
1 CAPSULE, LIQUID FILLED ORAL DAILY
Status: DISCONTINUED | OUTPATIENT
Start: 2025-07-08 | End: 2025-07-11 | Stop reason: HOSPADM

## 2025-07-07 RX ORDER — LISINOPRIL 2.5 MG/1
2.5 TABLET ORAL DAILY
Status: DISCONTINUED | OUTPATIENT
Start: 2025-07-08 | End: 2025-07-11 | Stop reason: HOSPADM

## 2025-07-07 RX ORDER — CLONAZEPAM 1 MG/1
2 TABLET ORAL
Status: DISCONTINUED | OUTPATIENT
Start: 2025-07-07 | End: 2025-07-11 | Stop reason: HOSPADM

## 2025-07-07 RX ORDER — METHYLPREDNISOLONE SODIUM SUCCINATE 125 MG/2ML
125 INJECTION, POWDER, LYOPHILIZED, FOR SOLUTION INTRAMUSCULAR; INTRAVENOUS ONCE
Status: COMPLETED | OUTPATIENT
Start: 2025-07-07 | End: 2025-07-07

## 2025-07-07 RX ORDER — PANTOPRAZOLE SODIUM 40 MG/1
40 TABLET, DELAYED RELEASE ORAL
Status: DISCONTINUED | OUTPATIENT
Start: 2025-07-08 | End: 2025-07-11 | Stop reason: HOSPADM

## 2025-07-07 RX ORDER — NICOTINE 21 MG/24HR
1 PATCH, TRANSDERMAL 24 HOURS TRANSDERMAL DAILY
Status: DISCONTINUED | OUTPATIENT
Start: 2025-07-08 | End: 2025-07-07

## 2025-07-07 RX ADMIN — ATORVASTATIN CALCIUM 20 MG: 20 TABLET, FILM COATED ORAL at 21:38

## 2025-07-07 RX ADMIN — CEFTRIAXONE SODIUM 1000 MG: 10 INJECTION, POWDER, FOR SOLUTION INTRAVENOUS at 17:35

## 2025-07-07 RX ADMIN — FUROSEMIDE 40 MG: 10 INJECTION, SOLUTION INTRAMUSCULAR; INTRAVENOUS at 17:30

## 2025-07-07 RX ADMIN — CLONAZEPAM 2 MG: 1 TABLET ORAL at 21:38

## 2025-07-07 RX ADMIN — INSULIN LISPRO 1 UNITS: 100 INJECTION, SOLUTION INTRAVENOUS; SUBCUTANEOUS at 21:37

## 2025-07-07 RX ADMIN — METHOCARBAMOL TABLETS 750 MG: 750 TABLET, COATED ORAL at 21:38

## 2025-07-07 RX ADMIN — IPRATROPIUM BROMIDE 1 MG: 0.5 SOLUTION RESPIRATORY (INHALATION) at 16:23

## 2025-07-07 RX ADMIN — ALBUTEROL SULFATE 10 MG: 2.5 SOLUTION RESPIRATORY (INHALATION) at 16:23

## 2025-07-07 RX ADMIN — AZITHROMYCIN MONOHYDRATE 500 MG: 500 INJECTION, POWDER, LYOPHILIZED, FOR SOLUTION INTRAVENOUS at 18:17

## 2025-07-07 RX ADMIN — ISODIUM CHLORIDE 12 ML: 0.03 SOLUTION RESPIRATORY (INHALATION) at 16:23

## 2025-07-07 RX ADMIN — METHYLPREDNISOLONE SODIUM SUCCINATE 125 MG: 125 INJECTION, POWDER, FOR SOLUTION INTRAMUSCULAR; INTRAVENOUS at 16:42

## 2025-07-07 NOTE — ASSESSMENT & PLAN NOTE
Patient with SOB, sepsis noted by tachypnea and tachycardia  BBC 11.65  Lactic acid 1.8  Pro-Bautista 0.89, continue to trend  Flu, COVID, RSV PCR negative  Follow pneumonia pathway

## 2025-07-07 NOTE — ED PROVIDER NOTES
Time reflects when diagnosis was documented in both MDM as applicable and the Disposition within this note       Time User Action Codes Description Comment    7/7/2025  7:27 PM Ion Johnson Add [J18.9] Pneumonia     7/7/2025  7:27 PM Ion Johnson Add [A41.9] Sepsis (HCC)     7/7/2025  7:27 PM Ion Johnson Add [J81.1] Pulmonary edema     7/7/2025  7:27 PM Ion Johnson Add [R09.02] Hypoxia     7/7/2025  7:29 PM Bhargavi Mitchell Add [J96.01] Acute respiratory failure with hypoxia (HCC)     7/7/2025  7:29 PM Bhargavi Mitchell Add [J18.9,  A41.9] Sepsis due to pneumonia (HCC)           ED Disposition       ED Disposition   Admit    Condition   Stable    Date/Time   Mon Jul 7, 2025  7:28 PM    Comment   Case was discussed with [facility] and the patient's admission status was agreed to be Admission Status: inpatient status to the service of Dr. Lane .               Assessment & Plan       Medical Decision Making  75-year-old female presenting progressive shortness of breath, hypoxia.  Symptoms on the past week.  Associated with productive cough, fever at home.  Mildly tachypneic, satting 74% on room air.  Patient was placed on nasal cannula  Differential includes viral URI versus pneumonia versus COPD exacerbation versus CHF.  Septic workup was obtained.  Will treat with heart neb, Solu-Medrol for potential COPD exacerbation  X-ray shows what I believe is a right sided lower lobe pneumonia.  Pro-Bautista elevated.  Patient does have leukocytosis.  Will treat with IV antibiotics for potential pneumonia.  BNP elevated, was given 40 mg of Lasix for possible fluid overload.  Patient was started on 15 L mid flow with improvement of oxygenation.  Patient admitted to stepdown level 2.    Problems Addressed:  Hypoxia: acute illness or injury  Pneumonia: acute illness or injury  Pulmonary edema: acute illness or injury  Sepsis (HCC): acute illness or injury    Amount and/or Complexity of Data Reviewed  Labs: ordered.  Radiology:  ordered and independent interpretation performed.    Risk  Prescription drug management.  Decision regarding hospitalization.             Medications   ceftriaxone (ROCEPHIN) 1 g/50 mL in dextrose IVPB (has no administration in time range)   insulin lispro (HumALOG/ADMELOG) 100 units/mL subcutaneous injection 1-5 Units (has no administration in time range)   insulin lispro (HumALOG/ADMELOG) 100 units/mL subcutaneous injection 1-5 Units (has no administration in time range)   acetaminophen (TYLENOL) tablet 650 mg (has no administration in time range)   ondansetron (ZOFRAN) injection 4 mg (has no administration in time range)   dextromethorphan-guaiFENesin (ROBITUSSIN DM) oral syrup 10 mL (has no administration in time range)   enoxaparin (LOVENOX) subcutaneous injection 40 mg (has no administration in time range)   albuterol inhalation solution 10 mg (10 mg Nebulization Given 7/7/25 1623)   ipratropium (ATROVENT) 0.02 % inhalation solution 1 mg (1 mg Nebulization Given 7/7/25 1623)   sodium chloride 0.9 % inhalation solution 12 mL (12 mL Nebulization Given 7/7/25 1623)   methylPREDNISolone sodium succinate (Solu-MEDROL) injection 125 mg (125 mg Intravenous Given 7/7/25 1642)   ceftriaxone (ROCEPHIN) 1 g/50 mL in dextrose IVPB (0 mg Intravenous Stopped 7/7/25 1805)   azithromycin (ZITHROMAX) 500 mg in sodium chloride 0.9 % 250 mL IVPB (0 mg Intravenous Stopped 7/7/25 1942)   furosemide (LASIX) injection 40 mg (40 mg Intravenous Given 7/7/25 1730)       ED Risk Strat Scores                    No data recorded        SBIRT 22yo+      Flowsheet Row Most Recent Value   Initial Alcohol Screen: US AUDIT-C     1. How often do you have a drink containing alcohol? 0 Filed at: 07/07/2025 1840   2. How many drinks containing alcohol do you have on a typical day you are drinking?  0 Filed at: 07/07/2025 1840   3a. Male UNDER 65: How often do you have five or more drinks on one occasion? 0 Filed at: 07/07/2025 1840   3b. FEMALE  "Any Age, or MALE 65+: How often do you have 4 or more drinks on one occassion? 0 Filed at: 07/07/2025 1840   Audit-C Score 0 Filed at: 07/07/2025 1840   ROSY: How many times in the past year have you...    Used an illegal drug or used a prescription medication for non-medical reasons? Never Filed at: 07/07/2025 1840                            History of Present Illness       Chief Complaint   Patient presents with    Shortness of Breath     Pt reports shortness of breath x one week.        Past Medical History[1]   Past Surgical History[2]   Family History[3]   Social History[4]   E-Cigarette/Vaping    E-Cigarette Use Never User       E-Cigarette/Vaping Substances    Nicotine No     THC No     CBD No     Flavoring No     Other No     Unknown No       I have reviewed and agree with the history as documented.     Patient is a 75-year-old female who presents for evaluation of shortness of breath.  Patient says the symptoms have been progressing over the past week.  Patient says is associated with a cough productive of greenish mucus and fever.  Patient denies any chest pain, pleuritic chest pain, leg swelling.  Patient says that she has had \"bronchitis\" in the past.  Chart does show a history of COPD.  Upon arrival, patient is mildly tachypneic, she was satting 74% on room air.        Review of Systems   Constitutional:  Positive for fever. Negative for unexpected weight change.   HENT:  Negative for congestion, ear pain, sore throat and trouble swallowing.    Eyes:  Negative for pain and redness.   Respiratory:  Positive for cough and shortness of breath. Negative for chest tightness.    Cardiovascular:  Negative for chest pain and leg swelling.   Gastrointestinal:  Negative for abdominal distention, abdominal pain, diarrhea and vomiting.   Endocrine: Negative for polyuria.   Genitourinary:  Negative for dysuria, hematuria, pelvic pain and vaginal bleeding.   Musculoskeletal:  Negative for back pain and myalgias. "   Skin:  Negative for rash.   Neurological:  Negative for dizziness, syncope, weakness, light-headedness and headaches.           Objective       ED Triage Vitals   Temperature Pulse Blood Pressure Respirations SpO2 Patient Position - Orthostatic VS   07/07/25 1614 07/07/25 1614 07/07/25 1614 07/07/25 1614 07/07/25 1614 07/07/25 1614   98.4 °F (36.9 °C) (!) 118 116/76 (!) 24 (S) (!) 74 % Sitting      Temp Source Heart Rate Source BP Location FiO2 (%) Pain Score    07/07/25 2006 07/07/25 1614 07/07/25 1614 -- 07/07/25 1614    Temporal Monitor Right arm  No Pain      Vitals      Date and Time Temp Pulse SpO2 Resp BP Pain Score FACES Pain Rating User   07/07/25 2230 97.2 °F (36.2 °C) 101 95 % 36 104/44 -- -- HAK   07/07/25 2200 97.3 °F (36.3 °C) 108 93 % 37 121/57 -- -- HAK   07/07/25 2130 97 °F (36.1 °C) 108 92 % 39 117/57 -- -- HAK   07/07/25 2105 97 °F (36.1 °C) 105 93 % 33 117/57 No Pain -- HAK   07/07/25 2006 98.4 °F (36.9 °C) 112 -- 33 130/79 No Pain -- HAK   07/07/25 2006 -- -- 95 % -- -- -- -- RK   07/07/25 1830 -- 114 91 % 16 110/57 -- -- DK   07/07/25 1804 -- -- 92 % -- -- -- -- LML   07/07/25 1731 -- 117 90 % 52 111/72 -- -- OP   07/07/25 1705 -- 107 92 % 38 115/56 -- -- DK   07/07/25 1645 -- 108 93 % 40 112/60 -- -- DK   07/07/25 1623 -- -- 87 % -- -- -- -- LML   07/07/25 1614 98.4 °F (36.9 °C) 118  74 % provider aware and at bedside. 24 116/76 No Pain -- AM            Physical Exam  Vitals and nursing note reviewed.   Constitutional:       General: She is not in acute distress.     Appearance: She is well-developed.   HENT:      Head: Normocephalic and atraumatic.      Right Ear: External ear normal.      Left Ear: External ear normal.      Nose: Nose normal.      Mouth/Throat:      Mouth: Mucous membranes are moist.      Pharynx: No oropharyngeal exudate.     Eyes:      Conjunctiva/sclera: Conjunctivae normal.      Pupils: Pupils are equal, round, and reactive to light.       Cardiovascular:      Rate and  Rhythm: Normal rate and regular rhythm.      Heart sounds: Normal heart sounds. No murmur heard.     No friction rub. No gallop.   Pulmonary:      Effort: Pulmonary effort is normal. Tachypnea present. No respiratory distress.      Breath sounds: Decreased breath sounds (throughout) present. No wheezing or rales.   Abdominal:      General: There is no distension.      Palpations: Abdomen is soft.      Tenderness: There is no abdominal tenderness. There is no guarding.     Musculoskeletal:         General: No swelling, tenderness or deformity. Normal range of motion.      Cervical back: Normal range of motion and neck supple.   Lymphadenopathy:      Cervical: No cervical adenopathy.     Skin:     General: Skin is warm and dry.     Neurological:      General: No focal deficit present.      Mental Status: She is alert and oriented to person, place, and time. Mental status is at baseline.      Cranial Nerves: No cranial nerve deficit.      Sensory: No sensory deficit.      Motor: No weakness or abnormal muscle tone.      Coordination: Coordination normal.         Results Reviewed       Procedure Component Value Units Date/Time    Sputum culture and Gram stain [[id number]]     Lab Status: No result Specimen: Sputum     Strep Pneumoniae, Urine [[id number]]     Lab Status: No result Specimen: Urine     Legionella antigen, Urine [[id number]]     Lab Status: No result Specimen: Urine     Urine Microscopic [[id number]]  (Abnormal) Collected: 07/07/25 1745    Lab Status: Final result Specimen: Urine, Clean Catch Updated: 07/07/25 1825     RBC, UA 0-5 /hpf      WBC, UA 0-5 /hpf      Epithelial Cells Occasional /hpf      Bacteria, UA Occasional /hpf      Hyaline Casts, UA 0-1 /lpf      COARSE GRANULAR CASTS 1-2 /lpf     UA w Reflex to Microscopic w Reflex to Culture [[id number]]  (Abnormal) Collected: 07/07/25 1745    Lab Status: Final result Specimen: Urine, Clean Catch Updated: 07/07/25 1752     Color, UA Betty     Clarity, UA Hazy      Specific Gravity, UA >=1.030     pH, UA 6.0     Leukocytes, UA Negative     Nitrite, UA Positive     Protein, UA 3+ mg/dl      Glucose, UA Negative mg/dl      Ketones, UA Negative mg/dl      Urobilinogen, UA 0.2 E.U./dl      Bilirubin, UA 1+     Occult Blood, UA 2+    Protime-INR [[id number]]  (Normal) Collected: 07/07/25 1632    Lab Status: Final result Specimen: Blood from Arm, Left Updated: 07/07/25 1738     Protime 14.9 seconds      INR 1.12    Narrative:      INR Therapeutic Range    Indication                                             INR Range      Atrial Fibrillation                                               2.0-3.0  Hypercoagulable State                                    2.0.2.3  Left Ventricular Asist Device                            2.0-3.0  Mechanical Heart Valve                                  -    Aortic(with afib, MI, embolism, HF, LA enlargement,    and/or coagulopathy)                                     2.0-3.0 (2.5-3.5)     Mitral                                                             2.5-3.5  Prosthetic/Bioprosthetic Heart Valve               2.0-3.0  Venous thromboembolism (VTE: VT, PE        2.0-3.0    APTT [[id number]]  (Normal) Collected: 07/07/25 1632    Lab Status: Final result Specimen: Blood from Arm, Left Updated: 07/07/25 1738     PTT 29 seconds     FLU/RSV/COVID - if FLU/RSV clinically relevant [[id number]]  (Normal) Collected: 07/07/25 1632    Lab Status: Final result Specimen: Nares from Nose Updated: 07/07/25 1725     SARS-CoV-2 Negative     INFLUENZA A PCR Negative     INFLUENZA B PCR Negative     RSV PCR Negative    Narrative:      This test has been performed using the CoV-2/Flu/RSV plus assay on the [employer] GeneXpert platform. This test has been validated by the  and verified by the performing laboratory.     This test is designed to amplify and detect the following: nucleocapsid (N), envelope (E), and RNA-dependent RNA polymerase (RdRP) genes of the  SARS-CoV-2 genome; matrix (M), basic polymerase (PB2), and acidic protein (PA) segments of the influenza A genome; matrix (M) and non-structural protein (NS) segments of the influenza B genome, and the nucleocapsid genes of RSV A and RSV B.     Positive results are indicative of the presence of Flu A, Flu B, RSV, and/or SARS-CoV-2 RNA. Positive results for SARS-CoV-2 or suspected novel influenza should be reported to state, local, or federal health departments according to local reporting requirements.      All results should be assessed in conjunction with clinical presentation and other laboratory markers for clinical management.     FOR PEDIATRIC PATIENTS - copy/paste COVID Guidelines URL to browser: [email or web address]       Manual Differential(PHLEBS Do Not Order) [[id number]]  (Abnormal) Collected: 07/07/25 1632    Lab Status: Final result Specimen: Blood from Arm, Left Updated: 07/07/25 1712     Segmented % 46 %      Bands % 27 %      Lymphocytes % 12 %      Monocytes % 11 %      Eosinophils % 0 %      Basophils % 1 %      Metamyelocytes % 2 %      Atypical Lymphocytes % 1 %      Absolute Neutrophils 8.50 Thousand/uL      Absolute Lymphocytes 1.51 Thousand/uL      Absolute Monocytes 1.28 Thousand/uL      Absolute Eosinophils 0.00 Thousand/uL      Absolute Basophils 0.12 Thousand/uL      Absolute Metamyelocytes 0.23 Thousand/uL      Total Counted --     RBC Morphology Normal     Platelet Estimate Adequate    Procalcitonin [[id number]]  (Abnormal) Collected: 07/07/25 1632    Lab Status: Final result Specimen: Blood from Arm, Left Updated: 07/07/25 1710     Procalcitonin 0.89 ng/ml     B-Type Natriuretic Peptide(BNP) [[id number]]  (Abnormal) Collected: 07/07/25 1632    Lab Status: Final result Specimen: Blood from Arm, Left Updated: 07/07/25 1707      pg/mL     Lactic acid [[id number]]  (Normal) Collected: 07/07/25 1632    Lab Status: Final result  Specimen: Blood from Arm, Left Updated: 07/07/25 1704     LACTIC ACID 1.8 mmol/L     Narrative:      Result may be elevated if tourniquet was used during collection.    CBC and differential [[id number]]  (Abnormal) Collected: 07/07/25 1632    Lab Status: Final result Specimen: Blood from Arm, Left Updated: 07/07/25 1704     WBC 11.65 Thousand/uL      RBC 4.49 Million/uL      Hemoglobin 14.9 g/dL      Hematocrit 44.3 %      MCV 99 fL      MCH 33.2 pg      MCHC 33.6 g/dL      RDW 11.6 %      MPV 10.4 fL      Platelets 198 Thousands/uL     Comprehensive metabolic panel [[id number]]  (Abnormal) Collected: 07/07/25 1632    Lab Status: Final result Specimen: Blood from Arm, Left Updated: 07/07/25 1703     Sodium 137 mmol/L      Potassium 3.8 mmol/L      Chloride 99 mmol/L      CO2 28 mmol/L      ANION GAP 10 mmol/L      BUN 24 mg/dL      Creatinine 0.89 mg/dL      Glucose 181 mg/dL      Calcium 9.6 mg/dL      AST 21 U/L      ALT 8 U/L      Alkaline Phosphatase 48 U/L      Total Protein 6.9 g/dL      Albumin 3.9 g/dL      Total Bilirubin 1.64 mg/dL      eGFR 63 ml/min/1.73sq m     Narrative:      National Kidney Disease Foundation guidelines for Chronic Kidney Disease (CKD):     Stage 1 with normal or high GFR (GFR > 90 mL/min/1.73 square meters)    Stage 2 Mild CKD (GFR = 60-89 mL/min/1.73 square meters)    Stage 3A Moderate CKD (GFR = 45-59 mL/min/1.73 square meters)    Stage 3B Moderate CKD (GFR = 30-44 mL/min/1.73 square meters)    Stage 4 Severe CKD (GFR = 15-29 mL/min/1.73 square meters)    Stage 5 End Stage CKD (GFR <15 mL/min/1.73 square meters)  Note: GFR calculation is accurate only with a steady state creatinine    Blood culture #2 [[id number]] Collected: 07/07/25 1632    Lab Status: In process Specimen: Blood from Arm, Right Updated: 07/07/25 1641    Blood culture #1 [[id number]] Collected: 07/07/25 1632    Lab Status: In process Specimen: Blood from Arm, Left Updated: 07/07/25 1641            XR chest portable    ED Interpretation by Ion Johnson DO (07/07 1718)   Right lower lobe infiltrate concerning for pneumonia          Procedures  Critical Care Time Statement: Upon my evaluation, this patient had a high probability of imminent or life-threatening deterioration due to hypoxia, COPD, pnuemonia, which required my direct attention, intervention, and personal management.  I spent a total of 75 minutes directly providing critical care services, including interpretation of complex medical databases, evaluating for the presence of life-threatening injuries or illnesses, management of organ system failure(s) , complex medical decision making (to support/prevent further life-threatening deterioration)., and interpretation of hemodynamic data. This time is exclusive of procedures, teaching, treating other patients, family meetings, and any prior time recorded by providers other than myself.     ED Medication and Procedure Management   Prior to Admission Medications   Prescriptions Last Dose Informant Patient Reported? Taking?   JOELLEN MICROLET LANCETS lancets 7/7/2025 Self No Yes   Sig: Use as instructed BID E11.65   Calcium 500 MG tablet 7/6/2025 Morning Self Yes Yes   Sig: Take 1 tablet by mouth every other day   Glucose Blood (ONETOUCH ULTRA BLUE VI) 7/7/2025 Self Yes Yes   Sig: Test in the morning and in the evening and before bedtime.   VITAMIN B COMPLEX-C PO 7/6/2025 Morning Self Yes Yes   Sig: Take 1 tablet by mouth in the morning.   aspirin (ECOTRIN LOW STRENGTH) 81 mg EC tablet 7/6/2025 Morning Self Yes Yes   Sig: Take 81 mg by mouth in the morning.   atorvastatin (LIPITOR) 20 mg tablet 7/6/2025 Morning  No Yes   Sig: Take 1 tablet by mouth once daily   cholecalciferol (VITAMIN D3) 1,000 units tablet 7/6/2025 Morning Self Yes Yes   Sig: Take 3 tablets by mouth every other day   clonazePAM (KlonoPIN) 2 mg tablet 7/6/2025 Bedtime  No Yes   Sig: Take 1 tablet (2 mg total) by mouth 2 (two) times a day   denosumab  (PROLIA) 60 mg/mL More than a month Self Yes No   Sig: Inject under the skin every 6 (six) months   escitalopram (LEXAPRO) 20 mg tablet 7/6/2025 Morning  No Yes   Sig: Take 1 tablet by mouth once daily   glucose blood (OneTouch Ultra) test strip 7/7/2025  No Yes   Sig: Once daily   levothyroxine 25 mcg tablet 7/6/2025 Morning  No Yes   Sig: TAKE 1 TABLET BY MOUTH ONCE DAILY IN THE MORNING   lisinopril (ZESTRIL) 2.5 mg tablet 7/6/2025 Morning  No Yes   Sig: Take 1 tablet (2.5 mg total) by mouth daily   metFORMIN (GLUCOPHAGE) 1000 MG tablet 7/6/2025  No Yes   Sig: TAKE 1 TABLET BY MOUTH TWICE DAILY WITH MEALS   methocarbamol (ROBAXIN) 750 mg tablet Past Week  No Yes   Sig: TAKE 1 TABLET BY MOUTH EVERY 8 HOURS   pantoprazole (PROTONIX) 40 mg tablet 7/6/2025 Bedtime  No Yes   Sig: Take 1 tablet by mouth once daily      Facility-Administered Medications: None     Current Discharge Medication List        CONTINUE these medications which have NOT CHANGED    Details   aspirin (ECOTRIN LOW STRENGTH) 81 mg EC tablet Take 81 mg by mouth in the morning.      atorvastatin (LIPITOR) 20 mg tablet Take 1 tablet by mouth once daily  Qty: 90 tablet, Refills: 1    Associated Diagnoses: Hyperlipidemia, unspecified hyperlipidemia type      JOELLEN MICROLET LANCETS lancets Use as instructed BID E11.65  Qty: 100 each, Refills: 5    Associated Diagnoses: Type 2 diabetes mellitus with complication, without long-term current use of insulin (HCC)      Calcium 500 MG tablet Take 1 tablet by mouth every other day      cholecalciferol (VITAMIN D3) 1,000 units tablet Take 3 tablets by mouth every other day      clonazePAM (KlonoPIN) 2 mg tablet Take 1 tablet (2 mg total) by mouth 2 (two) times a day  Qty: 60 tablet, Refills: 0    Associated Diagnoses: RLS (restless legs syndrome)      escitalopram (LEXAPRO) 20 mg tablet Take 1 tablet by mouth once daily  Qty: 90 tablet, Refills: 1    Associated Diagnoses: Anxiety      !! Glucose Blood (ONETOUCH  ULTRA BLUE VI) Test in the morning and in the evening and before bedtime.      !! glucose blood (OneTouch Ultra) test strip Once daily  Qty: 100 strip, Refills: 5    Comments: Onetouch ultra 2 machine  Associated Diagnoses: Type 2 diabetes mellitus with complication, without long-term current use of insulin (HCC)      levothyroxine 25 mcg tablet TAKE 1 TABLET BY MOUTH ONCE DAILY IN THE MORNING  Qty: 90 tablet, Refills: 1    Associated Diagnoses: Acquired hypothyroidism      lisinopril (ZESTRIL) 2.5 mg tablet Take 1 tablet (2.5 mg total) by mouth daily  Qty: 90 tablet, Refills: 1    Associated Diagnoses: Essential hypertension      metFORMIN (GLUCOPHAGE) 1000 MG tablet TAKE 1 TABLET BY MOUTH TWICE DAILY WITH MEALS  Qty: 180 tablet, Refills: 1    Associated Diagnoses: Type 2 diabetes mellitus with complication, without long-term current use of insulin (HCC)      methocarbamol (ROBAXIN) 750 mg tablet TAKE 1 TABLET BY MOUTH EVERY 8 HOURS  Qty: 90 tablet, Refills: 0    Associated Diagnoses: Chronic bilateral low back pain without sciatica      pantoprazole (PROTONIX) 40 mg tablet Take 1 tablet by mouth once daily  Qty: 90 tablet, Refills: 1    Associated Diagnoses: Esophageal dysphagia      VITAMIN B COMPLEX-C PO Take 1 tablet by mouth in the morning.      denosumab (PROLIA) 60 mg/mL Inject under the skin every 6 (six) months       !! - Potential duplicate medications found. Please discuss with provider.        No discharge procedures on file.  ED SEPSIS DOCUMENTATION   Time reflects when diagnosis was documented in both MDM as applicable and the Disposition within this note       Time User Action Codes Description Comment    7/7/2025  7:27 PM Ion Johnson [J18.9] Pneumonia     7/7/2025  7:27 PM Ion Johnson [A41.9] Sepsis (HCC)     7/7/2025  7:27 PM Ion Johnson [J81.1] Pulmonary edema     7/7/2025  7:27 PM Ion Johnson [R09.02] Hypoxia     7/7/2025  7:29 PM Bhargavi Mitchell Add [J96.01] Acute  respiratory failure with hypoxia ([facility])     7/7/2025  7:29 PM PaulaGayatriBhargavi MARY BETH Add [J18.9,  A41.9] Sepsis due to pneumonia ([facility])                      [1]   Past Medical History:  Diagnosis Date    Anxiety     Cellulitis     LAST ASSESED 2/12/16; RESOLVED 3/9/16    Chronic obstructive pulmonary disease (HCC) 3/11/2019    Depression     Diabetes mellitus ([facility])     Dysuria     LAST ASSESSED 3/7/16; RESOLVED 3/9/16    Esophageal reflux     RESOLVED 1/15/16    Fatigue     RESOLVED 3/9/16    Fracture     rt 4 th metatarsal     Heart murmur     Hypertension     Pneumonia     Restless legs syndrome (RLS)     Stage 3 chronic kidney disease, unspecified whether stage 3a or 3b CKD ([facility]) 7/22/2022    Vitamin D deficiency    [2]   Past Surgical History:  Procedure Laterality Date    APPENDECTOMY      BACK SURGERY      CHOLECYSTECTOMY  1975    FOOT SURGERY Right 2014    HIP SURGERY  2013    HYSTERECTOMY      INSERTION / PLACEMENT / REVISION NEUROSTIMULATOR      OTHER SURGICAL HISTORY  2011    LAPAROSCOPIC SLING OPERATION FOR STRESS INCONTINENCE     OVARIAN CYST SURGERY  1971    CYSTECTOMY    MN COLONOSCOPY FLX DX W/COLLJ SPEC WHEN PFRMD N/A 07/10/2018    Procedure: COLONOSCOPY;  Surgeon: Jose Carlos Lambert MD;  Location: [facility];  Service: Gastroenterology    MN ESOPHAGOGASTRODUODENOSCOPY TRANSORAL DIAGNOSTIC N/A 01/28/2019    Procedure: ESOPHAGOGASTRODUODENOSCOPY (EGD);  Surgeon: Josef James III, MD;  Location: [facility];  Service: Gastroenterology    SHOULDER SURGERY Right 12/14/2015    REPAIR OF TORN MUSCLES    SKIN BIOPSY      chin 03/07/2019    THORACIC SPINE SURGERY N/A 05/08/2023    TONSILLECTOMY     [3]   Family History  Problem Relation Name Age of Onset    Hypertension Mother      Mental illness Mother      Stroke Mother          CVA    Cancer Father      Diabetes type II Father      Heart disease Father          CARDIAC DISORDER    Hypertension Other [place]     Mental illness Other [place]     Migraines Other  [place]     Diabetes type II Other [place]     Calcium disorder Other [place]         CALCIUM KIDNEY STONES    Other Other [place]         HERPES ZOSTER INFECTION    Tuberculosis Other GRANDPARENT     Pulmonary embolism Other GRANDPARENT         CHRONIC OBSTRUCTIVE PULMONARY DISEASE    Calcium disorder Other GRANDPARENT         CALISUM KIDNEY STONES    Depression Family      Substance Abuse Family      Substance Abuse Brother     [4]   Social History  Tobacco Use    Smoking status: Former     Average packs/day: 4.0 packs/day for 56.4 years (225.7 ttl pk-yrs)     Types: Cigarettes     Start date: 1969     Passive exposure: Current    Smokeless tobacco: Never    Tobacco comments:     1 a month   Vaping Use    Vaping status: Never Used   Substance Use Topics    Alcohol use: Never     Comment: rarely    Drug use: No        Ion Johnson DO  07/07/25 [id number]

## 2025-07-07 NOTE — ASSESSMENT & PLAN NOTE
Patient noted to be 74% on RA and placed on Mid Willem, Currently 15 L  Respiratory protocol  Wean off oxygen as able

## 2025-07-07 NOTE — RESPIRATORY THERAPY NOTE
07/07/25 1804   Respiratory Assessment   Resp Comments Pt on 6lnc post hour long UDN run with O2, sat on 6l cont. to be mid 80s. Pt trialed on midflow at this time. Will increase to High flow if needed.   Oxygen Therapy/Pulse Ox   O2 Device (S)  Mid flow nasal cannula   Nasal Cannula O2 Flow Rate (L/min) (S)  15 L/min   Calculated FIO2 (%) - Nasal Cannula 80   SpO2 92 %   $ Pulse Oximetry Spot Check Charge Completed

## 2025-07-07 NOTE — ASSESSMENT & PLAN NOTE
Lab Results   Component Value Date    HGBA1C 5.0 04/01/2025   Hold metformin  Sliding Scale insulin coverage with Accu-Chek

## 2025-07-07 NOTE — H&P
"H&P - Hospitalist   Name: Rama Linda 75 y.o. female I MRN: [id number]  Unit/Bed#: ICU 06-01 I Date of Admission: 7/7/2025   Date of Service: 7/7/2025 I Hospital Day: 0     Assessment & Plan  Acute respiratory failure with hypoxia (HCC)  Patient noted to be 74% on RA and placed on Mid Willem, Currently 15 L  Respiratory protocol  Wean off oxygen as able  Sepsis due to pneumonia (HCC)  Patient with SOB, sepsis noted by tachypnea and tachycardia  BBC 11.65  Lactic acid 1.8  Pro-Bautista 0.89, continue to trend  Flu, COVID, RSV PCR negative  Follow pneumonia pathway  Anxiety  Continue home Lexapro  Type 2 diabetes mellitus with complication, without long-term current use of insulin (HCC)    Lab Results   Component Value Date    HGBA1C 5.0 04/01/2025   Hold metformin  Sliding Scale insulin coverage with Accu-Chek  Primary hypertension  Continue home medication with hold parameter  Hypercholesteremia  Continue statin      VTE Pharmacologic Prophylaxis: VTE Score: 7 High Risk (Score >/= 5) - Pharmacological DVT Prophylaxis Ordered: enoxaparin (Lovenox). Sequential Compression Devices Ordered.  Code Status: Level 3  Discussion with patient    Anticipated Length of Stay: Patient will be admitted on an inpatient basis with an anticipated length of stay of greater than 2 midnights secondary to IV antibiotics, respiratory monitoring, specialist input.    History of Present Illness   Chief Complaint: Shortness of breath    Rama Linda is a 75 y.o. female with a PMH of diabetes mellitus type 2 not on insulin with hypertension hyperlipidemia, anxiety who presents with Shortness of breath. Patient reports shortness of breath starting about 1 week ago. She then developed a cough Tuesday morning, she tried going outside to get fresh air, and had trouble sleeping with the cough. Today she felt that she was going to \"Go\" or needed to get help. Noted a fever on Tuesday of 102. Had decreased PO intake secondary to coughing. Does not wear " oxygen at home and does not follow with pulmonary.     Review of Systems   Constitutional:  Positive for fatigue and fever. Negative for chills.   HENT:  Positive for rhinorrhea. Negative for sore throat and trouble swallowing.    Eyes:  Negative for discharge and redness.   Respiratory:  Positive for cough and shortness of breath.    Cardiovascular:  Negative for chest pain and leg swelling.   Gastrointestinal:  Negative for abdominal pain, diarrhea, nausea and vomiting.   Genitourinary:  Negative for dysuria and hematuria.   Musculoskeletal:  Positive for back pain. Negative for myalgias and neck pain.   Skin:  Negative for rash and wound.        Toe fungus   Neurological:  Negative for dizziness, weakness and headaches.   Psychiatric/Behavioral:  Positive for agitation and confusion.        Historical Information   Past Medical History[1]  Past Surgical History[2]  Social History[3]  E-Cigarette/Vaping    E-Cigarette Use Never User      E-Cigarette/Vaping Substances    Nicotine No     THC No     CBD No     Flavoring No     Other No     Unknown No      Family History[4]  Social History:  Marital Status: /Civil Union   Occupation: Retired RN  Patient Pre-hospital Living Situation: With spouse  Patient Pre-hospital Level of Mobility: walks with walker when out  Patient Pre-hospital Diet Restrictions: 1600 rick diet    Meds/Allergies   I have reviewed home medications with patient personally.  Prior to Admission medications    Medication Sig Start Date End Date Taking? Authorizing Provider   albuterol (2.5 mg/3 mL) 0.083 % nebulizer solution Take 1 vial (2.5 mg total) by nebulization every 4 (four) hours as needed for shortness of breath Disp 1 box 4/8/20   Leann Mejía PA-C   aspirin (ECOTRIN LOW STRENGTH) 81 mg EC tablet Take 81 mg by mouth daily    Historical Provider, MD   atorvastatin (LIPITOR) 20 mg tablet Take 1 tablet by mouth once daily 2/24/25   Leann Mejía PA-C   JOELLEN MICROLET LANCETS  lancets Use as instructed BID E11.65 7/13/18   Josef Bermudez DO   Calcium 500 MG tablet Take 1 tablet by mouth every other day 12/14/15   Historical Provider, MD   cholecalciferol (VITAMIN D3) 1,000 units tablet Take 3 tablets by mouth every other day  6/28/17   Historical Provider, MD   clonazePAM (KlonoPIN) 2 mg tablet Take 1 tablet (2 mg total) by mouth 2 (two) times a day 6/24/25   Leann Mejía PA-C   denosumab (PROLIA) 60 mg/mL Inject under the skin every 6 (six) months 9/13/17   Historical Provider, MD   escitalopram (LEXAPRO) 20 mg tablet Take 1 tablet by mouth once daily 5/20/25   Leann Mejía PA-C   Ferrous Sulfate (IRON) 325 (65 Fe) MG TABS Take 1 tablet (325 mg total) by mouth daily 7/20/20   [employer]   furosemide (LASIX) 40 mg tablet Take 1 tablet (40 mg total) by mouth daily PRN 3/17/23   Leann Mejía PA-C   Glucose Blood (ONETOUCH ULTRA BLUE VI) Test 3 (three) times a day   12/14/15   Historical Provider, MD   glucose blood (OneTouch Ultra) test strip Once daily 3/4/24   Leann Mejía PA-C   levothyroxine 25 mcg tablet TAKE 1 TABLET BY MOUTH ONCE DAILY IN THE MORNING 5/12/25   Leann Mejía PA-C   lisinopril (ZESTRIL) 2.5 mg tablet Take 1 tablet (2.5 mg total) by mouth daily 6/27/25   Leann Mejía PA-C   metFORMIN (GLUCOPHAGE) 1000 MG tablet TAKE 1 TABLET BY MOUTH TWICE DAILY WITH MEALS 3/14/25   Leann Mejía PA-C   methocarbamol (ROBAXIN) 750 mg tablet TAKE 1 TABLET BY MOUTH EVERY 8 HOURS 5/28/25   Leann Mejía PA-C   pantoprazole (PROTONIX) 40 mg tablet Take 1 tablet by mouth once daily 6/25/25   Leann Mejía PA-C   potassium chloride (MICRO-K) 10 MEQ CR capsule Take 10 mEq by mouth 2 (two) times a day PRN w/lasix     Historical Provider, MD   VITAMIN B COMPLEX-C PO Take 1 tablet by mouth daily 6/28/17   Historical Provider, MD     Allergies   Allergen Reactions    Midazolam Nausea Only and Vomiting    Acetaminophen Itching    Hydrocodone Itching     Hydrocodone-Acetaminophen Other (See Comments)    Levodopa Other (See Comments)    Moxifloxacin Diarrhea    Naproxen Itching    Other Other (See Comments)    Gabapentin     Hydrocodone-Acetaminophen      Category: Adverse Reaction;     Penicillin V     Pregabalin     Propofol      Exacerbates RSL       Objective :  Temp:  [98.4 °F (36.9 °C)] 98.4 °F (36.9 °C)  HR:  [107-118] 112  BP: (110-130)/(56-79) 130/79  Resp:  [16-52] 33  SpO2:  [74 %-95 %] 95 %  O2 Device: Mid flow nasal cannula  Nasal Cannula O2 Flow Rate (L/min):  [6 L/min-15 L/min] 15 L/min    Physical Exam  Vitals reviewed.   Constitutional:       Appearance: Normal appearance.      Interventions: Nasal cannula in place.   HENT:      Head: Normocephalic and atraumatic.      Nose: Nose normal.     Eyes:      General:         Right eye: No discharge.         Left eye: No discharge.      Extraocular Movements: Extraocular movements intact.      Conjunctiva/sclera: Conjunctivae normal.       Cardiovascular:      Rate and Rhythm: Normal rate and regular rhythm.   Pulmonary:      Effort: Pulmonary effort is normal. No respiratory distress.      Breath sounds: Examination of the right-lower field reveals decreased breath sounds. Examination of the left-lower field reveals decreased breath sounds. Decreased breath sounds present. No wheezing.   Abdominal:      General: Bowel sounds are normal. There is no distension.      Palpations: Abdomen is soft.      Tenderness: There is no abdominal tenderness. There is no guarding.     Musculoskeletal:         General: No swelling or tenderness. Normal range of motion.      Cervical back: Normal range of motion.      Right lower leg: No edema.      Left lower leg: No edema.     Skin:     General: Skin is warm and dry.      Capillary Refill: Capillary refill takes less than 2 seconds.      Coloration: Skin is pale.      Comments: LE in ven[place]       Neurological:      General: No focal deficit present.      Mental Status:  She is alert and oriented to person, place, and time. Mental status is at baseline.     Psychiatric:         Mood and Affect: Mood normal.         Behavior: Behavior normal.         Thought Content: Thought content normal.         Judgment: Judgment normal.        Lines/Drains:      Lab Results: I have reviewed the following results:  Results from last 7 days   Lab Units 07/07/25  1632   WBC Thousand/uL 11.65*   HEMOGLOBIN g/dL 14.9   HEMATOCRIT % 44.3   PLATELETS Thousands/uL 198   BANDS PCT % 27*   LYMPHO PCT % 12*   MONO PCT % 11   EOS PCT % 0     Results from last 7 days   Lab Units 07/07/25  1632   SODIUM mmol/L 137   POTASSIUM mmol/L 3.8   CHLORIDE mmol/L 99   CO2 mmol/L 28   BUN mg/dL 24   CREATININE mg/dL 0.89   ANION GAP mmol/L 10   CALCIUM mg/dL 9.6   ALBUMIN g/dL 3.9   TOTAL BILIRUBIN mg/dL 1.64*   ALK PHOS U/L 48   ALT U/L 8   AST U/L 21   GLUCOSE RANDOM mg/dL 181*     Results from last 7 days   Lab Units 07/07/25  1632   INR  1.12     Results from last 7 days   Lab Units 07/07/25  2020   POC GLUCOSE mg/dl 216*     Lab Results   Component Value Date    HGBA1C 5.0 04/01/2025    HGBA1C 5.3 07/01/2024    HGBA1C 5.8 12/18/2023     Results from last 7 days   Lab Units 07/07/25  1632   LACTIC ACID mmol/L 1.8   PROCALCITONIN ng/ml 0.89*       Imaging Results Review: I personally reviewed the following image studies in PACS and associated radiology reports: chest xray. My interpretation of the radiology images/reports is: Right lower lobe pneumonia.  Other Study Results Review: EKG was personally reviewed and my interpretation is: Sinus Tachycardia. ..    Administrative Statements   I have spent a total time of 75 minutes in caring for this patient on the day of the visit/encounter including Diagnostic results, Counseling / Coordination of care, Documenting in the medical record, Reviewing/placing orders in the medical record (including tests, medications, and/or procedures), and Obtaining or reviewing  history  .    ** Please Note: This note has been constructed using a voice recognition system. **         [1]   Past Medical History:  Diagnosis Date    Anxiety     Cellulitis     LAST ASSESED 2/12/16; RESOLVED 3/9/16    Chronic obstructive pulmonary disease (HCC) 3/11/2019    Depression     Diabetes mellitus ([facility])     Dysuria     LAST ASSESSED 3/7/16; RESOLVED 3/9/16    Esophageal reflux     RESOLVED 1/15/16    Fatigue     RESOLVED 3/9/16    Fracture     rt 4 th metatarsal     Heart murmur     Hypertension     Pneumonia     Restless legs syndrome (RLS)     Stage 3 chronic kidney disease, unspecified whether stage 3a or 3b CKD ([facility]) 7/22/2022    Vitamin D deficiency    [2]   Past Surgical History:  Procedure Laterality Date    APPENDECTOMY      BACK SURGERY      CHOLECYSTECTOMY  1975    FOOT SURGERY Right 2014    HIP SURGERY  2013    HYSTERECTOMY      INSERTION / PLACEMENT / REVISION NEUROSTIMULATOR      OTHER SURGICAL HISTORY  2011    LAPAROSCOPIC SLING OPERATION FOR STRESS INCONTINENCE     OVARIAN CYST SURGERY  1971    CYSTECTOMY    SD COLONOSCOPY FLX DX W/COLLJ SPEC WHEN PFRMD N/A 07/10/2018    Procedure: COLONOSCOPY;  Surgeon: Jose Carlos Lambert MD;  Location: [facility];  Service: Gastroenterology    SD ESOPHAGOGASTRODUODENOSCOPY TRANSORAL DIAGNOSTIC N/A 01/28/2019    Procedure: ESOPHAGOGASTRODUODENOSCOPY (EGD);  Surgeon: Josef James III, MD;  Location: [facility];  Service: Gastroenterology    SHOULDER SURGERY Right 12/14/2015    REPAIR OF TORN MUSCLES    SKIN BIOPSY      chin 03/07/2019    THORACIC SPINE SURGERY N/A 05/08/2023    TONSILLECTOMY     [3]   Social History  Tobacco Use    Smoking status: Former     Average packs/day: 4.0 packs/day for 56.4 years (225.7 ttl pk-yrs)     Types: Cigarettes     Start date: 1969     Passive exposure: Current    Smokeless tobacco: Never    Tobacco comments:     1 a month   Vaping Use    Vaping status: Never Used   Substance and Sexual Activity    Alcohol use: Never      Comment: rarely    Drug use: No    Sexual activity: Yes   [4]   Family History  Problem Relation Name Age of Onset    Hypertension Mother      Mental illness Mother      Stroke Mother          CVA    Cancer Father      Diabetes type II Father      Heart disease Father          CARDIAC DISORDER    Hypertension Other [place]     Mental illness Other [place]     Migraines Other [place]     Diabetes type II Other [place]     Calcium disorder Other [place]         CALCIUM KIDNEY STONES    Other Other [place]         HERPES ZOSTER INFECTION    Tuberculosis Other GRANDPARENT     Pulmonary embolism Other GRANDPARENT         CHRONIC OBSTRUCTIVE PULMONARY DISEASE    Calcium disorder Other GRANDPARENT         CALISUM KIDNEY STONES    Depression Family      Substance Abuse Family      Substance Abuse Brother

## 2025-07-08 PROBLEM — F17.200 TOBACCO USE DISORDER: Status: ACTIVE | Noted: 2025-07-08

## 2025-07-08 PROBLEM — J44.1 COPD EXACERBATION (HCC): Status: ACTIVE | Noted: 2019-03-11

## 2025-07-08 LAB
GLUCOSE SERPL-MCNC: 112 MG/DL (ref 65–140)
GLUCOSE SERPL-MCNC: 187 MG/DL (ref 65–140)
GLUCOSE SERPL-MCNC: 188 MG/DL (ref 65–140)
GLUCOSE SERPL-MCNC: 282 MG/DL (ref 65–140)
L PNEUMO1 AG UR QL IA.RAPID: NEGATIVE
PROCALCITONIN SERPL-MCNC: 1.04 NG/ML
S PNEUM AG UR QL: NEGATIVE

## 2025-07-08 PROCEDURE — 87070 CULTURE OTHR SPECIMN AEROBIC: CPT | Performed by: PHYSICIAN ASSISTANT

## 2025-07-08 PROCEDURE — 87449 NOS EACH ORGANISM AG IA: CPT | Performed by: PHYSICIAN ASSISTANT

## 2025-07-08 PROCEDURE — 92610 EVALUATE SWALLOWING FUNCTION: CPT

## 2025-07-08 PROCEDURE — 94640 AIRWAY INHALATION TREATMENT: CPT

## 2025-07-08 PROCEDURE — 87106 FUNGI IDENTIFICATION YEAST: CPT | Performed by: PHYSICIAN ASSISTANT

## 2025-07-08 PROCEDURE — 82948 REAGENT STRIP/BLOOD GLUCOSE: CPT

## 2025-07-08 PROCEDURE — 84145 PROCALCITONIN (PCT): CPT | Performed by: PHYSICIAN ASSISTANT

## 2025-07-08 PROCEDURE — 99232 SBSQ HOSP IP/OBS MODERATE 35: CPT | Performed by: INTERNAL MEDICINE

## 2025-07-08 PROCEDURE — 99223 1ST HOSP IP/OBS HIGH 75: CPT | Performed by: INTERNAL MEDICINE

## 2025-07-08 PROCEDURE — 94760 N-INVAS EAR/PLS OXIMETRY 1: CPT

## 2025-07-08 PROCEDURE — 87205 SMEAR GRAM STAIN: CPT | Performed by: PHYSICIAN ASSISTANT

## 2025-07-08 RX ORDER — PREDNISONE 20 MG/1
40 TABLET ORAL DAILY
Status: COMPLETED | OUTPATIENT
Start: 2025-07-09 | End: 2025-07-11

## 2025-07-08 RX ORDER — LEVALBUTEROL INHALATION SOLUTION 1.25 MG/3ML
1.25 SOLUTION RESPIRATORY (INHALATION)
Status: DISCONTINUED | OUTPATIENT
Start: 2025-07-08 | End: 2025-07-11 | Stop reason: HOSPADM

## 2025-07-08 RX ADMIN — INSULIN LISPRO 1 UNITS: 100 INJECTION, SOLUTION INTRAVENOUS; SUBCUTANEOUS at 07:19

## 2025-07-08 RX ADMIN — ENOXAPARIN SODIUM 40 MG: 40 INJECTION SUBCUTANEOUS at 08:49

## 2025-07-08 RX ADMIN — LEVOTHYROXINE SODIUM 25 MCG: 0.03 TABLET ORAL at 05:42

## 2025-07-08 RX ADMIN — IPRATROPIUM BROMIDE 0.5 MG: 0.5 SOLUTION RESPIRATORY (INHALATION) at 13:28

## 2025-07-08 RX ADMIN — IPRATROPIUM BROMIDE 0.5 MG: 0.5 SOLUTION RESPIRATORY (INHALATION) at 20:06

## 2025-07-08 RX ADMIN — ONDANSETRON 4 MG: 2 INJECTION INTRAMUSCULAR; INTRAVENOUS at 21:08

## 2025-07-08 RX ADMIN — AZITHROMYCIN MONOHYDRATE 500 MG: 500 INJECTION, POWDER, LYOPHILIZED, FOR SOLUTION INTRAVENOUS at 10:34

## 2025-07-08 RX ADMIN — PANTOPRAZOLE SODIUM 40 MG: 40 TABLET, DELAYED RELEASE ORAL at 05:43

## 2025-07-08 RX ADMIN — LISINOPRIL 2.5 MG: 2.5 TABLET ORAL at 08:49

## 2025-07-08 RX ADMIN — INSULIN LISPRO 3 UNITS: 100 INJECTION, SOLUTION INTRAVENOUS; SUBCUTANEOUS at 11:53

## 2025-07-08 RX ADMIN — ATORVASTATIN CALCIUM 20 MG: 20 TABLET, FILM COATED ORAL at 21:08

## 2025-07-08 RX ADMIN — CALCIUM 1 TABLET: 500 TABLET ORAL at 08:49

## 2025-07-08 RX ADMIN — ESCITALOPRAM OXALATE 20 MG: 10 TABLET ORAL at 08:49

## 2025-07-08 RX ADMIN — METHOCARBAMOL TABLETS 750 MG: 750 TABLET, COATED ORAL at 05:43

## 2025-07-08 RX ADMIN — METHOCARBAMOL TABLETS 750 MG: 750 TABLET, COATED ORAL at 21:08

## 2025-07-08 RX ADMIN — Medication 1 CAPSULE: at 08:49

## 2025-07-08 RX ADMIN — ASPIRIN 81 MG: 81 TABLET, COATED ORAL at 08:49

## 2025-07-08 RX ADMIN — METHYLPREDNISOLONE SODIUM SUCCINATE 40 MG: 40 INJECTION, POWDER, FOR SOLUTION INTRAMUSCULAR; INTRAVENOUS at 05:42

## 2025-07-08 RX ADMIN — LEVALBUTEROL HYDROCHLORIDE 1.25 MG: 1.25 SOLUTION RESPIRATORY (INHALATION) at 13:28

## 2025-07-08 RX ADMIN — LEVALBUTEROL HYDROCHLORIDE 1.25 MG: 1.25 SOLUTION RESPIRATORY (INHALATION) at 20:06

## 2025-07-08 RX ADMIN — METHOCARBAMOL TABLETS 750 MG: 750 TABLET, COATED ORAL at 14:13

## 2025-07-08 RX ADMIN — INSULIN LISPRO 1 UNITS: 100 INJECTION, SOLUTION INTRAVENOUS; SUBCUTANEOUS at 21:11

## 2025-07-08 RX ADMIN — Medication 3000 UNITS: at 08:49

## 2025-07-08 RX ADMIN — METHYLPREDNISOLONE SODIUM SUCCINATE 40 MG: 40 INJECTION, POWDER, FOR SOLUTION INTRAMUSCULAR; INTRAVENOUS at 14:13

## 2025-07-08 RX ADMIN — CEFTRIAXONE SODIUM 1000 MG: 10 INJECTION, POWDER, FOR SOLUTION INTRAVENOUS at 16:05

## 2025-07-08 RX ADMIN — CLONAZEPAM 2 MG: 1 TABLET ORAL at 21:08

## 2025-07-08 NOTE — ASSESSMENT & PLAN NOTE
Lab Results   Component Value Date    HGBA1C 5.0 04/01/2025       Recent Labs     07/07/25 2020 07/07/25 2137 07/08/25  0712   POCGLU 216* 224* 188*       Blood Sugar Average: Last 72 hrs:  (P) [id number]

## 2025-07-08 NOTE — PLAN OF CARE
Problem: Potential for Falls  Goal: Patient will remain free of falls  Description: INTERVENTIONS:  - Educate patient/family on patient safety including physical limitations  - Instruct patient to call for assistance with activity   - Consider consulting OT/PT to assist with strengthening/mobility based on AM PAC & JH-HLM score  - Consult OT/PT to assist with strengthening/mobility   - Keep Call bell within reach  - Keep bed low and locked with side rails adjusted as appropriate  - Keep care items and personal belongings within reach  - Initiate and maintain comfort rounds  - Make Fall Risk Sign visible to staff  - Offer Toileting every 2 Hours, in advance of need  - Initiate/Maintain bed alarm  - Obtain necessary fall risk management equipment: non skid socks  - Apply yellow socks and bracelet for high fall risk patients  - Consider moving patient to room near nurses station  Outcome: Progressing     Problem: PAIN - ADULT  Goal: Verbalizes/displays adequate comfort level or baseline comfort level  Description: Interventions:  - Encourage patient to monitor pain and request assistance  - Assess pain using appropriate pain scale  - Administer analgesics as ordered based on type and severity of pain and evaluate response  - Implement non-pharmacological measures as appropriate and evaluate response  - Consider cultural and social influences on pain and pain management  - Notify physician/advanced practitioner if interventions unsuccessful or patient reports new pain  - Educate patient/family on pain management process including their role and importance of  reporting pain   - Provide non-pharmacologic/complimentary pain relief interventions  Outcome: Progressing     Problem: INFECTION - ADULT  Goal: Absence or prevention of progression during hospitalization  Description: INTERVENTIONS:  - Assess and monitor for signs and symptoms of infection  - Monitor lab/diagnostic results  - Monitor all insertion sites, i.e.  indwelling lines, tubes, and drains  - Monitor endotracheal if appropriate and nasal secretions for changes in amount and color  - Henderson appropriate cooling/warming therapies per order  - Administer medications as ordered  - Instruct and encourage patient and family to use good hand hygiene technique  - Identify and instruct in appropriate isolation precautions for identified infection/condition  Outcome: Progressing  Goal: Absence of fever/infection during neutropenic period  Description: INTERVENTIONS:  - Monitor WBC  - Perform strict hand hygiene  - Limit to healthy visitors only  - No plants, dried, fresh or silk flowers with heath in patient room  Outcome: Progressing     Problem: SAFETY ADULT  Goal: Patient will remain free of falls  Description: INTERVENTIONS:  - Educate patient/family on patient safety including physical limitations  - Instruct patient to call for assistance with activity   - Consider consulting OT/PT to assist with strengthening/mobility based on AM PAC & -HLM score  - Consult OT/PT to assist with strengthening/mobility   - Keep Call bell within reach  - Keep bed low and locked with side rails adjusted as appropriate  - Keep care items and personal belongings within reach  - Initiate and maintain comfort rounds  - Make Fall Risk Sign visible to staff  - Offer Toileting every 2 Hours, in advance of need  - Initiate/Maintain bed alarm  - Obtain necessary fall risk management equipment: non skid socks  - Apply yellow socks and bracelet for high fall risk patients  - Consider moving patient to room near nurses station  Outcome: Progressing  Goal: Maintain or return to baseline ADL function  Description: INTERVENTIONS:  -  Assess patient's ability to carry out ADLs; assess patient's baseline for ADL function and identify physical deficits which impact ability to perform ADLs (bathing, care of mouth/teeth, toileting, grooming, dressing, etc.)  - Assess/evaluate cause of self-care deficits    - Assess range of motion  - Assess patient's mobility; develop plan if impaired  - Assess patient's need for assistive devices and provide as appropriate  - Encourage maximum independence but intervene and supervise when necessary  - Involve family in performance of ADLs  - Assess for home care needs following discharge   - Consider OT consult to assist with ADL evaluation and planning for discharge  - Provide patient education as appropriate  - Monitor functional capacity and physical performance, use of AM PAC & JH-HLM   - Monitor gait, balance and fatigue with ambulation    Outcome: Progressing  Goal: Maintains/Returns to pre admission functional level  Description: INTERVENTIONS:  - Perform AM-PAC 6 Click Basic Mobility/ Daily Activity assessment daily.  - Set and communicate daily mobility goal to care team and patient/family/caregiver.   - Collaborate with rehabilitation services on mobility goals if consulted  - Perform Range of Motion 3 times a day.  - Reposition patient every 2 hours.  - Dangle patient 3 times a day  - Stand patient 3 times a day  - Ambulate patient 3 times a day  - Out of bed to chair 3 times a day   - Out of bed for meals 3 times a day  - Out of bed for toileting  - Record patient progress and toleration of activity level   Outcome: Progressing     Problem: DISCHARGE PLANNING  Goal: Discharge to home or other facility with appropriate resources  Description: INTERVENTIONS:  - Identify barriers to discharge w/patient and caregiver  - Arrange for needed discharge resources and transportation as appropriate  - Identify discharge learning needs (meds, wound care, etc.)  - Arrange for interpretive services to assist at discharge as needed  - Refer to Case Management Department for coordinating discharge planning if the patient needs post-hospital services based on physician/advanced practitioner order or complex needs related to functional status, cognitive ability, or social support  system  Outcome: Progressing     Problem: Knowledge Deficit  Goal: Patient/family/caregiver demonstrates understanding of disease process, treatment plan, medications, and discharge instructions  Description: Complete learning assessment and assess knowledge base.  Interventions:  - Provide teaching at level of understanding  - Provide teaching via preferred learning methods  Outcome: Progressing     Problem: Nutrition/Hydration-ADULT  Goal: Nutrient/Hydration intake appropriate for improving, restoring or maintaining nutritional needs  Description: Monitor and assess patient's nutrition/hydration status for malnutrition. Collaborate with interdisciplinary team and initiate plan and interventions as ordered.  Monitor patient's weight and dietary intake as ordered or per policy. Utilize nutrition screening tool and intervene as necessary. Determine patient's food preferences and provide high-protein, high-caloric foods as appropriate.     INTERVENTIONS:  - Monitor oral intake, urinary output, labs, and treatment plans  - Assess nutrition and hydration status and recommend course of action  - Evaluate amount of meals eaten  - Assist patient with eating if necessary   - Allow adequate time for meals  - Recommend/ encourage appropriate diets, oral nutritional supplements, and vitamin/mineral supplements  - Order, calculate, and assess calorie counts as needed  - Recommend, monitor, and adjust tube feedings and TPN/PPN based on assessed needs  - Assess need for intravenous fluids  - Provide specific nutrition/hydration education as appropriate  - Include patient/family/caregiver in decisions related to nutrition  Outcome: Progressing     Problem: NEUROSENSORY - ADULT  Goal: Achieves stable or improved neurological status  Description: INTERVENTIONS  - Monitor and report changes in neurological status  - Monitor vital signs such as temperature, blood pressure, glucose, and any other labs ordered   - Initiate measures to  prevent increased intracranial pressure  - Monitor for seizure activity and implement precautions if appropriate      Outcome: Progressing  Goal: Remains free of injury related to seizures activity  Description: INTERVENTIONS  - Maintain airway, patient safety  and administer oxygen as ordered  - Monitor patient for seizure activity, document and report duration and description of seizure to physician/advanced practitioner  - If seizure occurs,  ensure patient safety during seizure  - Reorient patient post seizure  - Seizure pads on all 4 side rails  - Instruct patient/family to notify RN of any seizure activity including if an aura is experienced  - Instruct patient/family to call for assistance with activity based on nursing assessment  - Administer anti-seizure medications if ordered    Outcome: Progressing  Goal: Achieves maximal functionality and self care  Description: INTERVENTIONS  - Monitor swallowing and airway patency with patient fatigue and changes in neurological status  - Encourage and assist patient to increase activity and self care.   - Encourage visually impaired, hearing impaired and aphasic patients to use assistive/communication devices  Outcome: Progressing     Problem: CARDIOVASCULAR - ADULT  Goal: Maintains optimal cardiac output and hemodynamic stability  Description: INTERVENTIONS:  - Monitor I/O, vital signs and rhythm  - Monitor for S/S and trends of decreased cardiac output  - Administer and titrate ordered vasoactive medications to optimize hemodynamic stability  - Assess quality of pulses, skin color and temperature  - Assess for signs of decreased coronary artery perfusion  - Instruct patient to report change in severity of symptoms  Outcome: Progressing  Goal: Absence of cardiac dysrhythmias or at baseline rhythm  Description: INTERVENTIONS:  - Continuous cardiac monitoring, vital signs, obtain 12 lead EKG if ordered  - Administer antiarrhythmic and heart rate control medications as  ordered  - Monitor electrolytes and administer replacement therapy as ordered  Outcome: Progressing     Problem: RESPIRATORY - ADULT  Goal: Achieves optimal ventilation and oxygenation  Description: INTERVENTIONS:  - Assess for changes in respiratory status  - Assess for changes in mentation and behavior  - Position to facilitate oxygenation and minimize respiratory effort  - Oxygen administered by appropriate delivery if ordered  - Initiate smoking cessation education as indicated  - Encourage broncho-pulmonary hygiene including cough, deep breathe, Incentive Spirometry  - Assess the need for suctioning and aspirate as needed  - Assess and instruct to report SOB or any respiratory difficulty  - Respiratory Therapy support as indicated  Outcome: Progressing     Problem: GASTROINTESTINAL - ADULT  Goal: Minimal or absence of nausea and/or vomiting  Description: INTERVENTIONS:  - Administer IV fluids if ordered to ensure adequate hydration  - Maintain NPO status until nausea and vomiting are resolved  - Nasogastric tube if ordered  - Administer ordered antiemetic medications as needed  - Provide nonpharmacologic comfort measures as appropriate  - Advance diet as tolerated, if ordered  - Consider nutrition services referral to assist patient with adequate nutrition and appropriate food choices  Outcome: Progressing  Goal: Maintains or returns to baseline bowel function  Description: INTERVENTIONS:  - Assess bowel function  - Encourage oral fluids to ensure adequate hydration  - Administer IV fluids if ordered to ensure adequate hydration  - Administer ordered medications as needed  - Encourage mobilization and activity  - Consider nutritional services referral to assist patient with adequate nutrition and appropriate food choices  Outcome: Progressing  Goal: Maintains adequate nutritional intake  Description: INTERVENTIONS:  - Monitor percentage of each meal consumed  - Identify factors contributing to decreased intake,  treat as appropriate  - Assist with meals as needed  - Monitor I&O, weight, and lab values if indicated  - Obtain nutrition services referral as needed  Outcome: Progressing  Goal: Establish and maintain optimal ostomy function  Description: INTERVENTIONS:  - Assess bowel function  - Encourage oral fluids to ensure adequate hydration  - Administer IV fluids if ordered to ensure adequate hydration   - Administer ordered medications as needed  - Encourage mobilization and activity  - Nutrition services referral to assist patient with appropriate food choices  - Assess stoma site  - Consider wound care consult   Outcome: Progressing  Goal: Oral mucous membranes remain intact  Description: INTERVENTIONS  - Assess oral mucosa and hygiene practices  - Implement preventative oral hygiene regimen  - Implement oral medicated treatments as ordered  - Initiate Nutrition services referral as needed  Outcome: Progressing     Problem: GENITOURINARY - ADULT  Goal: Maintains or returns to baseline urinary function  Description: INTERVENTIONS:  - Assess urinary function  - Encourage oral fluids to ensure adequate hydration if ordered  - Administer IV fluids as ordered to ensure adequate hydration  - Administer ordered medications as needed  - Offer frequent toileting  - Follow urinary retention protocol if ordered  Outcome: Progressing  Goal: Absence of urinary retention  Description: INTERVENTIONS:  - Assess patient’s ability to void and empty bladder  - Monitor I/O  - Bladder scan as needed  - Discuss with physician/AP medications to alleviate retention as needed  - Discuss catheterization for long term situations as appropriate  Outcome: Progressing  Goal: Urinary catheter remains patent  Description: INTERVENTIONS:  - Assess patency of urinary catheter  - If patient has a chronic viramotnes, consider changing catheter if non-functioning  - Follow guidelines for intermittent irrigation of non-functioning urinary catheter  Outcome:  Progressing     Problem: METABOLIC, FLUID AND ELECTROLYTES - ADULT  Goal: Electrolytes maintained within normal limits  Description: INTERVENTIONS:  - Monitor labs and assess patient for signs and symptoms of electrolyte imbalances  - Administer electrolyte replacement as ordered  - Monitor response to electrolyte replacements, including repeat lab results as appropriate  - Instruct patient on fluid and nutrition as appropriate  Outcome: Progressing  Goal: Fluid balance maintained  Description: INTERVENTIONS:  - Monitor labs   - Monitor I/O and WT  - Instruct patient on fluid and nutrition as appropriate  - Assess for signs & symptoms of volume excess or deficit  Outcome: Progressing  Goal: Glucose maintained within target range  Description: INTERVENTIONS:  - Monitor Blood Glucose as ordered  - Assess for signs and symptoms of hyperglycemia and hypoglycemia  - Administer ordered medications to maintain glucose within target range  - Assess nutritional intake and initiate nutrition service referral as needed  Outcome: Progressing     Problem: HEMATOLOGIC - ADULT  Goal: Maintains hematologic stability  Description: INTERVENTIONS  - Assess for signs and symptoms of bleeding or hemorrhage  - Monitor labs  - Administer supportive blood products/factors as ordered and appropriate  Outcome: Progressing     Problem: MUSCULOSKELETAL - ADULT  Goal: Maintain or return mobility to safest level of function  Description: INTERVENTIONS:  - Assess patient's ability to carry out ADLs; assess patient's baseline for ADL function and identify physical deficits which impact ability to perform ADLs (bathing, care of mouth/teeth, toileting, grooming, dressing, etc.)  - Assess/evaluate cause of self-care deficits   - Assess range of motion  - Assess patient's mobility  - Assess patient's need for assistive devices and provide as appropriate  - Encourage maximum independence but intervene and supervise when necessary  - Involve family in  performance of ADLs  - Assess for home care needs following discharge   - Consider OT consult to assist with ADL evaluation and planning for discharge  - Provide patient education as appropriate  Outcome: Progressing  Goal: Maintain proper alignment of affected body part  Description: INTERVENTIONS:  - Support, maintain and protect limb and body alignment  - Provide patient/ family with appropriate education  Outcome: Progressing     Problem: Prexisting or High Potential for Compromised Skin Integrity  Goal: Skin integrity is maintained or improved  Description: INTERVENTIONS:  - Identify patients at risk for skin breakdown  - Assess and monitor skin integrity including under and around medical devices   - Assess and monitor nutrition and hydration status  - Monitor labs  - Assess for incontinence   - Turn and reposition patient  - Assist with mobility/ambulation  - Relieve pressure over mohini prominences   - Avoid friction and shearing  - Provide appropriate hygiene as needed including keeping skin clean and dry  - Evaluate need for skin moisturizer/barrier cream  - Collaborate with interdisciplinary team  - Patient/family teaching  - Consider wound care consult    Outcome: Progressing

## 2025-07-08 NOTE — CASE MANAGEMENT
Case Management Assessment & Discharge Planning Note    Patient name Rama Linda  Location ICU 06/ICU  MRN [id number]  : 1950 Date 2025       Current Admission Date: 2025  Current Admission Diagnosis:Sepsis due to pneumonia (HCC)   Patient Active Problem List    Diagnosis Date Noted    Acute respiratory failure with hypoxia (HCC) 2025    Left lower quadrant abdominal mass 2024    Protein-calorie malnutrition, unspecified severity (HCC) 2024    RLS (restless legs syndrome) 2024    Tinea cruris 2024    Peripheral edema 2020    Lumbosacral radiculitis 06/10/2020    Chronic pain disorder 2020    Lumbar post-laminectomy syndrome 2020    Recurrent major depressive disorder, in partial remission (HCC) 2019    Chronic obstructive pulmonary disease (HCC) 2019    Esophageal dysphagia 2018    Heart murmur 2018    Type 2 diabetes mellitus with complication, without long-term current use of insulin ([facility]) 2018    Sepsis due to pneumonia ([facility]) 2018    Chronic lower back pain 2016    Anxiety 2016    Vitamin D deficiency 2016    Primary hypertension 2015    Hypercholesteremia 2015    Restless legs syndrome (RLS) 2015      LOS (days): 1  Geometric Mean LOS (GMLOS) (days):   Days to GMLOS:     OBJECTIVE:    Risk of Unplanned Readmission Score: 11.8     Current admission status: Inpatient    Preferred Pharmacy:   [facility] - [address]  [address] [id number]  Phone: [phone] Fax: [phone]    Primary Care Provider: Leann Mejía PA-C    Primary Insurance: MEDICARE  Secondary Insurance: AARP    ASSESSMENT:  Active Health Care Proxies    There are no active Health Care Proxies on file.       Advance Directives  Does patient have a Health Care POA?: No  Was patient offered paperwork?: Yes (They are at the   office)  Does patient currently have a Health Care decision maker?: Yes, please see Health Care Proxy section  Does patient have Advance Directives?: No  Was patient offered paperwork?: Yes (Paperwork is at he  office)  Primary Contact: Zack clemons spouse    Readmission Root Cause  30 Day Readmission: No    Patient Information  Admitted from:: Home  Mental Status: Alert  During Assessment patient was accompanied by: Not accompanied during assessment  Assessment information provided by:: Patient  Primary Caregiver: Self  Support Systems: Spouse/significant other  County of Residence: [facility] city do you live in?: [place]  Home entry access options. Select all that apply.: Stairs  Number of steps to enter home.: 4  Do the steps have railings?: Yes  Type of Current Residence: [address] [place] home  Upon entering residence, is there a bedroom on the main floor (no further steps)?: Yes  Upon entering residence, is there a bathroom on the main floor (no further steps)?: Yes  Living Arrangements: Lives w/ Spouse/significant other  Is patient a ?: No    Activities of Daily Living Prior to Admission  Functional Status: Independent  Completes ADLs independently?: Yes  Ambulates independently?: Yes  Does patient use assisted devices?: Yes (Cane when going out)  Assisted Devices (DME) used: Straight Cane, Walker  Does patient currently own DME?: Yes  What DME does the patient currently own?: Straight Cane, Walker  Does patient have a history of Outpatient Therapy (PT/OT)?: Yes ([facility])  Does the patient have a history of Short-Term Rehab?: No  Does patient have a history of HHC?: No  Does patient currently have HHC?: No    Patient Information Continued  Income Source: Pension/assisted  Does patient have prescription coverage?: Yes  Can the patient afford their medications and any related supplies (such as glucometers or test strips)?: Yes  Does patient receive dialysis treatments?: No  Does patient  have a history of substance abuse?: No  Does patient have a history of Mental Health Diagnosis?: No    Means of Transportation  Means of Transport to Appts:: Family transport    DISCHARGE DETAILS:    Discharge planning discussed with:: Pt    Contacts  Patient Contacts: Zack Linda spouse  Relationship to Patient:: Family  Contact Method: Phone  Phone Number: [phone]  Reason/Outcome: Continuity of Care    Pt indicated she lives with her spouse in a 2 story home.  Pt is independent with ADL's.  Spouse performs all IADL's and drives.  Pt does not wear oxygen at baseline.  Pt is currently on 12LMF oxygen.  Spouse would transport home.  Will continue to follow for care needs.

## 2025-07-08 NOTE — ASSESSMENT & PLAN NOTE
Present on admission as evidenced by tachycardia and tachypnea  Source of infection likely pneumonia  Complicated by acute hypoxic respiratory failure  Afebrile and without leukocytosis  Procalcitonin slightly uptrending  Flu, COVID, RSV PCR negative  Continue ceftriaxone and azithromycin  Trend fever curve/WBC count/procalcitonin  Follow-up blood cultures  De-escalate antibiotics as appropriate

## 2025-07-08 NOTE — ASSESSMENT & PLAN NOTE
No formal diagnosis of COPD  Some mild wheezing on exam  Stop Solumedrol  Prednisone 40 mg x 5 total days  Xopenex/Atrovent TID for now

## 2025-07-08 NOTE — CONSULTS
Consultation - Pulmonology   Name: Rama Linda 75 y.o. female I MRN: [id number]  Unit/Bed#: ICU 06-01 I Date of Admission: 7/7/2025   Date of Service: 7/8/2025 I Hospital Day: 1   Inpatient consult to Pulmonology  Consult performed by: Josef Urban MD  Consult ordered by: Bhargavi Mitchell PA-C        Physician Requesting Evaluation: Brandon Tillman DO   Reason for Evaluation / Principal Problem: Dx: 1. Acute respiratory failure with hypoxia (HCC) 2. Sepsis due to pneumonia (HCC)     Assessment & Plan  Sepsis due to pneumonia (HCC)  On admission tachypnic, tachycardic, hypotensive, hypoxemic  Presentation and history consistent with pneumonia althoguh CXR with no clear infiltrate- possibly Right lower lung field haziness  Improved with IVF and antibiotics  Day 2 Ceftriaxone/Azithromycin  Check PA/Lateral CXR tomorrow  Follow-up micro data sent  COPD exacerbation (HCC)  No formal diagnosis of COPD  Some mild wheezing on exam  Stop Solumedrol  Prednisone 40 mg x 5 total days  Xopenex/Atrovent TID for now  Tobacco use disorder  Smoking cessation counseling  Acute respiratory failure with hypoxia (HCC)  Wean oxygen for SPO2 >88%  Type 2 diabetes mellitus with complication, without long-term current use of insulin (HCC)  Lab Results   Component Value Date    HGBA1C 5.0 04/01/2025       Recent Labs     07/07/25  2020 07/07/25  2137 07/08/25  0712   POCGLU 216* 224* 188*       Blood Sugar Average: Last 72 hrs:  (P) [id number]    I have discussed the above management plan in detail with the primary service.     History of Present Illness   Rama Linda is a 75 y.o. female who presents with shortness of breath and cough. Also fatigue- started a week ago. Got much worse yesterday.    Had a fever at home. No fever in ED. Met sepsis criteria with tachycardia, RLL infiltrate, and leukocytosis. Elevated procal, Required oxygen up to 15 lpm midflow in the ED.  Treated with IV solumedrol 40 mg IV q8,  Ceftriaxone, Azithromycin. IVF    She is a smoker on/off her whole life- none in last 2 weeks  She is a retired nurse  She has a history of frequent bronchitis in the past. She is not on inhalers    Review of Systems   Constitutional:  Positive for fatigue.   Respiratory:  Positive for shortness of breath.    Gastrointestinal:  Negative for nausea and vomiting.   All other systems reviewed and are negative.      Historical Information   Historical Information   Past Medical History[1]  Past Surgical History[2]  Social History[3]  E-Cigarette/Vaping    E-Cigarette Use Never User      E-Cigarette/Vaping Substances    Nicotine No     THC No     CBD No     Flavoring No     Other No     Unknown No      Family history non-contributory      Objective :  Temp:  [97 °F (36.1 °C)-98.4 °F (36.9 °C)] 97 °F (36.1 °C)  HR:  [] 94  BP: ()/(44-79) 138/60  Resp:  [16-52] 22  SpO2:  [74 %-98 %] 95 %  O2 Device: Mid flow nasal cannula  Nasal Cannula O2 Flow Rate (L/min):  [6 L/min-15 L/min] 12 L/min    Physical Exam  Vitals and nursing note reviewed.   Constitutional:       General: She is not in acute distress.     Appearance: She is well-developed.   HENT:      Head: Normocephalic and atraumatic.     Eyes:      Conjunctiva/sclera: Conjunctivae normal.       Cardiovascular:      Rate and Rhythm: Normal rate and regular rhythm.      Heart sounds: No murmur heard.  Pulmonary:      Effort: Pulmonary effort is normal. No respiratory distress.      Breath sounds: Wheezing (faint) and rales present.   Abdominal:      Palpations: Abdomen is soft.      Tenderness: There is no abdominal tenderness.     Musculoskeletal:         General: No swelling.      Cervical back: Neck supple.     Skin:     General: Skin is warm and dry.      Capillary Refill: Capillary refill takes less than 2 seconds.     Neurological:      Mental Status: She is alert.     Psychiatric:         Mood and Affect: Mood normal.           Lab Results: I have  reviewed the following results:  .     07/07/25  1632   WBC 11.65*   HGB 14.9   HCT 44.3      BANDSPCT 27*   SODIUM 137   K 3.8   CL 99   CO2 28   BUN 24   CREATININE 0.89   GLUC 181*   AST 21   ALT 8   ALB 3.9   TBILI 1.64*   ALKPHOS 48   PTT 29   INR 1.12   *   LACTICACID 1.8     ABG: No new results in last 24 hours.    Imaging Results Review: I personally reviewed the following image studies in PACS and associated radiology reports: chest xray. My interpretation of the radiology images/reports is: R lower lung field opacity.  Other Study Results Review: EKG was reviewed.   PFT Results Reviewed: none for review    VTE Prophylaxis: VTE covered by:  enoxaparin, Subcutaneous, 40 mg at 07/08/25 0849            [1]   Past Medical History:  Diagnosis Date    Anxiety     Cellulitis     LAST ASSESED 2/12/16; RESOLVED 3/9/16    Chronic obstructive pulmonary disease (HCC) 3/11/2019    Depression     Diabetes mellitus ([facility])     Dysuria     LAST ASSESSED 3/7/16; RESOLVED 3/9/16    Esophageal reflux     RESOLVED 1/15/16    Fatigue     RESOLVED 3/9/16    Fracture     rt 4 th metatarsal     Heart murmur     Hypertension     Pneumonia     Restless legs syndrome (RLS)     Stage 3 chronic kidney disease, unspecified whether stage 3a or 3b CKD ([facility]) 7/22/2022    Vitamin D deficiency    [2]   Past Surgical History:  Procedure Laterality Date    APPENDECTOMY      BACK SURGERY      CHOLECYSTECTOMY  1975    FOOT SURGERY Right 2014    HIP SURGERY  2013    HYSTERECTOMY      INSERTION / PLACEMENT / REVISION NEUROSTIMULATOR      OTHER SURGICAL HISTORY  2011    LAPAROSCOPIC SLING OPERATION FOR STRESS INCONTINENCE     OVARIAN CYST SURGERY  1971    CYSTECTOMY    [place] COLONOSCOPY FLX DX W/COLLJ SPEC WHEN PFRMD N/A 07/10/2018    Procedure: COLONOSCOPY;  Surgeon: Jose Carlos Lambert MD;  Location: [facility];  Service: Gastroenterology    WV ESOPHAGOGASTRODUODENOSCOPY TRANSORAL DIAGNOSTIC N/A 01/28/2019    Procedure:  ESOPHAGOGASTRODUODENOSCOPY (EGD);  Surgeon: Josef James III, MD;  Location: [facility];  Service: Gastroenterology    SHOULDER SURGERY Right 12/14/2015    REPAIR OF TORN MUSCLES    SKIN BIOPSY      chin 03/07/2019    THORACIC SPINE SURGERY N/A 05/08/2023    TONSILLECTOMY     [3]   Social History  Tobacco Use    Smoking status: Former     Average packs/day: 4.0 packs/day for 56.4 years (225.7 ttl pk-yrs)     Types: Cigarettes     Start date: 1969     Passive exposure: Current    Smokeless tobacco: Never    Tobacco comments:     1 a month   Vaping Use    Vaping status: Never Used   Substance and Sexual Activity    Alcohol use: Never     Comment: rarely    Drug use: No    Sexual activity: Yes

## 2025-07-08 NOTE — PLAN OF CARE
Problem: Potential for Falls  Goal: Patient will remain free of falls  Description: INTERVENTIONS:  - Educate patient/family on patient safety including physical limitations  - Instruct patient to call for assistance with activity   - Consider consulting OT/PT to assist with strengthening/mobility based on AM PAC & JH-HLM score  - Consult OT/PT to assist with strengthening/mobility   - Keep Call bell within reach  - Keep bed low and locked with side rails adjusted as appropriate  - Keep care items and personal belongings within reach  - Initiate and maintain comfort rounds  - Make Fall Risk Sign visible to staff  - Apply yellow socks and bracelet for high fall risk patients  - Consider moving patient to room near nurses station  Outcome: Progressing     Problem: PAIN - ADULT  Goal: Verbalizes/displays adequate comfort level or baseline comfort level  Description: Interventions:  - Encourage patient to monitor pain and request assistance  - Assess pain using appropriate pain scale  - Administer analgesics as ordered based on type and severity of pain and evaluate response  - Implement non-pharmacological measures as appropriate and evaluate response  - Consider cultural and social influences on pain and pain management  - Notify physician/advanced practitioner if interventions unsuccessful or patient reports new pain  - Educate patient/family on pain management process including their role and importance of  reporting pain   - Provide non-pharmacologic/complimentary pain relief interventions  Outcome: Progressing     Problem: INFECTION - ADULT  Goal: Absence or prevention of progression during hospitalization  Description: INTERVENTIONS:  - Assess and monitor for signs and symptoms of infection  - Monitor lab/diagnostic results  - Monitor all insertion sites, i.e. indwelling lines, tubes, and drains  - Monitor endotracheal if appropriate and nasal secretions for changes in amount and color  - [place] appropriate  cooling/warming therapies per order  - Administer medications as ordered  - Instruct and encourage patient and family to use good hand hygiene technique  - Identify and instruct in appropriate isolation precautions for identified infection/condition  Outcome: Progressing     Problem: SAFETY ADULT  Goal: Patient will remain free of falls  Description: INTERVENTIONS:  - Educate patient/family on patient safety including physical limitations  - Instruct patient to call for assistance with activity   - Consider consulting OT/PT to assist with strengthening/mobility based on AM PAC & JH-HLM score  - Consult OT/PT to assist with strengthening/mobility   - Keep Call bell within reach  - Keep bed low and locked with side rails adjusted as appropriate  - Keep care items and personal belongings within reach  - Initiate and maintain comfort rounds  - Make Fall Risk Sign visible to staff  - Offer Toileting every 2  Hours, in advance of need  - Initiate/Maintain bed alarm  - Obtain necessary fall risk management equipment:   - Apply yellow socks and bracelet for high fall risk patients  - Consider moving patient to room near nurses station  Outcome: Progressing     Problem: Nutrition/Hydration-ADULT  Goal: Nutrient/Hydration intake appropriate for improving, restoring or maintaining nutritional needs  Description: Monitor and assess patient's nutrition/hydration status for malnutrition. Collaborate with interdisciplinary team and initiate plan and interventions as ordered.  Monitor patient's weight and dietary intake as ordered or per policy. Utilize nutrition screening tool and intervene as necessary. Determine patient's food preferences and provide high-protein, high-caloric foods as appropriate.     INTERVENTIONS:  - Monitor oral intake, urinary output, labs, and treatment plans  - Assess nutrition and hydration status and recommend course of action  - Evaluate amount of meals eaten  - Assist patient with eating if necessary    - Allow adequate time for meals  - Recommend/ encourage appropriate diets, oral nutritional supplements, and vitamin/mineral supplements  - Order, calculate, and assess calorie counts as needed  - Recommend, monitor, and adjust tube feedings and TPN/PPN based on assessed needs  - Assess need for intravenous fluids  - Provide specific nutrition/hydration education as appropriate  - Include patient/family/caregiver in decisions related to nutrition  Outcome: Progressing     Problem: RESPIRATORY - ADULT  Goal: Achieves optimal ventilation and oxygenation  Description: INTERVENTIONS:  - Assess for changes in respiratory status  - Assess for changes in mentation and behavior  - Position to facilitate oxygenation and minimize respiratory effort  - Oxygen administered by appropriate delivery if ordered  - Initiate smoking cessation education as indicated  - Encourage broncho-pulmonary hygiene including cough, deep breathe, Incentive Spirometry  - Assess the need for suctioning and aspirate as needed  - Assess and instruct to report SOB or any respiratory difficulty  - Respiratory Therapy support as indicated  Outcome: Progressing     Problem: METABOLIC, FLUID AND ELECTROLYTES - ADULT  Goal: Electrolytes maintained within normal limits  Description: INTERVENTIONS:  - Monitor labs and assess patient for signs and symptoms of electrolyte imbalances  - Administer electrolyte replacement as ordered  - Monitor response to electrolyte replacements, including repeat lab results as appropriate  - Instruct patient on fluid and nutrition as appropriate  Outcome: Progressing  Goal: Fluid balance maintained  Description: INTERVENTIONS:  - Monitor labs   - Monitor I/O and WT  - Instruct patient on fluid and nutrition as appropriate  - Assess for signs & symptoms of volume excess or deficit  Outcome: Progressing  Goal: Glucose maintained within target range  Description: INTERVENTIONS:  - Monitor Blood Glucose as ordered  - Assess for  signs and symptoms of hyperglycemia and hypoglycemia  - Administer ordered medications to maintain glucose within target range  - Assess nutritional intake and initiate nutrition service referral as needed  Outcome: Progressing

## 2025-07-08 NOTE — ASSESSMENT & PLAN NOTE
On admission tachypnic, tachycardic, hypotensive, hypoxemic  Presentation and history consistent with pneumonia althoguh CXR with no clear infiltrate- possibly Right lower lung field haziness  Improved with IVF and antibiotics  Day 2 Ceftriaxone/Azithromycin  Check PA/Lateral CXR tomorrow  Follow-up micro data sent

## 2025-07-08 NOTE — PROGRESS NOTES
Progress Note - Hospitalist   Name: Rama Linda 75 y.o. female I MRN: [id number]  Unit/Bed#: ICU 06-01 I Date of Admission: 7/7/2025   Date of Service: 7/8/2025 I Hospital Day: 1     Assessment & Plan  Sepsis due to pneumonia (HCC)  Present on admission as evidenced by tachycardia and tachypnea  Source of infection likely pneumonia  Complicated by acute hypoxic respiratory failure  Afebrile and without leukocytosis  Procalcitonin slightly uptrending  Flu, COVID, RSV PCR negative  Continue ceftriaxone and azithromycin  Trend fever curve/WBC count/procalcitonin  Follow-up blood cultures  De-escalate antibiotics as appropriate  Acute respiratory failure with hypoxia (HCC)  Patient noted to be 74% on RA and placed on mid flow  Currently requiring 4 L/min NC O2 mid flow  Secondary to pneumonia  Wean O2 as tolerated  Goal SpO2 greater than 88%  Respiratory protocol  Wean off oxygen as able  COPD exacerbation (HCC)  Possible exacerbation secondary to pneumonia  Solu-Medrol 40 mg IV every 8 hours  Respiratory protocol  Atrovent Xopenex  Pulmonology consultation appreciated  Anxiety  Continue home Lexapro  Type 2 diabetes mellitus with complication, without long-term current use of insulin (HCC)  Hold metformin  Sliding Scale insulin coverage with Accu-Chek  Primary hypertension  Continue home medication with hold parameter  Hypercholesteremia  Continue statin    VTE Pharmacologic Prophylaxis: VTE Score: 7 High Risk (Score >/= 5) - Pharmacological DVT Prophylaxis Ordered: enoxaparin (Lovenox). Sequential Compression Devices Ordered.    Mobility:   Basic Mobility Inpatient Raw Score: 16  JH-HLM Goal: 5: Stand one or more mins  JH-HLM Achieved: 6: Walk 10 steps or more  JH-HLM Goal achieved. Continue to encourage appropriate mobility.    Patient Centered Rounds: I performed bedside rounds with nursing staff today.     Discussions with Specialists or Other Care Team Provider: Pulmonology    Education and Discussions with  Family / Patient: Updated  () via phone.    Current Length of Stay: 1 day(s)  Current Patient Status: Inpatient   Certification Statement: The patient will continue to require additional inpatient hospital stay due to sepsis secondary to pneumonia complicated by acute hypoxic respiratory failure  Discharge Plan: Anticipate discharge in >72 hrs to discharge location to be determined pending rehab evaluations.    Code Status: Level 3 - DNAR and DNI    Subjective   Patient seen and examined at bedside.  No acute events overnight.  Currently requiring 12 L/min mid flow NC O2.    Objective :  Temp:  [97 °F (36.1 °C)-98.4 °F (36.9 °C)] 97 °F (36.1 °C)  HR:  [] 94  BP: ()/(44-79) 138/60  Resp:  [16-52] 22  SpO2:  [74 %-98 %] 95 %  O2 Device: Mid flow nasal cannula  Nasal Cannula O2 Flow Rate (L/min):  [6 L/min-15 L/min] 12 L/min    Body mass index is 21.23 kg/m².     Input and Output Summary (last 24 hours):     Intake/Output Summary (Last 24 hours) at 7/8/2025 0820  Last data filed at 7/8/2025 0400  Gross per 24 hour   Intake 1004.17 ml   Output 410 ml   Net 594.17 ml       Physical Exam  Vitals and nursing note reviewed.   Constitutional:       General: She is not in acute distress.     Appearance: She is well-developed.   HENT:      Head: Normocephalic and atraumatic.     Eyes:      Conjunctiva/sclera: Conjunctivae normal.       Cardiovascular:      Rate and Rhythm: Normal rate and regular rhythm.      Heart sounds: No murmur heard.  Pulmonary:      Effort: Pulmonary effort is normal. No respiratory distress.      Breath sounds: Normal breath sounds.   Abdominal:      Palpations: Abdomen is soft.      Tenderness: There is no abdominal tenderness.     Musculoskeletal:         General: No swelling.      Cervical back: Neck supple.     Skin:     General: Skin is warm and dry.      Capillary Refill: Capillary refill takes less than 2 seconds.     Neurological:      Mental Status: She is  alert.     Psychiatric:         Mood and Affect: Mood normal.         Lab Results: I have reviewed the following results:   Results from last 7 days   Lab Units 07/07/25  1632   WBC Thousand/uL 11.65*   HEMOGLOBIN g/dL 14.9   HEMATOCRIT % 44.3   PLATELETS Thousands/uL 198   BANDS PCT % 27*   LYMPHO PCT % 12*   MONO PCT % 11   EOS PCT % 0     Results from last 7 days   Lab Units 07/07/25  1632   SODIUM mmol/L 137   POTASSIUM mmol/L 3.8   CHLORIDE mmol/L 99   CO2 mmol/L 28   BUN mg/dL 24   CREATININE mg/dL 0.89   ANION GAP mmol/L 10   CALCIUM mg/dL 9.6   ALBUMIN g/dL 3.9   TOTAL BILIRUBIN mg/dL 1.64*   ALK PHOS U/L 48   ALT U/L 8   AST U/L 21   GLUCOSE RANDOM mg/dL 181*     Results from last 7 days   Lab Units 07/07/25  1632   INR  1.12     Results from last 7 days   Lab Units 07/08/25  0712 07/07/25  2137 07/07/25  2020   POC GLUCOSE mg/dl 188* 224* 216*         Results from last 7 days   Lab Units 07/08/25  0529 07/07/25  1632   LACTIC ACID mmol/L  --  1.8   PROCALCITONIN ng/ml 1.04* 0.89*       Recent Cultures (last 7 days):   Results from last 7 days   Lab Units 07/07/25  1632   BLOOD CULTURE  Received in Microbiology Lab. Culture in Progress.  Received in Microbiology Lab. Culture in Progress.       Imaging Results Review: I reviewed radiology reports from this admission including: chest xray.  Other Study Results Review: No additional pertinent studies reviewed.    Last 24 Hours Medication List:     Current Facility-Administered Medications:     acetaminophen (TYLENOL) tablet 650 mg, Q6H PRN    aspirin (ECOTRIN LOW STRENGTH) EC tablet 81 mg, Daily    atorvastatin (LIPITOR) tablet 20 mg, HS    azithromycin (ZITHROMAX) 500 mg in sodium chloride 0.9 % 250 mL IVPB, Q24H    calcium carbonate (OYSTER SHELL,OSCAL) 500 mg tablet 1 tablet, Every Other Day    ceftriaxone (ROCEPHIN) 1 g/50 mL in dextrose IVPB, Q24H    Cholecalciferol (VITAMIN D3) tablet 3,000 Units, Every Other Day    clonazePAM (KlonoPIN) tablet 2 mg,  HS    dextromethorphan-guaiFENesin (ROBITUSSIN DM) oral syrup 10 mL, Q4H PRN    enoxaparin (LOVENOX) subcutaneous injection 40 mg, Daily    escitalopram (LEXAPRO) tablet 20 mg, Daily    insulin lispro (HumALOG/ADMELOG) 100 units/mL subcutaneous injection 1-5 Units, TID AC **AND** Fingerstick Glucose (POCT), TID AC    insulin lispro (HumALOG/ADMELOG) 100 units/mL subcutaneous injection 1-5 Units, HS    levothyroxine tablet 25 mcg, Early Morning    lisinopril (ZESTRIL) tablet 2.5 mg, Daily    methocarbamol (ROBAXIN) tablet 750 mg, Q8H    methylPREDNISolone sodium succinate (Solu-MEDROL) injection 40 mg, Q8H AICHA    ondansetron (ZOFRAN) injection 4 mg, Q6H PRN    pantoprazole (PROTONIX) EC tablet 40 mg, Early Morning    vit B complex-vit C-folic acid (Renal Caps) capsule 1 capsule, Daily    Administrative Statements   Today, Patient Was Seen By: Brandon Tillman, DO  I have spent a total time of 35 minutes in caring for this patient on the day of the visit/encounter including Diagnostic results, Prognosis, Risks and benefits of tx options, Instructions for management, Patient and family education, Importance of tx compliance, Risk factor reductions, Impressions, Counseling / Coordination of care, Documenting in the medical record, Reviewing/placing orders in the medical record (including tests, medications, and/or procedures), Obtaining or reviewing history  , and Communicating with other healthcare professionals .    **Please Note: This note may have been constructed using a voice recognition system.**

## 2025-07-08 NOTE — ASSESSMENT & PLAN NOTE
Patient noted to be 74% on RA and placed on mid flow  Currently requiring 4 L/min NC O2 mid flow  Secondary to pneumonia  Wean O2 as tolerated  Goal SpO2 greater than 88%  Respiratory protocol  Wean off oxygen as able

## 2025-07-08 NOTE — ASSESSMENT & PLAN NOTE
Possible exacerbation secondary to pneumonia  Solu-Medrol 40 mg IV every 8 hours  Respiratory protocol  Atrovent Xopenex  Pulmonology consultation appreciated

## 2025-07-08 NOTE — SPEECH THERAPY NOTE
Speech Language/Pathology  Speech/Language Pathology  Assessment    Patient Name: Rama Linda  Today's Date: 7/8/2025     Problem List  Principal Problem:    Sepsis due to pneumonia (HCC)  Active Problems:    Anxiety    Type 2 diabetes mellitus with complication, without long-term current use of insulin (HCC)    Primary hypertension    Hypercholesteremia    Acute respiratory failure with hypoxia (HCC)    Past Medical History  Past Medical History[1]  Past Surgical History  Past Surgical History[2]     Bedside Swallow Evaluation:    Summary:  Pt presented w/ oral and pharyngeal stage of WNL. Positioned upright and alert. Agreeable to trials of soft solids and thin liquids via straw with single sips. Mastication was timely for current diet level. Bolus control, formation, and transfer were WNL. Swallows appeared prompt. No overt s/s of aspiration. Denied difficulties with chewing and swallowing. Pt did report min sore throat from coughing. Reported prior to admission was expectorating green phlegm however stated has not been able to expectorate secretions since admission. Denied hx of acid reflux. Pt did report decreased appetite this past week. Reported often has early meal satiety. Spoke to nursing reported no overt difficulties. ST reviewed diet recommendations and safe swallow strategies as listed below     Recommendations:  Diet: Regular   Liquid:thin   Meds: As tolerated   Positioning:Upright  Strategies: slow rate, alternate liquids with solids, swallow prior to additional po   Pt to take PO/Meds only when fully alert and upright.   Oral care  Aspiration precautions  Reflux precautions  Eval only, No f/u tx indicated.     Consider consult w/:  Rehab  Nutrition    H&P/Admit info/ pertinent provider notes: (PMH noted above)  Rama Linda is a 75 y.o. female with a PMH of diabetes mellitus type 2 not on insulin with hypertension hyperlipidemia, anxiety who presents with Shortness of breath. Patient reports  "shortness of breath starting about 1 week ago. She then developed a cough Tuesday morning, she tried going outside to get fresh air, and had trouble sleeping with the cough. Today she felt that she was going to \"Go\" or needed to get help. Noted a fever on Tuesday of 102. Had decreased PO intake secondary to coughing. Does not wear oxygen at home and does not follow with pulmonary.     Special Studies:   XR chest portable wet read 07/07/2025      Procalcitonin: 0.89      07/07/2025   WBC: 11.65    07/07/2025     Code Status : Level 3 DNR/DNI    Previous MBS: none     Patient's goal: \" I feel better\"    Did the pt report pain?  no  If yes, was nursing notified/was it addressed? N/a    Reason for consult:  R/o aspiration  Determine safest and least restrictive diet  current pna    Precautions:  Fall    Food Allergies:  No known    Current Diet: Regular diet and thin liquids    Premorbid diet: Regular diet and thin    O2 requirement: 12 midflow    Social/Prior living Lives with family    Voice/Speech: WNL   Follows commands: Basic    Cognitive status: Alert      Oral [facility] exam:  Dentition: Upper denture, lower  Lips (VII):WNL  Tongue (XII): midline  Mandible (V):WNL  Face/oral sensation (V):wnl   Velum (X):WNL    Items administered:  Soft solids and thin liquids     Oral stage:  Lip closure:WNL  Mastication:WNL  Bolus formation:WNL  Bolus control:WNL  Transfer:WNL    Pharyngeal stage:  Swallow promptness: prompt   Laryngeal rise:adequate   No overt s/s aspiration    Esophageal stage:  No s/s reported  H/o GERD    Results d/w:  Pt, nursing,     Time In: 9:26  Time Out: 9:39                  [1]   Past Medical History:  Diagnosis Date    Anxiety     Cellulitis     LAST ASSESED 2/12/16; RESOLVED 3/9/16    Chronic obstructive pulmonary disease (HCC) 3/11/2019    Depression     Diabetes mellitus (HCC)     Dysuria     LAST ASSESSED 3/7/16; RESOLVED 3/9/16    Esophageal reflux     RESOLVED 1/15/16    Fatigue     RESOLVED " 3/9/16    Fracture     rt 4 th metatarsal     Heart murmur     Hypertension     Pneumonia     Restless legs syndrome (RLS)     Stage 3 chronic kidney disease, unspecified whether stage 3a or 3b CKD ([facility]) 7/22/2022    Vitamin D deficiency    [2]   Past Surgical History:  Procedure Laterality Date    APPENDECTOMY      BACK SURGERY      CHOLECYSTECTOMY  1975    FOOT SURGERY Right 2014    HIP SURGERY  2013    HYSTERECTOMY      INSERTION / PLACEMENT / REVISION NEUROSTIMULATOR      OTHER SURGICAL HISTORY  2011    LAPAROSCOPIC SLING OPERATION FOR STRESS INCONTINENCE     OVARIAN CYST SURGERY  1971    CYSTECTOMY    CA COLONOSCOPY FLX DX W/COLLJ SPEC WHEN PFRMD N/A 07/10/2018    Procedure: COLONOSCOPY;  Surgeon: Jose Carlos Lambert MD;  Location: [facility];  Service: Gastroenterology    CA ESOPHAGOGASTRODUODENOSCOPY TRANSORAL DIAGNOSTIC N/A 01/28/2019    Procedure: ESOPHAGOGASTRODUODENOSCOPY (EGD);  Surgeon: Josef James III, MD;  Location: [facility];  Service: Gastroenterology    SHOULDER SURGERY Right 12/14/2015    REPAIR OF TORN MUSCLES    SKIN BIOPSY      chin 03/07/2019    THORACIC SPINE SURGERY N/A 05/08/2023    TONSILLECTOMY

## 2025-07-09 ENCOUNTER — APPOINTMENT (INPATIENT)
Dept: RADIOLOGY | Facility: HOSPITAL | Age: 75
DRG: 871 | End: 2025-07-09
Payer: MEDICARE

## 2025-07-09 LAB
ANION GAP SERPL CALCULATED.3IONS-SCNC: 5 MMOL/L (ref 4–13)
BASOPHILS # BLD AUTO: 0.05 THOUSANDS/ÂΜL (ref 0–0.1)
BASOPHILS NFR BLD AUTO: 0 % (ref 0–1)
BUN SERPL-MCNC: 30 MG/DL (ref 5–25)
CALCIUM SERPL-MCNC: 8.2 MG/DL (ref 8.4–10.2)
CHLORIDE SERPL-SCNC: 105 MMOL/L (ref 96–108)
CO2 SERPL-SCNC: 28 MMOL/L (ref 21–32)
CREAT SERPL-MCNC: 0.66 MG/DL (ref 0.6–1.3)
EOSINOPHIL # BLD AUTO: 0.14 THOUSAND/ÂΜL (ref 0–0.61)
EOSINOPHIL NFR BLD AUTO: 1 % (ref 0–6)
ERYTHROCYTE [DISTWIDTH] IN BLOOD BY AUTOMATED COUNT: 11.7 % (ref 11.6–15.1)
GFR SERPL CREATININE-BSD FRML MDRD: 86 ML/MIN/1.73SQ M
GLUCOSE SERPL-MCNC: 129 MG/DL (ref 65–140)
GLUCOSE SERPL-MCNC: 132 MG/DL (ref 65–140)
GLUCOSE SERPL-MCNC: 132 MG/DL (ref 65–140)
GLUCOSE SERPL-MCNC: 171 MG/DL (ref 65–140)
GLUCOSE SERPL-MCNC: 177 MG/DL (ref 65–140)
HCT VFR BLD AUTO: 41.1 % (ref 34.8–46.1)
HGB BLD-MCNC: 13.5 G/DL (ref 11.5–15.4)
IMM GRANULOCYTES # BLD AUTO: 0.08 THOUSAND/UL (ref 0–0.2)
IMM GRANULOCYTES NFR BLD AUTO: 1 % (ref 0–2)
LYMPHOCYTES # BLD AUTO: 1.48 THOUSANDS/ÂΜL (ref 0.6–4.47)
LYMPHOCYTES NFR BLD AUTO: 13 % (ref 14–44)
MAGNESIUM SERPL-MCNC: 1.3 MG/DL (ref 1.9–2.7)
MCH RBC QN AUTO: 33.1 PG (ref 26.8–34.3)
MCHC RBC AUTO-ENTMCNC: 32.8 G/DL (ref 31.4–37.4)
MCV RBC AUTO: 101 FL (ref 82–98)
MONOCYTES # BLD AUTO: 0.78 THOUSAND/ÂΜL (ref 0.17–1.22)
MONOCYTES NFR BLD AUTO: 7 % (ref 4–12)
NEUTROPHILS # BLD AUTO: 8.71 THOUSANDS/ÂΜL (ref 1.85–7.62)
NEUTS SEG NFR BLD AUTO: 78 % (ref 43–75)
NRBC BLD AUTO-RTO: 0 /100 WBCS
PHOSPHATE SERPL-MCNC: 3.3 MG/DL (ref 2.3–4.1)
PLATELET # BLD AUTO: 199 THOUSANDS/UL (ref 149–390)
PMV BLD AUTO: 10.3 FL (ref 8.9–12.7)
POTASSIUM SERPL-SCNC: 4.8 MMOL/L (ref 3.5–5.3)
PROCALCITONIN SERPL-MCNC: 0.61 NG/ML
RBC # BLD AUTO: 4.08 MILLION/UL (ref 3.81–5.12)
SODIUM SERPL-SCNC: 138 MMOL/L (ref 135–147)
WBC # BLD AUTO: 11.24 THOUSAND/UL (ref 4.31–10.16)

## 2025-07-09 PROCEDURE — 99232 SBSQ HOSP IP/OBS MODERATE 35: CPT | Performed by: INTERNAL MEDICINE

## 2025-07-09 PROCEDURE — 94760 N-INVAS EAR/PLS OXIMETRY 1: CPT

## 2025-07-09 PROCEDURE — 71046 X-RAY EXAM CHEST 2 VIEWS: CPT

## 2025-07-09 PROCEDURE — 84145 PROCALCITONIN (PCT): CPT | Performed by: INTERNAL MEDICINE

## 2025-07-09 PROCEDURE — 82948 REAGENT STRIP/BLOOD GLUCOSE: CPT

## 2025-07-09 PROCEDURE — 94664 DEMO&/EVAL PT USE INHALER: CPT

## 2025-07-09 PROCEDURE — 83735 ASSAY OF MAGNESIUM: CPT | Performed by: INTERNAL MEDICINE

## 2025-07-09 PROCEDURE — 80048 BASIC METABOLIC PNL TOTAL CA: CPT | Performed by: INTERNAL MEDICINE

## 2025-07-09 PROCEDURE — 84100 ASSAY OF PHOSPHORUS: CPT | Performed by: INTERNAL MEDICINE

## 2025-07-09 PROCEDURE — 94761 N-INVAS EAR/PLS OXIMETRY MLT: CPT

## 2025-07-09 PROCEDURE — 94640 AIRWAY INHALATION TREATMENT: CPT

## 2025-07-09 PROCEDURE — 85025 COMPLETE CBC W/AUTO DIFF WBC: CPT | Performed by: INTERNAL MEDICINE

## 2025-07-09 RX ORDER — MAGNESIUM SULFATE HEPTAHYDRATE 40 MG/ML
4 INJECTION, SOLUTION INTRAVENOUS ONCE
Status: COMPLETED | OUTPATIENT
Start: 2025-07-09 | End: 2025-07-09

## 2025-07-09 RX ADMIN — AZITHROMYCIN MONOHYDRATE 500 MG: 500 INJECTION, POWDER, LYOPHILIZED, FOR SOLUTION INTRAVENOUS at 08:22

## 2025-07-09 RX ADMIN — IPRATROPIUM BROMIDE 0.5 MG: 0.5 SOLUTION RESPIRATORY (INHALATION) at 19:45

## 2025-07-09 RX ADMIN — ENOXAPARIN SODIUM 40 MG: 40 INJECTION SUBCUTANEOUS at 08:22

## 2025-07-09 RX ADMIN — ATORVASTATIN CALCIUM 20 MG: 20 TABLET, FILM COATED ORAL at 21:08

## 2025-07-09 RX ADMIN — METHOCARBAMOL TABLETS 750 MG: 750 TABLET, COATED ORAL at 05:07

## 2025-07-09 RX ADMIN — PREDNISONE 40 MG: 20 TABLET ORAL at 08:21

## 2025-07-09 RX ADMIN — ONDANSETRON 4 MG: 2 INJECTION INTRAMUSCULAR; INTRAVENOUS at 09:46

## 2025-07-09 RX ADMIN — IPRATROPIUM BROMIDE 0.5 MG: 0.5 SOLUTION RESPIRATORY (INHALATION) at 13:13

## 2025-07-09 RX ADMIN — INSULIN LISPRO 1 UNITS: 100 INJECTION, SOLUTION INTRAVENOUS; SUBCUTANEOUS at 17:12

## 2025-07-09 RX ADMIN — ASPIRIN 81 MG: 81 TABLET, COATED ORAL at 08:21

## 2025-07-09 RX ADMIN — PANTOPRAZOLE SODIUM 40 MG: 40 TABLET, DELAYED RELEASE ORAL at 05:07

## 2025-07-09 RX ADMIN — IPRATROPIUM BROMIDE 0.5 MG: 0.5 SOLUTION RESPIRATORY (INHALATION) at 07:51

## 2025-07-09 RX ADMIN — CLONAZEPAM 2 MG: 1 TABLET ORAL at 21:08

## 2025-07-09 RX ADMIN — METHOCARBAMOL TABLETS 750 MG: 750 TABLET, COATED ORAL at 13:30

## 2025-07-09 RX ADMIN — MAGNESIUM SULFATE HEPTAHYDRATE 4 G: 40 INJECTION, SOLUTION INTRAVENOUS at 08:20

## 2025-07-09 RX ADMIN — INSULIN LISPRO 1 UNITS: 100 INJECTION, SOLUTION INTRAVENOUS; SUBCUTANEOUS at 21:10

## 2025-07-09 RX ADMIN — Medication 1 CAPSULE: at 08:21

## 2025-07-09 RX ADMIN — LEVALBUTEROL HYDROCHLORIDE 1.25 MG: 1.25 SOLUTION RESPIRATORY (INHALATION) at 07:51

## 2025-07-09 RX ADMIN — LEVOTHYROXINE SODIUM 25 MCG: 0.03 TABLET ORAL at 05:07

## 2025-07-09 RX ADMIN — LEVALBUTEROL HYDROCHLORIDE 1.25 MG: 1.25 SOLUTION RESPIRATORY (INHALATION) at 19:45

## 2025-07-09 RX ADMIN — LEVALBUTEROL HYDROCHLORIDE 1.25 MG: 1.25 SOLUTION RESPIRATORY (INHALATION) at 13:13

## 2025-07-09 RX ADMIN — CEFTRIAXONE SODIUM 1000 MG: 10 INJECTION, POWDER, FOR SOLUTION INTRAVENOUS at 17:15

## 2025-07-09 RX ADMIN — ESCITALOPRAM OXALATE 20 MG: 10 TABLET ORAL at 08:21

## 2025-07-09 NOTE — ASSESSMENT & PLAN NOTE
Present on admission as evidenced by tachycardia and tachypnea  Source of infection likely pneumonia  Complicated by acute hypoxic respiratory failure  Afebrile and without leukocytosis  Procalcitonin improving  Flu, COVID, RSV PCR negative  Continue ceftriaxone and azithromycin  Trend fever curve/WBC count/procalcitonin  Follow-up blood cultures  De-escalate antibiotics as appropriate

## 2025-07-09 NOTE — ASSESSMENT & PLAN NOTE
On admission tachypnic, tachycardic, hypotensive, hypoxemic  [facility]  Improved with IVF and antibiotics and steroids (with wheezing on exam- likely exacerbation of previously undiagnosed COPD)  Day 3 Ceftriaxone/Azithromycin- can stop azithromycin got 500 mg x 3 days.  Micro unrevealing

## 2025-07-09 NOTE — ASSESSMENT & PLAN NOTE
Patient noted to be 74% on RA and placed on mid flow NC O2  Currently requiring 3 L/min NC O2 mid flow  Secondary to pneumonia  Wean O2 as tolerated  Goal SpO2 greater than 88%  Respiratory protocol  Wean off oxygen as able  Home O2 evaluation prior to discharge

## 2025-07-09 NOTE — ASSESSMENT & PLAN NOTE
No formal diagnosis of COPD  Continues to have some wheezing on exam  Prednisone 40 mg x 5 total days  Xopenex/Atrovent TID for now  On discharge will need albuterol inhaler and pulmonary follow-up in 2-3 weeks for further evaluation, PFTs, lung cancer screening, etc

## 2025-07-09 NOTE — PROGRESS NOTES
Progress Note - Pulmonology   Name: Rama Linda 75 y.o. female I MRN: [id number]  Unit/Bed#: ICU 06-01 I Date of Admission: 7/7/2025   Date of Service: 7/9/2025 I Hospital Day: 2    Assessment & Plan  Sepsis due to pneumonia (HCC)  On admission tachypnic, tachycardic, hypotensive, hypoxemic  Presentation and history consistent with pneumonia [facility] CXR with no clear infiltrate  Improved with IVF and antibiotics and steroids (with wheezing on exam- likely exacerbation of previously undiagnosed COPD)  Day 3 Ceftriaxone/Azithromycin- can stop azithromycin got 500 mg x 3 days.  Micro unrevealing  COPD exacerbation (HCC)  No formal diagnosis of COPD  Continues to have some wheezing on exam  Prednisone 40 mg x 5 total days  Xopenex/Atrovent TID for now  On discharge will need albuterol inhaler and pulmonary follow-up in 2-3 weeks for further evaluation, PFTs, lung cancer screening, etc  Tobacco use disorder  Smoking cessation counseling  She is motivated to stay off cigarettes- stopped a couple of weeks ago. Declining medications  Acute respiratory failure with hypoxia (HCC)  Wean oxygen for SPO2 >88%- currently requiring 3 lpm at rest and 6 with exertion  Type 2 diabetes mellitus with complication, without long-term current use of insulin (HCC)  Lab Results   Component Value Date    HGBA1C 5.0 04/01/2025       Recent Labs     07/08/25  1604 07/08/25  2055 07/09/25  0728 07/09/25  1107   POCGLU 112 187* 132 132       Blood Sugar Average: Last 72 hrs:  (P) 184.125      24 Hour Events : Feeling a bit better  Subjective : Still with some dyspnea but improved    Objective :  Temp:  [97.2 °F (36.2 °C)-97.6 °F (36.4 °C)] 97.5 °F (36.4 °C)  HR:  [67-97] 67  BP: ()/(50-60) 97/59  Resp:  [21-29] 21  SpO2:  [90 %-94 %] 90 %  O2 Device: Nasal cannula  Nasal Cannula O2 Flow Rate (L/min):  [3 L/min-6 L/min] 6 L/min    Physical Exam  Vitals and nursing note reviewed.   Constitutional:       General: She is not in acute  distress.     Appearance: She is well-developed.   HENT:      Head: Normocephalic and atraumatic.     Eyes:      Conjunctiva/sclera: Conjunctivae normal.       Cardiovascular:      Rate and Rhythm: Normal rate and regular rhythm.      Heart sounds: No murmur heard.  Pulmonary:      Effort: Pulmonary effort is normal. No respiratory distress.      Breath sounds: Wheezing present.   Abdominal:      Palpations: Abdomen is soft.      Tenderness: There is no abdominal tenderness.     Musculoskeletal:         General: No swelling.      Cervical back: Neck supple.      Right lower leg: No edema.      Left lower leg: No edema.     Skin:     General: Skin is warm and dry.      Capillary Refill: Capillary refill takes less than 2 seconds.     Neurological:      Mental Status: She is alert.     Psychiatric:         Mood and Affect: Mood normal.           Lab Results: I have reviewed the following results:   .     07/09/25  0520   WBC 11.24*   HGB 13.5   HCT 41.1      SODIUM 138   K 4.8      CO2 28   BUN 30*   CREATININE 0.66   GLUC 129   MG 1.3*   PHOS 3.3     ABG: No new results in last 24 hours.    Imaging Results Review: I personally reviewed the following image studies in PACS and associated radiology reports: chest xray. My interpretation of the radiology images/reports is: R lower lung field opacity.  Other Study Results Review: EKG was reviewed.   PFT Results Reviewed: none for review

## 2025-07-09 NOTE — ASSESSMENT & PLAN NOTE
Lab Results   Component Value Date    HGBA1C 5.0 04/01/2025       Recent Labs     07/08/25  1604 07/08/25 2055 07/09/25  0728 07/09/25  1107   POCGLU 112 187* 132 132       Blood Sugar Average: Last 72 hrs:  (P) 184.125

## 2025-07-09 NOTE — PLAN OF CARE
Problem: Potential for Falls  Goal: Patient will remain free of falls  Description: INTERVENTIONS:  - Educate patient/family on patient safety including physical limitations  - Instruct patient to call for assistance with activity   - Consider consulting OT/PT to assist with strengthening/mobility based on AM PAC & JH-HLM score  - Consult OT/PT to assist with strengthening/mobility   - Keep Call bell within reach  - Keep bed low and locked with side rails adjusted as appropriate  - Keep care items and personal belongings within reach  - Initiate and maintain comfort rounds  - Make Fall Risk Sign visible to staff  - Offer Toileting every 2 Hours, in advance of need  - Initiate/Maintain bed / chair alarm  - Obtain necessary fall risk management equipment  - Apply yellow socks and bracelet for high fall risk patients  - Consider moving patient to room near nurses station  Outcome: Progressing     Problem: PAIN - ADULT  Goal: Verbalizes/displays adequate comfort level or baseline comfort level  Description: Interventions:  - Encourage patient to monitor pain and request assistance  - Assess pain using appropriate pain scale  - Administer analgesics as ordered based on type and severity of pain and evaluate response  - Implement non-pharmacological measures as appropriate and evaluate response  - Consider cultural and social influences on pain and pain management  - Notify physician/advanced practitioner if interventions unsuccessful or patient reports new pain  - Educate patient/family on pain management process including their role and importance of  reporting pain   - Provide non-pharmacologic/complimentary pain relief interventions  Outcome: Progressing     Problem: INFECTION - ADULT  Goal: Absence or prevention of progression during hospitalization  Description: INTERVENTIONS:  - Assess and monitor for signs and symptoms of infection  - Monitor lab/diagnostic results  - Monitor all insertion sites, i.e. indwelling  lines, tubes, and drains  - Monitor endotracheal if appropriate and nasal secretions for changes in amount and color  - [place] appropriate cooling/warming therapies per order  - Administer medications as ordered  - Instruct and encourage patient and family to use good hand hygiene technique  - Identify and instruct in appropriate isolation precautions for identified infection/condition  Outcome: Progressing  Goal: Absence of fever/infection during neutropenic period  Description: INTERVENTIONS:  - Monitor WBC  - Perform strict hand hygiene  - Limit to healthy visitors only  - No plants, dried, fresh or silk flowers with heath in patient room  Outcome: Progressing  Goal: Maintain or return to baseline ADL function  Description: INTERVENTIONS:  -  Assess patient's ability to carry out ADLs; assess patient's baseline for ADL function and identify physical deficits which impact ability to perform ADLs (bathing, care of mouth/teeth, toileting, grooming, dressing, etc.)  - Assess/evaluate cause of self-care deficits   - Assess range of motion  - Assess patient's mobility; develop plan if impaired  - Assess patient's need for assistive devices and provide as appropriate  - Encourage maximum independence but intervene and supervise when necessary  - Involve family in performance of ADLs  - Assess for home care needs following discharge   - Consider OT consult to assist with ADL evaluation and planning for discharge  - Provide patient education as appropriate  - Monitor functional capacity and physical performance, use of AM PAC & JH-HLM   - Monitor gait, balance and fatigue with ambulation    Outcome: Progressing    Problem: Knowledge Deficit  Goal: Patient/family/caregiver demonstrates understanding of disease process, treatment plan, medications, and discharge instructions  Description: Complete learning assessment and assess knowledge base.  Interventions:  - Provide teaching at level of understanding  - Provide  teaching via preferred learning methods  Outcome: Progressing     Problem: Nutrition/Hydration-ADULT  Goal: Nutrient/Hydration intake appropriate for improving, restoring or maintaining nutritional needs  Description: Monitor and assess patient's nutrition/hydration status for malnutrition. Collaborate with interdisciplinary team and initiate plan and interventions as ordered.  Monitor patient's weight and dietary intake as ordered or per policy. Utilize nutrition screening tool and intervene as necessary. Determine patient's food preferences and provide high-protein, high-caloric foods as appropriate.     INTERVENTIONS:  - Monitor oral intake, urinary output, labs, and treatment plans  - Assess nutrition and hydration status and recommend course of action  - Evaluate amount of meals eaten  - Assist patient with eating if necessary   - Allow adequate time for meals  - Recommend/ encourage appropriate diets, oral nutritional supplements, and vitamin/mineral supplements  - Order, calculate, and assess calorie counts as needed  - Recommend, monitor, and adjust tube feedings and TPN/PPN based on assessed needs  - Assess need for intravenous fluids  - Provide specific nutrition/hydration education as appropriate  - Include patient/family/caregiver in decisions related to nutrition  Outcome: Progressing     Problem: DISCHARGE PLANNING  Goal: Discharge to home or other facility with appropriate resources  Description: INTERVENTIONS:  - Identify barriers to discharge w/patient and caregiver  - Arrange for needed discharge resources and transportation as appropriate  - Identify discharge learning needs (meds, wound care, etc.)  - Arrange for interpretive services to assist at discharge as needed  - Refer to Case Management Department for coordinating discharge planning if the patient needs post-hospital services based on physician/advanced practitioner order or complex needs related to functional status, cognitive ability,  or social support system  Outcome: Progressing

## 2025-07-09 NOTE — ASSESSMENT & PLAN NOTE
Smoking cessation counseling  She is motivated to stay off cigarettes- stopped a couple of weeks ago. Declining medications

## 2025-07-09 NOTE — RESPIRATORY THERAPY NOTE
Home Oxygen Qualifying Test     Patient name: Rama Linda        : 1950   Date of Test:  2025  Diagnosis:    Home Oxygen Test:    **Medicare Guidelines require item(s) 1-5 on all ambulatory patients or 1 and 2 on non-ambulatory patients.    1. Baseline SPO2 on Room Air at rest 86 %   If <= 88% on Room Air add O2 via NC to obtain SpO2 >=88%. If LPM needed, document LPM 3 needed to reach =>88%    SPO2 during exertion on Room Air   %  During exertion monitor SPO2. If SPO2 increases >=89%, do not add supplemental oxygen    SPO2 on Oxygen at Rest 90 % at 3 LPM    SPO2 during exertion on Oxygen 90 % at 6 LPM    Test performed during exertion activity.      [x]  Supplemental Home Oxygen is indicated.    []  Client does not qualify for home oxygen.    Respiratory Additional Notes- Pt walked in [place] with minimal shortness of breath maintained O2 sats 90% on 3L. Towards the end of the walk pt sat dropped to 86% on 3L. O2 increased to 6L with sat 90%    Anna Burciaga, RT

## 2025-07-09 NOTE — PROGRESS NOTES
Progress Note - Hospitalist   Name: Rama Linda 75 y.o. female I MRN: [id number]  Unit/Bed#: ICU 06-01 I Date of Admission: 7/7/2025   Date of Service: 7/9/2025 I Hospital Day: 2     Assessment & Plan  Sepsis due to pneumonia (HCC)  Present on admission as evidenced by tachycardia and tachypnea  Source of infection likely pneumonia  Complicated by acute hypoxic respiratory failure  Afebrile and without leukocytosis  Procalcitonin improving  Flu, COVID, RSV PCR negative  Continue ceftriaxone and azithromycin  Trend fever curve/WBC count/procalcitonin  Follow-up blood cultures  De-escalate antibiotics as appropriate  Acute respiratory failure with hypoxia (HCC)  Patient noted to be 74% on RA and placed on mid flow NC O2  Currently requiring 3 L/min NC O2 mid flow  Secondary to pneumonia  Wean O2 as tolerated  Goal SpO2 greater than 88%  Respiratory protocol  Wean off oxygen as able  Home O2 evaluation prior to discharge  COPD exacerbation (HCC)  Possible exacerbation secondary to pneumonia  Continue prednisone 40 mg oral daily  Respiratory protocol  Atrovent and Xopenex  Pulmonology following  Anxiety  Continue home Lexapro  Type 2 diabetes mellitus with complication, without long-term current use of insulin (HCC)  Hold metformin  Sliding Scale insulin coverage with Accu-Chek  Primary hypertension  Continue home medication with hold parameter  Hypercholesteremia  Continue statin    VTE Pharmacologic Prophylaxis: VTE Score: 7 High Risk (Score >/= 5) - Pharmacological DVT Prophylaxis Ordered: enoxaparin (Lovenox). Sequential Compression Devices Ordered.    Mobility:   Basic Mobility Inpatient Raw Score: 18  JH-HLM Goal: 6: Walk 10 steps or more  JH-HLM Achieved: 6: Walk 10 steps or more  JH-HLM Goal achieved. Continue to encourage appropriate mobility.    Patient Centered Rounds: I performed bedside rounds with nursing staff today.     Discussions with Specialists or Other Care Team Provider: Pulmonology    Education  and Discussions with Family / Patient: Updated  () via phone.    Current Length of Stay: 2 day(s)  Current Patient Status: Inpatient   Certification Statement: The patient will continue to require additional inpatient hospital stay due to sepsis secondary to pneumonia complicated by acute hypoxic respiratory failure  Discharge Plan: Anticipate discharge in >72 hrs to discharge location to be determined pending rehab evaluations.    Code Status: Level 3 - DNAR and DNI    Subjective   Patient seen and examined at bedside.  No acute events overnight.  Patient currently saturating well on room air.  Home O2 evaluation prior to discharge.    Objective :  Temp:  [97.2 °F (36.2 °C)-97.6 °F (36.4 °C)] 97.6 °F (36.4 °C)  HR:  [] 67  BP: ()/(50-60) 97/59  Resp:  [21-34] 21  SpO2:  [91 %-96 %] 91 %  O2 Device: Mid flow nasal cannula  Nasal Cannula O2 Flow Rate (L/min):  [3 L/min-12 L/min] 3 L/min    Body mass index is 21.23 kg/m².     Input and Output Summary (last 24 hours):     Intake/Output Summary (Last 24 hours) at 7/9/2025 0725  Last data filed at 7/9/2025 0601  Gross per 24 hour   Intake 692 ml   Output 650 ml   Net 42 ml       Physical Exam  Vitals and nursing note reviewed.   Constitutional:       General: She is not in acute distress.     Appearance: She is well-developed.   HENT:      Head: Normocephalic and atraumatic.     Eyes:      Conjunctiva/sclera: Conjunctivae normal.       Cardiovascular:      Rate and Rhythm: Normal rate and regular rhythm.      Heart sounds: No murmur heard.  Pulmonary:      Effort: Pulmonary effort is normal. No respiratory distress.      Breath sounds: Normal breath sounds.   Abdominal:      Palpations: Abdomen is soft.      Tenderness: There is no abdominal tenderness.     Musculoskeletal:         General: No swelling.      Cervical back: Neck supple.     Skin:     General: Skin is warm and dry.      Capillary Refill: Capillary refill takes less than 2  seconds.     Neurological:      Mental Status: She is alert.     Psychiatric:         Mood and Affect: Mood normal.         Lab Results: I have reviewed the following results:   Results from last 7 days   Lab Units 07/09/25  0520 07/07/25  1632   WBC Thousand/uL 11.24* 11.65*   HEMOGLOBIN g/dL 13.5 14.9   HEMATOCRIT % 41.1 44.3   PLATELETS Thousands/uL 199 198   BANDS PCT %  --  27*   SEGS PCT % 78*  --    LYMPHO PCT % 13* 12*   MONO PCT % 7 11   EOS PCT % 1 0     Results from last 7 days   Lab Units 07/09/25  0520 07/07/25  1632   SODIUM mmol/L 138 137   POTASSIUM mmol/L 4.8 3.8   CHLORIDE mmol/L 105 99   CO2 mmol/L 28 28   BUN mg/dL 30* 24   CREATININE mg/dL 0.66 0.89   ANION GAP mmol/L 5 10   CALCIUM mg/dL 8.2* 9.6   ALBUMIN g/dL  --  3.9   TOTAL BILIRUBIN mg/dL  --  1.64*   ALK PHOS U/L  --  48   ALT U/L  --  8   AST U/L  --  21   GLUCOSE RANDOM mg/dL 129 181*     Results from last 7 days   Lab Units 07/07/25  1632   INR  1.12     Results from last 7 days   Lab Units 07/08/25  2055 07/08/25  1604 07/08/25  1048 07/08/25  0712 07/07/25  2137 07/07/25  2020   POC GLUCOSE mg/dl 187* 112 282* 188* 224* 216*         Results from last 7 days   Lab Units 07/09/25  0520 07/08/25  0529 07/07/25  1632   LACTIC ACID mmol/L  --   --  1.8   PROCALCITONIN ng/ml 0.61* 1.04* 0.89*       Recent Cultures (last 7 days):   Results from last 7 days   Lab Units 07/08/25  1145 07/07/25  1632   BLOOD CULTURE   --  No Growth at 24 hrs.  No Growth at 24 hrs.   LEGIONELLA URINARY ANTIGEN  Negative  --        Imaging Results Review: I reviewed radiology reports from this admission including: chest xray.  Other Study Results Review: No additional pertinent studies reviewed.    Last 24 Hours Medication List:     Current Facility-Administered Medications:     acetaminophen (TYLENOL) tablet 650 mg, Q6H PRN    aspirin (ECOTRIN LOW STRENGTH) EC tablet 81 mg, Daily    atorvastatin (LIPITOR) tablet 20 mg, HS    azithromycin (ZITHROMAX) 500 mg in  sodium chloride 0.9 % 250 mL IVPB, Q24H, Last Rate: 500 mg (07/08/25 1034)    calcium carbonate (OYSTER SHELL,OSCAL) 500 mg tablet 1 tablet, Every Other Day    ceftriaxone (ROCEPHIN) 1 g/50 mL in dextrose IVPB, Q24H, Last Rate: 1,000 mg (07/08/25 1605)    Cholecalciferol (VITAMIN D3) tablet 3,000 Units, Every Other Day    clonazePAM (KlonoPIN) tablet 2 mg, HS    dextromethorphan-guaiFENesin (ROBITUSSIN DM) oral syrup 10 mL, Q4H PRN    enoxaparin (LOVENOX) subcutaneous injection 40 mg, Daily    escitalopram (LEXAPRO) tablet 20 mg, Daily    insulin lispro (HumALOG/ADMELOG) 100 units/mL subcutaneous injection 1-5 Units, TID AC **AND** Fingerstick Glucose (POCT), TID AC    insulin lispro (HumALOG/ADMELOG) 100 units/mL subcutaneous injection 1-5 Units, HS    ipratropium (ATROVENT) 0.02 % inhalation solution 0.5 mg, TID    levalbuterol (XOPENEX) inhalation solution 1.25 mg, TID    levothyroxine tablet 25 mcg, Early Morning    lisinopril (ZESTRIL) tablet 2.5 mg, Daily    magnesium sulfate 4 g/100 mL IVPB (premix) 4 g, Once    methocarbamol (ROBAXIN) tablet 750 mg, Q8H    ondansetron (ZOFRAN) injection 4 mg, Q6H PRN    pantoprazole (PROTONIX) EC tablet 40 mg, Early Morning    predniSONE tablet 40 mg, Daily    vit B complex-vit C-folic acid (Renal Caps) capsule 1 capsule, Daily    Administrative Statements   Today, Patient Was Seen By: Brandon Tillman, DO  I have spent a total time of 35 minutes in caring for this patient on the day of the visit/encounter including Diagnostic results, Prognosis, Risks and benefits of tx options, Instructions for management, Patient and family education, Importance of tx compliance, Risk factor reductions, Impressions, Counseling / Coordination of care, Documenting in the medical record, Reviewing/placing orders in the medical record (including tests, medications, and/or procedures), Obtaining or reviewing history  , and Communicating with other healthcare professionals .    **Please Note:  This note may have been constructed using a voice recognition system.**

## 2025-07-09 NOTE — ASSESSMENT & PLAN NOTE
Possible exacerbation secondary to pneumonia  Continue prednisone 40 mg oral daily  Respiratory protocol  Atrovent and Xopenex  Pulmonology following

## 2025-07-10 LAB
ANION GAP SERPL CALCULATED.3IONS-SCNC: 6 MMOL/L (ref 4–13)
BASOPHILS # BLD AUTO: 0.02 THOUSANDS/ÂΜL (ref 0–0.1)
BASOPHILS NFR BLD AUTO: 0 % (ref 0–1)
BUN SERPL-MCNC: 30 MG/DL (ref 5–25)
CALCIUM SERPL-MCNC: 9.5 MG/DL (ref 8.4–10.2)
CHLORIDE SERPL-SCNC: 102 MMOL/L (ref 96–108)
CO2 SERPL-SCNC: 33 MMOL/L (ref 21–32)
CREAT SERPL-MCNC: 0.67 MG/DL (ref 0.6–1.3)
EOSINOPHIL # BLD AUTO: 0.01 THOUSAND/ÂΜL (ref 0–0.61)
EOSINOPHIL NFR BLD AUTO: 0 % (ref 0–6)
ERYTHROCYTE [DISTWIDTH] IN BLOOD BY AUTOMATED COUNT: 11.6 % (ref 11.6–15.1)
GFR SERPL CREATININE-BSD FRML MDRD: 86 ML/MIN/1.73SQ M
GLUCOSE SERPL-MCNC: 134 MG/DL (ref 65–140)
GLUCOSE SERPL-MCNC: 176 MG/DL (ref 65–140)
GLUCOSE SERPL-MCNC: 185 MG/DL (ref 65–140)
GLUCOSE SERPL-MCNC: 87 MG/DL (ref 65–140)
GLUCOSE SERPL-MCNC: 95 MG/DL (ref 65–140)
HCT VFR BLD AUTO: 38.5 % (ref 34.8–46.1)
HGB BLD-MCNC: 12.4 G/DL (ref 11.5–15.4)
IMM GRANULOCYTES # BLD AUTO: 0.12 THOUSAND/UL (ref 0–0.2)
IMM GRANULOCYTES NFR BLD AUTO: 1 % (ref 0–2)
LYMPHOCYTES # BLD AUTO: 1.95 THOUSANDS/ÂΜL (ref 0.6–4.47)
LYMPHOCYTES NFR BLD AUTO: 20 % (ref 14–44)
MAGNESIUM SERPL-MCNC: 1.8 MG/DL (ref 1.9–2.7)
MCH RBC QN AUTO: 32.5 PG (ref 26.8–34.3)
MCHC RBC AUTO-ENTMCNC: 32.2 G/DL (ref 31.4–37.4)
MCV RBC AUTO: 101 FL (ref 82–98)
MONOCYTES # BLD AUTO: 0.72 THOUSAND/ÂΜL (ref 0.17–1.22)
MONOCYTES NFR BLD AUTO: 7 % (ref 4–12)
NEUTROPHILS # BLD AUTO: 7.18 THOUSANDS/ÂΜL (ref 1.85–7.62)
NEUTS SEG NFR BLD AUTO: 72 % (ref 43–75)
NRBC BLD AUTO-RTO: 0 /100 WBCS
PHOSPHATE SERPL-MCNC: 2.8 MG/DL (ref 2.3–4.1)
PLATELET # BLD AUTO: 212 THOUSANDS/UL (ref 149–390)
PMV BLD AUTO: 10.5 FL (ref 8.9–12.7)
POTASSIUM SERPL-SCNC: 3.9 MMOL/L (ref 3.5–5.3)
PROCALCITONIN SERPL-MCNC: 0.42 NG/ML
RBC # BLD AUTO: 3.81 MILLION/UL (ref 3.81–5.12)
SODIUM SERPL-SCNC: 141 MMOL/L (ref 135–147)
WBC # BLD AUTO: 10 THOUSAND/UL (ref 4.31–10.16)

## 2025-07-10 PROCEDURE — 82948 REAGENT STRIP/BLOOD GLUCOSE: CPT

## 2025-07-10 PROCEDURE — 94640 AIRWAY INHALATION TREATMENT: CPT

## 2025-07-10 PROCEDURE — 99232 SBSQ HOSP IP/OBS MODERATE 35: CPT | Performed by: INTERNAL MEDICINE

## 2025-07-10 PROCEDURE — 83735 ASSAY OF MAGNESIUM: CPT | Performed by: INTERNAL MEDICINE

## 2025-07-10 PROCEDURE — 80048 BASIC METABOLIC PNL TOTAL CA: CPT | Performed by: INTERNAL MEDICINE

## 2025-07-10 PROCEDURE — 85025 COMPLETE CBC W/AUTO DIFF WBC: CPT | Performed by: INTERNAL MEDICINE

## 2025-07-10 PROCEDURE — 94760 N-INVAS EAR/PLS OXIMETRY 1: CPT

## 2025-07-10 PROCEDURE — 84100 ASSAY OF PHOSPHORUS: CPT | Performed by: INTERNAL MEDICINE

## 2025-07-10 PROCEDURE — 94664 DEMO&/EVAL PT USE INHALER: CPT

## 2025-07-10 PROCEDURE — 84145 PROCALCITONIN (PCT): CPT | Performed by: INTERNAL MEDICINE

## 2025-07-10 RX ADMIN — CEFTRIAXONE SODIUM 1000 MG: 10 INJECTION, POWDER, FOR SOLUTION INTRAVENOUS at 18:09

## 2025-07-10 RX ADMIN — IPRATROPIUM BROMIDE 0.5 MG: 0.5 SOLUTION RESPIRATORY (INHALATION) at 20:12

## 2025-07-10 RX ADMIN — ASPIRIN 81 MG: 81 TABLET, COATED ORAL at 09:12

## 2025-07-10 RX ADMIN — INSULIN LISPRO 1 UNITS: 100 INJECTION, SOLUTION INTRAVENOUS; SUBCUTANEOUS at 18:06

## 2025-07-10 RX ADMIN — METHOCARBAMOL TABLETS 750 MG: 750 TABLET, COATED ORAL at 21:00

## 2025-07-10 RX ADMIN — METHOCARBAMOL TABLETS 750 MG: 750 TABLET, COATED ORAL at 05:22

## 2025-07-10 RX ADMIN — LEVOTHYROXINE SODIUM 25 MCG: 0.03 TABLET ORAL at 05:21

## 2025-07-10 RX ADMIN — INSULIN LISPRO 1 UNITS: 100 INJECTION, SOLUTION INTRAVENOUS; SUBCUTANEOUS at 21:00

## 2025-07-10 RX ADMIN — IPRATROPIUM BROMIDE 0.5 MG: 0.5 SOLUTION RESPIRATORY (INHALATION) at 07:31

## 2025-07-10 RX ADMIN — ESCITALOPRAM OXALATE 20 MG: 10 TABLET ORAL at 09:12

## 2025-07-10 RX ADMIN — Medication 3000 UNITS: at 09:12

## 2025-07-10 RX ADMIN — CLONAZEPAM 2 MG: 1 TABLET ORAL at 21:00

## 2025-07-10 RX ADMIN — LEVALBUTEROL HYDROCHLORIDE 1.25 MG: 1.25 SOLUTION RESPIRATORY (INHALATION) at 13:13

## 2025-07-10 RX ADMIN — PANTOPRAZOLE SODIUM 40 MG: 40 TABLET, DELAYED RELEASE ORAL at 05:21

## 2025-07-10 RX ADMIN — LEVALBUTEROL HYDROCHLORIDE 1.25 MG: 1.25 SOLUTION RESPIRATORY (INHALATION) at 20:11

## 2025-07-10 RX ADMIN — IPRATROPIUM BROMIDE 0.5 MG: 0.5 SOLUTION RESPIRATORY (INHALATION) at 13:13

## 2025-07-10 RX ADMIN — Medication 1 CAPSULE: at 09:12

## 2025-07-10 RX ADMIN — ENOXAPARIN SODIUM 40 MG: 40 INJECTION SUBCUTANEOUS at 09:12

## 2025-07-10 RX ADMIN — AZITHROMYCIN MONOHYDRATE 500 MG: 500 INJECTION, POWDER, LYOPHILIZED, FOR SOLUTION INTRAVENOUS at 10:56

## 2025-07-10 RX ADMIN — LEVALBUTEROL HYDROCHLORIDE 1.25 MG: 1.25 SOLUTION RESPIRATORY (INHALATION) at 07:31

## 2025-07-10 RX ADMIN — LISINOPRIL 2.5 MG: 2.5 TABLET ORAL at 09:12

## 2025-07-10 RX ADMIN — ATORVASTATIN CALCIUM 20 MG: 20 TABLET, FILM COATED ORAL at 21:00

## 2025-07-10 RX ADMIN — CALCIUM 1 TABLET: 500 TABLET ORAL at 09:12

## 2025-07-10 RX ADMIN — PREDNISONE 40 MG: 20 TABLET ORAL at 09:12

## 2025-07-10 NOTE — NURSING NOTE
AAOx4,on O2 3L NC,in no acute distress,no complaints of pain at this time.Transferred to room 309 via wheelchair.VSS.

## 2025-07-10 NOTE — NURSING NOTE
Sleeping o2 is maintained and ot sat in 90's heart rate 55/min. No distress or changes in status over night

## 2025-07-10 NOTE — NURSING NOTE
RECEIVED VIA W/C FROM ICU. TO BED WITH ASSIST. 02 MAINTAINED 2 LITERS AND 02 SAT 93% HR 55/MIN. NO SOB. DENIES PAIN. CALL VARELA NEAR AND BED ALARM ON

## 2025-07-10 NOTE — RESPIRATORY THERAPY NOTE
07/10/25 1329   Oxygen Therapy/Pulse Ox   Nasal Cannula O2 Flow Rate (L/min) 2 L/min  (increased back to 3L)   Calculated FIO2 (%) - Nasal Cannula 28   SpO2 (!) 88 %

## 2025-07-10 NOTE — PLAN OF CARE
Problem: PAIN - ADULT  Goal: Verbalizes/displays adequate comfort level or baseline comfort level  Description: Interventions:  - Encourage patient to monitor pain and request assistance  - Assess pain using appropriate pain scale  - Administer analgesics as ordered based on type and severity of pain and evaluate response  - Implement non-pharmacological measures as appropriate and evaluate response  - Consider cultural and social influences on pain and pain management  - Notify physician/advanced practitioner if interventions unsuccessful or patient reports new pain  - Educate patient/family on pain management process including their role and importance of  reporting pain   - Provide non-pharmacologic/complimentary pain relief interventions  Outcome: Progressing     Problem: RESPIRATORY - ADULT  Goal: Achieves optimal ventilation and oxygenation  Description: INTERVENTIONS:  - Assess for changes in respiratory status  - Assess for changes in mentation and behavior  - Position to facilitate oxygenation and minimize respiratory effort  - Oxygen administered by appropriate delivery if ordered  - Initiate smoking cessation education as indicated  - Encourage broncho-pulmonary hygiene including cough, deep breathe, Incentive Spirometry  - Assess the need for suctioning and aspirate as needed  - Assess and instruct to report SOB or any respiratory difficulty  - Respiratory Therapy support as indicated  Outcome: Progressing

## 2025-07-10 NOTE — ASSESSMENT & PLAN NOTE
Patient noted to be 74% on RA and placed on mid flow NC O2 on presentation  Currently requiring 2 L/min NC O2  Secondary to multifocal pneumonia  Wean O2 as tolerated  Goal SpO2 greater than 88%  Respiratory protocol  Wean off oxygen as able  Home O2 evaluation prior to discharge

## 2025-07-10 NOTE — ASSESSMENT & PLAN NOTE
Likely exacerbation secondary to pneumonia  Continue prednisone 40 mg oral daily  Respiratory protocol  Atrovent and Xopenex  Pulmonology following

## 2025-07-10 NOTE — ASSESSMENT & PLAN NOTE
Present on admission as evidenced by tachycardia and tachypnea  Source of infection likely multifocal pneumonia  Complicated by acute hypoxic respiratory failure  Afebrile and without leukocytosis  Procalcitonin downtrending  Flu, COVID, RSV PCR negative  Continue ceftriaxone and azithromycin  Trend fever curve/WBC count/procalcitonin  Follow-up blood cultures  De-escalate antibiotics as appropriate

## 2025-07-10 NOTE — PROGRESS NOTES
Progress Note - Hospitalist   Name: Rama Linda 75 y.o. female I MRN: [id number]  Unit/Bed#: -01 I Date of Admission: 7/7/2025   Date of Service: 7/10/2025 I Hospital Day: 3     Assessment & Plan  Sepsis due to pneumonia (HCC)  Present on admission as evidenced by tachycardia and tachypnea  Source of infection likely multifocal pneumonia  Complicated by acute hypoxic respiratory failure  Afebrile and without leukocytosis  Procalcitonin downtrending  Flu, COVID, RSV PCR negative  Continue ceftriaxone and azithromycin  Trend fever curve/WBC count/procalcitonin  Follow-up blood cultures  De-escalate antibiotics as appropriate  Acute respiratory failure with hypoxia (HCC)  Patient noted to be 74% on RA and placed on mid flow NC O2 on presentation  Currently requiring 2 L/min NC O2  Secondary to multifocal pneumonia  Wean O2 as tolerated  Goal SpO2 greater than 88%  Respiratory protocol  Wean off oxygen as able  Home O2 evaluation prior to discharge  COPD exacerbation (HCC)  Likely exacerbation secondary to pneumonia  Continue prednisone 40 mg oral daily  Respiratory protocol  Atrovent and Xopenex  Pulmonology following  Anxiety  Continue home Lexapro  Type 2 diabetes mellitus with complication, without long-term current use of insulin (HCC)  Hold metformin  Sliding Scale insulin coverage with Accu-Chek  Primary hypertension  Continue home medication with hold parameter  Hypercholesteremia  Continue statin    VTE Pharmacologic Prophylaxis: VTE Score: 7 High Risk (Score >/= 5) - Pharmacological DVT Prophylaxis Ordered: enoxaparin (Lovenox). Sequential Compression Devices Ordered.    Mobility:   Basic Mobility Inpatient Raw Score: 18  JH-HLM Goal: 6: Walk 10 steps or more  JH-HLM Achieved: 6: Walk 10 steps or more  JH-HLM Goal achieved. Continue to encourage appropriate mobility.    Patient Centered Rounds: I performed bedside rounds with nursing staff today.     Discussions with Specialists or Other Care Team  Provider: Pulmonology    Education and Discussions with Family / Patient: Updated  () via phone.    Current Length of Stay: 3 day(s)  Current Patient Status: Inpatient   Certification Statement: The patient will continue to require additional inpatient hospital stay due to sepsis secondary to pneumonia complicated by acute hypoxic respiratory failure  Discharge Plan: Anticipate discharge in >72 hrs to discharge location to be determined pending rehab evaluations.    Code Status: Level 3 - DNAR and DNI    Subjective   Patient seen and examined at bedside.  No acute events overnight.  Patient currently saturating well on 2 L/min NC O2.  Home O2 evaluation prior to discharge.    Objective :  Temp:  [97 °F (36.1 °C)-98.1 °F (36.7 °C)] 98.1 °F (36.7 °C)  HR:  [56-82] 57  BP: (101-127)/(51-71) 122/65  Resp:  [16-23] 16  SpO2:  [89 %-98 %] 90 %  O2 Device: Nasal cannula  Nasal Cannula O2 Flow Rate (L/min):  [2 L/min-6 L/min] 2 L/min    Body mass index is 20.81 kg/m².     Input and Output Summary (last 24 hours):     Intake/Output Summary (Last 24 hours) at 7/10/2025 0940  Last data filed at 7/10/2025 0844  Gross per 24 hour   Intake 735 ml   Output 375 ml   Net 360 ml       Physical Exam  Vitals and nursing note reviewed.   Constitutional:       General: She is not in acute distress.     Appearance: She is well-developed.   HENT:      Head: Normocephalic and atraumatic.     Eyes:      Conjunctiva/sclera: Conjunctivae normal.       Cardiovascular:      Rate and Rhythm: Normal rate and regular rhythm.      Heart sounds: No murmur heard.  Pulmonary:      Effort: Pulmonary effort is normal. No respiratory distress.      Breath sounds: Normal breath sounds.   Abdominal:      Palpations: Abdomen is soft.      Tenderness: There is no abdominal tenderness.     Musculoskeletal:         General: No swelling.      Cervical back: Neck supple.     Skin:     General: Skin is warm and dry.      Capillary Refill:  Capillary refill takes less than 2 seconds.     Neurological:      Mental Status: She is alert.     Psychiatric:         Mood and Affect: Mood normal.         Lab Results: I have reviewed the following results:   Results from last 7 days   Lab Units 07/10/25  0453 07/09/25  0520 07/07/25  1632   WBC Thousand/uL 10.00   < > 11.65*   HEMOGLOBIN g/dL 12.4   < > 14.9   HEMATOCRIT % 38.5   < > 44.3   PLATELETS Thousands/uL 212   < > 198   BANDS PCT %  --   --  27*   SEGS PCT % 72   < >  --    LYMPHO PCT % 20   < > 12*   MONO PCT % 7   < > 11   EOS PCT % 0   < > 0    < > = values in this interval not displayed.     Results from last 7 days   Lab Units 07/10/25  0453 07/09/25  0520 07/07/25  1632   SODIUM mmol/L 141   < > 137   POTASSIUM mmol/L 3.9   < > 3.8   CHLORIDE mmol/L 102   < > 99   CO2 mmol/L 33*   < > 28   BUN mg/dL 30*   < > 24   CREATININE mg/dL 0.67   < > 0.89   ANION GAP mmol/L 6   < > 10   CALCIUM mg/dL 9.5   < > 9.6   ALBUMIN g/dL  --   --  3.9   TOTAL BILIRUBIN mg/dL  --   --  1.64*   ALK PHOS U/L  --   --  48   ALT U/L  --   --  8   AST U/L  --   --  21   GLUCOSE RANDOM mg/dL 95   < > 181*    < > = values in this interval not displayed.     Results from last 7 days   Lab Units 07/07/25  1632   INR  1.12     Results from last 7 days   Lab Units 07/10/25  0712 07/09/25  2046 07/09/25  1602 07/09/25  1107 07/09/25  0728 07/08/25  2055 07/08/25  1604 07/08/25  1048 07/08/25  0712 07/07/25  2137 07/07/25  2020   POC GLUCOSE mg/dl 87 171* 177* 132 132 187* 112 282* 188* 224* 216*         Results from last 7 days   Lab Units 07/10/25  0453 07/09/25  0520 07/08/25  0529 07/07/25  1632   LACTIC ACID mmol/L  --   --   --  1.8   PROCALCITONIN ng/ml 0.42* 0.61* 1.04* 0.89*       Recent Cultures (last 7 days):   Results from last 7 days   Lab Units 07/08/25 2054 07/08/25  1145 07/07/25  1632   BLOOD CULTURE   --   --  No Growth at 48 hrs.  No Growth at 48 hrs.   GRAM STAIN RESULT  1+ Epithelial cells per low power  field*  2+ Polys*  1+ Yeast*  Rare Gram positive rods*  --   --    LEGIONELLA URINARY ANTIGEN   --  Negative  --        Imaging Results Review: I reviewed radiology reports from this admission including: chest xray.  Other Study Results Review: No additional pertinent studies reviewed.    Last 24 Hours Medication List:     Current Facility-Administered Medications:     acetaminophen (TYLENOL) tablet 650 mg, Q6H PRN    aspirin (ECOTRIN LOW STRENGTH) EC tablet 81 mg, Daily    atorvastatin (LIPITOR) tablet 20 mg, HS    calcium carbonate (OYSTER SHELL,OSCAL) 500 mg tablet 1 tablet, Every Other Day    ceftriaxone (ROCEPHIN) 1 g/50 mL in dextrose IVPB, Q24H, Last Rate: 1,000 mg (07/09/25 1715)    Cholecalciferol (VITAMIN D3) tablet 3,000 Units, Every Other Day    clonazePAM (KlonoPIN) tablet 2 mg, HS    dextromethorphan-guaiFENesin (ROBITUSSIN DM) oral syrup 10 mL, Q4H PRN    enoxaparin (LOVENOX) subcutaneous injection 40 mg, Daily    escitalopram (LEXAPRO) tablet 20 mg, Daily    insulin lispro (HumALOG/ADMELOG) 100 units/mL subcutaneous injection 1-5 Units, TID AC **AND** Fingerstick Glucose (POCT), TID AC    insulin lispro (HumALOG/ADMELOG) 100 units/mL subcutaneous injection 1-5 Units, HS    ipratropium (ATROVENT) 0.02 % inhalation solution 0.5 mg, TID    levalbuterol (XOPENEX) inhalation solution 1.25 mg, TID    levothyroxine tablet 25 mcg, Early Morning    lisinopril (ZESTRIL) tablet 2.5 mg, Daily    methocarbamol (ROBAXIN) tablet 750 mg, Q8H    ondansetron (ZOFRAN) injection 4 mg, Q6H PRN    pantoprazole (PROTONIX) EC tablet 40 mg, Early Morning    predniSONE tablet 40 mg, Daily    vit B complex-vit C-folic acid (Renal Caps) capsule 1 capsule, Daily    Administrative Statements   Today, Patient Was Seen By: Brandon Tillman, DO  I have spent a total time of 35 minutes in caring for this patient on the day of the visit/encounter including Diagnostic results, Prognosis, Risks and benefits of tx options, Instructions  for management, Patient and family education, Importance of tx compliance, Risk factor reductions, Impressions, Counseling / Coordination of care, Documenting in the medical record, Reviewing/placing orders in the medical record (including tests, medications, and/or procedures), Obtaining or reviewing history  , and Communicating with other healthcare professionals .    **Please Note: This note may have been constructed using a voice recognition system.**

## 2025-07-11 VITALS
SYSTOLIC BLOOD PRESSURE: 129 MMHG | WEIGHT: 121.25 LBS | TEMPERATURE: 98.3 F | RESPIRATION RATE: 18 BRPM | DIASTOLIC BLOOD PRESSURE: 52 MMHG | BODY MASS INDEX: 20.7 KG/M2 | OXYGEN SATURATION: 95 % | HEIGHT: 64 IN | HEART RATE: 72 BPM

## 2025-07-11 LAB
ANION GAP SERPL CALCULATED.3IONS-SCNC: 5 MMOL/L (ref 4–13)
ANISOCYTOSIS BLD QL SMEAR: PRESENT
BACTERIA SPT RESP CULT: ABNORMAL
BASOPHILS # BLD MANUAL: 0 THOUSAND/UL (ref 0–0.1)
BASOPHILS NFR MAR MANUAL: 0 % (ref 0–1)
BUN SERPL-MCNC: 25 MG/DL (ref 5–25)
CALCIUM SERPL-MCNC: 9.6 MG/DL (ref 8.4–10.2)
CHLORIDE SERPL-SCNC: 102 MMOL/L (ref 96–108)
CO2 SERPL-SCNC: 35 MMOL/L (ref 21–32)
CREAT SERPL-MCNC: 0.73 MG/DL (ref 0.6–1.3)
DME PARACHUTE DELIVERY DATE EXPECTED: NORMAL
DME PARACHUTE DELIVERY DATE REQUESTED: NORMAL
DME PARACHUTE ITEM DESCRIPTION: NORMAL
DME PARACHUTE ORDER STATUS: NORMAL
DME PARACHUTE SUPPLIER NAME: NORMAL
DME PARACHUTE SUPPLIER PHONE: NORMAL
EOSINOPHIL # BLD MANUAL: 0 THOUSAND/UL (ref 0–0.4)
EOSINOPHIL NFR BLD MANUAL: 0 % (ref 0–6)
ERYTHROCYTE [DISTWIDTH] IN BLOOD BY AUTOMATED COUNT: 11.5 % (ref 11.6–15.1)
GFR SERPL CREATININE-BSD FRML MDRD: 80 ML/MIN/1.73SQ M
GLUCOSE SERPL-MCNC: 101 MG/DL (ref 65–140)
GLUCOSE SERPL-MCNC: 90 MG/DL (ref 65–140)
GLUCOSE SERPL-MCNC: 91 MG/DL (ref 65–140)
GRAM STN SPEC: ABNORMAL
HCT VFR BLD AUTO: 39.2 % (ref 34.8–46.1)
HGB BLD-MCNC: 12.9 G/DL (ref 11.5–15.4)
LYMPHOCYTES # BLD AUTO: 2.64 THOUSAND/UL (ref 0.6–4.47)
LYMPHOCYTES # BLD AUTO: 29 % (ref 14–44)
MAGNESIUM SERPL-MCNC: 1.6 MG/DL (ref 1.9–2.7)
MCH RBC QN AUTO: 33.3 PG (ref 26.8–34.3)
MCHC RBC AUTO-ENTMCNC: 32.9 G/DL (ref 31.4–37.4)
MCV RBC AUTO: 101 FL (ref 82–98)
MONOCYTES # BLD AUTO: 0.73 THOUSAND/UL (ref 0–1.22)
MONOCYTES NFR BLD: 8 % (ref 4–12)
MYELOCYTE ABSOLUTE CT: 0.09 THOUSAND/UL (ref 0–0.1)
MYELOCYTES NFR BLD MANUAL: 1 % (ref 0–1)
NEUTROPHILS # BLD MANUAL: 5.64 THOUSAND/UL (ref 1.85–7.62)
NEUTS BAND NFR BLD MANUAL: 3 % (ref 0–8)
NEUTS SEG NFR BLD AUTO: 59 % (ref 43–75)
PHOSPHATE SERPL-MCNC: 3.3 MG/DL (ref 2.3–4.1)
PLATELET # BLD AUTO: 218 THOUSANDS/UL (ref 149–390)
PLATELET BLD QL SMEAR: ADEQUATE
PMV BLD AUTO: 10 FL (ref 8.9–12.7)
POIKILOCYTOSIS BLD QL SMEAR: PRESENT
POTASSIUM SERPL-SCNC: 4 MMOL/L (ref 3.5–5.3)
PROCALCITONIN SERPL-MCNC: 0.2 NG/ML
RBC # BLD AUTO: 3.87 MILLION/UL (ref 3.81–5.12)
RBC MORPH BLD: PRESENT
SODIUM SERPL-SCNC: 142 MMOL/L (ref 135–147)
WBC # BLD AUTO: 9.1 THOUSAND/UL (ref 4.31–10.16)

## 2025-07-11 PROCEDURE — 94761 N-INVAS EAR/PLS OXIMETRY MLT: CPT

## 2025-07-11 PROCEDURE — 85007 BL SMEAR W/DIFF WBC COUNT: CPT | Performed by: INTERNAL MEDICINE

## 2025-07-11 PROCEDURE — 94760 N-INVAS EAR/PLS OXIMETRY 1: CPT

## 2025-07-11 PROCEDURE — 80048 BASIC METABOLIC PNL TOTAL CA: CPT | Performed by: INTERNAL MEDICINE

## 2025-07-11 PROCEDURE — 84100 ASSAY OF PHOSPHORUS: CPT | Performed by: INTERNAL MEDICINE

## 2025-07-11 PROCEDURE — 94640 AIRWAY INHALATION TREATMENT: CPT

## 2025-07-11 PROCEDURE — 99239 HOSP IP/OBS DSCHRG MGMT >30: CPT | Performed by: INTERNAL MEDICINE

## 2025-07-11 PROCEDURE — 83735 ASSAY OF MAGNESIUM: CPT | Performed by: INTERNAL MEDICINE

## 2025-07-11 PROCEDURE — 82948 REAGENT STRIP/BLOOD GLUCOSE: CPT

## 2025-07-11 PROCEDURE — 85027 COMPLETE CBC AUTOMATED: CPT | Performed by: INTERNAL MEDICINE

## 2025-07-11 PROCEDURE — 84145 PROCALCITONIN (PCT): CPT | Performed by: INTERNAL MEDICINE

## 2025-07-11 RX ORDER — MAGNESIUM SULFATE HEPTAHYDRATE 40 MG/ML
4 INJECTION, SOLUTION INTRAVENOUS ONCE
Status: COMPLETED | OUTPATIENT
Start: 2025-07-11 | End: 2025-07-11

## 2025-07-11 RX ORDER — PREDNISONE 20 MG/1
40 TABLET ORAL DAILY
Qty: 10 TABLET | Refills: 0 | Status: SHIPPED | OUTPATIENT
Start: 2025-07-11 | End: 2025-07-16

## 2025-07-11 RX ORDER — ALBUTEROL SULFATE 90 UG/1
2 INHALANT RESPIRATORY (INHALATION) EVERY 6 HOURS PRN
Qty: 8.5 G | Refills: 0 | Status: SHIPPED | OUTPATIENT
Start: 2025-07-11 | End: 2025-08-01

## 2025-07-11 RX ORDER — AZITHROMYCIN 250 MG/1
250 TABLET, FILM COATED ORAL EVERY 24 HOURS
Qty: 3 TABLET | Refills: 0 | Status: SHIPPED | OUTPATIENT
Start: 2025-07-11 | End: 2025-07-14

## 2025-07-11 RX ADMIN — LISINOPRIL 2.5 MG: 2.5 TABLET ORAL at 09:18

## 2025-07-11 RX ADMIN — ASPIRIN 81 MG: 81 TABLET, COATED ORAL at 09:18

## 2025-07-11 RX ADMIN — LEVOTHYROXINE SODIUM 25 MCG: 0.03 TABLET ORAL at 05:16

## 2025-07-11 RX ADMIN — PREDNISONE 40 MG: 20 TABLET ORAL at 09:18

## 2025-07-11 RX ADMIN — IPRATROPIUM BROMIDE 0.5 MG: 0.5 SOLUTION RESPIRATORY (INHALATION) at 07:23

## 2025-07-11 RX ADMIN — ESCITALOPRAM OXALATE 20 MG: 10 TABLET ORAL at 09:18

## 2025-07-11 RX ADMIN — AZITHROMYCIN MONOHYDRATE 500 MG: 500 INJECTION, POWDER, LYOPHILIZED, FOR SOLUTION INTRAVENOUS at 09:18

## 2025-07-11 RX ADMIN — METHOCARBAMOL TABLETS 750 MG: 750 TABLET, COATED ORAL at 14:05

## 2025-07-11 RX ADMIN — MAGNESIUM SULFATE HEPTAHYDRATE 4 G: 40 INJECTION, SOLUTION INTRAVENOUS at 10:28

## 2025-07-11 RX ADMIN — PANTOPRAZOLE SODIUM 40 MG: 40 TABLET, DELAYED RELEASE ORAL at 05:16

## 2025-07-11 RX ADMIN — Medication 1 CAPSULE: at 09:18

## 2025-07-11 RX ADMIN — LEVALBUTEROL HYDROCHLORIDE 1.25 MG: 1.25 SOLUTION RESPIRATORY (INHALATION) at 07:23

## 2025-07-11 RX ADMIN — METHOCARBAMOL TABLETS 750 MG: 750 TABLET, COATED ORAL at 05:16

## 2025-07-11 RX ADMIN — ENOXAPARIN SODIUM 40 MG: 40 INJECTION SUBCUTANEOUS at 09:18

## 2025-07-11 NOTE — DISCHARGE SUMMARY
Discharge Summary - Hospitalist   Name: Rama Linda 75 y.o. female I MRN: [id number]  Unit/Bed#: -01 I Date of Admission: 7/7/2025   Date of Service: 7/11/2025 I Hospital Day: 4     Assessment & Plan  Sepsis due to pneumonia (HCC)  Present on admission as evidenced by tachycardia and tachypnea  Source of infection likely multifocal pneumonia  Complicated by acute hypoxic respiratory failure  Afebrile and without leukocytosis  Procalcitonin downtrending  Flu, COVID, RSV PCR negative  Continue ceftriaxone and azithromycin  Trend fever curve/WBC count/procalcitonin  Follow-up blood cultures  De-escalate antibiotics as appropriate  Discharge home on a 3-day course of azithromycin  Acute respiratory failure with hypoxia (HCC)  Patient noted to be 74% on RA and placed on mid flow NC O2 on presentation  Currently requiring 2 L/min NC O2  Secondary to multifocal pneumonia  Wean O2 as tolerated  Goal SpO2 greater than 88%  Respiratory protocol  Wean off oxygen as able  Home O2 evaluation prior to discharge  Patient will likely need home O2 pending evaluation by RT  COPD exacerbation (HCC)  Likely exacerbation secondary to pneumonia  Continue prednisone 40 mg oral daily  Respiratory protocol  Atrovent and Xopenex  Pulmonology following  Discharge home on a 5-day course of prednisone  Anxiety  Continue home Lexapro  Type 2 diabetes mellitus with complication, without long-term current use of insulin (HCC)  Hold metformin  Sliding Scale insulin coverage with Accu-Chek  Primary hypertension  Continue home medication with hold parameter  Hypercholesteremia  Continue statin     Medical Problems       Resolved Problems  Date Reviewed: 7/7/2025          Resolved    Type 2 diabetes mellitus with diabetic neuropathy, without long-term current use of insulin (HCC) 7/7/2025     Resolved by  Bhargavi Mitchell PA-C        Discharging Physician / Practitioner: Brandon Tillman DO  PCP: Leann Mejía PA-C  Admission Date:  "  Admission Orders (From admission, onward)       Ordered        07/07/25 1928  INPATIENT ADMISSION  Once                          Discharge Date: 07/11/25    Next Steps for Physician/AP Assuming Care:  Patient will need PFTs    Test Results Pending at Discharge (will require follow up):  Not applicable    Medication Changes for Discharge & Rationale:   Prednisone and azithromycin for presumed COPD exacerbation  See after visit summary for reconciled discharge medications provided to patient and/or family.     Consultations During Hospital Stay:  Pulmonology    Procedures Performed:   Not applicable    Significant Findings / Test Results:   Not applicable    Incidental Findings:   Not applicable    Hospital Course:   Rama Linda is a 75 y.o. female with a PMH of diabetes mellitus type 2 not on insulin with hypertension hyperlipidemia, anxiety who presents with Shortness of breath. Patient reports shortness of breath starting about 1 week ago. She then developed a cough Tuesday morning, she tried going outside to get fresh air, and had trouble sleeping with the cough. Today she felt that she was going to \"Go\" or needed to get help. Noted a fever on Tuesday of 102. Had decreased PO intake secondary to coughing. Does not wear oxygen at home and does not follow with pulmonary.     The patient has been requiring 2-3 L/min NC O2.  Home O2 evaluation will be performed prior to discharge.  The patient will be discharged home on a 5-day course of prednisone and a 3-day course of azithromycin in the setting of COPD with acute exacerbation.  The patient does not hold a diagnosis of COPD however will need PFTs in the outpatient setting.  I have referred the patient to pulmonology.  She was strongly encouraged to resume all PTA medications and to follow-up with her PCP within 7-14 days.  This serves as a brief discharge summary.  Please see full medical record for more details.  All questions and concerns were answered prior " "to departure and the patient endorses understanding and agreement with the plan.    Discharge Day Visit / Exam:   Subjective:  Patient seen and examined at bedside. No acute events overnight. Denies chest pain, SOB, diaphoresis, nausea/vomiting/diarrhea, fevers/chills.     Vitals: Blood Pressure: 145/64 (07/11/25 0652)  Pulse: (!) 43 (07/11/25 0652)  Temperature: 97.8 °F (36.6 °C) (07/11/25 0652)  Temp Source: Oral (07/10/25 2306)  Respirations: 16 (07/11/25 0723)  Height: 5' 4\" (162.6 cm) (07/07/25 2006)  Weight - Scale: 55 kg (121 lb 4.1 oz) (07/09/25 2329)  SpO2: (!) 86 % (07/11/25 0652)    Physical Exam  Vitals and nursing note reviewed.   Constitutional:       General: She is not in acute distress.     Appearance: She is well-developed.   HENT:      Head: Normocephalic and atraumatic.     Eyes:      Conjunctiva/sclera: Conjunctivae normal.       Cardiovascular:      Rate and Rhythm: Normal rate and regular rhythm.      Heart sounds: No murmur heard.  Pulmonary:      Effort: Pulmonary effort is normal. No respiratory distress.      Breath sounds: Normal breath sounds.   Abdominal:      Palpations: Abdomen is soft.      Tenderness: There is no abdominal tenderness.     Musculoskeletal:         General: No swelling.      Cervical back: Neck supple.     Skin:     General: Skin is warm and dry.      Capillary Refill: Capillary refill takes less than 2 seconds.     Neurological:      Mental Status: She is alert.     Psychiatric:         Mood and Affect: Mood normal.       Discussion with Family: Patient declined call to .     Discharge instructions/Information to patient and family:   See after visit summary for information provided to patient and family.      Provisions for Follow-Up Care:  See after visit summary for information related to follow-up care and any pertinent home health orders.      Mobility at time of Discharge:   Basic Mobility Inpatient Raw Score: 18  -HL Goal: 6: Walk 10 steps or " more  JH-HLM Achieved: 6: Walk 10 steps or more  HLM Goal achieved. Continue to encourage appropriate mobility.     Disposition:   Home with VNA Services (Reminder: Complete face to face encounter)    Planned Readmission: Not applicable    Administrative Statements   Discharge Statement:  I have spent a total time of 65 minutes in caring for this patient on the day of the visit/encounter. >30 minutes of time was spent on: Diagnostic results, Prognosis, Risks and benefits of tx options, Instructions for management, Patient and family education, Importance of tx compliance, Risk factor reductions, Impressions, Counseling / Coordination of care, Documenting in the medical record, Reviewing / ordering tests, medicine, procedures  , and Communicating with other healthcare professionals .    **Please Note: This note may have been constructed using a voice recognition system**

## 2025-07-11 NOTE — ASSESSMENT & PLAN NOTE
Patient noted to be 74% on RA and placed on mid flow NC O2 on presentation  Currently requiring 2 L/min NC O2  Secondary to multifocal pneumonia  Wean O2 as tolerated  Goal SpO2 greater than 88%  Respiratory protocol  Wean off oxygen as able  Home O2 evaluation prior to discharge  Patient will likely need home O2 pending evaluation by RT

## 2025-07-11 NOTE — PLAN OF CARE
Problem: Potential for Falls  Goal: Patient will remain free of falls  Description: INTERVENTIONS:  - Educate patient/family on patient safety including physical limitations  - Instruct patient to call for assistance with activity   - Consider consulting OT/PT to assist with strengthening/mobility based on AM PAC & JH-HLM score  - Consult OT/PT to assist with strengthening/mobility   - Keep Call bell within reach  - Keep bed low and locked with side rails adjusted as appropriate  - Keep care items and personal belongings within reach  - Initiate and maintain comfort rounds  - Make Fall Risk Sign visible to staff  - Offer Toileting every 2 Hours, in advance of need  - Initiate/Maintain bed alarm  - Obtain necessary fall risk management equipment: none  - Apply yellow socks and bracelet for high fall risk patients  - Consider moving patient to room near nurses station  Outcome: Progressing     Problem: PAIN - ADULT  Goal: Verbalizes/displays adequate comfort level or baseline comfort level  Description: Interventions:  - Encourage patient to monitor pain and request assistance  - Assess pain using appropriate pain scale  - Administer analgesics as ordered based on type and severity of pain and evaluate response  - Implement non-pharmacological measures as appropriate and evaluate response  - Consider cultural and social influences on pain and pain management  - Notify physician/advanced practitioner if interventions unsuccessful or patient reports new pain  - Educate patient/family on pain management process including their role and importance of  reporting pain   - Provide non-pharmacologic/complimentary pain relief interventions  Outcome: Progressing     Problem: INFECTION - ADULT  Goal: Absence or prevention of progression during hospitalization  Description: INTERVENTIONS:  - Assess and monitor for signs and symptoms of infection  - Monitor lab/diagnostic results  - Monitor all insertion sites, i.e. indwelling  lines, tubes, and drains  - Monitor endotracheal if appropriate and nasal secretions for changes in amount and color  - [place] appropriate cooling/warming therapies per order  - Administer medications as ordered  - Instruct and encourage patient and family to use good hand hygiene technique  - Identify and instruct in appropriate isolation precautions for identified infection/condition  Outcome: Progressing  Goal: Absence of fever/infection during neutropenic period  Description: INTERVENTIONS:  - Monitor WBC  - Perform strict hand hygiene  - Limit to healthy visitors only  - No plants, dried, fresh or silk flowers with heath in patient room  Outcome: Progressing     Problem: SAFETY ADULT  Goal: Patient will remain free of falls  Description: INTERVENTIONS:  - Educate patient/family on patient safety including physical limitations  - Instruct patient to call for assistance with activity   - Consider consulting OT/PT to assist with strengthening/mobility based on AM PAC & -HLM score  - Consult OT/PT to assist with strengthening/mobility   - Keep Call bell within reach  - Keep bed low and locked with side rails adjusted as appropriate  - Keep care items and personal belongings within reach  - Initiate and maintain comfort rounds  - Make Fall Risk Sign visible to staff  - Apply yellow socks and bracelet for high fall risk patients  - Consider moving patient to room near nurses station  Outcome: Progressing  Goal: Maintain or return to baseline ADL function  Description: INTERVENTIONS:  -  Assess patient's ability to carry out ADLs; assess patient's baseline for ADL function and identify physical deficits which impact ability to perform ADLs (bathing, care of mouth/teeth, toileting, grooming, dressing, etc.)  - Assess/evaluate cause of self-care deficits   - Assess range of motion  - Assess patient's mobility; develop plan if impaired  - Assess patient's need for assistive devices and provide as appropriate  -  Encourage maximum independence but intervene and supervise when necessary  - Involve family in performance of ADLs  - Assess for home care needs following discharge   - Consider OT consult to assist with ADL evaluation and planning for discharge  - Provide patient education as appropriate  - Monitor functional capacity and physical performance, use of AM PAC & JH-HLM   - Monitor gait, balance and fatigue with ambulation    Outcome: Progressing  Goal: Maintains/Returns to pre admission functional level  Description: INTERVENTIONS:  - Perform AM-PAC 6 Click Basic Mobility/ Daily Activity assessment daily.  - Set and communicate daily mobility goal to care team and patient/family/caregiver.   - Collaborate with rehabilitation services on mobility goals if consulted  - Perform Range of Motion 3 times a day.  - Reposition patient every 2 hours.  - Dangle patient 3 times a day  - Stand patient 3 times a day  - Ambulate patient 3 times a day  - Out of bed to chair 3 times a day   - Out of bed for meals 3 times a day  - Out of bed for toileting  - Record patient progress and toleration of activity level   Outcome: Progressing     Problem: DISCHARGE PLANNING  Goal: Discharge to home or other facility with appropriate resources  Description: INTERVENTIONS:  - Identify barriers to discharge w/patient and caregiver  - Arrange for needed discharge resources and transportation as appropriate  - Identify discharge learning needs (meds, wound care, etc.)  - Arrange for interpretive services to assist at discharge as needed  - Refer to Case Management Department for coordinating discharge planning if the patient needs post-hospital services based on physician/advanced practitioner order or complex needs related to functional status, cognitive ability, or social support system  Outcome: Progressing     Problem: Knowledge Deficit  Goal: Patient/family/caregiver demonstrates understanding of disease process, treatment plan,  medications, and discharge instructions  Description: Complete learning assessment and assess knowledge base.  Interventions:  - Provide teaching at level of understanding  - Provide teaching via preferred learning methods  Outcome: Progressing     Problem: Nutrition/Hydration-ADULT  Goal: Nutrient/Hydration intake appropriate for improving, restoring or maintaining nutritional needs  Description: Monitor and assess patient's nutrition/hydration status for malnutrition. Collaborate with interdisciplinary team and initiate plan and interventions as ordered.  Monitor patient's weight and dietary intake as ordered or per policy. Utilize nutrition screening tool and intervene as necessary. Determine patient's food preferences and provide high-protein, high-caloric foods as appropriate.     INTERVENTIONS:  - Monitor oral intake, urinary output, labs, and treatment plans  - Assess nutrition and hydration status and recommend course of action  - Evaluate amount of meals eaten  - Assist patient with eating if necessary   - Allow adequate time for meals  - Recommend/ encourage appropriate diets, oral nutritional supplements, and vitamin/mineral supplements  - Order, calculate, and assess calorie counts as needed  - Recommend, monitor, and adjust tube feedings and TPN/PPN based on assessed needs  - Assess need for intravenous fluids  - Provide specific nutrition/hydration education as appropriate  - Include patient/family/caregiver in decisions related to nutrition  Outcome: Progressing     Problem: NEUROSENSORY - ADULT  Goal: Achieves stable or improved neurological status  Description: INTERVENTIONS  - Monitor and report changes in neurological status  - Monitor vital signs such as temperature, blood pressure, glucose, and any other labs ordered   - Initiate measures to prevent increased intracranial pressure  - Monitor for seizure activity and implement precautions if appropriate      Outcome: Progressing  Goal: Remains  free of injury related to seizures activity  Description: INTERVENTIONS  - Maintain airway, patient safety  and administer oxygen as ordered  - Monitor patient for seizure activity, document and report duration and description of seizure to physician/advanced practitioner  - If seizure occurs,  ensure patient safety during seizure  - Reorient patient post seizure  - Seizure pads on all 4 side rails  - Instruct patient/family to notify RN of any seizure activity including if an aura is experienced  - Instruct patient/family to call for assistance with activity based on nursing assessment  - Administer anti-seizure medications if ordered    Outcome: Progressing  Goal: Achieves maximal functionality and self care  Description: INTERVENTIONS  - Monitor swallowing and airway patency with patient fatigue and changes in neurological status  - Encourage and assist patient to increase activity and self care.   - Encourage visually impaired, hearing impaired and aphasic patients to use assistive/communication devices  Outcome: Progressing     Problem: CARDIOVASCULAR - ADULT  Goal: Maintains optimal cardiac output and hemodynamic stability  Description: INTERVENTIONS:  - Monitor I/O, vital signs and rhythm  - Monitor for S/S and trends of decreased cardiac output  - Administer and titrate ordered vasoactive medications to optimize hemodynamic stability  - Assess quality of pulses, skin color and temperature  - Assess for signs of decreased coronary artery perfusion  - Instruct patient to report change in severity of symptoms  Outcome: Progressing  Goal: Absence of cardiac dysrhythmias or at baseline rhythm  Description: INTERVENTIONS:  - Continuous cardiac monitoring, vital signs, obtain 12 lead EKG if ordered  - Administer antiarrhythmic and heart rate control medications as ordered  - Monitor electrolytes and administer replacement therapy as ordered  Outcome: Progressing     Problem: RESPIRATORY - ADULT  Goal: Achieves  optimal ventilation and oxygenation  Description: INTERVENTIONS:  - Assess for changes in respiratory status  - Assess for changes in mentation and behavior  - Position to facilitate oxygenation and minimize respiratory effort  - Oxygen administered by appropriate delivery if ordered  - Initiate smoking cessation education as indicated  - Encourage broncho-pulmonary hygiene including cough, deep breathe, Incentive Spirometry  - Assess the need for suctioning and aspirate as needed  - Assess and instruct to report SOB or any respiratory difficulty  - Respiratory Therapy support as indicated  Outcome: Progressing     Problem: GASTROINTESTINAL - ADULT  Goal: Minimal or absence of nausea and/or vomiting  Description: INTERVENTIONS:  - Administer IV fluids if ordered to ensure adequate hydration  - Maintain NPO status until nausea and vomiting are resolved  - Nasogastric tube if ordered  - Administer ordered antiemetic medications as needed  - Provide nonpharmacologic comfort measures as appropriate  - Advance diet as tolerated, if ordered  - Consider nutrition services referral to assist patient with adequate nutrition and appropriate food choices  Outcome: Progressing  Goal: Maintains or returns to baseline bowel function  Description: INTERVENTIONS:  - Assess bowel function  - Encourage oral fluids to ensure adequate hydration  - Administer IV fluids if ordered to ensure adequate hydration  - Administer ordered medications as needed  - Encourage mobilization and activity  - Consider nutritional services referral to assist patient with adequate nutrition and appropriate food choices  Outcome: Progressing  Goal: Maintains adequate nutritional intake  Description: INTERVENTIONS:  - Monitor percentage of each meal consumed  - Identify factors contributing to decreased intake, treat as appropriate  - Assist with meals as needed  - Monitor I&O, weight, and lab values if indicated  - Obtain nutrition services referral as  needed  Outcome: Progressing  Goal: Establish and maintain optimal ostomy function  Description: INTERVENTIONS:  - Assess bowel function  - Encourage oral fluids to ensure adequate hydration  - Administer IV fluids if ordered to ensure adequate hydration   - Administer ordered medications as needed  - Encourage mobilization and activity  - Nutrition services referral to assist patient with appropriate food choices  - Assess stoma site  - Consider wound care consult   Outcome: Progressing  Goal: Oral mucous membranes remain intact  Description: INTERVENTIONS  - Assess oral mucosa and hygiene practices  - Implement preventative oral hygiene regimen  - Implement oral medicated treatments as ordered  - Initiate Nutrition services referral as needed  Outcome: Progressing     Problem: GENITOURINARY - ADULT  Goal: Maintains or returns to baseline urinary function  Description: INTERVENTIONS:  - Assess urinary function  - Encourage oral fluids to ensure adequate hydration if ordered  - Administer IV fluids as ordered to ensure adequate hydration  - Administer ordered medications as needed  - Offer frequent toileting  - Follow urinary retention protocol if ordered  Outcome: Progressing  Goal: Absence of urinary retention  Description: INTERVENTIONS:  - Assess patient’s ability to void and empty bladder  - Monitor I/O  - Bladder scan as needed  - Discuss with physician/AP medications to alleviate retention as needed  - Discuss catheterization for long term situations as appropriate  Outcome: Progressing  Goal: Urinary catheter remains patent  Description: INTERVENTIONS:  - Assess patency of urinary catheter  - If patient has a chronic viramontes, consider changing catheter if non-functioning  - Follow guidelines for intermittent irrigation of non-functioning urinary catheter  Outcome: Progressing     Problem: METABOLIC, FLUID AND ELECTROLYTES - ADULT  Goal: Electrolytes maintained within normal limits  Description:  INTERVENTIONS:  - Monitor labs and assess patient for signs and symptoms of electrolyte imbalances  - Administer electrolyte replacement as ordered  - Monitor response to electrolyte replacements, including repeat lab results as appropriate  - Instruct patient on fluid and nutrition as appropriate  Outcome: Progressing  Goal: Fluid balance maintained  Description: INTERVENTIONS:  - Monitor labs   - Monitor I/O and WT  - Instruct patient on fluid and nutrition as appropriate  - Assess for signs & symptoms of volume excess or deficit  Outcome: Progressing  Goal: Glucose maintained within target range  Description: INTERVENTIONS:  - Monitor Blood Glucose as ordered  - Assess for signs and symptoms of hyperglycemia and hypoglycemia  - Administer ordered medications to maintain glucose within target range  - Assess nutritional intake and initiate nutrition service referral as needed  Outcome: Progressing     Problem: SKIN/TISSUE INTEGRITY - ADULT  Goal: Skin Integrity remains intact(Skin Breakdown Prevention)  Description: Assess:  -Perform [place] assessment every shift  -Clean and moisturize skin every shift  -Inspect skin when repositioning, toileting, and assisting with ADLS    -Assess extremities for adequate circulation and sensation     Bed Management:  -Have minimal linens on bed & keep smooth, unwrinkled  -Change linens as needed when moist or perspiring  -Avoid sitting or lying in one position for more than 2 hours while in bed  -Keep HOB at 30 degrees     Toileting:  -Offer bedside commode  -Assess for incontinence every 4  -Use incontinent care products after each incontinent episode such as blue pads    Activity:  -Mobilize patient 3 times a day  -Encourage activity and walks on unit  -Encourage or provide ROM exercises   -Turn and reposition patient every 2 Hours  -Use appropriate equipment to lift or move patient in bed  -Instruct/ Assist with weight shifting every 30 miutes when out of bed in  chair  -Consider limitation of chair time 4 hour intervals    Skin Care:  -Avoid use of baby powder, tape, friction and shearing, hot water or constrictive clothing  -Relieve pressure over bony prominences using foam dsg  -Do not massage red bony areas      Goal: Incision(s), wounds(s) or drain site(s) healing without S/S of infection  Description: INTERVENTIONS  - Assess and document dressing, incision, wound bed, drain sites and surrounding tissue  - Provide patient and family education  - Perform skin care/dressing changes every shift  Outcome: Progressing  Goal: Pressure injury heals and does not worsen  Description: Interventions:  - Implement low air loss mattress or specialty surface (Criteria met)  - Apply silicone foam dressing  - Instruct/assist with weight shifting every 30 minutes when in chair   - Limit chair time to 4 hour intervals  - Use special pressure reducing interventions such as cushion when in chair   - Apply fecal or urinary incontinence containment device   - Perform passive or active ROM every shift  - Turn and reposition patient & offload bony prominences every 2 hours   - Utilize friction reducing device or surface for transfers  - Use incontinent care products after each incontinent episode such as blue pads  - Consider nutrition services referral as needed  Outcome: Progressing     Problem: HEMATOLOGIC - ADULT  Goal: Maintains hematologic stability  Description: INTERVENTIONS  - Assess for signs and symptoms of bleeding or hemorrhage  - Monitor labs  - Administer supportive blood products/factors as ordered and appropriate  Outcome: Progressing     Problem: MUSCULOSKELETAL - ADULT  Goal: Maintain or return mobility to safest level of function  Description: INTERVENTIONS:  - Assess patient's ability to carry out ADLs; assess patient's baseline for ADL function and identify physical deficits which impact ability to perform ADLs (bathing, care of mouth/teeth, toileting, grooming, dressing,  etc.)  - Assess/evaluate cause of self-care deficits   - Assess range of motion  - Assess patient's mobility  - Assess patient's need for assistive devices and provide as appropriate  - Encourage maximum independence but intervene and supervise when necessary  - Involve family in performance of ADLs  - Assess for home care needs following discharge   - Consider OT consult to assist with ADL evaluation and planning for discharge  - Provide patient education as appropriate  Outcome: Progressing  Goal: Maintain proper alignment of affected body part  Description: INTERVENTIONS:  - Support, maintain and protect limb and body alignment  - Provide patient/ family with appropriate education  Outcome: Progressing     Problem: Prexisting or High Potential for Compromised Skin Integrity  Goal: Skin integrity is maintained or improved  Description: INTERVENTIONS:  - Identify patients at risk for skin breakdown  - Assess and monitor skin integrity including under and around medical devices   - Assess and monitor nutrition and hydration status  - Monitor labs  - Assess for incontinence   - Turn and reposition patient  - Assist with mobility/ambulation  - Relieve pressure over [place] prominences   - Avoid friction and shearing  - Provide appropriate hygiene as needed including keeping skin clean and dry  - Evaluate need for skin moisturizer/barrier cream  - Collaborate with interdisciplinary team  - Patient/family teaching  - Consider wound care consult

## 2025-07-11 NOTE — ASSESSMENT & PLAN NOTE
On admission tachypnic, tachycardic, hypotensive, hypoxemic  Presentation and history consistent with pneumonia althog CXR with no clear infiltrate  Improved with IVF and antibiotics and steroids (with wheezing on exam- likely exacerbation of previously undiagnosed COPD)  Day 4 Ceftriaxone/Azithromycin- got azithromycin got 500 mg x 3 days.  Micro unrevealing

## 2025-07-11 NOTE — PLAN OF CARE
Problem: PAIN - ADULT  Goal: Verbalizes/displays adequate comfort level or baseline comfort level  Description: Interventions:  - Encourage patient to monitor pain and request assistance  - Assess pain using appropriate pain scale  - Administer analgesics as ordered based on type and severity of pain and evaluate response  - Implement non-pharmacological measures as appropriate and evaluate response  - Consider cultural and social influences on pain and pain management  - Notify physician/advanced practitioner if interventions unsuccessful or patient reports new pain  - Educate patient/family on pain management process including their role and importance of  reporting pain   - Provide non-pharmacologic/complimentary pain relief interventions  Outcome: Progressing   Patient has no reports of pain at this time

## 2025-07-11 NOTE — NURSING NOTE
Went over discharge info with patient and with patients , all questions answered. Patients belongings were returned from the safe by security. PCA took patient down to main lobby via wheel chair.

## 2025-07-11 NOTE — ASSESSMENT & PLAN NOTE
Lab Results   Component Value Date    HGBA1C 5.0 04/01/2025       Recent Labs     07/10/25  0712 07/10/25  1101 07/10/25  1549 07/10/25  2046   POCGLU 87 134 176* 185*       Blood Sugar Average: Last 72 hrs:  (P) [id number]

## 2025-07-11 NOTE — ASSESSMENT & PLAN NOTE
No formal diagnosis of COPD  Had wheezing now resolved today  Prednisone 40 mg x 5 total days  Xopenex/Atrovent TID for now  On discharge will need albuterol inhaler and pulmonary follow-up (scheduled for 8/27) for further evaluation, PFTs, lung cancer screening, etc

## 2025-07-11 NOTE — PROGRESS NOTES
Progress Note - Pulmonology   Name: Rama Linda 75 y.o. female I MRN: [id number]  Unit/Bed#: -01 I Date of Admission: 7/7/2025   Date of Service: 7/10/2025 I Hospital Day: 3    Assessment & Plan  Sepsis due to pneumonia (HCC)  On admission tachypnic, tachycardic, hypotensive, hypoxemic  Presentation and history consistent with pneumonia althoguh CXR with no clear infiltrate  Improved with IVF and antibiotics and steroids (with wheezing on exam- likely exacerbation of previously undiagnosed COPD)  Day 4 Ceftriaxone/Azithromycin- got azithromycin got 500 mg x 3 days.  Micro unrevealing  COPD exacerbation (HCC)  No formal diagnosis of COPD  Had wheezing now resolved today  Prednisone 40 mg x 5 total days  Xopenex/Atrovent TID for now  On discharge will need albuterol inhaler and pulmonary follow-up (scheduled for 8/27) for further evaluation, PFTs, lung cancer screening, etc  Tobacco use disorder  Smoking cessation counseling  She is motivated to stay off cigarettes- stopped a couple of weeks ago. Declining medications  Acute respiratory failure with hypoxia (HCC)  Wean oxygen for SPO2 >88%- oxygen eval on discharge  Type 2 diabetes mellitus with complication, without long-term current use of insulin (HCC)  Lab Results   Component Value Date    HGBA1C 5.0 04/01/2025       Recent Labs     07/10/25  0712 07/10/25  1101 07/10/25  1549 07/10/25  2046   POCGLU 87 134 176* 185*       Blood Sugar Average: Last 72 hrs:  (P) [id number]      24 Hour Events : feeling better  Subjective : feeling better. Less short of breath      Objective :  Temp:  [97.8 °F (36.6 °C)-98.5 °F (36.9 °C)] 98.5 °F (36.9 °C)  HR:  [56-73] 73  BP: (119-127)/(56-65) 119/56  Resp:  [16] 16  SpO2:  [88 %-95 %] 94 %  O2 Device: Nasal cannula  Nasal Cannula O2 Flow Rate (L/min):  [2 L/min-3 L/min] 3 L/min    Physical Exam  Vitals and nursing note reviewed.   Constitutional:       General: She is not in acute distress.     Appearance: She  is well-developed.   HENT:      Head: Normocephalic and atraumatic.     Eyes:      Conjunctiva/sclera: Conjunctivae normal.       Cardiovascular:      Rate and Rhythm: Normal rate and regular rhythm.      Heart sounds: No murmur heard.  Pulmonary:      Effort: Pulmonary effort is normal. No respiratory distress.      Breath sounds: Normal breath sounds. No wheezing.   Abdominal:      Palpations: Abdomen is soft.      Tenderness: There is no abdominal tenderness.     Musculoskeletal:         General: No swelling.      Cervical back: Neck supple.      Right lower leg: No edema.      Left lower leg: No edema.     Skin:     General: Skin is warm and dry.      Capillary Refill: Capillary refill takes less than 2 seconds.     Neurological:      Mental Status: She is alert.     Psychiatric:         Mood and Affect: Mood normal.           Lab Results: I have reviewed the following results:   .     07/10/25  0453   WBC 10.00   HGB 12.4   HCT 38.5      SODIUM 141   K 3.9      CO2 33*   BUN 30*   CREATININE 0.67   GLUC 95   MG 1.8*   PHOS 2.8     ABG: No new results in last 24 hours.    Imaging Results Review: I personally reviewed the following image studies in PACS and associated radiology reports: chest xray. My interpretation of the radiology images/reports is: R lower lung field opacity.  Other Study Results Review: EKG was reviewed.   PFT Results Reviewed: none for review

## 2025-07-11 NOTE — ASSESSMENT & PLAN NOTE
Likely exacerbation secondary to pneumonia  Continue prednisone 40 mg oral daily  Respiratory protocol  Atrovent and Xopenex  Pulmonology following  Discharge home on a 5-day course of prednisone

## 2025-07-11 NOTE — ASSESSMENT & PLAN NOTE
Present on admission as evidenced by tachycardia and tachypnea  Source of infection likely multifocal pneumonia  Complicated by acute hypoxic respiratory failure  Afebrile and without leukocytosis  Procalcitonin downtrending  Flu, COVID, RSV PCR negative  Continue ceftriaxone and azithromycin  Trend fever curve/WBC count/procalcitonin  Follow-up blood cultures  De-escalate antibiotics as appropriate  Discharge home on a 3-day course of azithromycin

## 2025-07-11 NOTE — CASE MANAGEMENT
Case Management Discharge Planning Note    Patient name Rama Ahumada /-01 MRN [id number]  : 1950 Date 2025       Current Admission Date: 2025  Current Admission Diagnosis:Sepsis due to pneumonia (HCC)   Patient Active Problem List    Diagnosis Date Noted    Tobacco use disorder 2025    Acute respiratory failure with hypoxia (HCC) 2025    Left lower quadrant abdominal mass 2024    Protein-calorie malnutrition, unspecified severity (HCC) 2024    RLS (restless legs syndrome) 2024    Tinea cruris 2024    Peripheral edema 2020    Lumbosacral radiculitis 06/10/2020    Chronic pain disorder 2020    Lumbar post-laminectomy syndrome 2020    Recurrent major depressive disorder, in partial remission ([facility]) 2019    COPD exacerbation ([facility]) 2019    Esophageal dysphagia 2018    Heart murmur 2018    Type 2 diabetes mellitus with complication, without long-term current use of insulin (HCC) 2018    Sepsis due to pneumonia ([facility]) 2018    Chronic lower back pain 2016    Anxiety 2016    Vitamin D deficiency 2016    Primary hypertension 2015    Hypercholesteremia 2015    Restless legs syndrome (RLS) 2015      LOS (days): 4  Geometric Mean LOS (GMLOS) (days): 4.9  Days to GMLOS:1.3     OBJECTIVE:  Risk of Unplanned Readmission Score: 13.01     Current admission status: Inpatient   Preferred Pharmacy:   [facility] - [address]  [address] [address]  Phone: [phone] Fax: [phone]    Primary Care Provider: Leann eMjía PA-C    Primary Insurance: MEDICARE  Secondary Insurance: [facility]    DISCHARGE DETAILS:    Requested Home Health Care         Is the patient interested in HHC at discharge?: No    DME Referral Provided  Referral made for DME?: Yes  DME referral completed for the following items::  Home Oxygen concentrator  DME Supplier Name:: [facility]    Treatment Team Recommendation: Home  Expected Discharge Disposition: Home or Self Care     Transport at Discharge : Family     IMM Given (Date):: 07/11/25  IMM Given to:: Patient (reviewed the IMM with the pt who is in agreement with same)  Reviewed the IMM with the pt who is in agreement with same.  Pt has been cleared by [facility] for DC.  Pt had a desat study and requires home oxygen.  Sent order via [employer] for same.  Oxygen has been approved by [employer].  Provided a POC to the pt.  Spouse will transport home.  Pt indicated she had been walking the hayes without any difficulty.

## 2025-07-11 NOTE — RESPIRATORY THERAPY NOTE
Home Oxygen Qualifying Test     Patient name: Rama Linda        : 1950   Date of Test:  2025  Diagnosis:    Home Oxygen Test:    **Medicare Guidelines require item(s) 1-5 on all ambulatory patients or 1 and 2 on non-ambulatory patients.    1. Baseline SPO2 on Room Air at rest 87 %   If <= 88% on Room Air add O2 via NC to obtain SpO2 >=88%. If LPM needed, document LPM 2 needed to reach =>88%    SPO2 during exertion on Room Air  87 %  During exertion monitor SPO2. If SPO2 increases >=89%, do not add supplemental oxygen    SPO2 on Oxygen at Rest 92 % at 2 LPM    SPO2 during exertion on Oxygen 90 % at 2 LPM    Test performed during exertion activity.      [x]  Supplemental Home Oxygen is indicated.    []  Client does not qualify for home oxygen.    Respiratory Additional Notes- Pt required 2L O2 for rest and exertion    Anna Burciaga, RT

## 2025-07-12 LAB
BACTERIA BLD CULT: NORMAL
BACTERIA BLD CULT: NORMAL

## 2025-07-14 ENCOUNTER — TRANSITIONAL CARE MANAGEMENT (OUTPATIENT)
Dept: INTERNAL MEDICINE CLINIC | Facility: CLINIC | Age: 75
End: 2025-07-14

## 2025-07-14 ENCOUNTER — TELEPHONE (OUTPATIENT)
Dept: INTERNAL MEDICINE CLINIC | Facility: CLINIC | Age: 75
End: 2025-07-14

## 2025-07-14 LAB

## 2025-07-17 ENCOUNTER — OFFICE VISIT (OUTPATIENT)
Dept: INTERNAL MEDICINE CLINIC | Facility: CLINIC | Age: 75
End: 2025-07-17
Payer: MEDICARE

## 2025-07-17 VITALS
HEIGHT: 64 IN | SYSTOLIC BLOOD PRESSURE: 116 MMHG | HEART RATE: 62 BPM | DIASTOLIC BLOOD PRESSURE: 70 MMHG | BODY MASS INDEX: 20.14 KG/M2 | OXYGEN SATURATION: 96 % | TEMPERATURE: 97.3 F | WEIGHT: 118 LBS

## 2025-07-17 DIAGNOSIS — J44.9 CHRONIC OBSTRUCTIVE PULMONARY DISEASE, UNSPECIFIED COPD TYPE (HCC): Primary | ICD-10-CM

## 2025-07-17 PROCEDURE — 99496 TRANSJ CARE MGMT HIGH F2F 7D: CPT | Performed by: PHYSICIAN ASSISTANT

## 2025-07-17 RX ORDER — FLUTICASONE PROPIONATE AND SALMETEROL 250; 50 UG/1; UG/1
1 POWDER RESPIRATORY (INHALATION) 2 TIMES DAILY
Qty: 60 BLISTER | Refills: 2 | Status: SHIPPED | OUTPATIENT
Start: 2025-07-17

## 2025-07-20 PROBLEM — J96.01 ACUTE RESPIRATORY FAILURE WITH HYPOXIA (HCC): Status: RESOLVED | Noted: 2025-07-07 | Resolved: 2025-07-20

## 2025-07-20 NOTE — PROGRESS NOTES
Transition of Care Visit:  Name: Rama Linda      : 1950      MRN: [id number]  Encounter Provider: Leann Mejía PA-C  Encounter Date: 2025   Encounter department: [facility]    Assessment & Plan  Chronic obstructive pulmonary disease, unspecified COPD type (HCC)  Doing better overall. Has appt with pulmonary scheduled. Start Advair. Continue without smoking. Directions for use and possible side effects discussed and patient verbalized understanding of these.    Orders:  •  Fluticasone-Salmeterol (Advair Diskus) 250-50 mcg/dose inhaler; Inhale 1 puff 2 (two) times a day Rinse mouth after use.      Depression Screening and Follow-up Plan: Patient was screened for depression during today's encounter. They screened negative with a PHQ-9 score of 0.      Urinary Incontinence Plan of Care: counseling topics discussed: practice Kegel (pelvic floor strengthening) exercises and use restroom every 2 hours.         History of Present Illness     Transitional Care Management Review:   Rama Linda is a 75 y.o. female here for TCM follow up.     During the TCM phone call patient stated:  TCM Call (since 2025)     Date and time call was made  2025  7:36 AM    Hospital care reviewed  Records reviewed    Patient was hospitialized at  [facility]    Date of Admission  25    Date of discharge  25    Diagnosis  sepsis due to pnemonia    Disposition  Home    Were the patients medications reviewed and updated  Yes    Current Symptoms  None      TCM Call (since 2025)     Post hospital issues  None    Scheduled for follow up?  Yes    Did you obtain your prescribed medications  Yes    Do you need help managing your prescriptions or medications  No    Is transportation to your appointment needed  No    I have advised the patient to call PCP with any new or worsening symptoms  YANNI Price        Pt presents for TCM. She was experiencing SOB and cough and fever x  "1 week prompting evaluation at the hospital and subsequent admission on 7/7. She had CXR which showed possible pneumonia. She met sepsis criteria with tachycardia and tachypnea. It was felt she had possible pneumonia vs COPD exacerbation. She was consulted by pulmonary She was given 2L O2. She was given zithromax and rocephin as well as prednisone. She was also given breathing treatments. She improved and was discharged home on 7/11. She is feeling better but tired. She has appt with PFTs. She has no maintenance inhalers but would like to start one.       Review of Systems   Constitutional:  Positive for fatigue. Negative for chills and fever.   HENT:  Negative for congestion, ear pain, hearing loss, postnasal drip, rhinorrhea, sinus pressure, sinus pain, sore throat and trouble swallowing.    Eyes:  Negative for pain and visual disturbance.   Respiratory:  Negative for cough, chest tightness, shortness of breath and wheezing.    Cardiovascular: Negative.  Negative for chest pain, palpitations and leg swelling.   Gastrointestinal:  Negative for abdominal pain, blood in stool, constipation, diarrhea, nausea and vomiting.   Endocrine: Negative for cold intolerance, heat intolerance, polydipsia, polyphagia and polyuria.   Genitourinary:  Negative for difficulty urinating, dysuria, flank pain and urgency.   Musculoskeletal:  Negative for arthralgias, back pain, gait problem and myalgias.   Skin:  Negative for rash.   Allergic/Immunologic: Negative.    Neurological:  Negative for dizziness, weakness, light-headedness and headaches.   Hematological: Negative.    Psychiatric/Behavioral:  Negative for behavioral problems, dysphoric mood and sleep disturbance. The patient is not nervous/anxious.      Objective   /70 (BP Location: Left arm, Patient Position: Sitting, Cuff Size: Large)   Pulse 62   Temp (!) 97.3 °F (36.3 °C)   Ht 5' 4\" (1.626 m)   Wt 53.5 kg (118 lb)   SpO2 96%   BMI 20.25 kg/m²     Physical " Exam  Vitals and nursing note reviewed.   Constitutional:       General: She is not in acute distress.     Appearance: Normal appearance. She is well-developed. She is not diaphoretic.   HENT:      Head: Normocephalic and atraumatic.      Right Ear: External ear normal.      Left Ear: External ear normal.      Nose: Nose normal.      Mouth/Throat:      Pharynx: No oropharyngeal exudate.     Eyes:      General: No scleral icterus.        Right eye: No discharge.         Left eye: No discharge.      Conjunctiva/sclera: Conjunctivae normal.      Pupils: Pupils are equal, round, and reactive to light.     Neck:      Thyroid: No thyromegaly.     Cardiovascular:      Rate and Rhythm: Normal rate and regular rhythm.      Heart sounds: Normal heart sounds. No murmur heard.     No friction rub. No gallop.   Pulmonary:      Effort: Pulmonary effort is normal. No respiratory distress.      Breath sounds: Normal breath sounds. No wheezing or rales.   Abdominal:      General: Bowel sounds are normal. There is no distension.      Palpations: Abdomen is soft.      Tenderness: There is no abdominal tenderness.     Musculoskeletal:         General: No tenderness or deformity. Normal range of motion.      Cervical back: Normal range of motion and neck supple.     Skin:     General: Skin is warm and dry.     Neurological:      Mental Status: She is alert and oriented to person, place, and time.      Cranial Nerves: No cranial nerve deficit.     Psychiatric:         Behavior: Behavior normal.         Thought Content: Thought content normal.         Judgment: Judgment normal.       Medications have been reviewed by provider in current encounter

## 2025-07-20 NOTE — ASSESSMENT & PLAN NOTE
Doing better overall. Has appt with pulmonary scheduled. Start Advair. Continue without smoking. Directions for use and possible side effects discussed and patient verbalized understanding of these.    Orders:  •  Fluticasone-Salmeterol (Advair Diskus) 250-50 mcg/dose inhaler; Inhale 1 puff 2 (two) times a day Rinse mouth after use.

## 2025-07-31 DIAGNOSIS — J44.1 COPD EXACERBATION (HCC): ICD-10-CM

## 2025-08-01 DIAGNOSIS — M54.50 CHRONIC BILATERAL LOW BACK PAIN WITHOUT SCIATICA: ICD-10-CM

## 2025-08-01 DIAGNOSIS — G89.29 CHRONIC BILATERAL LOW BACK PAIN WITHOUT SCIATICA: ICD-10-CM

## 2025-08-01 DIAGNOSIS — G25.81 RLS (RESTLESS LEGS SYNDROME): ICD-10-CM

## 2025-08-01 RX ORDER — ALBUTEROL SULFATE 90 UG/1
2 INHALANT RESPIRATORY (INHALATION) EVERY 6 HOURS PRN
Qty: 9 G | Refills: 2 | Status: SHIPPED | OUTPATIENT
Start: 2025-08-01

## 2025-08-04 RX ORDER — METHOCARBAMOL 750 MG/1
750 TABLET, FILM COATED ORAL EVERY 8 HOURS
Qty: 90 TABLET | Refills: 0 | Status: SHIPPED | OUTPATIENT
Start: 2025-08-04

## 2025-08-04 RX ORDER — CLONAZEPAM 2 MG/1
2 TABLET ORAL 2 TIMES DAILY
Qty: 60 TABLET | Refills: 0 | Status: SHIPPED | OUTPATIENT
Start: 2025-08-04

## 2025-08-07 PROBLEM — A41.9 SEPSIS DUE TO PNEUMONIA (HCC): Status: RESOLVED | Noted: 2018-04-06 | Resolved: 2025-08-07

## 2025-08-07 PROBLEM — J18.9 SEPSIS DUE TO PNEUMONIA (HCC): Status: RESOLVED | Noted: 2018-04-06 | Resolved: 2025-08-07

## 2025-08-08 ENCOUNTER — OFFICE VISIT (OUTPATIENT)
Dept: INTERNAL MEDICINE CLINIC | Facility: CLINIC | Age: 75
End: 2025-08-08
Payer: MEDICARE

## 2025-08-08 VITALS
HEART RATE: 72 BPM | TEMPERATURE: 95.7 F | BODY MASS INDEX: 22.18 KG/M2 | OXYGEN SATURATION: 95 % | SYSTOLIC BLOOD PRESSURE: 120 MMHG | DIASTOLIC BLOOD PRESSURE: 68 MMHG | WEIGHT: 129.2 LBS

## 2025-08-08 DIAGNOSIS — Z13.29 SCREENING FOR THYROID DISORDER: ICD-10-CM

## 2025-08-08 DIAGNOSIS — E55.9 VITAMIN D DEFICIENCY: ICD-10-CM

## 2025-08-08 DIAGNOSIS — Z12.31 ENCOUNTER FOR SCREENING MAMMOGRAM FOR BREAST CANCER: ICD-10-CM

## 2025-08-08 DIAGNOSIS — E11.8 TYPE 2 DIABETES MELLITUS WITH COMPLICATION, WITHOUT LONG-TERM CURRENT USE OF INSULIN (HCC): ICD-10-CM

## 2025-08-08 DIAGNOSIS — E78.00 HYPERCHOLESTEREMIA: Chronic | ICD-10-CM

## 2025-08-08 DIAGNOSIS — K43.9 SPIGELIAN HERNIA: Primary | ICD-10-CM

## 2025-08-08 DIAGNOSIS — Z13.0 SCREENING FOR DEFICIENCY ANEMIA: ICD-10-CM

## 2025-08-08 LAB — SL AMB POCT HEMOGLOBIN AIC: 5.5 (ref ?–6.5)

## 2025-08-08 PROCEDURE — 99213 OFFICE O/P EST LOW 20 MIN: CPT | Performed by: PHYSICIAN ASSISTANT

## 2025-08-08 PROCEDURE — 2028F FOOT EXAM PERFORMED: CPT | Performed by: PHYSICIAN ASSISTANT

## 2025-08-08 PROCEDURE — G2211 COMPLEX E/M VISIT ADD ON: HCPCS | Performed by: PHYSICIAN ASSISTANT

## 2025-08-08 PROCEDURE — 83036 HEMOGLOBIN GLYCOSYLATED A1C: CPT | Performed by: PHYSICIAN ASSISTANT
